# Patient Record
Sex: MALE | Race: WHITE | NOT HISPANIC OR LATINO | URBAN - METROPOLITAN AREA
[De-identification: names, ages, dates, MRNs, and addresses within clinical notes are randomized per-mention and may not be internally consistent; named-entity substitution may affect disease eponyms.]

---

## 2023-04-18 LAB
ALANINE AMINOTRANSFERASE (SGPT) (U/L) IN SER/PLAS: 20 U/L (ref 10–52)
ALBUMIN (G/DL) IN SER/PLAS: 4.4 G/DL (ref 3.4–5)
ALKALINE PHOSPHATASE (U/L) IN SER/PLAS: 72 U/L (ref 33–120)
ANION GAP IN SER/PLAS: 11 MMOL/L (ref 10–20)
APPEARANCE, URINE: CLEAR
ASPARTATE AMINOTRANSFERASE (SGOT) (U/L) IN SER/PLAS: 22 U/L (ref 9–39)
BILIRUBIN TOTAL (MG/DL) IN SER/PLAS: 0.6 MG/DL (ref 0–1.2)
BILIRUBIN, URINE: NEGATIVE
BLOOD, URINE: NEGATIVE
CALCIUM (MG/DL) IN SER/PLAS: 9.4 MG/DL (ref 8.6–10.3)
CARBON DIOXIDE, TOTAL (MMOL/L) IN SER/PLAS: 30 MMOL/L (ref 21–32)
CHLORIDE (MMOL/L) IN SER/PLAS: 100 MMOL/L (ref 98–107)
CHOLESTEROL (MG/DL) IN SER/PLAS: 199 MG/DL (ref 0–199)
CHOLESTEROL IN HDL (MG/DL) IN SER/PLAS: 89.4 MG/DL
CHOLESTEROL/HDL RATIO: 2.2
COLOR, URINE: YELLOW
CREATININE (MG/DL) IN SER/PLAS: 1.05 MG/DL (ref 0.5–1.3)
ERYTHROCYTE DISTRIBUTION WIDTH (RATIO) BY AUTOMATED COUNT: 13.4 % (ref 11.5–14.5)
ERYTHROCYTE MEAN CORPUSCULAR HEMOGLOBIN CONCENTRATION (G/DL) BY AUTOMATED: 32.9 G/DL (ref 32–36)
ERYTHROCYTE MEAN CORPUSCULAR VOLUME (FL) BY AUTOMATED COUNT: 95 FL (ref 80–100)
ERYTHROCYTES (10*6/UL) IN BLOOD BY AUTOMATED COUNT: 4.91 X10E12/L (ref 4.5–5.9)
GFR MALE: >90 ML/MIN/1.73M2
GLUCOSE (MG/DL) IN SER/PLAS: 88 MG/DL (ref 74–99)
GLUCOSE, URINE: NEGATIVE MG/DL
HEMATOCRIT (%) IN BLOOD BY AUTOMATED COUNT: 46.8 % (ref 41–52)
HEMOGLOBIN (G/DL) IN BLOOD: 15.4 G/DL (ref 13.5–17.5)
KETONES, URINE: NEGATIVE MG/DL
LDL: 85 MG/DL (ref 0–99)
LEUKOCYTE ESTERASE, URINE: ABNORMAL
LEUKOCYTES (10*3/UL) IN BLOOD BY AUTOMATED COUNT: 7.3 X10E9/L (ref 4.4–11.3)
MUCUS, URINE: NORMAL /LPF
NITRITE, URINE: NEGATIVE
NRBC (PER 100 WBCS) BY AUTOMATED COUNT: 0 /100 WBC (ref 0–0)
PH, URINE: 5 (ref 5–8)
PLATELETS (10*3/UL) IN BLOOD AUTOMATED COUNT: 295 X10E9/L (ref 150–450)
POTASSIUM (MMOL/L) IN SER/PLAS: 4 MMOL/L (ref 3.5–5.3)
PROTEIN TOTAL: 7 G/DL (ref 6.4–8.2)
PROTEIN, URINE: NEGATIVE MG/DL
RBC, URINE: <1 /HPF (ref 0–5)
SODIUM (MMOL/L) IN SER/PLAS: 137 MMOL/L (ref 136–145)
SPECIFIC GRAVITY, URINE: 1.02 (ref 1–1.03)
THYROTROPIN (MIU/L) IN SER/PLAS BY DETECTION LIMIT <= 0.05 MIU/L: 3.17 MIU/L (ref 0.44–3.98)
TRIGLYCERIDE (MG/DL) IN SER/PLAS: 123 MG/DL (ref 0–149)
UREA NITROGEN (MG/DL) IN SER/PLAS: 19 MG/DL (ref 6–23)
UROBILINOGEN, URINE: <2 MG/DL (ref 0–1.9)
VLDL: 25 MG/DL (ref 0–40)
WBC, URINE: <1 /HPF (ref 0–5)

## 2023-04-20 LAB
6-ACETYLMORPHINE: <25 NG/ML
7-AMINOCLONAZEPAM: <25 NG/ML
ALPHA-HYDROXYALPRAZOLAM: <25 NG/ML
ALPHA-HYDROXYMIDAZOLAM: <25 NG/ML
ALPRAZOLAM: <25 NG/ML
AMPHETAMINE (PRESENCE) IN URINE BY SCREEN METHOD: NORMAL
BARBITURATES PRESENCE IN URINE BY SCREEN METHOD: NORMAL
CANNABINOIDS IN URINE BY SCREEN METHOD: NORMAL
CHLORDIAZEPOXIDE: <25 NG/ML
CLONAZEPAM: <25 NG/ML
COCAINE (PRESENCE) IN URINE BY SCREEN METHOD: NORMAL
CODEINE: <50 NG/ML
CREATINE, URINE FOR DRUG: 169.3 MG/DL
DIAZEPAM: <25 NG/ML
DRUG SCREEN COMMENT URINE: NORMAL
EDDP: <25 NG/ML
FENTANYL CONFIRMATION, URINE: <2.5 NG/ML
HYDROCODONE: <25 NG/ML
HYDROMORPHONE: <25 NG/ML
LORAZEPAM: <25 NG/ML
METHADONE CONFIRMATION,URINE: <25 NG/ML
MIDAZOLAM: <25 NG/ML
MORPHINE URINE: <50 NG/ML
NORDIAZEPAM: <25 NG/ML
NORFENTANYL: <2.5 NG/ML
NORHYDROCODONE: <25 NG/ML
NOROXYCODONE: <25 NG/ML
O-DESMETHYLTRAMADOL: <50 NG/ML
OXAZEPAM: <25 NG/ML
OXYCODONE: <25 NG/ML
OXYMORPHONE: <25 NG/ML
PHENCYCLIDINE (PRESENCE) IN URINE BY SCREEN METHOD: NORMAL
TEMAZEPAM: <25 NG/ML
TRAMADOL: <50 NG/ML
ZOLPIDEM METABOLITE (ZCA): <25 NG/ML
ZOLPIDEM: <25 NG/ML

## 2023-06-16 DIAGNOSIS — F41.9 ANXIETY DISORDER, UNSPECIFIED: ICD-10-CM

## 2023-06-16 DIAGNOSIS — J30.9 ALLERGIC RHINITIS, UNSPECIFIED: ICD-10-CM

## 2023-06-16 DIAGNOSIS — I10 ESSENTIAL (PRIMARY) HYPERTENSION: ICD-10-CM

## 2023-06-16 PROBLEM — R59.0 LAD (LYMPHADENOPATHY), CERVICAL: Status: ACTIVE | Noted: 2023-06-16

## 2023-06-16 PROBLEM — R46.81 OBSESSIVE BEHAVIORS: Status: ACTIVE | Noted: 2023-06-16

## 2023-06-16 PROBLEM — I47.10 PSVT (PAROXYSMAL SUPRAVENTRICULAR TACHYCARDIA) (CMS-HCC): Status: ACTIVE | Noted: 2023-06-16

## 2023-06-16 PROBLEM — K50.90 CROHN'S DISEASE (MULTI): Status: ACTIVE | Noted: 2023-06-16

## 2023-06-16 PROBLEM — R76.8 ANA POSITIVE: Status: ACTIVE | Noted: 2023-06-16

## 2023-06-16 PROBLEM — G43.109 MIGRAINE WITH AURA AND WITHOUT STATUS MIGRAINOSUS, NOT INTRACTABLE: Status: ACTIVE | Noted: 2023-06-16

## 2023-06-16 PROBLEM — R41.840 ATTENTION DEFICIT: Status: ACTIVE | Noted: 2023-06-16

## 2023-06-16 PROBLEM — F41.0 PANIC ATTACKS: Status: ACTIVE | Noted: 2023-06-16

## 2023-06-16 RX ORDER — LISINOPRIL 20 MG/1
20 TABLET ORAL DAILY
Qty: 30 TABLET | Refills: 0 | Status: SHIPPED | OUTPATIENT
Start: 2023-06-16 | End: 2023-07-17

## 2023-06-16 RX ORDER — FLUOXETINE HYDROCHLORIDE 40 MG/1
1 CAPSULE ORAL DAILY
COMMUNITY
Start: 2021-04-16 | End: 2023-10-27 | Stop reason: SDUPTHER

## 2023-06-16 RX ORDER — LISINOPRIL 20 MG/1
1 TABLET ORAL DAILY
COMMUNITY
Start: 2020-05-19 | End: 2024-01-04 | Stop reason: SDUPTHER

## 2023-06-16 RX ORDER — MONTELUKAST SODIUM 10 MG/1
1 TABLET ORAL NIGHTLY
COMMUNITY
Start: 2022-06-23

## 2023-06-16 RX ORDER — FLUOXETINE HYDROCHLORIDE 40 MG/1
40 CAPSULE ORAL DAILY
Qty: 30 CAPSULE | Refills: 0 | Status: SHIPPED | OUTPATIENT
Start: 2023-06-16 | End: 2023-07-17

## 2023-06-16 RX ORDER — MONTELUKAST SODIUM 10 MG/1
10 TABLET ORAL NIGHTLY
Qty: 30 TABLET | Refills: 0 | Status: SHIPPED | OUTPATIENT
Start: 2023-06-16 | End: 2023-07-17

## 2023-07-17 DIAGNOSIS — J30.9 ALLERGIC RHINITIS, UNSPECIFIED: ICD-10-CM

## 2023-07-17 DIAGNOSIS — F41.9 ANXIETY DISORDER, UNSPECIFIED: ICD-10-CM

## 2023-07-17 DIAGNOSIS — I10 ESSENTIAL (PRIMARY) HYPERTENSION: ICD-10-CM

## 2023-07-17 RX ORDER — PREDNISONE 20 MG/1
40 TABLET ORAL DAILY
COMMUNITY
Start: 2022-07-28 | End: 2023-08-22 | Stop reason: WASHOUT

## 2023-07-17 RX ORDER — AMOXICILLIN 500 MG/1
500 TABLET, FILM COATED ORAL 3 TIMES DAILY
COMMUNITY
Start: 2023-04-14 | End: 2023-08-22 | Stop reason: WASHOUT

## 2023-07-17 RX ORDER — ALPRAZOLAM 0.5 MG/1
TABLET ORAL
COMMUNITY
Start: 2019-06-26 | End: 2023-08-22 | Stop reason: SDUPTHER

## 2023-07-17 RX ORDER — METHYLPREDNISOLONE 4 MG/1
TABLET ORAL
COMMUNITY
Start: 2023-04-14 | End: 2023-08-22 | Stop reason: WASHOUT

## 2023-07-17 RX ORDER — LISINOPRIL 20 MG/1
TABLET ORAL
Qty: 90 TABLET | Refills: 0 | Status: SHIPPED | OUTPATIENT
Start: 2023-07-17 | End: 2023-08-22 | Stop reason: SDUPTHER

## 2023-07-17 RX ORDER — FLUOXETINE HYDROCHLORIDE 40 MG/1
CAPSULE ORAL
Qty: 90 CAPSULE | Refills: 1 | Status: SHIPPED | OUTPATIENT
Start: 2023-07-17 | End: 2023-08-22 | Stop reason: SDUPTHER

## 2023-07-17 RX ORDER — IBUPROFEN 800 MG/1
800 TABLET ORAL EVERY 8 HOURS PRN
COMMUNITY
Start: 2023-04-14 | End: 2023-08-22 | Stop reason: WASHOUT

## 2023-07-17 RX ORDER — ALBUTEROL SULFATE 90 UG/1
AEROSOL, METERED RESPIRATORY (INHALATION)
COMMUNITY
End: 2024-01-08 | Stop reason: SDUPTHER

## 2023-07-17 RX ORDER — SUMATRIPTAN 50 MG/1
TABLET, FILM COATED ORAL
COMMUNITY
Start: 2022-06-23 | End: 2023-10-23 | Stop reason: SDUPTHER

## 2023-07-17 RX ORDER — MONTELUKAST SODIUM 10 MG/1
10 TABLET ORAL NIGHTLY
Qty: 90 TABLET | Refills: 1 | Status: SHIPPED | OUTPATIENT
Start: 2023-07-17 | End: 2023-08-22 | Stop reason: SDUPTHER

## 2023-07-17 RX ORDER — MOMETASONE FUROATE 100 UG/1
AEROSOL RESPIRATORY (INHALATION) 2 TIMES DAILY
COMMUNITY
Start: 2018-12-21

## 2023-07-17 RX ORDER — AZITHROMYCIN 250 MG/1
TABLET, FILM COATED ORAL
COMMUNITY
Start: 2022-11-04 | End: 2023-08-22 | Stop reason: WASHOUT

## 2023-08-22 ENCOUNTER — OFFICE VISIT (OUTPATIENT)
Dept: PRIMARY CARE | Facility: CLINIC | Age: 43
End: 2023-08-22
Payer: COMMERCIAL

## 2023-08-22 VITALS
DIASTOLIC BLOOD PRESSURE: 70 MMHG | HEART RATE: 82 BPM | SYSTOLIC BLOOD PRESSURE: 122 MMHG | BODY MASS INDEX: 29.04 KG/M2 | WEIGHT: 191 LBS

## 2023-08-22 DIAGNOSIS — R00.2 HEART PALPITATIONS: Primary | ICD-10-CM

## 2023-08-22 DIAGNOSIS — L81.9 HYPOPIGMENTATION: ICD-10-CM

## 2023-08-22 DIAGNOSIS — I47.10 PSVT (PAROXYSMAL SUPRAVENTRICULAR TACHYCARDIA) (CMS-HCC): ICD-10-CM

## 2023-08-22 DIAGNOSIS — K50.918 CROHN'S DISEASE WITH OTHER COMPLICATION, UNSPECIFIED GASTROINTESTINAL TRACT LOCATION (MULTI): ICD-10-CM

## 2023-08-22 DIAGNOSIS — J30.2 SEASONAL ALLERGIES: ICD-10-CM

## 2023-08-22 DIAGNOSIS — F41.9 ANXIETY: ICD-10-CM

## 2023-08-22 PROCEDURE — 93000 ELECTROCARDIOGRAM COMPLETE: CPT | Performed by: INTERNAL MEDICINE

## 2023-08-22 PROCEDURE — 99214 OFFICE O/P EST MOD 30 MIN: CPT | Performed by: INTERNAL MEDICINE

## 2023-08-22 PROCEDURE — 3074F SYST BP LT 130 MM HG: CPT | Performed by: INTERNAL MEDICINE

## 2023-08-22 PROCEDURE — 3078F DIAST BP <80 MM HG: CPT | Performed by: INTERNAL MEDICINE

## 2023-08-22 RX ORDER — ALPRAZOLAM 0.5 MG/1
0.5 TABLET ORAL DAILY PRN
Qty: 20 TABLET | Refills: 0 | Status: SHIPPED | OUTPATIENT
Start: 2023-08-22 | End: 2024-03-12 | Stop reason: SDUPTHER

## 2023-08-22 ASSESSMENT — ENCOUNTER SYMPTOMS
HEADACHES: 0
DIARRHEA: 0
CHEST TIGHTNESS: 0
SHORTNESS OF BREATH: 0
LIGHT-HEADEDNESS: 0
NERVOUS/ANXIOUS: 1
VOMITING: 0
ABDOMINAL PAIN: 0
NAUSEA: 0
RHINORRHEA: 0
PALPITATIONS: 1
CONSTIPATION: 0
COUGH: 0
SINUS PAIN: 0
SINUS PRESSURE: 0
DIZZINESS: 0

## 2023-08-22 NOTE — ASSESSMENT & PLAN NOTE
On Fluoxetine at 40 mg  Pt is reluctant to increase dose  Using Alprazolam as needed  Hoping to cope with mood/anxiety without medications

## 2023-08-22 NOTE — ASSESSMENT & PLAN NOTE
Worsening congestion  Would like pt to start on daily Flonase with his antihistamine   If symptoms worsen will consider kenalog injection

## 2023-08-22 NOTE — PROGRESS NOTES
Subjective   Patient ID: Thai Cristobal is a 42 y.o. male who presents for Follow-up (6m/heart palpitations thinks related to anxiety/allergy shot/rash on forehead).    HPI    Pt here in 6 month follow up.  He tells me since last visit his work has been very stressful.  He stepped down from the position he was at.  He is unsure if he will continue to work there.  He tells me he will be totally fine on weekends but once at work within 1 hour he will feel heart racing/fluttering.  He has had to take Alprazolam to help lessen symptoms.  This has occurred over the last 3-4 weeks.      His breathing is good.  He is using inhaler.  He feels more congested due to seasonal allergies.  He is on Singulair.  He uses some Flonase as well but not consistently.      Review of Systems   HENT:  Positive for congestion. Negative for postnasal drip, rhinorrhea, sinus pressure and sinus pain.    Respiratory:  Negative for cough, chest tightness and shortness of breath.    Cardiovascular:  Positive for palpitations. Negative for chest pain and leg swelling.   Gastrointestinal:  Negative for abdominal pain, constipation, diarrhea, nausea and vomiting.   Neurological:  Negative for dizziness, light-headedness and headaches.   Psychiatric/Behavioral:  The patient is nervous/anxious.        Objective   /70   Pulse 82   Wt 86.6 kg (191 lb)   BMI 29.04 kg/m²    Physical Exam  Constitutional:       Appearance: Normal appearance.   Cardiovascular:      Rate and Rhythm: Normal rate and regular rhythm.      Pulses: Normal pulses.      Heart sounds: Normal heart sounds.   Pulmonary:      Effort: Pulmonary effort is normal.      Breath sounds: Normal breath sounds.   Abdominal:      General: Bowel sounds are normal.      Palpations: Abdomen is soft.   Musculoskeletal:      Right lower leg: No edema.      Left lower leg: No edema.   Lymphadenopathy:      Cervical: No cervical adenopathy.   Skin:     Comments: Hypopigmented spots noted on  forehead    Neurological:      Mental Status: He is alert and oriented to person, place, and time.   Psychiatric:         Mood and Affect: Mood normal.         Behavior: Behavior normal.         Thought Content: Thought content normal.         Judgment: Judgment normal.         Assessment/Plan   Problem List Items Addressed This Visit       Anxiety     On Fluoxetine at 40 mg  Pt is reluctant to increase dose  Using Alprazolam as needed  Hoping to cope with mood/anxiety without medications          Relevant Medications    ALPRAZolam (Xanax) 0.5 mg tablet    Other Relevant Orders    Follow Up In Primary Care - Health Maintenance    Crohn's disease (CMS/[facility])     On infusions every 6 weeks  He follows with GI through [facility]          Relevant Orders    Follow Up In Primary Care - [facility]    PSVT (paroxysmal supraventricular tachycardia) (CMS/[facility])     Feeling it more of late due to stress          Relevant Orders    ECG 12 lead (Completed)    Follow Up In Primary Care - [facility]    Hypopigmentation     Likely secondary to eczema and lack of ability to tan  Would just moisturize area well  Should go away          Relevant Orders    Follow Up In Primary Care - [facility]    Seasonal allergies     Worsening congestion  Would like pt to start on daily Flonase with his antihistamine   If symptoms worsen will consider kenalog injection             Other Visit Diagnoses       Heart palpitations    -  Primary    Relevant Orders    ECG 12 lead (Completed)    Follow Up In Primary Care - Health Maintenance

## 2023-08-22 NOTE — ASSESSMENT & PLAN NOTE
Likely secondary to eczema and lack of ability to tan  Would just moisturize area well  Should go away

## 2023-08-22 NOTE — PATIENT INSTRUCTIONS
"Consider increase in Fluoxetine dose if stress levels remain similar in upcoming months  Consider seeing/talking with counselor (psychology today) to help manage symptoms/stress from work  Use alprazolam but caution frequency as this medication has potential for tolerance/dependence  We did EKG today to make sure heart fluttering is not more than stress/anxiety induced-it was normal   For allergies would add Flonase or other nasal corticosteroids 2 times a day for 1 week then decrease to 1 time daily and be consistent   If your allergy symptoms persist or worsen call to arrange for steroid injection   Hypopigmented spots of forehead are due to eczema (dry patches of skin) and lack of ability to \"tan\"-should go away; use good moisturizer to face to help prevent dryness   Follow up here in 6 months for full physical   "

## 2023-10-23 DIAGNOSIS — G43.109 MIGRAINE WITH AURA AND WITHOUT STATUS MIGRAINOSUS, NOT INTRACTABLE: ICD-10-CM

## 2023-10-23 RX ORDER — SUMATRIPTAN 50 MG/1
50 TABLET, FILM COATED ORAL ONCE AS NEEDED
Qty: 9 TABLET | Refills: 3 | Status: SHIPPED | OUTPATIENT
Start: 2023-10-23

## 2023-10-27 DIAGNOSIS — F41.0 PANIC ATTACKS: Primary | ICD-10-CM

## 2023-10-27 RX ORDER — FLUOXETINE HYDROCHLORIDE 40 MG/1
40 CAPSULE ORAL DAILY
Qty: 90 CAPSULE | Refills: 1 | Status: SHIPPED | OUTPATIENT
Start: 2023-10-27 | End: 2024-01-24 | Stop reason: SDUPTHER

## 2024-01-03 ENCOUNTER — TELEPHONE (OUTPATIENT)
Dept: PRIMARY CARE | Facility: CLINIC | Age: 44
End: 2024-01-03
Payer: COMMERCIAL

## 2024-01-03 DIAGNOSIS — I10 BENIGN ESSENTIAL HTN: Primary | ICD-10-CM

## 2024-01-03 NOTE — TELEPHONE ENCOUNTER
Yes can increase to either 30 mg/day so 1.5 tablets of the 20 mg or can go up to 40 mg/day so 2 tablets of the 20 mg  It comes in 40 mg dose so if he goes to 40 mg/day can send in new rx   Continue home BP checks with goal of <130/80  Want him to call with update in 2 weeks

## 2024-01-03 NOTE — TELEPHONE ENCOUNTER
Thai called today stating BP is elevated, 150/90 130/90, he is taking Lisinopril noticed this yesterday, wants to know if he should  increase his medication? It has  been as high as 160/100 and lowest was 125/80.      Please advise

## 2024-01-03 NOTE — TELEPHONE ENCOUNTER
I spoke with Thai he would like to increase to 40mg, please send rx into [facility] and he will call in 2 weeks to update how he is doing on new dose and BP readings.

## 2024-01-04 RX ORDER — LISINOPRIL 40 MG/1
40 TABLET ORAL DAILY
Qty: 90 TABLET | Refills: 1 | Status: SHIPPED | OUTPATIENT
Start: 2024-01-04

## 2024-01-08 ENCOUNTER — OFFICE VISIT (OUTPATIENT)
Dept: PRIMARY CARE | Facility: CLINIC | Age: 44
End: 2024-01-08
Payer: COMMERCIAL

## 2024-01-08 VITALS
BODY MASS INDEX: 28.24 KG/M2 | DIASTOLIC BLOOD PRESSURE: 100 MMHG | SYSTOLIC BLOOD PRESSURE: 130 MMHG | WEIGHT: 185.7 LBS | HEART RATE: 100 BPM

## 2024-01-08 DIAGNOSIS — I10 ELEVATED HEART RATE WITH ELEVATED BLOOD PRESSURE AND DIAGNOSIS OF HYPERTENSION: ICD-10-CM

## 2024-01-08 DIAGNOSIS — J30.2 SEASONAL ALLERGIES: ICD-10-CM

## 2024-01-08 DIAGNOSIS — R00.9 ELEVATED HEART RATE WITH ELEVATED BLOOD PRESSURE AND DIAGNOSIS OF HYPERTENSION: ICD-10-CM

## 2024-01-08 DIAGNOSIS — R19.7 DIARRHEA, UNSPECIFIED TYPE: Primary | ICD-10-CM

## 2024-01-08 LAB
BASOPHILS # BLD AUTO: 0.08 X10*3/UL (ref 0–0.1)
BASOPHILS NFR BLD AUTO: 1.6 %
EOSINOPHIL # BLD AUTO: 0.14 X10*3/UL (ref 0–0.7)
EOSINOPHIL NFR BLD AUTO: 2.8 %
ERYTHROCYTE [DISTWIDTH] IN BLOOD BY AUTOMATED COUNT: 13 % (ref 11.5–14.5)
HCT VFR BLD AUTO: 47.9 % (ref 41–52)
HGB BLD-MCNC: 16.2 G/DL (ref 13.5–17.5)
IMM GRANULOCYTES # BLD AUTO: 0.02 X10*3/UL (ref 0–0.7)
IMM GRANULOCYTES NFR BLD AUTO: 0.4 % (ref 0–0.9)
LYMPHOCYTES # BLD AUTO: 1.13 X10*3/UL (ref 1.2–4.8)
LYMPHOCYTES NFR BLD AUTO: 22.2 %
MCH RBC QN AUTO: 31.3 PG (ref 26–34)
MCHC RBC AUTO-ENTMCNC: 33.8 G/DL (ref 32–36)
MCV RBC AUTO: 93 FL (ref 80–100)
MONOCYTES # BLD AUTO: 0.68 X10*3/UL (ref 0.1–1)
MONOCYTES NFR BLD AUTO: 13.4 %
NEUTROPHILS # BLD AUTO: 3.03 X10*3/UL (ref 1.2–7.7)
NEUTROPHILS NFR BLD AUTO: 59.6 %
NRBC BLD-RTO: 0 /100 WBCS (ref 0–0)
PLATELET # BLD AUTO: 291 X10*3/UL (ref 150–450)
RBC # BLD AUTO: 5.18 X10*6/UL (ref 4.5–5.9)
WBC # BLD AUTO: 5.1 X10*3/UL (ref 4.4–11.3)

## 2024-01-08 PROCEDURE — 83690 ASSAY OF LIPASE: CPT

## 2024-01-08 PROCEDURE — 36415 COLL VENOUS BLD VENIPUNCTURE: CPT

## 2024-01-08 PROCEDURE — 1036F TOBACCO NON-USER: CPT | Performed by: INTERNAL MEDICINE

## 2024-01-08 PROCEDURE — 80053 COMPREHEN METABOLIC PANEL: CPT

## 2024-01-08 PROCEDURE — 85025 COMPLETE CBC W/AUTO DIFF WBC: CPT

## 2024-01-08 PROCEDURE — 99213 OFFICE O/P EST LOW 20 MIN: CPT | Performed by: INTERNAL MEDICINE

## 2024-01-08 PROCEDURE — 3075F SYST BP GE 130 - 139MM HG: CPT | Performed by: INTERNAL MEDICINE

## 2024-01-08 PROCEDURE — 3080F DIAST BP >= 90 MM HG: CPT | Performed by: INTERNAL MEDICINE

## 2024-01-08 RX ORDER — ALBUTEROL SULFATE 90 UG/1
1-2 AEROSOL, METERED RESPIRATORY (INHALATION) EVERY 4 HOURS PRN
Qty: 18 G | Refills: 1 | Status: SHIPPED | OUTPATIENT
Start: 2024-01-08

## 2024-01-08 ASSESSMENT — ENCOUNTER SYMPTOMS
NAUSEA: 1
SHORTNESS OF BREATH: 1
ABDOMINAL PAIN: 0
APPETITE CHANGE: 1
STRIDOR: 0
BLOOD IN STOOL: 0
DIFFICULTY URINATING: 0
WHEEZING: 0
VOMITING: 0
ACTIVITY CHANGE: 0
CHOKING: 0
DIZZINESS: 0
COUGH: 0
CHILLS: 0
CONSTIPATION: 0
ABDOMINAL DISTENTION: 0
HYPERTENSION: 1
FLANK PAIN: 0
FACIAL ASYMMETRY: 0
UNEXPECTED WEIGHT CHANGE: 0
DIARRHEA: 1
APNEA: 0
FATIGUE: 1
RECTAL PAIN: 0
LIGHT-HEADEDNESS: 1
HEADACHES: 0
ANAL BLEEDING: 0
CHEST TIGHTNESS: 0
FEVER: 0
FREQUENCY: 0

## 2024-01-08 NOTE — PATIENT INSTRUCTIONS
BP and HR elevations are likely secondary to acute illness that you are having; continue for now the Lisinopril at 40 mg/day   For diarrhea main concern is for staying hydration-gatorade/powerade to support electrolytes; bland/brat diet (toast/banana/rice/apples)  We did labs today and will call with results  If not getting better in next few days and/or any symptoms worsen please let me know   Follow up based on results

## 2024-01-08 NOTE — PROGRESS NOTES
Subjective   Patient ID: Thai Cristobal is a 43 y.o. male who presents for Hypertension, Weight Loss, Shortness of Breath, and Diarrhea.    Hypertension  Associated symptoms include shortness of breath. Pertinent negatives include no chest pain or headaches.   Shortness of Breath  Pertinent negatives include no abdominal pain, chest pain, fever, headaches, vomiting or wheezing.   Diarrhea   Pertinent negatives include no abdominal pain, chills, coughing, fever, headaches or vomiting.       Pt here in acute visit.  He tells me last week he noted his BP going high as well as his HR.  He thought it may be due to eating poorly last week (fast food multiple times in one day).  He then noted he was having diarrhea daily (8-9/day) for last 1 week; his appetite is diminished as well. He has started pepcid 20 mg daily due to some epigastric discomfort.  The BM's are all liquid; no blood noted.  No travel history.  No fevers or chills.  He is tired and has a bad taste in his mouth.  He also feels easily winded.  He felt a bit lightheaded last week and ringing in ears.  He was exposed to people on [place] day that did have GI virus and Covid.  He tested for covid yesterday and it was negative.      Review of Systems   Constitutional:  Positive for appetite change and fatigue. Negative for activity change, chills, fever and unexpected weight change.   Respiratory:  Positive for shortness of breath. Negative for apnea, cough, choking, chest tightness, wheezing and stridor.    Cardiovascular:  Negative for chest pain.   Gastrointestinal:  Positive for diarrhea and nausea. Negative for abdominal distention, abdominal pain, anal bleeding, blood in stool, constipation, rectal pain and vomiting.   Genitourinary:  Negative for difficulty urinating, flank pain and frequency.   Neurological:  Positive for light-headedness. Negative for dizziness, facial asymmetry and headaches.        Brain fog        Objective   BP (!) 130/100 (BP  Location: Left arm, Patient Position: Sitting)   Pulse 100   Wt 84.2 kg (185 lb 11.2 oz)   BMI 28.24 kg/m²    Physical Exam  Constitutional:       Appearance: Normal appearance.   HENT:      Right Ear: Tympanic membrane normal.      Left Ear: Tympanic membrane normal.      Nose: Nose normal.      Mouth/Throat:      Mouth: Mucous membranes are moist.      Pharynx: Oropharynx is clear.   Cardiovascular:      Rate and Rhythm: Normal rate and regular rhythm.      Heart sounds: Normal heart sounds.   Pulmonary:      Effort: Pulmonary effort is normal.      Breath sounds: Normal breath sounds.   Abdominal:      General: Abdomen is flat. Bowel sounds are normal.      Palpations: Abdomen is soft.   Lymphadenopathy:      Cervical: No cervical adenopathy.   Neurological:      Mental Status: He is alert and oriented to person, place, and time.   Psychiatric:         Mood and Affect: Mood normal.         Assessment/Plan   Problem List Items Addressed This Visit       Elevated heart rate with elevated blood pressure and diagnosis of hypertension     This is likely secondary to acute illness he is having; would expect his to return to normal once illness resolves; on Lisinopril   Possible this is covid but home test negative and he is past the 5 day luly so testing is of mute point   We will do blood work today and see if WBC is elevated  Rest, bland diet, push fluids for electrolytes due to diarrhea   If symptoms persist later this week will need to see GI as this could be Crohn's flare          Seasonal allergies    Relevant Medications    albuterol 90 mcg/actuation inhaler     Other Visit Diagnoses       Diarrhea, unspecified type    -  Primary    Relevant Orders    CBC and Auto Differential    Comprehensive Metabolic Panel    Lipase

## 2024-01-08 NOTE — ASSESSMENT & PLAN NOTE
This is likely secondary to acute illness he is having; would expect his to return to normal once illness resolves; on Lisinopril   Possible this is covid but home test negative and he is past the 5 day luly so testing is of mute point   We will do blood work today and see if WBC is elevated  Rest, bland diet, push fluids for electrolytes due to diarrhea   If symptoms persist later this week will need to see GI as this could be Crohn's flare

## 2024-01-09 ENCOUNTER — TELEPHONE (OUTPATIENT)
Dept: PRIMARY CARE | Facility: CLINIC | Age: 44
End: 2024-01-09
Payer: COMMERCIAL

## 2024-01-09 LAB
ALBUMIN SERPL BCP-MCNC: 4.4 G/DL (ref 3.4–5)
ALP SERPL-CCNC: 80 U/L (ref 33–120)
ALT SERPL W P-5'-P-CCNC: 24 U/L (ref 10–52)
ANION GAP SERPL CALC-SCNC: 12 MMOL/L (ref 10–20)
AST SERPL W P-5'-P-CCNC: 25 U/L (ref 9–39)
BILIRUB SERPL-MCNC: 0.6 MG/DL (ref 0–1.2)
BUN SERPL-MCNC: 15 MG/DL (ref 6–23)
CALCIUM SERPL-MCNC: 9.4 MG/DL (ref 8.6–10.6)
CHLORIDE SERPL-SCNC: 103 MMOL/L (ref 98–107)
CO2 SERPL-SCNC: 24 MMOL/L (ref 21–32)
CREAT SERPL-MCNC: 1.16 MG/DL (ref 0.5–1.3)
EGFRCR SERPLBLD CKD-EPI 2021: 80 ML/MIN/1.73M*2
GLUCOSE SERPL-MCNC: 98 MG/DL (ref 74–99)
LIPASE SERPL-CCNC: 13 U/L (ref 9–82)
POTASSIUM SERPL-SCNC: 4.3 MMOL/L (ref 3.5–5.3)
PROT SERPL-MCNC: 6.8 G/DL (ref 6.4–8.2)
SODIUM SERPL-SCNC: 135 MMOL/L (ref 136–145)

## 2024-01-09 NOTE — TELEPHONE ENCOUNTER
Ok would give his symptoms a bit more time-if this is not improving after this week will need to contact GI

## 2024-01-09 NOTE — TELEPHONE ENCOUNTER
He has tried immodium. It seems like it slowed his bowels down, but still having diarrhea. Though some formed stool is happening.

## 2024-01-11 ENCOUNTER — TELEPHONE (OUTPATIENT)
Dept: PRIMARY CARE | Facility: CLINIC | Age: 44
End: 2024-01-11
Payer: COMMERCIAL

## 2024-01-11 NOTE — TELEPHONE ENCOUNTER
You saw Thai Tuesday and wanted him to call with a update, in some ways feeling better, doesn't feel sick and GI issues are slowly improving. He has a scope on 02/16/24, But still having elevated  140 /90 10 HR  resting. Still getting out of breath easily. How do we rule out a heart issue?     Please advise

## 2024-01-11 NOTE — TELEPHONE ENCOUNTER
Since he is not feeling 100% I wouldn't recommend any testing right now-high BP and HR is normal when sick with acute illness  Would give this another 1 week and if not better/improving by mid of next week I need to see him to address

## 2024-01-24 ENCOUNTER — OFFICE VISIT (OUTPATIENT)
Dept: PRIMARY CARE | Facility: CLINIC | Age: 44
End: 2024-01-24
Payer: COMMERCIAL

## 2024-01-24 VITALS — DIASTOLIC BLOOD PRESSURE: 82 MMHG | SYSTOLIC BLOOD PRESSURE: 120 MMHG | HEART RATE: 80 BPM

## 2024-01-24 DIAGNOSIS — R07.89 CHEST PRESSURE: ICD-10-CM

## 2024-01-24 DIAGNOSIS — F41.0 PANIC ATTACKS: ICD-10-CM

## 2024-01-24 DIAGNOSIS — R46.81 OBSESSIVE BEHAVIORS: ICD-10-CM

## 2024-01-24 DIAGNOSIS — R00.2 HEART PALPITATIONS: ICD-10-CM

## 2024-01-24 DIAGNOSIS — F41.9 ANXIETY: ICD-10-CM

## 2024-01-24 DIAGNOSIS — I47.10 PSVT (PAROXYSMAL SUPRAVENTRICULAR TACHYCARDIA) (CMS-HCC): ICD-10-CM

## 2024-01-24 DIAGNOSIS — I10 PRIMARY HYPERTENSION: Primary | ICD-10-CM

## 2024-01-24 PROCEDURE — 1036F TOBACCO NON-USER: CPT | Performed by: INTERNAL MEDICINE

## 2024-01-24 PROCEDURE — 3074F SYST BP LT 130 MM HG: CPT | Performed by: INTERNAL MEDICINE

## 2024-01-24 PROCEDURE — 3079F DIAST BP 80-89 MM HG: CPT | Performed by: INTERNAL MEDICINE

## 2024-01-24 PROCEDURE — 99214 OFFICE O/P EST MOD 30 MIN: CPT | Performed by: INTERNAL MEDICINE

## 2024-01-24 RX ORDER — FLUOXETINE HYDROCHLORIDE 40 MG/1
40 CAPSULE ORAL DAILY
Qty: 90 CAPSULE | Refills: 1 | Status: SHIPPED | OUTPATIENT
Start: 2024-01-24

## 2024-01-24 RX ORDER — FLUOXETINE HYDROCHLORIDE 20 MG/1
20 CAPSULE ORAL DAILY
Qty: 90 CAPSULE | Refills: 1 | Status: SHIPPED | OUTPATIENT
Start: 2024-01-24 | End: 2024-07-22

## 2024-01-24 ASSESSMENT — ENCOUNTER SYMPTOMS
SLEEP DISTURBANCE: 0
NERVOUS/ANXIOUS: 1
DYSPHORIC MOOD: 0
CHEST TIGHTNESS: 0
APNEA: 0
WHEEZING: 0
CHOKING: 0
SHORTNESS OF BREATH: 1
STRIDOR: 0
COUGH: 0
PALPITATIONS: 1

## 2024-01-24 NOTE — ASSESSMENT & PLAN NOTE
Pt having more palpitations  Will have him do holter monitor for 7 days; order is in  Lessen caffeine  May be worse secondary to worsening anxiety

## 2024-01-24 NOTE — ASSESSMENT & PLAN NOTE
We will have patient get stress test due to some chest pressure he is having  Less likely cardiac in nature-normal EKG noted in recent past but he is asking to have this done for peace of mind

## 2024-01-24 NOTE — ASSESSMENT & PLAN NOTE
BP is well controlled here  ? If his cuff is accurate  Will continue current dose   Will have him bring in home cuff to make sure accurate

## 2024-01-24 NOTE — PATIENT INSTRUCTIONS
Call and schedule your stress test cardiac to rule out heart for chest pressure you are having  Call and schedule to have holter monitor put on and wear for 7 full days due to heart racing  Increase Fluoxetine to 60 mg/day by taking 1 40 mg capsule and 1 20 mg capsule daily-should see improvements within 2 weeks with higher dose  Continue all other meds as directed  Follow up here end of 2/2024 and bring BP cuff to make sure accurate

## 2024-01-24 NOTE — PROGRESS NOTES
Subjective   Patient ID: Thai Cristobal is a 43 y.o. male who presents for Follow-up (Blood pressure).    HPI    Pt here in follow up to BP.  He is on Lisinopril 40 mg/day.  He tells me at home he is still getting high readings 130-140/90-95.  He will take his BP in AM when waking up and then before bed.  He is taking Lisinopril around 8 pm.  He tells me his BP is consistent in AM and PM.      He tells me he is waking in middle of night racing.  He also notes when going up one flight of stairs he is short of breath.  He also has some chest pain-left side 1-2/10 in nature.  He describes this as pressure sensation (gas bubble).    No family history of heart disease.  Pts cholesterol is good LDL was 85 last year.      He does admit to increased anxiety of late.  He notes more compulsions/irrational thoughts.      His stomach symptoms did improve after having another entyvio infusion.  He has colonoscopy scheduled for 2/20.        Review of Systems   Respiratory:  Positive for shortness of breath. Negative for apnea, cough, choking, chest tightness, wheezing and stridor.    Cardiovascular:  Positive for chest pain and palpitations. Negative for leg swelling.   Psychiatric/Behavioral:  Negative for dysphoric mood and sleep disturbance. The patient is nervous/anxious.        Objective   /82 (BP Location: Right arm, Patient Position: Sitting)   Pulse 80    Physical Exam  Constitutional:       Appearance: Normal appearance.   Cardiovascular:      Rate and Rhythm: Normal rate and regular rhythm.      Heart sounds: Normal heart sounds.   Pulmonary:      Effort: Pulmonary effort is normal.      Breath sounds: Normal breath sounds.   Lymphadenopathy:      Cervical: No cervical adenopathy.   Neurological:      Mental Status: He is alert and oriented to person, place, and time.   Psychiatric:         Mood and Affect: Mood normal.         Behavior: Behavior normal.         Thought Content: Thought content normal.          Judgment: Judgment normal.         Assessment/Plan   Problem List Items Addressed This Visit       Anxiety     Pt noting worsening symptoms  Will increase to 60 mg of Fluoxetine          Relevant Medications    FLUoxetine (PROzac) 20 mg capsule    FLUoxetine (PROzac) 40 mg capsule    Primary hypertension - Primary     BP is well controlled here  ? If his cuff is accurate  Will continue current dose   Will have him bring in home cuff to make sure accurate          Obsessive behaviors    Panic attacks    Relevant Medications    FLUoxetine (PROzac) 40 mg capsule    PSVT (paroxysmal supraventricular tachycardia)     Pt having more palpitations  Will have him do holter monitor for 7 days; order is in  Lessen caffeine  May be worse secondary to worsening anxiety          Chest pressure     We will have patient get stress test due to some chest pressure he is having  Less likely cardiac in nature-normal EKG noted in recent past but he is asking to have this done for peace of mind         Relevant Orders    Echocardiogram Stress Test     Other Visit Diagnoses       Heart palpitations        Relevant Orders    Echocardiogram Stress Test    Holter or Event Cardiac Monitor

## 2024-01-29 PROBLEM — R11.2 NAUSEA AND VOMITING IN ADULT: Status: ACTIVE | Noted: 2024-01-25

## 2024-01-29 PROBLEM — I47.10 PAROXYSMAL SUPRAVENTRICULAR TACHYCARDIA (CMS-HCC): Status: ACTIVE | Noted: 2023-06-16

## 2024-01-29 PROBLEM — J30.9 ALLERGIC RHINITIS: Status: ACTIVE | Noted: 2024-01-29

## 2024-01-29 PROBLEM — R39.11 URINARY HESITANCY: Status: ACTIVE | Noted: 2024-01-29

## 2024-01-29 PROBLEM — G43.909 MIGRAINE: Status: ACTIVE | Noted: 2024-01-29

## 2024-01-29 PROBLEM — R76.8 POSITIVE ANTINUCLEAR ANTIBODY: Status: ACTIVE | Noted: 2023-06-16

## 2024-01-29 PROBLEM — K56.609 SBO (SMALL BOWEL OBSTRUCTION) (MULTI): Status: ACTIVE | Noted: 2024-01-25

## 2024-01-29 PROBLEM — J45.909 ASTHMA (HHS-HCC): Status: ACTIVE | Noted: 2024-01-29

## 2024-01-29 PROBLEM — G43.009 MIGRAINE WITHOUT AURA AND RESPONSIVE TO TREATMENT: Status: ACTIVE | Noted: 2023-06-16

## 2024-01-29 PROBLEM — I10 BENIGN ESSENTIAL HYPERTENSION: Status: ACTIVE | Noted: 2023-06-16

## 2024-01-29 PROBLEM — R07.89 ATYPICAL CHEST PAIN: Status: ACTIVE | Noted: 2024-01-24

## 2024-01-29 PROBLEM — R59.0 CERVICAL LYMPHADENOPATHY: Status: ACTIVE | Noted: 2023-06-16

## 2024-01-29 PROBLEM — L81.9 DISORDER OF PIGMENTATION: Status: ACTIVE | Noted: 2023-08-22

## 2024-01-29 PROBLEM — R41.840 ATTENTION DISTURBANCE: Status: ACTIVE | Noted: 2023-06-16

## 2024-01-29 PROBLEM — R00.2 PALPITATIONS: Status: ACTIVE | Noted: 2024-01-29

## 2024-01-29 PROBLEM — R19.7 DIARRHEA: Status: ACTIVE | Noted: 2024-01-29

## 2024-01-29 PROBLEM — I95.1 ORTHOSTATIC HYPOTENSION: Status: ACTIVE | Noted: 2024-01-29

## 2024-01-29 PROBLEM — R10.9 ABDOMINAL PAIN, ACUTE: Status: ACTIVE | Noted: 2024-01-25

## 2024-01-29 PROBLEM — R46.81 OBSESSIVE BEHAVIOR: Status: ACTIVE | Noted: 2023-06-16

## 2024-01-29 PROBLEM — K50.90 CROHN DISEASE (MULTI): Status: ACTIVE | Noted: 2023-12-26

## 2024-01-29 PROBLEM — R56.9 SEIZURE (MULTI): Status: ACTIVE | Noted: 2024-01-29

## 2024-01-29 PROBLEM — F32.A DEPRESSION: Status: ACTIVE | Noted: 2024-01-29

## 2024-01-30 ENCOUNTER — PATIENT OUTREACH (OUTPATIENT)
Dept: CARE COORDINATION | Facility: CLINIC | Age: 44
End: 2024-01-30
Payer: COMMERCIAL

## 2024-01-30 RX ORDER — PREDNISONE 10 MG/1
10 TABLET ORAL DAILY
COMMUNITY
End: 2024-03-12 | Stop reason: DRUGHIGH

## 2024-02-13 ENCOUNTER — PATIENT OUTREACH (OUTPATIENT)
Dept: CARE COORDINATION | Facility: CLINIC | Age: 44
End: 2024-02-13
Payer: COMMERCIAL

## 2024-02-13 NOTE — PROGRESS NOTES
Unable to reach patient for call back.   Left voicemail with call back number for patient to call if needed   If no voicemail available call attempts x 2 were made to contact the patient to assist with any questions or concerns patient may have.

## 2024-02-23 ENCOUNTER — APPOINTMENT (OUTPATIENT)
Dept: PRIMARY CARE | Facility: CLINIC | Age: 44
End: 2024-02-23
Payer: COMMERCIAL

## 2024-02-27 ENCOUNTER — APPOINTMENT (OUTPATIENT)
Dept: CARDIOLOGY | Facility: HOSPITAL | Age: 44
End: 2024-02-27
Payer: COMMERCIAL

## 2024-02-27 ENCOUNTER — ANCILLARY PROCEDURE (OUTPATIENT)
Dept: CARDIOLOGY | Facility: CLINIC | Age: 44
End: 2024-02-27
Payer: COMMERCIAL

## 2024-02-27 DIAGNOSIS — R00.2 HEART PALPITATIONS: ICD-10-CM

## 2024-02-27 PROCEDURE — 93246 EXT ECG>7D<15D RECORDING: CPT | Performed by: INTERNAL MEDICINE

## 2024-02-27 PROCEDURE — 93248 EXT ECG>7D<15D REV&INTERPJ: CPT | Performed by: INTERNAL MEDICINE

## 2024-03-12 ENCOUNTER — OFFICE VISIT (OUTPATIENT)
Dept: PRIMARY CARE | Facility: CLINIC | Age: 44
End: 2024-03-12
Payer: COMMERCIAL

## 2024-03-12 ENCOUNTER — APPOINTMENT (OUTPATIENT)
Dept: CARDIOLOGY | Facility: HOSPITAL | Age: 44
End: 2024-03-12
Payer: COMMERCIAL

## 2024-03-12 VITALS
SYSTOLIC BLOOD PRESSURE: 110 MMHG | HEIGHT: 69 IN | WEIGHT: 189.9 LBS | DIASTOLIC BLOOD PRESSURE: 76 MMHG | HEART RATE: 90 BPM | BODY MASS INDEX: 28.13 KG/M2

## 2024-03-12 DIAGNOSIS — I95.1 ORTHOSTATIC HYPOTENSION: ICD-10-CM

## 2024-03-12 DIAGNOSIS — K50.918 CROHN'S DISEASE WITH OTHER COMPLICATION, UNSPECIFIED GASTROINTESTINAL TRACT LOCATION (MULTI): ICD-10-CM

## 2024-03-12 DIAGNOSIS — I10 PRIMARY HYPERTENSION: ICD-10-CM

## 2024-03-12 DIAGNOSIS — F51.04 PSYCHOPHYSIOLOGICAL INSOMNIA: ICD-10-CM

## 2024-03-12 DIAGNOSIS — Z79.899 MEDICATION MANAGEMENT: ICD-10-CM

## 2024-03-12 DIAGNOSIS — I47.10 PSVT (PAROXYSMAL SUPRAVENTRICULAR TACHYCARDIA) (CMS-HCC): ICD-10-CM

## 2024-03-12 DIAGNOSIS — T54.2X1A ACID BURN: ICD-10-CM

## 2024-03-12 DIAGNOSIS — J45.20 MILD INTERMITTENT ASTHMA WITHOUT COMPLICATION (HHS-HCC): ICD-10-CM

## 2024-03-12 DIAGNOSIS — R00.2 HEART PALPITATIONS: ICD-10-CM

## 2024-03-12 DIAGNOSIS — L81.9 HYPOPIGMENTATION: ICD-10-CM

## 2024-03-12 DIAGNOSIS — F41.9 ANXIETY: ICD-10-CM

## 2024-03-12 DIAGNOSIS — T30.4 ACID BURN: ICD-10-CM

## 2024-03-12 DIAGNOSIS — Z00.00 ROUTINE GENERAL MEDICAL EXAMINATION AT A HEALTH CARE FACILITY: Primary | ICD-10-CM

## 2024-03-12 PROBLEM — R10.9 ABDOMINAL PAIN, ACUTE: Status: RESOLVED | Noted: 2024-01-25 | Resolved: 2024-03-12

## 2024-03-12 PROBLEM — K50.90 CROHN DISEASE (MULTI): Status: RESOLVED | Noted: 2023-12-26 | Resolved: 2024-03-12

## 2024-03-12 PROBLEM — R19.7 DIARRHEA: Status: RESOLVED | Noted: 2024-01-29 | Resolved: 2024-03-12

## 2024-03-12 PROBLEM — K56.609 SBO (SMALL BOWEL OBSTRUCTION) (MULTI): Status: RESOLVED | Noted: 2024-01-25 | Resolved: 2024-03-12

## 2024-03-12 PROBLEM — R11.2 NAUSEA AND VOMITING IN ADULT: Status: RESOLVED | Noted: 2024-01-25 | Resolved: 2024-03-12

## 2024-03-12 PROBLEM — R56.9 SEIZURE (MULTI): Status: RESOLVED | Noted: 2024-01-29 | Resolved: 2024-03-12

## 2024-03-12 LAB
AMPHETAMINES UR QL SCN: NORMAL
BARBITURATES UR QL SCN: NORMAL
BZE UR QL SCN: NORMAL
CANNABINOIDS UR QL SCN: NORMAL
CREAT UR-MCNC: 203 MG/DL (ref 20–370)
PCP UR QL SCN: NORMAL

## 2024-03-12 PROCEDURE — 80354 DRUG SCREENING FENTANYL: CPT

## 2024-03-12 PROCEDURE — 80361 OPIATES 1 OR MORE: CPT

## 2024-03-12 PROCEDURE — 80365 DRUG SCREENING OXYCODONE: CPT

## 2024-03-12 PROCEDURE — 3074F SYST BP LT 130 MM HG: CPT | Performed by: INTERNAL MEDICINE

## 2024-03-12 PROCEDURE — 80368 SEDATIVE HYPNOTICS: CPT

## 2024-03-12 PROCEDURE — 80346 BENZODIAZEPINES1-12: CPT

## 2024-03-12 PROCEDURE — 80307 DRUG TEST PRSMV CHEM ANLYZR: CPT

## 2024-03-12 PROCEDURE — 80373 DRUG SCREENING TRAMADOL: CPT

## 2024-03-12 PROCEDURE — 80358 DRUG SCREENING METHADONE: CPT

## 2024-03-12 PROCEDURE — 99396 PREV VISIT EST AGE 40-64: CPT | Performed by: INTERNAL MEDICINE

## 2024-03-12 PROCEDURE — 82570 ASSAY OF URINE CREATININE: CPT

## 2024-03-12 PROCEDURE — 3078F DIAST BP <80 MM HG: CPT | Performed by: INTERNAL MEDICINE

## 2024-03-12 PROCEDURE — 99213 OFFICE O/P EST LOW 20 MIN: CPT | Performed by: INTERNAL MEDICINE

## 2024-03-12 PROCEDURE — 1036F TOBACCO NON-USER: CPT | Performed by: INTERNAL MEDICINE

## 2024-03-12 RX ORDER — PREDNISONE 10 MG/1
20 TABLET ORAL DAILY
Qty: 180 TABLET | Refills: 0 | Status: SHIPPED
Start: 2024-03-12

## 2024-03-12 RX ORDER — TRAZODONE HYDROCHLORIDE 50 MG/1
25-50 TABLET ORAL NIGHTLY PRN
Qty: 90 TABLET | Refills: 1 | Status: SHIPPED | OUTPATIENT
Start: 2024-03-12 | End: 2025-03-12

## 2024-03-12 RX ORDER — FERROUS SULFATE 325(65) MG
325 TABLET ORAL EVERY OTHER DAY
COMMUNITY

## 2024-03-12 RX ORDER — ALPRAZOLAM 0.5 MG/1
0.5 TABLET ORAL DAILY PRN
Qty: 20 TABLET | Refills: 0 | Status: SHIPPED | OUTPATIENT
Start: 2024-03-12

## 2024-03-12 RX ORDER — OMEPRAZOLE 20 MG/1
20 TABLET, DELAYED RELEASE ORAL DAILY
Qty: 30 TABLET | Refills: 0 | COMMUNITY
Start: 2024-03-12 | End: 2025-03-12

## 2024-03-12 ASSESSMENT — ENCOUNTER SYMPTOMS
ADENOPATHY: 0
FEVER: 0
NUMBNESS: 0
CHOKING: 0
DIZZINESS: 0
BACK PAIN: 0
EYE REDNESS: 0
NERVOUS/ANXIOUS: 1
CONFUSION: 0
DYSPHORIC MOOD: 0
DIFFICULTY URINATING: 0
HEADACHES: 0
FATIGUE: 0
EYE PAIN: 0
POLYDIPSIA: 0
ACTIVITY CHANGE: 0
HEMATURIA: 0
WEAKNESS: 0
COLOR CHANGE: 0
LIGHT-HEADEDNESS: 1
CONSTIPATION: 0
HYPERACTIVE: 0
BRUISES/BLEEDS EASILY: 0
SINUS PAIN: 0
SHORTNESS OF BREATH: 0
FACIAL SWELLING: 0
MYALGIAS: 0
WOUND: 0
NECK STIFFNESS: 0
APPETITE CHANGE: 0
ANAL BLEEDING: 0
VOICE CHANGE: 0
JOINT SWELLING: 0
TREMORS: 0
HALLUCINATIONS: 0
COUGH: 0
POLYPHAGIA: 0
SINUS PRESSURE: 0
EYE DISCHARGE: 0
DYSURIA: 0
NECK PAIN: 0
FLANK PAIN: 0
AGITATION: 0
CHEST TIGHTNESS: 0
SPEECH DIFFICULTY: 0
BLOOD IN STOOL: 0
ABDOMINAL PAIN: 1
DECREASED CONCENTRATION: 0
SORE THROAT: 0
STRIDOR: 0
SLEEP DISTURBANCE: 1
EYE ITCHING: 0
WHEEZING: 0
CHILLS: 0
TROUBLE SWALLOWING: 0
FREQUENCY: 0
NAUSEA: 0
ABDOMINAL DISTENTION: 0
PHOTOPHOBIA: 0
UNEXPECTED WEIGHT CHANGE: 0
RHINORRHEA: 0
DIARRHEA: 0
DIAPHORESIS: 0
FACIAL ASYMMETRY: 0
RECTAL PAIN: 0
PALPITATIONS: 1
SEIZURES: 0
ARTHRALGIAS: 0
APNEA: 0
VOMITING: 0

## 2024-03-12 NOTE — PROGRESS NOTES
Subjective   Patient ID: Thai Cristobal is a 43 y.o. male who presents for Annual Exam.    HPI  Pt here for physical.    He was hospitalized in 1/2024 for SBO.  He has since seen GI in follow up.  He is not on Entyvio due to insurance issues.  He had labs recently that showed low iron so he was started on iron every other day.  He is on prednisone 20 mg/day.      He tells me his HR/BP was ok while in hospital and shortly after being discharged.  He tells me when wearing Holter he didn't have many episodes.  In the last week he has had it more-heart racing/beating hard and shortness of breath.  He notes it when going up stairs/changing position (sitting/standing).  He just returned the Holter in last week.  Each episode lasts maybe 1 minute and he recovers within 1-2 minutes.  He can sometimes have lightheadedness.  He had this back in 2014 and discussed with Dr. Escamilla.  He has it when eating-some chest discomfort.      He is not sleeping well.  He has tried benadryl but this causes him to have RLS.  He was using alcohol but has stopped drinking recently.          Review of Systems   Constitutional:  Negative for activity change, appetite change, chills, diaphoresis, fatigue, fever and unexpected weight change.   HENT:  Negative for congestion, dental problem, drooling, ear discharge, ear pain, facial swelling, hearing loss, mouth sores, nosebleeds, postnasal drip, rhinorrhea, sinus pressure, sinus pain, sneezing, sore throat, tinnitus, trouble swallowing and voice change.    Eyes:  Negative for photophobia, pain, discharge, redness, itching and visual disturbance (just had exam-wears contacts).   Respiratory:  Negative for apnea, cough, choking, chest tightness, shortness of breath, wheezing and stridor.    Cardiovascular:  Positive for chest pain and palpitations. Negative for leg swelling.   Gastrointestinal:  Positive for abdominal pain. Negative for abdominal distention, anal bleeding, blood in stool,  "constipation, diarrhea, nausea, rectal pain and vomiting.   Endocrine: Negative for cold intolerance, heat intolerance, polydipsia, polyphagia and polyuria.   Genitourinary:  Negative for decreased urine volume, difficulty urinating, dysuria, enuresis, flank pain, frequency, genital sores, hematuria, penile discharge, penile pain, penile swelling, scrotal swelling, testicular pain and urgency.   Musculoskeletal:  Negative for arthralgias, back pain, gait problem, joint swelling, myalgias, neck pain and neck stiffness.   Skin:  Negative for color change, pallor, rash and wound.   Allergic/Immunologic: Positive for environmental allergies. Negative for food allergies and immunocompromised state.   Neurological:  Positive for light-headedness. Negative for dizziness, tremors, seizures, syncope, facial asymmetry, speech difficulty, weakness, numbness and headaches.   Hematological:  Negative for adenopathy. Does not bruise/bleed easily.   Psychiatric/Behavioral:  Positive for sleep disturbance. Negative for agitation, behavioral problems, confusion, decreased concentration, dysphoric mood, hallucinations, self-injury and suicidal ideas. The patient is nervous/anxious. The patient is not hyperactive.        Objective   /76   Pulse 90   Ht 1.74 m (5' 8.5\")   Wt 86.1 kg (189 lb 14.4 oz)   BMI 28.45 kg/m²    Physical Exam  Constitutional:       Appearance: Normal appearance. He is obese.   HENT:      Head: Normocephalic and atraumatic.      Right Ear: Tympanic membrane, ear canal and external ear normal. There is no impacted cerumen.      Left Ear: Tympanic membrane, ear canal and external ear normal. There is no impacted cerumen.      Nose: Nose normal. No congestion or rhinorrhea.      Mouth/Throat:      Mouth: Mucous membranes are moist.      Pharynx: Oropharynx is clear. No oropharyngeal exudate or posterior oropharyngeal erythema.   Eyes:      Extraocular Movements: Extraocular movements intact.      " Conjunctiva/sclera: Conjunctivae normal.      Pupils: Pupils are equal, round, and reactive to light.   Neck:      Vascular: No carotid bruit.   Cardiovascular:      Rate and Rhythm: Normal rate and regular rhythm.      Pulses: Normal pulses.      Heart sounds: Normal heart sounds. No murmur heard.  Pulmonary:      Effort: Pulmonary effort is normal. No respiratory distress.      Breath sounds: Normal breath sounds. No wheezing, rhonchi or rales.   Abdominal:      General: Abdomen is flat. Bowel sounds are normal. There is no distension.      Palpations: Abdomen is soft.      Tenderness: There is no abdominal tenderness.      Hernia: No hernia is present.   Musculoskeletal:         General: No swelling or tenderness. Normal range of motion.      Cervical back: Normal range of motion and neck supple.      Right lower leg: No edema.      Left lower leg: No edema.   Lymphadenopathy:      Cervical: No cervical adenopathy.   Skin:     General: Skin is warm and dry.      Findings: No lesion or rash.   Neurological:      General: No focal deficit present.      Mental Status: He is alert and oriented to person, place, and time.      Cranial Nerves: No cranial nerve deficit.      Sensory: No sensory deficit.      Motor: No weakness.   Psychiatric:         Mood and Affect: Mood normal.         Behavior: Behavior normal.         Thought Content: Thought content normal.         Judgment: Judgment normal.         Assessment/Plan   Problem List Items Addressed This Visit       Anxiety     On Fluoxetine 60 mg/day          Relevant Medications    ALPRAZolam (Xanax) 0.5 mg tablet    Other Relevant Orders    Follow Up In Primary Care - Established    Primary hypertension    Crohn's disease (CMS/HCC)     Recently in hospital with SBO  On Prednisone right now  Waiting to get Entyvio approved again  Having some acid symptoms noted         Relevant Orders    Follow Up In Primary Care - Established    PSVT (paroxysmal supraventricular  tachycardia)    Hypopigmentation    Asthma     Controlled  No current issues   Allergy induced or when sick with viral          Orthostatic hypotension     Concerned he is having orthostatic episodes  Want him to split his lisinopril dose to 1/2 twice a day  Want him to wear knee high compression socks          Other Visit Diagnoses       Routine general medical examination at a health care facility    -  Primary    Heart palpitations        Relevant Orders    Follow Up In Primary Care - Established    Acid burn        Relevant Medications    omeprazole OTC (PriLOSEC OTC) 20 mg EC tablet    Other Relevant Orders    Follow Up In Primary Care - Established    Psychophysiological insomnia        Relevant Medications    traZODone (Desyrel) 50 mg tablet    Other Relevant Orders    Follow Up In Primary Care - Established    Medication management        Relevant Orders    Opiate/Opioid/Benzo Prescription Compliance

## 2024-03-12 NOTE — ASSESSMENT & PLAN NOTE
Concerned he is having orthostatic episodes  Want him to split his lisinopril dose to 1/2 twice a day  Want him to wear knee high compression socks

## 2024-03-12 NOTE — ASSESSMENT & PLAN NOTE
Recently in hospital with SBO  On Prednisone right now  Waiting to get Entyvio approved again  Having some acid symptoms noted

## 2024-03-12 NOTE — PATIENT INSTRUCTIONS
Buy compression socks (knee highs) and wear them during the day (off at night)  Make sure you are staying adequately hydrated   Continue periodic home BP checks-goal is <130/80 consistently   Get new BP cuff as your current one seems a bit high vs ours-we may need to decrease Lisinopril dose  Trial of taking 1/2 Lisinopril in AM and 1/2 in PM to see if lower dose lessens symptoms but keeps BP adequately   Can use low dose Trazodone 1/2-1 full tablet 30-45 minutes before bed to help with sleep   Trial of Omeprazole 20 mg in AM on empty stomach and/or 30 minutes before largest meal as palpitations may be caused by acid sitting in chest (do for 2 full weeks at the minimum but if better do for full 30 days)   Refill on alprazolam done today  See GI as directed  Follow up here in 3 months

## 2024-03-13 ENCOUNTER — TELEPHONE (OUTPATIENT)
Dept: PRIMARY CARE | Facility: CLINIC | Age: 44
End: 2024-03-13
Payer: COMMERCIAL

## 2024-03-13 DIAGNOSIS — R00.2 PALPITATIONS: Primary | ICD-10-CM

## 2024-03-13 RX ORDER — METOPROLOL SUCCINATE 25 MG/1
25 TABLET, EXTENDED RELEASE ORAL DAILY
Qty: 90 TABLET | Refills: 1 | Status: SHIPPED | OUTPATIENT
Start: 2024-03-13 | End: 2024-09-09

## 2024-03-13 NOTE — TELEPHONE ENCOUNTER
I spoke with Thai and he is agreeable to starting medication, you can send to [facility] on file, also he asked if he needs to make any lifestyle changes, diet changes?  for this and how will it affect his long term health?     Please advise

## 2024-03-13 NOTE — TELEPHONE ENCOUNTER
I sent in metoprolol XL 25 mg to take one a day to manage palpitations and heart racing  Would lessen caffeine as this can increase heart rate but otherwise just healthy diet and regular exercise is allen to healthy living  Will not necessarily bother him for life-we have to see also if related to stomach so would do the trial of the omeprazole daily for 2 weeks first before starting this new med

## 2024-03-13 NOTE — TELEPHONE ENCOUNTER
----- Message from Charlotte VOSS DO sent at 3/12/2024  3:05 PM EDT -----  Holter returned showed sinus rhythm which is our normal rhythm but also periods of sinus tachycardia meaning heart going faster than 100 beats per minute  He had a few extra beats noted and one episode of 16 beats of SVT which is supraventricular tachycardia (above the ventricles-bottom part of heart is going fast)-none of these rhythms are malignant but they can bother him so as discussed if we need to treat with medication we can which would work to slow the HR and keep it from speeding up and going over 100

## 2024-03-14 ENCOUNTER — PATIENT OUTREACH (OUTPATIENT)
Dept: CARE COORDINATION | Facility: CLINIC | Age: 44
End: 2024-03-14
Payer: COMMERCIAL

## 2024-03-14 NOTE — PROGRESS NOTES
Call placed regarding one month post discharge follow up call.  At time of outreach call the patient feels as if their condition has improved since initial visit with PCP or specialist.  Questions or concerns regarding recovery period addressed at this time.   Pt had PCP appt 3/12/2024.  Reviewed any PCP or specialists progress notes/labs/radiology reports if applicable and addressed any questions or concerns.  Pt reports understanding of holter monitor results. He states his wife will be picking up new prescription for metoprolol xl 25mg. Pt denies questions regarding new medication.    Verified next PCP appt 6/18/2024 1200.    Pt denies any questions, needs, or concerns at this time. Pt encouraged to call if questions or needs arise.

## 2024-04-16 ENCOUNTER — PATIENT OUTREACH (OUTPATIENT)
Dept: CARE COORDINATION | Facility: CLINIC | Age: 44
End: 2024-04-16
Payer: COMMERCIAL

## 2024-04-22 ENCOUNTER — PATIENT OUTREACH (OUTPATIENT)
Dept: CARE COORDINATION | Facility: CLINIC | Age: 44
End: 2024-04-22
Payer: COMMERCIAL

## 2024-04-22 NOTE — PROGRESS NOTES
Discharge Facility: [facility]  Discharge Diagnosis: Anemia  Admission Date: 4/16/2024  Discharge Date: 4/19/2024    PCP Appointment Date:  -4/26/2024 0830    Hospital Encounter and Summary: Linked     See discharge assessment below for further details    Engagement  Call Start Time: 0938 (4/22/2024  9:43 AM)    Medications  Does the patient have all medications ordered at discharge?: Not applicable (4/22/2024  9:43 AM)  Care Management Interventions: No intervention needed (4/22/2024  9:43 AM)  Is the patient taking all medications as directed (includes completed medication regime)?: Yes (4/22/2024  9:43 AM)    Appointments  Does the patient have a primary care provider?: Yes (4/22/2024  9:43 AM)  Care Management Interventions: Verified appointment date/time/provider (4/22/2024  9:43 AM)  Has the patient kept scheduled appointments due by today?: Yes (4/22/2024  9:43 AM)    Self Management  Has home health visited the patient within 72 hours of discharge?: Not applicable (4/22/2024  9:43 AM)    Patient Teaching  Does the patient have access to their discharge instructions?: Yes (4/22/2024  9:43 AM)  What is the patient's perception of their health status since discharge?: Same (4/22/2024  9:43 AM)  Is the patient/caregiver able to teach back the hierarchy of who to call/visit for symptoms/problems? PCP, Specialist, Home Health nurse, Urgent Care, ED, 911: Yes (4/22/2024  9:43 AM)    Wrap Up  Wrap Up Additional Comments: Pt was admitted to [facility] 4/16-4/19/2024 for anemia. Pt states prior to going to the hospital he was feeling very tired and noticed blood in his stool. Pt states the doctors think the GI bleed is from a previous chrons surgery and they are thinking about doing surgery in the near future. Pt to call to schedule follow up with surgeon. Pt reports feeling the same since discharge. He states he has not noticed anymore bleeding but that he is tired and sleeps a  lot. Pt reports understanding of discharge instructions. Pt discharged with no new medication. Pt would like to discuss metoprolol prescription with PCP- will send message. Pt scheduled for PCP follow up 4/26/2024 0830. Pt denies any other questions, needs, or concerns at this time. Pt encouraged to call if questions or needs arise. (4/22/2024  9:43 AM)  Call End Time: 0944 (4/22/2024  9:43 AM)

## 2024-04-22 NOTE — PROGRESS NOTES
He can stop metoprolol for now if he wishes and then see me in follow up when scheduled   They did make a note that the SVT may have been due to the anemia so if this is the case would expect it to improve as blood counts improve

## 2024-05-02 ENCOUNTER — PATIENT OUTREACH (OUTPATIENT)
Dept: CARE COORDINATION | Facility: CLINIC | Age: 44
End: 2024-05-02

## 2024-05-02 ENCOUNTER — OFFICE VISIT (OUTPATIENT)
Dept: SURGERY | Facility: CLINIC | Age: 44
End: 2024-05-02
Payer: COMMERCIAL

## 2024-05-02 VITALS
OXYGEN SATURATION: 98 % | SYSTOLIC BLOOD PRESSURE: 128 MMHG | DIASTOLIC BLOOD PRESSURE: 76 MMHG | HEART RATE: 77 BPM | RESPIRATION RATE: 16 BRPM | WEIGHT: 179.5 LBS | HEIGHT: 68 IN | BODY MASS INDEX: 27.2 KG/M2 | TEMPERATURE: 97.1 F

## 2024-05-02 DIAGNOSIS — K50.812 CROHN'S DISEASE OF BOTH SMALL AND LARGE INTESTINE WITH INTESTINAL OBSTRUCTION (MULTI): Primary | ICD-10-CM

## 2024-05-02 PROCEDURE — 3074F SYST BP LT 130 MM HG: CPT | Performed by: SURGERY

## 2024-05-02 PROCEDURE — 1036F TOBACCO NON-USER: CPT | Performed by: SURGERY

## 2024-05-02 PROCEDURE — 3078F DIAST BP <80 MM HG: CPT | Performed by: SURGERY

## 2024-05-02 PROCEDURE — 99215 OFFICE O/P EST HI 40 MIN: CPT | Performed by: SURGERY

## 2024-05-02 RX ORDER — NEOMYCIN SULFATE 500 MG/1
500 TABLET ORAL ONCE
Qty: 1 TABLET | Refills: 0 | Status: SHIPPED | OUTPATIENT
Start: 2024-05-02 | End: 2024-05-02

## 2024-05-02 RX ORDER — CEPHALEXIN 500 MG/1
500 CAPSULE ORAL ONCE
Qty: 1 CAPSULE | Refills: 0 | Status: SHIPPED | OUTPATIENT
Start: 2024-05-02 | End: 2024-05-02

## 2024-05-02 RX ORDER — HYDROCORTISONE ACETATE 25 MG/1
25 SUPPOSITORY RECTAL 2 TIMES DAILY
COMMUNITY
Start: 2024-04-25

## 2024-05-02 NOTE — PROGRESS NOTES
Unable to reach patient for post discharge follow up call.  Left voicemail with call back number for patient to call if needed   If no voicemail available call attempts x 2 were made to contact the patient to assist with any questions or concerns patient may have.

## 2024-05-02 NOTE — PROGRESS NOTES
Subjective   Patient ID: Thai Cristobal is a 43 y.o. male who presents for Follow-up ([facility]- For a GI bleed. ).  The patient complains of abdominal distention, bloating, intolerance of the food.    HPI the patient had a long history of Crohn's disease.  Previous history of ileocolic resection with development of severe anastomotic stricture.  Multiple episodes of dilation.  None effectiveness of medical management.  Also patient had a open cholecystectomy  Review of Systems GI consistent with abdominal discomfort, partial bowel obstruction, multiple Crohn's exacerbations.  Review of other 10 system is negative  Physical Exam pupils equal bilaterally, mucosa moist, bilateral breath sounds, clear to auscultation, regular rhythm and rate, no murmurs.  Abdomen soft, mild tenderness in the right side of the abdomen.  Scar from midline laparotomy.  Scar from the right upper subcostal laparotomy.  Palpable peripheral pulses, no focal neurological motor deficits.  Musculoskeletal exam within normal limits, ENT exam within normal limits.    Objective I reviewed all available data including lab results, radiological studies, previous reports and notes.  Multiple colonoscopies, endoscopies, all available radiological studies were reviewed.  CT enterography consistent with anastomotic stricture, multiple focal cysts of the fatty infiltration of the intestines, consistent with a chronic Crohn's disease.  I spent 65 minutes    No diagnosis found.   Patient Active Problem List   Diagnosis    IDALIA positive    Anxiety    Attention deficit    Primary hypertension    Crohn's disease (Multi)    LAD (lymphadenopathy), cervical    Migraine with aura and without status migrainosus, not intractable    Obsessive behaviors    Panic attacks    PSVT (paroxysmal supraventricular tachycardia) (CMS-HCC)    Hypopigmentation    Seasonal allergies    Chest pressure    Allergic rhinitis    Asthma (HHS-HCC)    Atypical chest pain    Depression     Mallet finger    Migraine without aura and responsive to treatment    Orthostatic hypotension    Palpitations    Urinary hesitancy    Positive antinuclear antibody    Attention disturbance    Disorder of pigmentation    Cervical lymphadenopathy    Migraine    Obsessive behavior    Benign essential hypertension    Paroxysmal supraventricular tachycardia (CMS-HCC)      No Known Allergies   Medication Documentation Review Audit       Reviewed by Marcos Anderson MD (Physician) on 05/02/24 at 0926      Medication Order Taking? Sig Documenting Provider Last Dose Status   albuterol 90 mcg/actuation inhaler [id number] Yes Inhale 1-2 puffs every 4 hours if needed for wheezing or shortness of breath. Charlotte Yan V, DO Taking Active   ALPRAZolam (Xanax) 0.5 mg tablet [id number] Yes Take 1 tablet (0.5 mg) by mouth once daily as needed for anxiety. Charlotte Yan V, DO Taking Active   ferrous sulfate, 325 mg ferrous sulfate, tablet [id number] Yes Take 1 tablet by mouth every other day. Historical Provider, MD Taking Active   FLUoxetine (PROzac) 20 mg capsule [id number] Yes Take 1 capsule (20 mg) by mouth once daily. Charlotte Yan V, DO Taking Active   FLUoxetine (PROzac) 40 mg capsule [id number] Yes Take 1 capsule (40 mg) by mouth once daily. Charlotte Yan V, DO Taking Active   hydrocortisone (Anusol-HC) 25 mg suppository [id number] Yes Insert 1 suppository (25 mg) into the rectum 2 times a day. Historical Provider, MD Taking Active   lisinopril 40 mg tablet [id number] Yes Take 1 tablet (40 mg) by mouth once daily. Chalrotte Yan V, DO Taking Active   metoprolol succinate XL (Toprol-XL) 25 mg 24 hr tablet [id number] No Take 1 tablet (25 mg) by mouth once daily. Do not crush or chew.   Patient not taking: Reported on 5/2/2024    Charlotte Yan V DO Not Taking Active   mometasone (Asmanex HFA) 100 mcg/actuation HFA aerosol inhaler [id number] Yes Inhale twice a day. Historical Provider, MD Taking Active   montelukast (Singulair) 10 mg tablet [id number] Yes  Take 1 tablet (10 mg) by mouth once daily at bedtime. Historical Provider, MD Taking Active   omeprazole OTC (PriLOSEC OTC) 20 mg EC tablet [id number] Yes Take 1 tablet (20 mg) by mouth once daily. Do not crush, chew, or split. Charlotte Yan V, DO Taking Active   predniSONE (Deltasone) 10 mg tablet [id number] No Take 2 tablets (20 mg) by mouth once daily. Start 40 mg a day for 2 weeks then cut down by 10 mg every 10 days   Patient not taking: Reported on 5/2/2024    Charlotte Yan V, DO Not Taking Active   SUMAtriptan (Imitrex) 50 mg tablet [id number] Yes Take 1 tablet (50 mg) by mouth 1 time if needed for migraine for up to 36 doses. Charlotte Yan V, DO Taking Active   traZODone (Desyrel) 50 mg tablet [id number] Yes Take 0.5-1 tablets (25-50 mg) by mouth as needed at bedtime for sleep. Charlotte Yan V, DO Taking Active   vedolizumab (Entyvio) 300 mg injection [id number] Yes Infuse into a venous catheter. Historical Provider, MD Taking Active   vitamin D3-vitamin K2 1,250-200 mcg capsule [id number] Yes Take 1 capsule by mouth 1 (one) time per week. Historical Provider, MD Taking Active                    Past Medical History:   Diagnosis Date    Acute upper respiratory infection, unspecified 02/21/2018    Acute URI    Personal history of other specified conditions 04/23/2018    History of vertigo    SBO (small bowel obstruction) (Multi) 01/25/2024     Social History     Tobacco Use   Smoking Status Never   Smokeless Tobacco Never     Family History   Problem Relation Name Age of Onset    Thyroid disease Mother      Other (bladder cancer) Mother      Hypertension Father      Benign prostatic hyperplasia Father      Anxiety disorder Sister      No Known Problems Brother      No Known Problems Daughter      Heart failure Paternal Grandfather        Past Surgical History:   Procedure Laterality Date    GALLBLADDER SURGERY  02/23/2015    Gallbladder Surgery    OTHER SURGICAL HISTORY  04/16/2021    Small bowel resection       Assessment/Plan      The patient with a longstanding Crohn's disease, on Entyvio, failure of the nonoperative management of the anastomotic stricture.  The patient has indication for open ileocolic resection, possible ileostomy.  Patient had a previous multiple surgical intervention, including laparotomy for ileocolic resection, as well as right upper quadrant open cholecystectomy, therefore minimally invasive procedure will not be feasible and carry high risk for intraoperative complications.  Risks, benefits, alternative treatment were explained to the patient.  All questions were answered.  Informed consent was obtained.    Marcos Anderson MD

## 2024-05-09 ENCOUNTER — OFFICE VISIT (OUTPATIENT)
Dept: SURGERY | Facility: CLINIC | Age: 44
End: 2024-05-09
Payer: COMMERCIAL

## 2024-05-09 VITALS
BODY MASS INDEX: 26.83 KG/M2 | OXYGEN SATURATION: 99 % | HEART RATE: 85 BPM | TEMPERATURE: 97.5 F | WEIGHT: 177 LBS | SYSTOLIC BLOOD PRESSURE: 120 MMHG | RESPIRATION RATE: 16 BRPM | DIASTOLIC BLOOD PRESSURE: 74 MMHG | HEIGHT: 68 IN

## 2024-05-09 DIAGNOSIS — K50.812 CROHN'S DISEASE OF BOTH SMALL AND LARGE INTESTINE WITH INTESTINAL OBSTRUCTION (MULTI): Primary | ICD-10-CM

## 2024-05-09 PROCEDURE — 3074F SYST BP LT 130 MM HG: CPT | Performed by: SURGERY

## 2024-05-09 PROCEDURE — 99213 OFFICE O/P EST LOW 20 MIN: CPT | Performed by: SURGERY

## 2024-05-09 PROCEDURE — 3078F DIAST BP <80 MM HG: CPT | Performed by: SURGERY

## 2024-05-09 PROCEDURE — 1036F TOBACCO NON-USER: CPT | Performed by: SURGERY

## 2024-05-09 NOTE — PROGRESS NOTES
Subjective   Patient ID: Thai Cristobal is a 43 y.o. male who presents for Follow-up ([facility]- Was in the ER about 4/20-4/26 for strictured ileocolonic anastomosis resection to be done 5/25/24).  Patient developed severe abdominal pain last week after a discussion of diet.  Concerned about bowel obstruction.  Schedule appointment for reassessment    HPI Long history of Crohn's disease, previous history of ileocecal resection.  Scheduled for additional partial small bowel and colon resection secondary to anastomotic stricture  Review of Systems GI consistent with abdominal pain.  Review of other 10 system is negative  Physical Exam abdomen soft, no significant tenderness.  No palpable inguinal hernias.  No rebound, no guarding.  Otherwise exam without changes compared to previous exam    Objective I reviewed all available data including lab results, radiological studies, previous reports and notes.    No diagnosis found.   Patient Active Problem List   Diagnosis    IDALIA positive    Anxiety    Attention deficit    Primary hypertension    Crohn's disease (Multi)    LAD (lymphadenopathy), cervical    Migraine with aura and without status migrainosus, not intractable    Obsessive behaviors    Panic attacks    PSVT (paroxysmal supraventricular tachycardia) (CMS-HCC)    Hypopigmentation    Seasonal allergies    Chest pressure    Allergic rhinitis    Asthma (HHS-HCC)    Atypical chest pain    Depression    Mallet finger    Migraine without aura and responsive to treatment    Orthostatic hypotension    Palpitations    Urinary hesitancy    Positive antinuclear antibody    Attention disturbance    Disorder of pigmentation    Cervical lymphadenopathy    Migraine    Obsessive behavior    Benign essential hypertension    Paroxysmal supraventricular tachycardia (CMS-HCC)      No Known Allergies   Medication Documentation Review Audit       Reviewed by Marcos Anderson MD (Physician) on 05/09/24 at 1032      Medication Order Taking?  Sig Documenting Provider Last Dose Status   albuterol 90 mcg/actuation inhaler [id number] Yes Inhale 1-2 puffs every 4 hours if needed for wheezing or shortness of breath. Charlotte Yan SHANIKA, DO Taking Active   ALPRAZolam (Xanax) 0.5 mg tablet [id number] Yes Take 1 tablet (0.5 mg) by mouth once daily as needed for anxiety. Charlotte Yan SHANIKA, DO Taking Active   cephalexin (Keflex) 500 mg capsule [id number]  Take 1 capsule (500 mg) by mouth 1 time for 1 dose. Marcos Anderson MD   24 2359   ferrous sulfate, 325 mg ferrous sulfate, tablet [id number] No Take 1 tablet by mouth every other day. Historical Provider, MD Not Taking Active   FLUoxetine (PROzac) 20 mg capsule [id number] Yes Take 1 capsule (20 mg) by mouth once daily. Charlotte Yan V, DO Taking Active   FLUoxetine (PROzac) 40 mg capsule [id number] Yes Take 1 capsule (40 mg) by mouth once daily. Charlotte Yan SHANIKA, DO Taking Active   hydrocortisone (Anusol-HC) 25 mg suppository [id number] Yes Insert 1 suppository (25 mg) into the rectum 2 times a day. Historical Provider, MD Taking Active   lisinopril 40 mg tablet [id number] Yes Take 1 tablet (40 mg) by mouth once daily.   Patient taking differently: Take 0.5 tablets (20 mg) by mouth once daily.    Charlotte Yan V, DO Taking Differently Active   metoprolol succinate XL (Toprol-XL) 25 mg 24 hr tablet [id number] No Take 1 tablet (25 mg) by mouth once daily. Do not crush or chew.   Patient not taking: Reported on 2024    Charlotte Yan V, DO Not Taking Active   mometasone (Asmanex HFA) 100 mcg/actuation HFA aerosol inhaler [id number]  Inhale twice a day. Historical Provider, MD  Active   montelukast (Singulair) 10 mg tablet [id number] Yes Take 1 tablet (10 mg) by mouth once daily at bedtime. Historical Provider, MD Taking Active   neomycin (Mycifradin) 500 mg tablet [id number]  Take 1 tablet (500 mg) by mouth 1 time for 1 dose. Marcos Anderson MD   24 0117   omeprazole OTC (PriLOSEC OTC) 20 mg EC tablet [id number] Yes Take  1 tablet (20 mg) by mouth once daily. Do not crush, chew, or split. Charlotte Yan V, DO Taking Active   predniSONE (Deltasone) 10 mg tablet [id number] No Take 2 tablets (20 mg) by mouth once daily. Start 40 mg a day for 2 weeks then cut down by 10 mg every 10 days   Patient not taking: Reported on 5/2/2024    Charlotte Yan V, DO Not Taking Active   SUMAtriptan (Imitrex) 50 mg tablet [id number] Yes Take 1 tablet (50 mg) by mouth 1 time if needed for migraine for up to 36 doses. Charlotte Yan V, DO Taking Active   traZODone (Desyrel) 50 mg tablet [id number] Yes Take 0.5-1 tablets (25-50 mg) by mouth as needed at bedtime for sleep. Charlotte Yan V, DO Taking Active   vedolizumab (Entyvio) 300 mg injection [id number] Yes Infuse into a venous catheter. Historical Provider, MD Taking Active   vitamin D3-vitamin K2 1,250-200 mcg capsule [id number] Yes Take 1 capsule by mouth 1 (one) time per week. Historical Provider, MD Taking Active                    Past Medical History:   Diagnosis Date    Acute upper respiratory infection, unspecified 02/21/2018    Acute URI    Personal history of other specified conditions 04/23/2018    History of vertigo    SBO (small bowel obstruction) (Multi) 01/25/2024     Social History     Tobacco Use   Smoking Status Never   Smokeless Tobacco Never     Family History   Problem Relation Name Age of Onset    Thyroid disease Mother      Other (bladder cancer) Mother      Hypertension Father      Benign prostatic hyperplasia Father      Anxiety disorder Sister      No Known Problems Brother      No Known Problems Daughter      Heart failure Paternal Grandfather        Past Surgical History:   Procedure Laterality Date    GALLBLADDER SURGERY  02/23/2015    Gallbladder Surgery    OTHER SURGICAL HISTORY  04/16/2021    Small bowel resection       Assessment/Plan     Patient with longstanding Crohn's disease, history of anastomotic stricture.  The patient scheduled for anastomotic resection on May 28.  Patient's  symptoms improved after bowel rest.  No indication for urgent surgery.  Will keep patient on schedule for anastomotic resection    Marcos Anderson MD

## 2024-06-03 ENCOUNTER — TELEPHONE (OUTPATIENT)
Dept: PRIMARY CARE | Facility: CLINIC | Age: 44
End: 2024-06-03

## 2024-06-03 NOTE — TELEPHONE ENCOUNTER
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 His wife called to update you, he should be discharged today and they are following up with oncology to get some answers on staging.

## 2024-06-17 ENCOUNTER — APPOINTMENT (OUTPATIENT)
Dept: SURGERY | Facility: CLINIC | Age: 44
End: 2024-06-17
Payer: COMMERCIAL

## 2024-06-17 DIAGNOSIS — C7B.8 METASTATIC MALIGNANT NEUROENDOCRINE TUMOR TO LYMPH NODE (MULTI): Primary | ICD-10-CM

## 2024-06-17 DIAGNOSIS — K63.2 COLONIC FISTULA: ICD-10-CM

## 2024-06-17 DIAGNOSIS — K50.819 CROHN'S DISEASE OF SMALL AND LARGE INTESTINES WITH COMPLICATION (MULTI): ICD-10-CM

## 2024-06-17 PROCEDURE — 1036F TOBACCO NON-USER: CPT | Performed by: PHYSICIAN ASSISTANT

## 2024-06-17 PROCEDURE — 99024 POSTOP FOLLOW-UP VISIT: CPT | Performed by: PHYSICIAN ASSISTANT

## 2024-06-17 NOTE — PROGRESS NOTES
Subjective   Patient ID: Thai Cristobal is a 43 y.o. male who presents for Post-op (Partial right colectomy w Small bowel resection, FS and colocolonic fistulla done on 5/28/24).    HPI  This is a 43-year-old male with a known history of Crohn's disease patient is status post partial right colectomy small bowel resection on repair of a colonic fistula on 5/28/2024.  Patient is here to have staples removed.  He is also here to discuss pathology he had a neuroendocrine tumor grade 3 poorly differentiated he did follow-up with oncology here at [facility] they are referring him to oncology at [facility].  Patient's only complaint is change in color of stools at this point he is eating and drinking and he has no abdominal pain.    Review of Systems  Review of systems is negative other than what is mentioned above        Physical Exam  Eyes: Conjunctiva non -icteric and eye lids are without obvious rash or drooping. Pupils are symmetric.   Ears, Nose, Mouth, and Throat: External ears and nose appear to be without deformity or rash. No lesions or masses noted. Hearing is grossly intact.   Neck:. No JVD noted, tracheal position is midline. No thyromegaly, no thyroid nodules  Head and Face: Examination of the head and face revealed no abnormalities.   Respiratory: No gasping or shortness of breath noted, no use of accessory muscles noted.  Lungs are clear to auscultate  Cardiovascular: Examination for edema is normal, regular rate and rhythm S1-S2  GI: Abdomen non tender to palpation, bowel sounds are present, staples removed today incision is clean dry and intact no erythema no swelling no drainage  Skin: No rashes or open lesions/ulcers identified on skin.   Musk: Digits/nails show no clubbing or cyanosis. No asymmetry or masses noted of the musculature. Examination of the muscles/joints/bones show normal range of motion. Gait is grossly normally.   Neurologic: Cranial nerves II- XII intact, motor strength 5/5 muscle  strength of the lower extremities bilaterally and equal.      Objective     No diagnosis found.   Patient Active Problem List   Diagnosis    IDALIA positive    Anxiety    Attention deficit    Primary hypertension    Crohn's disease (Multi)    LAD (lymphadenopathy), cervical    Migraine with aura and without status migrainosus, not intractable    Obsessive behaviors    Panic attacks    PSVT (paroxysmal supraventricular tachycardia) (CMS-[facility])    Hypopigmentation    Seasonal allergies    Chest pressure    Allergic rhinitis    Asthma ([facility])    Atypical chest pain    Depression    Mallet finger    Migraine without aura and responsive to treatment    Orthostatic hypotension    Palpitations    Urinary hesitancy    Positive antinuclear antibody    Attention disturbance    Disorder of pigmentation    Cervical lymphadenopathy    Migraine    Obsessive behavior    Benign essential hypertension    Paroxysmal supraventricular tachycardia (CMS-HCC)      No Known Allergies   Medication Documentation Review Audit       Reviewed by Itzel De Jesus MA (Medical Assistant) on 06/17/24 at 0840      Medication Order Taking? Sig Documenting Provider Last Dose Status   albuterol 90 mcg/actuation inhaler [id number] No Inhale 1-2 puffs every 4 hours if needed for wheezing or shortness of breath. Charlotteiqra Umañae V, DO Taking Active   ALPRAZolam (Xanax) 0.5 mg tablet [id number] No Take 1 tablet (0.5 mg) by mouth once daily as needed for anxiety. Charlotte Umañae V, DO Taking Active   ferrous sulfate, 325 mg ferrous sulfate, tablet [id number] No Take 1 tablet by mouth every other day. Historical Provider, MD Not Taking Active   FLUoxetine (PROzac) 20 mg capsule [id number] No Take 1 capsule (20 mg) by mouth once daily. Charlotte Yan V, DO Taking Active   FLUoxetine (PROzac) 40 mg capsule [id number] No Take 1 capsule (40 mg) by mouth once daily. Charlotte Yan V, DO Taking Active   hydrocortisone (Anusol-HC) 25 mg suppository [id number] No Insert 1 suppository (25 mg)  into the rectum 2 times a day. Historical Provider, MD Taking Active   lisinopril 40 mg tablet [id number] No Take 1 tablet (40 mg) by mouth once daily.   Patient taking differently: Take 0.5 tablets (20 mg) by mouth once daily.    Charlotte Yan V, DO Taking Differently Active   metoprolol succinate XL (Toprol-XL) 25 mg 24 hr tablet [id number] No Take 1 tablet (25 mg) by mouth once daily. Do not crush or chew.   Patient not taking: Reported on 5/2/2024    Charlotte Yan V DO Not Taking Active   mometasone (Asmanex HFA) 100 mcg/actuation HFA aerosol inhaler [id number] No Inhale twice a day. Historical Provider, MD Taking Active   montelukast (Singulair) 10 mg tablet [id number] No Take 1 tablet (10 mg) by mouth once daily at bedtime. Historical Provider, MD Taking Active   omeprazole OTC (PriLOSEC OTC) 20 mg EC tablet [id number] No Take 1 tablet (20 mg) by mouth once daily. Do not crush, chew, or split. Charlotte Yan V, DO Taking Active   predniSONE (Deltasone) 10 mg tablet [id number] No Take 2 tablets (20 mg) by mouth once daily. Start 40 mg a day for 2 weeks then cut down by 10 mg every 10 days   Patient not taking: Reported on 5/2/2024    Charlotte Yan V DO Not Taking Active   SUMAtriptan (Imitrex) 50 mg tablet [id number] No Take 1 tablet (50 mg) by mouth 1 time if needed for migraine for up to 36 doses. Charlotte Yan V, DO Taking Active   traZODone (Desyrel) 50 mg tablet [id number] No Take 0.5-1 tablets (25-50 mg) by mouth as needed at bedtime for sleep. Charlotte Yan V, DO Taking Active   vedolizumab (Entyvio) 300 mg injection [id number] No Infuse into a venous catheter. Historical Provider, MD Taking Active   vitamin D3-vitamin K2 1,250-200 mcg capsule [id number] No Take 1 capsule by mouth 1 (one) time per week. Historical Provider, MD Taking Active                    Past Medical History:   Diagnosis Date    Acute upper respiratory infection, unspecified 02/21/2018    Acute URI    Personal history of other specified conditions 04/23/2018     History of vertigo    SBO (small bowel obstruction) (Multi) 01/25/2024     Social History     Tobacco Use   Smoking Status Never   Smokeless Tobacco Never     Family History   Problem Relation Name Age of Onset    Thyroid disease Mother      Other (bladder cancer) Mother      Hypertension Father      Benign prostatic hyperplasia Father      Anxiety disorder Sister      No Known Problems Brother      No Known Problems Daughter      Heart failure Paternal Grandfather        Past Surgical History:   Procedure Laterality Date    GALLBLADDER SURGERY  02/23/2015    Gallbladder Surgery    OTHER SURGICAL HISTORY  04/16/2021    Small bowel resection       Assessment/Plan   No lifting over 10 to 12 pounds for 3 weeks  You may ride a stationary bike, you may use a treadmill, you may walk outside.  No squats, sit ups or lunges  You may drive a car    Follow-up [facility] oncology as scheduled    Encounter Diagnoses   Name Primary?    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          **Portions of this medical record have been created using voice recognition software and may have minor errors which are inherent in voice recognition systems. It has not been fully edited for typographical or grammatical errors**

## 2024-06-18 ENCOUNTER — APPOINTMENT (OUTPATIENT)
Dept: PRIMARY CARE | Facility: CLINIC | Age: 44
End: 2024-06-18
Payer: COMMERCIAL

## 2024-06-18 VITALS
DIASTOLIC BLOOD PRESSURE: 72 MMHG | SYSTOLIC BLOOD PRESSURE: 118 MMHG | HEART RATE: 76 BPM | BODY MASS INDEX: 24.72 KG/M2 | WEIGHT: 162.6 LBS

## 2024-06-18 DIAGNOSIS — D3A.8 NEUROENDOCRINE TUMOR (CMS-HCC): Primary | ICD-10-CM

## 2024-06-18 DIAGNOSIS — F41.9 ANXIETY: ICD-10-CM

## 2024-06-18 DIAGNOSIS — K50.918 CROHN'S DISEASE WITH OTHER COMPLICATION, UNSPECIFIED GASTROINTESTINAL TRACT LOCATION (MULTI): ICD-10-CM

## 2024-06-18 DIAGNOSIS — R00.2 HEART PALPITATIONS: ICD-10-CM

## 2024-06-18 DIAGNOSIS — I10 PRIMARY HYPERTENSION: ICD-10-CM

## 2024-06-18 PROCEDURE — 3078F DIAST BP <80 MM HG: CPT | Performed by: INTERNAL MEDICINE

## 2024-06-18 PROCEDURE — 99214 OFFICE O/P EST MOD 30 MIN: CPT | Performed by: INTERNAL MEDICINE

## 2024-06-18 PROCEDURE — 3074F SYST BP LT 130 MM HG: CPT | Performed by: INTERNAL MEDICINE

## 2024-06-18 RX ORDER — LISINOPRIL 40 MG/1
20 TABLET ORAL DAILY
Status: SHIPPED
Start: 2024-06-18 | End: 2024-06-18 | Stop reason: DRUGHIGH

## 2024-06-18 RX ORDER — MONTELUKAST SODIUM 10 MG/1
10 TABLET ORAL NIGHTLY PRN
Status: SHIPPED
Start: 2024-06-18

## 2024-06-18 RX ORDER — LISINOPRIL 40 MG/1
40 TABLET ORAL DAILY
Status: SHIPPED
Start: 2024-06-18

## 2024-06-18 ASSESSMENT — ENCOUNTER SYMPTOMS
ACTIVITY CHANGE: 0
WHEEZING: 0
ABDOMINAL PAIN: 1
VOMITING: 0
ABDOMINAL DISTENTION: 0
SHORTNESS OF BREATH: 0
DIZZINESS: 0
FEVER: 0
CONSTIPATION: 0
HEADACHES: 0
PALPITATIONS: 0
CHEST TIGHTNESS: 0
COUGH: 0
NAUSEA: 0
UNEXPECTED WEIGHT CHANGE: 0
LIGHT-HEADEDNESS: 0
CHILLS: 0
DIFFICULTY URINATING: 0
DIARRHEA: 0
APPETITE CHANGE: 1
FATIGUE: 1

## 2024-06-18 ASSESSMENT — PATIENT HEALTH QUESTIONNAIRE - PHQ9
SUM OF ALL RESPONSES TO PHQ9 QUESTIONS 1 AND 2: 0
2. FEELING DOWN, DEPRESSED OR HOPELESS: NOT AT ALL
1. LITTLE INTEREST OR PLEASURE IN DOING THINGS: NOT AT ALL

## 2024-06-18 NOTE — ASSESSMENT & PLAN NOTE
Pt noted to have well differentiated neuroendocrine tumor high grade in a small bowel lesion and in the ileum/right colon resection  With 11/18 lymph nodes involved  He is scheduled to see oncology in early July

## 2024-06-18 NOTE — PATIENT INSTRUCTIONS
See oncology when scheduled they will go over treatment and scanning, etc   Increase iron fortifying foods to help elevate blood counts  Continue meds as driected  Continue to adhere to healthy diet and avoid alcohol   Continue to monitor heart palpitations -if related to low blood counts this should improve as these improve  Follow up in 4 months

## 2024-06-18 NOTE — ASSESSMENT & PLAN NOTE
S/p right sided hemicolectomy due to recurrent bowel obstructions  Saw GS yesterday and had staples removed  Treatment on hold for Crohn's secondary to neuroendocrine tumor noted

## 2024-06-18 NOTE — PROGRESS NOTES
Subjective   Patient ID: Thai Cristobal is a 43 y.o. male who presents for Follow-up (3month).    HPI  Pt here in follow up.  He ended up with blood in his stool in mid April.  His hemoglobin had dropped to 7.  He followed up with Dr. Anderson on May 2nd and they were considering surgery at that time.  He ended up having another obstruction on 5/26 that caused him to be admitted.      Pt was in hospital in May due to continued bowel issues.  He had right partial colon resection with biopied as well of the small bowel resection that were positive for well differentiated neuroendocrine tumor-high grade.  He also had a fistula repaired at that same time on 5/28/2024.   He had lymph nodes biopsied as well and 11/18 were positive for metastatic tumor.  He is down 17 lbs since early May.      He feels ok overall.  He is having less BM's that are soft but not full liquid.  The color is changing-green/brown/beige.  He has not seen blood in bowels but did have some on TP.  His appetite is better.  His hemoglobin is around 10 per patient and that was as of last week.      He has been seen in follow up with  Dr. Clark yesterday.  His staples were removed at that time.      He has been referred to hem/oncology and has an appointment noted on July 9th.      Pt did holter that showed occasional PAC/PVC along with sinus tachycardia.          Review of Systems   Constitutional:  Positive for appetite change and fatigue. Negative for activity change, chills, fever and unexpected weight change.   Eyes:  Negative for visual disturbance.   Respiratory:  Negative for cough, chest tightness, shortness of breath and wheezing.    Cardiovascular:  Negative for chest pain, palpitations and leg swelling.   Gastrointestinal:  Positive for abdominal pain. Negative for abdominal distention, constipation, diarrhea, nausea and vomiting.   Endocrine: Negative for cold intolerance and heat intolerance.   Genitourinary:  Negative for  difficulty urinating.   Neurological:  Negative for dizziness, light-headedness and headaches.       Objective   /72 (BP Location: Left arm, Patient Position: Sitting, BP Cuff Size: Adult)   Pulse 76   Wt 73.8 kg (162 lb 9.6 oz)   BMI 24.72 kg/m²    Physical Exam  Constitutional:       Appearance: Normal appearance.   Cardiovascular:      Rate and Rhythm: Normal rate and regular rhythm.      Heart sounds: Normal heart sounds.   Pulmonary:      Effort: Pulmonary effort is normal.      Breath sounds: Normal breath sounds.   Abdominal:      General: Bowel sounds are normal.      Tenderness: There is abdominal tenderness.   Musculoskeletal:      Right lower leg: No edema.      Left lower leg: No edema.   Lymphadenopathy:      Cervical: No cervical adenopathy.   Neurological:      Mental Status: He is alert.         Assessment/Plan   Problem List Items Addressed This Visit       Anxiety    Primary hypertension    Relevant Medications    lisinopril 40 mg tablet    Crohn's disease (Multi)     S/p right sided hemicolectomy due to recurrent bowel obstructions  Saw GS yesterday and had staples removed  Treatment on hold for Crohn's secondary to neuroendocrine tumor noted          Relevant Orders    Follow Up In Primary Care - Established    Neuroendocrine tumor (CMS-HCC) - Primary     Pt noted to have well differentiated neuroendocrine tumor high grade in a small bowel lesion and in the ileum/right colon resection  With 11/18 lymph nodes involved  He is scheduled to see oncology in early July           Relevant Orders    Follow Up In Primary Care - Established     Other Visit Diagnoses       Heart palpitations        Relevant Orders    Follow Up In Primary Care - Established

## 2024-06-27 DIAGNOSIS — I10 PRIMARY HYPERTENSION: ICD-10-CM

## 2024-06-27 RX ORDER — LISINOPRIL 40 MG/1
20 TABLET ORAL DAILY
Start: 2024-06-27

## 2024-07-03 ENCOUNTER — PATIENT OUTREACH (OUTPATIENT)
Dept: HEMATOLOGY/ONCOLOGY | Facility: CLINIC | Age: 44
End: 2024-07-03
Payer: COMMERCIAL

## 2024-07-03 NOTE — PROGRESS NOTES
Patient stated HX:  2000 Crohnes with resection  2019 Bowel Obstruction started, on and off continuous issues.  2024 finally went in to remove stricture, found the NET  Coming over for second opinion. NET PET on Friday.    Patient called to review Nurse navigation. Patient well understood diagnosis and staging, Patient did have some questions about prognosis and other treatment options, but understood that these will be deferred to the Dr on Tuesday. Patient does not have any barriers to care. Does state that his insurance gives him some initial problems during registration, but is an accepted plan. Patient told to arrive a couple mins early to address the potential insurance holdup.     Patient reviewed time and date of location. Contact number was given and we went over the location of the appointment. Patient had no further needs at the moment.

## 2024-07-05 ENCOUNTER — HOSPITAL ENCOUNTER (OUTPATIENT)
Dept: RADIOLOGY | Facility: HOSPITAL | Age: 44
Discharge: HOME | End: 2024-07-05
Payer: COMMERCIAL

## 2024-07-05 DIAGNOSIS — K50.811 CROHN'S DISEASE OF BOTH SMALL AND LARGE INTESTINE WITH RECTAL BLEEDING (MULTI): ICD-10-CM

## 2024-07-05 DIAGNOSIS — D3A.012: ICD-10-CM

## 2024-07-05 PROCEDURE — A9587 GALLIUM GA-68: HCPCS | Performed by: INTERNAL MEDICINE

## 2024-07-05 PROCEDURE — 78815 PET IMAGE W/CT SKULL-THIGH: CPT | Mod: PI

## 2024-07-05 PROCEDURE — 3430000001 HC RX 343 DIAGNOSTIC RADIOPHARMACEUTICALS: Performed by: INTERNAL MEDICINE

## 2024-07-09 ENCOUNTER — OFFICE VISIT (OUTPATIENT)
Dept: HEMATOLOGY/ONCOLOGY | Facility: CLINIC | Age: 44
End: 2024-07-09
Payer: COMMERCIAL

## 2024-07-09 VITALS
TEMPERATURE: 97.5 F | HEART RATE: 79 BPM | OXYGEN SATURATION: 98 % | BODY MASS INDEX: 24.41 KG/M2 | HEIGHT: 68 IN | RESPIRATION RATE: 16 BRPM | WEIGHT: 161.1 LBS | DIASTOLIC BLOOD PRESSURE: 90 MMHG | SYSTOLIC BLOOD PRESSURE: 128 MMHG

## 2024-07-09 DIAGNOSIS — D3A.8 NEUROENDOCRINE TUMOR (CMS-HCC): Primary | ICD-10-CM

## 2024-07-09 PROCEDURE — 99215 OFFICE O/P EST HI 40 MIN: CPT | Performed by: INTERNAL MEDICINE

## 2024-07-09 PROCEDURE — 3074F SYST BP LT 130 MM HG: CPT | Performed by: INTERNAL MEDICINE

## 2024-07-09 PROCEDURE — 3080F DIAST BP >= 90 MM HG: CPT | Performed by: INTERNAL MEDICINE

## 2024-07-09 ASSESSMENT — PAIN SCALES - GENERAL: PAINLEVEL: 0-NO PAIN

## 2024-07-09 NOTE — PROGRESS NOTES
.Patient ID: Thai Cristobal is a 43 y.o. male.  Referring Physician: No referring provider defined for this encounter.  Primary Care Provider: Charlotte Yan DO      Chief complaint: SB NET    HPI  This is a 43-year-old male with a known history of Crohn's disease patient is status post partial right colectomy small bowel resection on repair of a colonic fistula on 5/28/2024. Pathology he had a neuroendocrine tumor grade 3 well differentiated, with Ki67 30-40%. He did follow-up with oncology at [facility]. He had a PET-Ga68 which showed Postsurgical changes from right hemicolectomy and distal small  bowel with no evidence of focal abnormal gallium 68 dotatate uptake. Patient's only complaint is change in color of stools at this point he is eating and drinking and he has no abdominal pain. No diarrhea and no flushing.    SYSTEMIC TREATMENT RECEIVED: None       Subjective   Please refer to Notes above for complete History of present illness.          Review of Systems:   All 14 systems have been reviewed and were negative except for the HPI.       MEDICAL HISTORY  Past Medical History:   Diagnosis Date    Acute upper respiratory infection, unspecified 02/21/2018    Acute URI    Personal history of other specified conditions 04/23/2018    History of vertigo    SBO (small bowel obstruction) (Multi) 01/25/2024       FAMILY HISTORY  Family History   Problem Relation Name Age of Onset    Thyroid disease Mother      Other (bladder cancer) Mother      Hypertension Father      Benign prostatic hyperplasia Father      Anxiety disorder Sister      No Known Problems Brother      No Known Problems Daughter      Heart failure Paternal Grandfather         TOBACCO HISTORY  Tobacco Use: Low Risk  (6/17/2024)    Patient History     Smoking Tobacco Use: Never     Smokeless Tobacco Use: Never     Passive Exposure: Not on file       SOCIAL HISTORY  Social Connections: Not on file        Outpatient Medication Profile:  Current Outpatient  Medications on File Prior to Visit   Medication Sig Dispense Refill    albuterol 90 mcg/actuation inhaler Inhale 1-2 puffs every 4 hours if needed for wheezing or shortness of breath. 18 g 1    ALPRAZolam (Xanax) 0.5 mg tablet Take 1 tablet (0.5 mg) by mouth once daily as needed for anxiety. 20 tablet 0    ferrous sulfate, 325 mg ferrous sulfate, tablet Take 1 tablet by mouth every other day.      FLUoxetine (PROzac) 20 mg capsule Take 1 capsule (20 mg) by mouth once daily. 90 capsule 1    FLUoxetine (PROzac) 40 mg capsule Take 1 capsule (40 mg) by mouth once daily. 90 capsule 1    hydrocortisone (Anusol-HC) 25 mg suppository Insert 1 suppository (25 mg) into the rectum 2 times a day.      lisinopril 40 mg tablet Take 0.5 tablets (20 mg) by mouth once daily.      mometasone (Asmanex HFA) 100 mcg/actuation HFA aerosol inhaler Inhale 1 puff 2 times a day as needed (asthma/allergies).      montelukast (Singulair) 10 mg tablet Take 1 tablet (10 mg) by mouth as needed at bedtime (allergies/asthma).      SUMAtriptan (Imitrex) 50 mg tablet Take 1 tablet (50 mg) by mouth 1 time if needed for migraine for up to 36 doses. 9 tablet 3    traZODone (Desyrel) 50 mg tablet Take 0.5-1 tablets (25-50 mg) by mouth as needed at bedtime for sleep. 90 tablet 1    vitamin D3-vitamin K2 1,250-200 mcg capsule Take 1 capsule by mouth 1 (one) time per week.       No current facility-administered medications on file prior to visit.         Performance Status:  Symptomatic; fully ambulatory     Vitals and Measurements:   There were no vitals taken for this visit.      Physical Exam:   General: Appears well and in NAD  Eyes: PERRL, EOMI, clear sclera  ENMT: mucous membranes moist, no apparent injury, no lesions seen  Head/Neck: Neck supple, no apparent injury, thyroid without mass or tenderness, No JVD, trachea midline,   Thorax: Patent airways, CTAB, normal breath sounds with good chest expansion, thorax symmetric  Cardiovascular: Regular,  rate and rhythm, no murmurs, 2+ equal pulses of the extremities, normal S 1and S 2  Gastrointestinal: Nondistended, soft, non-tender, no rebound tenderness or guarding, no masses palpable, no organomegaly, +BS  Musculoskeletal: ROM intact, no joint swelling, normal strength  Extremities: normal extremities, no cyanosis edema, contusions or wounds, no clubbing  Neurological: alert and oriented x3, intact senses, motor, response and reflexes, normal strength  Lymphatic: No significant lymphadenopathy  Psychological: Appropriate mood and behavior  Skin: Warm and dry, no lesions, no rashes         Lab Results:  I have reviewed these laboratory results:     Lab Results   Component Value Date    WBC 5.1 01/08/2024    HGB 16.2 01/08/2024    HCT 47.9 01/08/2024    MCV 93 01/08/2024     01/08/2024         Chemistry    Lab Results   Component Value Date/Time     (L) 01/08/2024 1213    K 4.3 01/08/2024 1213     01/08/2024 1213    CO2 24 01/08/2024 1213    BUN 15 01/08/2024 1213    CREATININE 1.16 01/08/2024 1213    Lab Results   Component Value Date/Time    CALCIUM 9.4 01/08/2024 1213    ALKPHOS 80 01/08/2024 1213    AST 25 01/08/2024 1213    ALT 24 01/08/2024 1213    BILITOT 0.6 01/08/2024 1213            Lab Results   Component Value Date    TSH 3.17 04/18/2023        Radiology Result:  I have reviewed the latest Imaging in PACS and the findings are noted in this note. I discussed the results of the latest imaging with the patient. All previous imaging were reviewed at the time it was completed. Full records are available in the EMR for review as well.             NM PET CT net netspot    Result Date: 7/5/2024  Impression: 1. Postsurgical changes from right hemicolectomy and distal small bowel with no evidence of focal abnormal gallium 68 dotatate uptake within the region of documented neuroendocrine tumor to suggest residual disease. 2. Nonspecific, gallium 68 dotatate uptake within the pancreatic  uncinate which can be seen with physiologic uptake.     I personally reviewed the images/study and I agree with the findings as stated by Resident Celso Ashford MD.   MACRO: None   Signed by: Maco Villafana 7/5/2024 3:06 PM Dictation workstation:   [id number]        Pathology Results:  I have reviewed the full pathology report recorded in the EMR. The pertinent portions indicating diagnosis are listed here in the note. for details please refer to the full report recorded in the EMR.    Assessment and Plan:     G3 well differentiated SB NET  This is a 43-year-old male with a known history of Crohn's disease patient is status post partial right colectomy small bowel resection on repair of a colonic fistula on 5/28/2024. Pathology he had a neuroendocrine tumor grade 3 well differentiated, with Ki67 30-40%. He did follow-up with oncology at [facility]. He had a PET-Ga68 which showed Postsurgical changes from right hemicolectomy and distal small  bowel with no evidence of focal abnormal gallium 68 dotatate uptake. Patient's only complaint is change in color of stools at this point he is eating and drinking and he has no abdominal pain. No diarrhea and no flushing.    Plan:  1- CT CAP w/wo contrast on 07/15  2- Review at the [facility]  3- RTC in 2 weeks and if scans are negative will resume with restaging scans in 3 months        DISCLAIMER:   In preparing for this visit and writing this note, I reviewed all the previous electronic medical records (labs, imaging and medical charts) of the patient available in the physician portal. Significant findings which helped in decision making are recorded  in this chart.     The plan was discussed with the patient. We gave him ample opportunities to ask questions. All questions were answered to his satisfaction and he verbalized understanding.       Nba Vargas M.D.   and Director of the Neuroendocrine Tumor Program  Gastrointestinal Medical Oncology  Department  of Hematology and Oncology  [facility], [place] [address]  Phone (Office): [phone]  Fax:  [phone]   Email: [email or web address]  Learn more about [facility] Comprehensive Neuroendocrine Tumor Program: Click Here  Learn more about [place] [employer]: [email or web address]

## 2024-07-16 ENCOUNTER — APPOINTMENT (OUTPATIENT)
Dept: HEMATOLOGY/ONCOLOGY | Facility: HOSPITAL | Age: 44
End: 2024-07-16
Payer: COMMERCIAL

## 2024-07-16 NOTE — TUMOR BOARD NOTE
Tumor Board Documentation    The case was presented by Nba Vargas at our Tumor Board on 07/16/2024, which included representatives from.    This is a 43-year-old male with a known history of Crohn's disease patient is status post partial right colectomy small bowel resection on repair of a colonic fistula on 5/28/2024. Pathology he had a neuroendocrine tumor grade 3 well differentiated, with Ki67 30-40%.   He did follow-up with oncology at [facility].   He had a PET-Ga68 which showed Postsurgical changes from right hemicolectomy and distal small bowel with no evidence of focal abnormal gallium 68 dotatate uptake.   The pathology was reviewed and confirmed the present report.    CT CAP w/wo contrast on 07/15 was not done    Additionally, we reviewed previous medical and familial history, history of present illness, and recent lab results along with all available histopathologic and imaging studies. The tumor board considered available treatment options and made the following recommendations:    Plan: G3 well differentiated SB NET  - CT was not done as scheduled, so RTC in 2 weeks with CT CAP and if scans are negative will resume with restaging scans Q 3 months     National site-specific guidelines were discussed with respect to the case.

## 2024-07-18 ENCOUNTER — HOSPITAL ENCOUNTER (OUTPATIENT)
Dept: RADIOLOGY | Facility: CLINIC | Age: 44
End: 2024-07-18
Payer: COMMERCIAL

## 2024-07-23 ENCOUNTER — APPOINTMENT (OUTPATIENT)
Dept: HEMATOLOGY/ONCOLOGY | Facility: CLINIC | Age: 44
End: 2024-07-23
Payer: COMMERCIAL

## 2024-07-24 ENCOUNTER — OFFICE VISIT (OUTPATIENT)
Dept: SURGERY | Facility: CLINIC | Age: 44
End: 2024-07-24
Payer: COMMERCIAL

## 2024-07-24 ENCOUNTER — HOSPITAL ENCOUNTER (OUTPATIENT)
Dept: RADIOLOGY | Facility: EXTERNAL LOCATION | Age: 44
Discharge: HOME | End: 2024-07-24

## 2024-07-24 DIAGNOSIS — K43.9 VENTRAL HERNIA WITHOUT OBSTRUCTION OR GANGRENE: ICD-10-CM

## 2024-07-24 DIAGNOSIS — R10.31 RLQ ABDOMINAL PAIN: ICD-10-CM

## 2024-07-24 PROCEDURE — 99024 POSTOP FOLLOW-UP VISIT: CPT | Performed by: PHYSICIAN ASSISTANT

## 2024-07-24 PROCEDURE — 1036F TOBACCO NON-USER: CPT | Performed by: PHYSICIAN ASSISTANT

## 2024-07-24 NOTE — PROGRESS NOTES
Subjective   Patient ID: Thai Cristobal is a 43 y.o. male who presents for Follow-up (RLQ pain S/P partial right colectomy done on 5/28/24).    HPI  This is a 43-year-old male who is 2 months status post a partial right colectomy bowel resection colocolonic fistula done history of Crohn's disease.  Patient is here because last Saturday he was working out in the gym he was laying on a bench press machine he was lifting 110 pounds and he felt pain in his right middle to lower quadrant area this been on and off since sometimes it actually reaches the level of the a 7 out of 10 in pain.  He is concerned that he did something wrong.  Does not have any nausea or vomiting.  He is eating he is drinking he is moving his bowels.      Review of Systems    Review of systems is negative other than what is mentioned above        Physical Exam  Eyes: Conjunctiva non -icteric and eye lids are without obvious rash or drooping. Pupils are symmetric.   Ears, Nose, Mouth, and Throat: External ears and nose appear to be without deformity or rash. No lesions or masses noted. Hearing is grossly intact.   Neck:. No JVD noted, tracheal position is midline. No thyromegaly, no thyroid nodules  Head and Face: Examination of the head and face revealed no abnormalities.   Respiratory: No gasping or shortness of breath noted, no use of accessory muscles noted.  Lungs are clear to auscultate  Cardiovascular: Examination for edema is normal, regular rate and rhythm S1-S2  GI: Abdomen non tender to palpation, bowel sounds are present, incision to the right of the navel there is some puffiness in the area possible ventral hernia.  Its only tender on very deep palpation there is no rebound.  Skin: No rashes or open lesions/ulcers identified on skin.   Musk: Digits/nails show no clubbing or cyanosis. No asymmetry or masses noted of the musculature. Examination of the muscles/joints/bones show normal range of motion. Gait is grossly normally.    Neurologic: Cranial nerves II- XII intact, motor strength 5/5 muscle strength of the lower extremities bilaterally and equal.      Objective     No diagnosis found.   Patient Active Problem List   Diagnosis    IDALIA positive    Anxiety    Attention deficit    Primary hypertension    Crohn's disease (Multi)    LAD (lymphadenopathy), cervical    Migraine with aura and without status migrainosus, not intractable    Obsessive behaviors    Panic attacks    PSVT (paroxysmal supraventricular tachycardia) (CMS-HCC)    Hypopigmentation    Seasonal allergies    Chest pressure    Allergic rhinitis    Asthma (HHS-HCC)    Atypical chest pain    Depression    Mallet finger    Migraine without aura and responsive to treatment    Orthostatic hypotension    Palpitations    Urinary hesitancy    Positive antinuclear antibody    Attention disturbance    Disorder of pigmentation    Cervical lymphadenopathy    Migraine    Obsessive behavior    Benign essential hypertension    Paroxysmal supraventricular tachycardia (CMS-HCC)    Neuroendocrine tumor (CMS-HCC)      No Known Allergies         Past Medical History:   Diagnosis Date    Acute upper respiratory infection, unspecified 02/21/2018    Acute URI    Personal history of other specified conditions 04/23/2018    History of vertigo    SBO (small bowel obstruction) (Multi) 01/25/2024     Social History     Tobacco Use   Smoking Status Never   Smokeless Tobacco Never     Family History   Problem Relation Name Age of Onset    Thyroid disease Mother      Other (bladder cancer) Mother      Hypertension Father      Benign prostatic hyperplasia Father      Anxiety disorder Sister      No Known Problems Brother      No Known Problems Daughter      Heart failure Paternal Grandfather        Past Surgical History:   Procedure Laterality Date    COLON SURGERY      GALLBLADDER SURGERY  02/23/2015    Gallbladder Surgery    OTHER SURGICAL HISTORY  04/16/2021    Small bowel resection        Assessment/Plan   This is status post a partial right colectomy possible ventral hernia.  Patient will require CT abdomen pelvis IV oral contrast to assess.  Patient was instructed to stop bench pressing.  He should follow-up once the CT is completed.  If he develops any further acute abdominal pain nausea vomiting and not moving his bowels he needs to report to the emergency department.      Encounter Diagnoses   Name Primary?    Ventral hernia without obstruction or gangrene     RLQ abdominal pain      I have reviewed all data including labs,radiologic and previous reports.      **Portions of this medical record have been created using voice recognition software and may have minor errors which are inherent in voice recognition systems. It has not been fully edited for typographical or grammatical errors**

## 2024-08-04 DIAGNOSIS — F51.04 PSYCHOPHYSIOLOGICAL INSOMNIA: ICD-10-CM

## 2024-08-05 RX ORDER — TRAZODONE HYDROCHLORIDE 50 MG/1
25-50 TABLET ORAL NIGHTLY PRN
Qty: 90 TABLET | Refills: 1 | Status: SHIPPED | OUTPATIENT
Start: 2024-08-05 | End: 2025-08-05

## 2024-08-07 ENCOUNTER — APPOINTMENT (OUTPATIENT)
Dept: RADIOLOGY | Facility: CLINIC | Age: 44
End: 2024-08-07
Payer: COMMERCIAL

## 2024-08-09 ENCOUNTER — NURSE TRIAGE (OUTPATIENT)
Dept: HEMATOLOGY/ONCOLOGY | Facility: HOSPITAL | Age: 44
End: 2024-08-09
Payer: COMMERCIAL

## 2024-08-09 NOTE — TELEPHONE ENCOUNTER
Wife calling for 2 concerns- patient seems more fatigued then before, taking a nap once a day, more tired and brain fog. Appetite fair. Reports their sleep has been disrupted r/t sick child, but wanted to be sure everything was ok, asking if he can have his labs checked to see if he is dehydrated or anemic.  2nd concern-Has new abdominal bulge concerns for possible Hernia. Saw general surgery and is getting CT abd/pelvis on 8/16. His CT scan C/A/P previously ordered by Dr. Vargas was denied, he will be having just abd/pelvis scanned next week, but not chest. IS that ok, will Dr. Vargas still see him on 8/20?  Please advise / address concerns, thank you          Additional Information   Are you sleepy during the day?     Nap once a day   Commented on: If yes to pain, on a scale of 0 to 10 (0 being no pain and 10 being the worst possible) how would you rate your pain?     Intermittent pain at surgery site   Commented on: How many hours a day are you tired?     On and off the entire day   Commented on: Do you have trouble falling alseep or staying asleep?     Sleep has been disrupted r/t child has been sick and sleep disrupted    Protocols used: Fatigue

## 2024-08-09 NOTE — TELEPHONE ENCOUNTER
Dr. Vargas suggesting ED or [facility] visit. [facility] paged, but due to not having any available chairs today and also pt's symptoms, they request the pt to go to the ED to be evaluated. Due to abdominal hernia, concern for bowel strangulation increased and they would be able to provide STAT surgery/imaging consults. Called pt to make them aware of the situation. Patient verbalized understanding and agreement regarding discussed information/plan and will be going to ED today.

## 2024-08-09 NOTE — TELEPHONE ENCOUNTER
Called pt's wife back to discuss recent phone call. Left VM to return call. Provided call back number [phone].

## 2024-08-12 NOTE — TELEPHONE ENCOUNTER
Per [facility], no earlier appointment is available as Dr. Vargas is out of the office until next week.    Jared Santos's call to discuss. Left a voicemail with callback number and fax number [phone].

## 2024-08-12 NOTE — TELEPHONE ENCOUNTER
Patient's spouse Danielle calls back with an update. She states that they did go to [facility] ED on Friday 8/9. Thai had labs drawn, IV fluids and a CT scan. She states that the CT scan showed new liver nodules.    She would like to discuss next steps and if anything additional needs to be scheduled prior to next visit on 8/20.    She has all of the records from [facility] and can send them if needed as they are on a different computer system.    Danielle can be reached at [phone]. If no answer, can call Thai directly at [phone].    Message sent to Dr. Vargas and GI Disease team.

## 2024-08-20 ENCOUNTER — OFFICE VISIT (OUTPATIENT)
Dept: HEMATOLOGY/ONCOLOGY | Facility: CLINIC | Age: 44
End: 2024-08-20
Payer: COMMERCIAL

## 2024-08-20 VITALS
DIASTOLIC BLOOD PRESSURE: 79 MMHG | RESPIRATION RATE: 16 BRPM | WEIGHT: 158.1 LBS | HEART RATE: 85 BPM | TEMPERATURE: 97.3 F | SYSTOLIC BLOOD PRESSURE: 116 MMHG | BODY MASS INDEX: 24.1 KG/M2 | OXYGEN SATURATION: 98 %

## 2024-08-20 DIAGNOSIS — D3A.8 NEUROENDOCRINE TUMOR (CMS-HCC): ICD-10-CM

## 2024-08-20 PROCEDURE — 99215 OFFICE O/P EST HI 40 MIN: CPT | Performed by: INTERNAL MEDICINE

## 2024-08-20 PROCEDURE — 3074F SYST BP LT 130 MM HG: CPT | Performed by: INTERNAL MEDICINE

## 2024-08-20 PROCEDURE — 3078F DIAST BP <80 MM HG: CPT | Performed by: INTERNAL MEDICINE

## 2024-08-20 ASSESSMENT — PAIN SCALES - GENERAL: PAINLEVEL: 2

## 2024-08-20 NOTE — PROGRESS NOTES
.Patient ID: Thai Cristobal is a 43 y.o. male.  Referring Physician: Nba Vargas MD  [id number] [place] [address]  Primary Care Provider: Charlotte Yan DO      Chief complaint: SB NET    HPI  This is a 43-year-old male with a known history of Crohn's disease patient is status post partial right colectomy small bowel resection on repair of a colonic fistula on 5/28/2024. Pathology he had a neuroendocrine tumor grade 3 well differentiated, with Ki67 30-40%. He did follow-up with oncology at [facility]. He had a PET-Ga68 which showed Postsurgical changes from right hemicolectomy and distal small bowel with no evidence of focal abnormal gallium 68 dotatate uptake. Patient's only complaint is change in color of stools at this point he is eating and drinking and he has no abdominal pain. No diarrhea and no flushing.    SYSTEMIC TREATMENT RECEIVED: None       Subjective   Please refer to Notes above for complete History of present illness.          Review of Systems:   All 14 systems have been reviewed and were negative except for the HPI.       MEDICAL HISTORY  Past Medical History:   Diagnosis Date    Acute upper respiratory infection, unspecified 02/21/2018    Acute URI    Personal history of other specified conditions 04/23/2018    History of vertigo    SBO (small bowel obstruction) (Multi) 01/25/2024       FAMILY HISTORY  Family History   Problem Relation Name Age of Onset    Thyroid disease Mother      Other (bladder cancer) Mother      Hypertension Father      Benign prostatic hyperplasia Father      Anxiety disorder Sister      No Known Problems Brother      No Known Problems Daughter      Heart failure Paternal Grandfather         TOBACCO HISTORY  Tobacco Use: Low Risk  (7/24/2024)    Patient History     Smoking Tobacco Use: Never     Smokeless Tobacco Use: Never     Passive Exposure: Not on file       SOCIAL HISTORY  Social Connections: Not on file        Outpatient Medication Profile:  Current  Outpatient Medications on File Prior to Visit   Medication Sig Dispense Refill    albuterol 90 mcg/actuation inhaler Inhale 1-2 puffs every 4 hours if needed for wheezing or shortness of breath. 18 g 1    ALPRAZolam (Xanax) 0.5 mg tablet Take 1 tablet (0.5 mg) by mouth once daily as needed for anxiety. 20 tablet 0    ferrous sulfate, 325 mg ferrous sulfate, tablet Take 1 tablet by mouth every other day.      FLUoxetine (PROzac) 20 mg capsule Take 1 capsule (20 mg) by mouth once daily. 90 capsule 1    FLUoxetine (PROzac) 40 mg capsule Take 1 capsule (40 mg) by mouth once daily. 90 capsule 1    hydrocortisone (Anusol-HC) 25 mg suppository Insert 1 suppository (25 mg) into the rectum 2 times a day.      lisinopril 40 mg tablet Take 0.5 tablets (20 mg) by mouth once daily. (Patient not taking: Reported on 7/9/2024)      mometasone (Asmanex HFA) 100 mcg/actuation HFA aerosol inhaler Inhale 1 puff 2 times a day as needed (asthma/allergies).      montelukast (Singulair) 10 mg tablet Take 1 tablet (10 mg) by mouth as needed at bedtime (allergies/asthma).      SUMAtriptan (Imitrex) 50 mg tablet Take 1 tablet (50 mg) by mouth 1 time if needed for migraine for up to 36 doses. 9 tablet 3    traZODone (Desyrel) 50 mg tablet TAKE 0.5-1 TABLETS (25-50 MG) BY MOUTH AS NEEDED AT BEDTIME FOR SLEEP. 90 tablet 1    vitamin D3-vitamin K2 1,250-200 mcg capsule Take 1 capsule by mouth 1 (one) time per week.       No current facility-administered medications on file prior to visit.         Performance Status:  Symptomatic; fully ambulatory     Vitals and Measurements:   There were no vitals taken for this visit.      Physical Exam:   General: Appears well and in NAD  Eyes: PERRL, EOMI, clear sclera  ENMT: mucous membranes moist, no apparent injury, no lesions seen  Head/Neck: Neck supple, no apparent injury, thyroid without mass or tenderness, No JVD, trachea midline,   Thorax: Patent airways, CTAB, normal breath sounds with good chest  expansion, thorax symmetric  Cardiovascular: Regular, rate and rhythm, no murmurs, 2+ equal pulses of the extremities, normal S 1and S 2  Gastrointestinal: Nondistended, soft, non-tender, no rebound tenderness or guarding, no masses palpable, no organomegaly, +BS  Musculoskeletal: ROM intact, no joint swelling, normal strength  Extremities: normal extremities, no cyanosis edema, contusions or wounds, no clubbing  Neurological: alert and oriented x3, intact senses, motor, response and reflexes, normal strength  Lymphatic: No significant lymphadenopathy  Psychological: Appropriate mood and behavior  Skin: Warm and dry, no lesions, no rashes         Lab Results:  I have reviewed these laboratory results:     Lab Results   Component Value Date    WBC 5.1 01/08/2024    HGB 16.2 01/08/2024    HCT 47.9 01/08/2024    MCV 93 01/08/2024     01/08/2024         Chemistry    Lab Results   Component Value Date/Time     (L) 01/08/2024 1213    K 4.3 01/08/2024 1213     01/08/2024 1213    CO2 24 01/08/2024 1213    BUN 15 01/08/2024 1213    CREATININE 1.16 01/08/2024 1213    Lab Results   Component Value Date/Time    CALCIUM 9.4 01/08/2024 1213    ALKPHOS 80 01/08/2024 1213    AST 25 01/08/2024 1213    ALT 24 01/08/2024 1213    BILITOT 0.6 01/08/2024 1213            Lab Results   Component Value Date    TSH 3.17 04/18/2023        Radiology Result:  I have reviewed the latest Imaging in PACS and the findings are noted in this note. I discussed the results of the latest imaging with the patient. All previous imaging were reviewed at the time it was completed. Full records are available in the EMR for review as well.             NM PET CT net netspot    Result Date: 7/5/2024  Impression: 1. Postsurgical changes from right hemicolectomy and distal small bowel with no evidence of focal abnormal gallium 68 dotatate uptake within the region of documented neuroendocrine tumor to suggest residual disease. 2. Nonspecific,  gallium 68 dotatate uptake within the pancreatic uncinate which can be seen with physiologic uptake.     I personally reviewed the images/study and I agree with the findings as stated by Resident Celso Ashford MD.   MACRO: None   Signed by: Maco Villafana 7/5/2024 3:06 PM Dictation workstation:   [id number]        Pathology Results:  I have reviewed the full pathology report recorded in the EMR. The pertinent portions indicating diagnosis are listed here in the note. for details please refer to the full report recorded in the EMR.    Assessment and Plan:     G3 well differentiated SB NET  This is a 43-year-old male with a known history of Crohn's disease patient is status post partial right colectomy small bowel resection on repair of a colonic fistula on 5/28/2024. Pathology he had a neuroendocrine tumor grade 3 well differentiated, with Ki67 30-40%. He did follow-up with oncology at [facility]. He had a PET-Ga68 which showed Postsurgical changes from right hemicolectomy and distal small bowel with no evidence of focal abnormal gallium 68 dotatate uptake. Patient's only complaint is change in color of stools at this point he is eating and drinking and he has no abdominal pain. No diarrhea and no flushing. She had CT AP on 08/09/2024 at outside institution which showed new small liver nodules.    Plan:  1- MRI liver w eovist   2- RTC in 2 weeks and if scans are negative will resume with restaging scans in 3 months        DISCLAIMER:   In preparing for this visit and writing this note, I reviewed all the previous electronic medical records (labs, imaging and medical charts) of the patient available in the physician portal. Significant findings which helped in decision making are recorded  in this chart.     The plan was discussed with the patient. We gave him ample opportunities to ask questions. All questions were answered to his satisfaction and he verbalized understanding.       Nba Vargas M.D.  Associate  Professor and Director of the Neuroendocrine Tumor Program  Gastrointestinal Medical Oncology  Department of Hematology and Oncology  [facility], [place] [address]  Phone (Office): [phone]  Fax:  [phone]   Email: [email or web address]  Learn more about [facility] Comprehensive Neuroendocrine Tumor Program: Click Here  Learn more about [place] [employer]: [email or web address]

## 2024-08-22 DIAGNOSIS — F41.9 ANXIETY: ICD-10-CM

## 2024-08-22 RX ORDER — FLUOXETINE HYDROCHLORIDE 20 MG/1
20 CAPSULE ORAL DAILY
Qty: 90 CAPSULE | Refills: 1 | Status: SHIPPED | OUTPATIENT
Start: 2024-08-22 | End: 2025-02-18

## 2024-09-04 ENCOUNTER — APPOINTMENT (OUTPATIENT)
Dept: RADIOLOGY | Facility: CLINIC | Age: 44
End: 2024-09-04
Payer: COMMERCIAL

## 2024-09-04 ENCOUNTER — HOSPITAL ENCOUNTER (OUTPATIENT)
Dept: RADIOLOGY | Facility: HOSPITAL | Age: 44
Discharge: HOME | End: 2024-09-04
Payer: COMMERCIAL

## 2024-09-04 DIAGNOSIS — D3A.8 NEUROENDOCRINE TUMOR (CMS-HCC): ICD-10-CM

## 2024-09-04 PROCEDURE — 2500000004 HC RX 250 GENERAL PHARMACY W/ HCPCS (ALT 636 FOR OP/ED): Performed by: INTERNAL MEDICINE

## 2024-09-04 PROCEDURE — 74183 MRI ABD W/O CNTR FLWD CNTR: CPT

## 2024-09-04 PROCEDURE — A9581 GADOXETATE DISODIUM INJ: HCPCS | Performed by: INTERNAL MEDICINE

## 2024-09-05 ENCOUNTER — TELEPHONE (OUTPATIENT)
Dept: HEMATOLOGY/ONCOLOGY | Facility: HOSPITAL | Age: 44
End: 2024-09-05

## 2024-09-06 ENCOUNTER — TELEPHONE (OUTPATIENT)
Dept: HEMATOLOGY/ONCOLOGY | Facility: HOSPITAL | Age: 44
End: 2024-09-06

## 2024-09-10 ENCOUNTER — HOSPITAL ENCOUNTER (OUTPATIENT)
Facility: HOSPITAL | Age: 44
Setting detail: SURGERY ADMIT
End: 2024-09-10
Attending: STUDENT IN AN ORGANIZED HEALTH CARE EDUCATION/TRAINING PROGRAM | Admitting: STUDENT IN AN ORGANIZED HEALTH CARE EDUCATION/TRAINING PROGRAM
Payer: COMMERCIAL

## 2024-09-10 ENCOUNTER — OFFICE VISIT (OUTPATIENT)
Dept: HEMATOLOGY/ONCOLOGY | Facility: CLINIC | Age: 44
End: 2024-09-10
Payer: COMMERCIAL

## 2024-09-10 ENCOUNTER — OFFICE VISIT (OUTPATIENT)
Dept: SURGICAL ONCOLOGY | Facility: CLINIC | Age: 44
End: 2024-09-10
Payer: COMMERCIAL

## 2024-09-10 VITALS
SYSTOLIC BLOOD PRESSURE: 114 MMHG | WEIGHT: 161.4 LBS | TEMPERATURE: 97.5 F | RESPIRATION RATE: 18 BRPM | OXYGEN SATURATION: 98 % | DIASTOLIC BLOOD PRESSURE: 70 MMHG | BODY MASS INDEX: 24.6 KG/M2 | HEART RATE: 76 BPM

## 2024-09-10 DIAGNOSIS — C7B.8: Primary | ICD-10-CM

## 2024-09-10 DIAGNOSIS — D3A.8 NEUROENDOCRINE TUMOR (CMS-HCC): ICD-10-CM

## 2024-09-10 PROCEDURE — 3074F SYST BP LT 130 MM HG: CPT | Performed by: INTERNAL MEDICINE

## 2024-09-10 PROCEDURE — 99215 OFFICE O/P EST HI 40 MIN: CPT | Performed by: INTERNAL MEDICINE

## 2024-09-10 PROCEDURE — 3078F DIAST BP <80 MM HG: CPT | Performed by: INTERNAL MEDICINE

## 2024-09-10 PROCEDURE — 99204 OFFICE O/P NEW MOD 45 MIN: CPT | Performed by: STUDENT IN AN ORGANIZED HEALTH CARE EDUCATION/TRAINING PROGRAM

## 2024-09-10 PROCEDURE — 1036F TOBACCO NON-USER: CPT | Performed by: INTERNAL MEDICINE

## 2024-09-10 PROCEDURE — 1036F TOBACCO NON-USER: CPT | Performed by: STUDENT IN AN ORGANIZED HEALTH CARE EDUCATION/TRAINING PROGRAM

## 2024-09-10 PROCEDURE — 99214 OFFICE O/P EST MOD 30 MIN: CPT | Mod: 57 | Performed by: STUDENT IN AN ORGANIZED HEALTH CARE EDUCATION/TRAINING PROGRAM

## 2024-09-10 RX ORDER — ENOXAPARIN SODIUM 100 MG/ML
40 INJECTION SUBCUTANEOUS ONCE
OUTPATIENT
Start: 2024-09-10 | End: 2024-09-10

## 2024-09-10 RX ORDER — CEFAZOLIN SODIUM 2 G/100ML
2 INJECTION, SOLUTION INTRAVENOUS ONCE
OUTPATIENT
Start: 2024-09-10 | End: 2024-09-10

## 2024-09-10 RX ORDER — SODIUM CHLORIDE, SODIUM LACTATE, POTASSIUM CHLORIDE, CALCIUM CHLORIDE 600; 310; 30; 20 MG/100ML; MG/100ML; MG/100ML; MG/100ML
20 INJECTION, SOLUTION INTRAVENOUS CONTINUOUS
OUTPATIENT
Start: 2024-09-10

## 2024-09-10 ASSESSMENT — ENCOUNTER SYMPTOMS
OCCASIONAL FEELINGS OF UNSTEADINESS: 0
LOSS OF SENSATION IN FEET: 0
DEPRESSION: 0

## 2024-09-10 ASSESSMENT — PAIN SCALES - GENERAL: PAINLEVEL: 0-NO PAIN

## 2024-09-10 NOTE — LETTER
September 10, 2024     Nba Vargas MD  [address]  [facility] [address]    Patient: Thai Cristobal   YOB: 1980   Date of Visit: 9/10/2024       Dear Dr. Nba Vargas MD:    Thank you for referring Thai Cristobal to me for evaluation. Below are my notes for this consultation.  If you have questions, please do not hesitate to call me. I look forward to following your patient along with you.       Sincerely,     Alexis Smith MD      CC: No Recipients  ______________________________________________________________________________________    Assessment and Plan:  Mr Cristobal is a very pleasant 43-year-old man with a significant medical history significant for Crohn's disease, previously on steroids and immune suppressants but not for many months, with a history of multiple small bowel resections and a right colectomy as well as an open cholecystectomy.  His most recent surgery was significant for many dense peritoneal adhesions.  I do not believe he is at risk for short gut.  His recent liver MRI reveals 13 liver tumors by my count, all of which are smaller than 3 cm.  These are presumably neuroendocrine tumor metastases but they did not light up on the dotatate scan.  I believe Dr. Vargas will be ordering a PET scan and biopsy of these liver lesions to confirm our suspicions.  Assuming these results are consistent with neuroendocrine liver mets I have recommended that we proceed with laparotomy, liver ultrasound and multiple wedge/ablation of these liver tumors, and excision of his mesenteric lymph nodes.  I think that we can excise and/or ablate all of his liver tumors and a very confident that we can get more than 70% cytoreduction.  We discussed the risks and benefits of the general rationale behind this approach in the setting of neuroendocrine tumor.  He understands and is enthusiastic to proceed.  We will see him back in 2 weeks with Dr. Vargas to finalize surgical plans and review  his testing.  I will plan for surgery on October 2 and arrange PAT beforehand.    I spent 60 minutes in the professional and overall care of this patient.    Alexis Smith MD  Assistant Professor of Surgery  Division of Surgical Oncology  [phone]  [email or web address]      History Of Present Illness  Thai Cristobal is a 43 y.o. male referred by Nba Vargas for neuroendocrine liver metastases.    The patient has a long history of Crohn's disease diagnosed around the year 2000.  He was treated at that time with what sounds like a bowel resection for abscess in his ileum and then was under no treatment until 2019 when he started developing strictures and fistulae.  He also has a history of an open cholecystectomy.  He was previously on Entyvio and steroids but has been off of these for a while.  Over the past few years he has developed an anastomotic stricture and obstructions that required multiple episodes of dilation.  He was eventually admitted to the hospital in May of this year and underwent laparotomy with Dr. 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  After a tedious and lengthy adhesiolysis he was found to have a small bowel tumor and frozen section revealed poorly differentiated adenocarcinoma and so he underwent a right hemicolectomy.  He has recovered well from the surgery.  Final pathology ultimately returned grade 3 well-differentiated neuroendocrine tumor, Ki-67 30-40%, 11/18 lymph nodes positive, negative margins.  He subsequently had a dotatate scan that showed no focal uptake and a liver MRI which shows multiple liver metastases.  He also has 2 large lymph nodes at his ileocolic pedicle.    All other systems have been reviewed and are negative except as noted in the HPI.    I personally reviewed all necessary laboratory results, pathology reports, and radiologic images for this patient.    Past Medical  History  He has a past medical history of Acute upper respiratory infection, unspecified (02/21/2018), Personal history of other specified conditions (04/23/2018), and SBO (small bowel obstruction) (Multi) (01/25/2024).    Surgical History  He has a past surgical history that includes Gallbladder surgery (02/23/2015); Other surgical history (04/16/2021); and Colon surgery.     Social History  He reports that he has never smoked. He has never used smokeless tobacco. He reports that he does not currently use alcohol. He reports that he does not currently use drugs.    Family History  Family History   Problem Relation Name Age of Onset   • Thyroid disease Mother     • Other (bladder cancer) Mother     • Hypertension Father     • Benign prostatic hyperplasia Father     • Anxiety disorder Sister     • No Known Problems Brother     • No Known Problems Daughter     • Heart failure Paternal Grandfather          Allergies  Patient has no known allergies.     Last Recorded Vitals  There were no vitals taken for this visit.    Physical Exam  General: no acute distress, well-nourished  Eyes: intact EOM, no scleral icterus  ENT: hearing intact, no drainage  Respiratory: symmetric chest rise, no cough  Cardiovascular: intact distal pulses, no pitting edema  Abdominal: soft, nontender, nondistended  Musculoskeletal: no deformities, intact strength  Integumentary: warm, dry, no lymphadenopathy  Neuro: no focal deficits, sensation intact  Psych: normal mood and affect       Relevant Results  Narrative & Impression   Interpreted By:  Jim Reddy,  Eli Espana   STUDY:  MR LIVER WITH EOVIST CONTRAST;  9/4/2024 9:01 pm      INDICATION:  Signs/Symptoms:New liver nodules on CT scan of 08/09/2024.      ,D3A.8 Other benign neuroendocrine tumors (CMS-HCC)      COMPARISON:  PET-CT 07/05/2024, CT abdomen and pelvis 08/09/2024      ACCESSION NUMBER(S):  [id number]      ORDERING CLINICIAN:  CLARI ABREU      TECHNIQUE:  MRI  LIVER; Multiplanar magnetic resonance images of the abdomen were  obtained including the following sequences; T2-weighted SSFSE with  and without fat saturation, T1-weighted GRE in/opposed phase, DWI,  fat saturated 3D-T1w GRE pre and dynamically post contrast.  7 ml of  Eovist was administered intravenously without immediate complication.      FINDINGS:  LIVER:  The liver is at the upper limits of normal measuring 17.1 cm in the  craniocaudal dimension. Smooth contour. Normal enhancement. Hepatic  parenchyma is isointense on T1 in and out of phase imaging. Multiple  T1 hypointense, mildly T2 hyperintense, arterially enhancing lesions  which do not demonstrate uptake of contrast on the hepatocyte phase  throughout the liver, similar in size and appearance as compared to  CT abdomen and pelvis dated 08/09/2024 given the differences in  imaging techniques. The liver lesions were faintly seen and smaller  in size on the PET-CT of 07/05/2024 but did not demonstrate a uptake.  For example, 2.4 x 2.3 cm lesion within the inferior right hepatic  lobe (series 22, image 37), previously measuring 2.3 x 2.1 cm. Liver  lesion within the caudate lobe measuring 2.7 x 2.6 cm (series 22,  image 14), previously measuring 2.8 x 2.6 cm. Liver nodule within the  anterior right hepatic lobe measuring 2.7 x 2.6 cm (series 22, image  17), previously measuring 2.6 x 2.4 cm.      BILE DUCTS:  No intrahepatic or extrahepatic bile duct dilatation is demonstrated.      GALLBLADDER:  The gallbladder is absent.      PANCREAS:  Normal signal intensity. Normal enhancement. No masses. The  pancreatic duct is normal.      SPLEEN:  The spleen is normal in size without evidence of focal lesions.      ADRENAL GLANDS:  The bilateral adrenal glands are unremarkable.      KIDNEYS:  The bilateral kidneys are unremarkable in size and appearance. No  evidence of hydronephrosis.      LYMPH NODES:  Two enlarged mesenteric lymph nodes adjacent to the  ileocolic  anastomosis measuring 2.6 x 2.3 cm (series 22, image 38) and 2.6 x  1.9 cm (series 22, image 40), which are similar in size as compared  to CT abdomen and pelvis dated 08/09/2024 and enlarged as compared to  PET-CT dated 07/05/2024.      ABDOMINAL VESSELS:  Aorta and the major abdominal arterial vessels demonstrate no gross  abnormality.  Superior mesenteric vein, splenic vein, and main, right  and left portal vein are patent. Hepatic veins are patent.  No  significant collaterals or esophageal varices are present.      BOWEL:  Stomach and duodenum are within normal limits. Postoperative changes  of right hemicolectomy. Mild small bowel wall thickening, which is  likely related to known Crohn's disease. The colon is unremarkable.      PERITONEUM/RETROPERITONEUM:  No ascites or loculated fluid collections.      BONES AND LOWER THORAX:  No suspicious osseous lesions. Within normal limits.      IMPRESSION:  1.  Multiple T1 hypointense and T2 hyperintense lesions throughout  the liver, which demonstrate mild arterial enhancement and no uptake  of contrast on the hepatocyte phase, favored to represent metastatic  disease. These liver lesions are similar in size as compared to CT  abdomen and pelvis dated 08/09/2024. Lesions were faintly seen and  smaller in size on the prior PET-CT of 07/05/2024 and did not  demonstrate metabolic activity.  2. Two enlarged mesenteric lymph nodes adjacent to the ileocolic  anastomosis. These lymph nodes are similar in size as compared to CT  abdomen and pelvis dated 08/09/2024 and increased in size as compared  to PET-CT dated 07/05/2024. These were not metabolically active on  the PET-CT.

## 2024-09-10 NOTE — PROGRESS NOTES
.Patient ID: Thai Cristobal is a 43 y.o. male.  Referring Physician: Nba Vargas MD  [address] [place] [address]  Primary Care Provider: Charlotte Yan DO      Chief complaint: SB NET    HPI  This is a 43-year-old male with a known history of Crohn's disease patient is status post partial right colectomy small bowel resection on repair of a colonic fistula on 5/28/2024. Pathology he had a neuroendocrine tumor grade 3 well differentiated, with Ki67 30-40%. He did follow-up with oncology at [facility]. He had a PET-Ga68 which showed Postsurgical changes from right hemicolectomy and distal small bowel with no evidence of focal abnormal gallium 68 dotatate uptake. Patient's only complaint is change in color of stools at this point, he is eating and drinking less and he has no abdominal pain. No diarrhea and no flushing.    SYSTEMIC TREATMENT RECEIVED: None       Subjective   Please refer to Notes above for complete History of present illness.          Review of Systems:   All 14 systems have been reviewed and were negative except for the HPI.       MEDICAL HISTORY  Past Medical History:   Diagnosis Date    Acute upper respiratory infection, unspecified 02/21/2018    Acute URI    Personal history of other specified conditions 04/23/2018    History of vertigo    SBO (small bowel obstruction) (Multi) 01/25/2024       FAMILY HISTORY  Family History   Problem Relation Name Age of Onset    Thyroid disease Mother      Other (bladder cancer) Mother      Hypertension Father      Benign prostatic hyperplasia Father      Anxiety disorder Sister      No Known Problems Brother      No Known Problems Daughter      Heart failure Paternal Grandfather         TOBACCO HISTORY  Tobacco Use: Low Risk  (7/24/2024)    Patient History     Smoking Tobacco Use: Never     Smokeless Tobacco Use: Never     Passive Exposure: Not on file       SOCIAL HISTORY  Social Connections: Not on file        Outpatient Medication  Profile:  Current Outpatient Medications on File Prior to Visit   Medication Sig Dispense Refill    albuterol 90 mcg/actuation inhaler Inhale 1-2 puffs every 4 hours if needed for wheezing or shortness of breath. 18 g 1    ALPRAZolam (Xanax) 0.5 mg tablet Take 1 tablet (0.5 mg) by mouth once daily as needed for anxiety. 20 tablet 0    ferrous sulfate, 325 mg ferrous sulfate, tablet Take 1 tablet by mouth every other day.      FLUoxetine (PROzac) 20 mg capsule Take 1 capsule (20 mg) by mouth once daily. 90 capsule 1    FLUoxetine (PROzac) 40 mg capsule Take 1 capsule (40 mg) by mouth once daily. 90 capsule 1    hydrocortisone (Anusol-HC) 25 mg suppository Insert 1 suppository (25 mg) into the rectum 2 times a day.      lisinopril 40 mg tablet Take 0.5 tablets (20 mg) by mouth once daily. (Patient not taking: Reported on 7/9/2024)      mometasone (Asmanex HFA) 100 mcg/actuation HFA aerosol inhaler Inhale 1 puff 2 times a day as needed (asthma/allergies).      montelukast (Singulair) 10 mg tablet Take 1 tablet (10 mg) by mouth as needed at bedtime (allergies/asthma).      SUMAtriptan (Imitrex) 50 mg tablet Take 1 tablet (50 mg) by mouth 1 time if needed for migraine for up to 36 doses. 9 tablet 3    traZODone (Desyrel) 50 mg tablet TAKE 0.5-1 TABLETS (25-50 MG) BY MOUTH AS NEEDED AT BEDTIME FOR SLEEP. 90 tablet 1    vitamin D3-vitamin K2 1,250-200 mcg capsule Take 1 capsule by mouth 1 (one) time per week.       No current facility-administered medications on file prior to visit.         Performance Status:  Symptomatic; fully ambulatory     Vitals and Measurements:   There were no vitals taken for this visit.      Physical Exam:   General: Appears well and in NAD  Eyes: PERRL, EOMI, clear sclera  ENMT: mucous membranes moist, no apparent injury, no lesions seen  Head/Neck: Neck supple, no apparent injury, thyroid without mass or tenderness, No JVD, trachea midline,   Thorax: Patent airways, CTAB, normal breath sounds  with good chest expansion, thorax symmetric  Cardiovascular: Regular, rate and rhythm, no murmurs, 2+ equal pulses of the extremities, normal S 1and S 2  Gastrointestinal: Nondistended, soft, non-tender, no rebound tenderness or guarding, no masses palpable, no organomegaly, +BS  Musculoskeletal: ROM intact, no joint swelling, normal strength  Extremities: normal extremities, no cyanosis edema, contusions or wounds, no clubbing  Neurological: alert and oriented x3, intact senses, motor, response and reflexes, normal strength  Lymphatic: No significant lymphadenopathy  Psychological: Appropriate mood and behavior  Skin: Warm and dry, no lesions, no rashes         Lab Results:  I have reviewed these laboratory results:     Lab Results   Component Value Date    WBC 5.1 01/08/2024    HGB 16.2 01/08/2024    HCT 47.9 01/08/2024    MCV 93 01/08/2024     01/08/2024         Chemistry    Lab Results   Component Value Date/Time     (L) 01/08/2024 1213    K 4.3 01/08/2024 1213     01/08/2024 1213    CO2 24 01/08/2024 1213    BUN 15 01/08/2024 1213    CREATININE 1.16 01/08/2024 1213    Lab Results   Component Value Date/Time    CALCIUM 9.4 01/08/2024 1213    ALKPHOS 80 01/08/2024 1213    AST 25 01/08/2024 1213    ALT 24 01/08/2024 1213    BILITOT 0.6 01/08/2024 1213            Lab Results   Component Value Date    TSH 3.17 04/18/2023        Radiology Result:  I have reviewed the latest Imaging in PACS and the findings are noted in this note. I discussed the results of the latest imaging with the patient. All previous imaging were reviewed at the time it was completed. Full records are available in the EMR for review as well.             NM PET CT net netspot    Result Date: 7/5/2024  Impression: 1. Postsurgical changes from right hemicolectomy and distal small bowel with no evidence of focal abnormal gallium 68 dotatate uptake within the region of documented neuroendocrine tumor to suggest residual disease. 2.  Nonspecific, gallium 68 dotatate uptake within the pancreatic uncinate which can be seen with physiologic uptake.     I personally reviewed the images/study and I agree with the findings as stated by Resident Celso Ashford MD.   MACRO: None   Signed by: Maco Villafana 7/5/2024 3:06 PM Dictation workstation:   [id number]        Pathology Results:  I have reviewed the full pathology report recorded in the EMR. The pertinent portions indicating diagnosis are listed here in the note. for details please refer to the full report recorded in the EMR.    Assessment and Plan:     G3 well differentiated SB NET  This is a 43-year-old male with a known history of Crohn's disease patient is status post partial right colectomy w small bowel resection on repair of a colonic fistula on 5/28/2024. Pathology he had a neuroendocrine tumor grade 3 well differentiated, with Ki67 30-40%. He did follow-up with oncology at [facility]. He had a PET-Ga68 which showed Postsurgical changes from right hemicolectomy and distal small bowel with no evidence of focal abnormal gallium 68 dotatate uptake. Patient's only complaint is change in color of stools at this point he is eating and drinking and he has no abdominal pain. No diarrhea and no flushing. She had CT AP on 08/09/2024 at outside institution which showed new small liver nodules. 09/04/2024: MRI w Eovist showed Multiple T1 hypointense and T2 hyperintense lesions throughout the liver, and mesenteric LNS.     Plan:  1- PET-FDG and liver biopsy  2-Will send to Dr. Smith for surgical cytoreduction for mesenteric LNS and liver lesions  3- RTC in 2 weeks for follow up       DISCLAIMER:   In preparing for this visit and writing this note, I reviewed all the previous electronic medical records (labs, imaging and medical charts) of the patient available in the physician portal. Significant findings which helped in decision making are recorded  in this chart.     The plan was discussed with the  patient. We gave him ample opportunities to ask questions. All questions were answered to his satisfaction and he verbalized understanding.       Nba Vargas M.D.   and Director of the Neuroendocrine Tumor Program  Gastrointestinal Medical Oncology  Department of Hematology and Oncology  [facility], [place] [address]  Phone (Office): [phone]  Fax:  [phone]   Email: [email or web address]  Learn more about [facility] Comprehensive Neuroendocrine Tumor Program: Click Here  Learn more about [place] [employer]: [email or web address]

## 2024-09-10 NOTE — Clinical Note
September 10, 2024       No Recipients    Patient: Thai Cristobal   YOB: 1980   Date of Visit: 9/10/2024       Dear Dr. Gusman Recipients:    Thank you for referring Thai Cristobal to me for evaluation. Below are my notes for this consultation.  If you have questions, please do not hesitate to call me. I look forward to following your patient along with you.       Sincerely,     Alexis Smith MD      CC:   No Recipients  ______________________________________________________________________________________

## 2024-09-10 NOTE — PROGRESS NOTES
Assessment and Plan:  Mr Cristobal is a very pleasant 43-year-old man with a significant medical history significant for Crohn's disease, previously on steroids and immune suppressants but not for many months, with a history of multiple small bowel resections and a right colectomy as well as an open cholecystectomy.  His most recent surgery was significant for many dense peritoneal adhesions.  I do not believe he is at risk for short gut.  His recent liver MRI reveals 13 liver tumors by my count, all of which are smaller than 3 cm.  These are presumably neuroendocrine tumor metastases but they did not light up on the dotatate scan.  I believe Dr. Vargas will be ordering a PET scan and biopsy of these liver lesions to confirm our suspicions.  Assuming these results are consistent with neuroendocrine liver mets I have recommended that we proceed with laparotomy, liver ultrasound and multiple wedge/ablation of these liver tumors, and excision of his mesenteric lymph nodes.  I think that we can excise and/or ablate all of his liver tumors and a very confident that we can get more than 70% cytoreduction.  We discussed the risks and benefits of the general rationale behind this approach in the setting of neuroendocrine tumor.  He understands and is enthusiastic to proceed.  We will see him back in 2 weeks with Dr. Vargas to finalize surgical plans and review his testing.  I will plan for surgery on October 2 and arrange PAT beforehand.    I spent 60 minutes in the professional and overall care of this patient.    Alexis Smith MD  Assistant Professor of Surgery  Division of Surgical Oncology  [phone]  [email or web address]      History Of Present Illness  Thai Cristobal is a 43 y.o. male referred by Nba Vargas for neuroendocrine liver metastases.    The patient has a long history of Crohn's disease diagnosed around the year 2000.  He was treated at that time with what sounds like a bowel resection for  abscess in his ileum and then was under no treatment until 2019 when he started developing strictures and fistulae.  He also has a history of an open cholecystectomy.  He was previously on Entyvio and steroids but has been off of these for a while.  Over the past few years he has developed an anastomotic stricture and obstructions that required multiple episodes of dilation.  He was eventually admitted to the hospital in May of this year and underwent laparotomy with Dr. 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  After a tedious and lengthy adhesiolysis he was found to have a small bowel tumor and frozen section revealed poorly differentiated adenocarcinoma and so he underwent a right hemicolectomy.  He has recovered well from the surgery.  Final pathology ultimately returned grade 3 well-differentiated neuroendocrine tumor, Ki-67 30-40%, 11/18 lymph nodes positive, negative margins.  He subsequently had a dotatate scan that showed no focal uptake and a liver MRI which shows multiple liver metastases.  He also has 2 large lymph nodes at his ileocolic pedicle.    All other systems have been reviewed and are negative except as noted in the HPI.    I personally reviewed all necessary laboratory results, pathology reports, and radiologic images for this patient.    Past Medical History  He has a past medical history of Acute upper respiratory infection, unspecified (02/21/2018), Personal history of other specified conditions (04/23/2018), and SBO (small bowel obstruction) (Multi) (01/25/2024).    Surgical History  He has a past surgical history that includes Gallbladder surgery (02/23/2015); Other surgical history (04/16/2021); and Colon surgery.     Social History  He reports that he has never smoked. He has never used smokeless tobacco. He reports that he does not currently use alcohol. He reports that he does not currently use  drugs.    Family History  Family History   Problem Relation Name Age of Onset    Thyroid disease Mother      Other (bladder cancer) Mother      Hypertension Father      Benign prostatic hyperplasia Father      Anxiety disorder Sister      No Known Problems Brother      No Known Problems Daughter      Heart failure Paternal Grandfather          Allergies  Patient has no known allergies.     Last Recorded Vitals  There were no vitals taken for this visit.    Physical Exam  General: no acute distress, well-nourished  Eyes: intact EOM, no scleral icterus  ENT: hearing intact, no drainage  Respiratory: symmetric chest rise, no cough  Cardiovascular: intact distal pulses, no pitting edema  Abdominal: soft, nontender, nondistended  Musculoskeletal: no deformities, intact strength  Integumentary: warm, dry, no lymphadenopathy  Neuro: no focal deficits, sensation intact  Psych: normal mood and affect       Relevant Results  Narrative & Impression   Interpreted By:  Jim Reddy,  and Augustus Espana   STUDY:  MR LIVER WITH EOVIST CONTRAST;  9/4/2024 9:01 pm      INDICATION:  Signs/Symptoms:New liver nodules on CT scan of 08/09/2024.      ,D3A.8 Other benign neuroendocrine tumors (CMS-HCC)      COMPARISON:  PET-CT 07/05/2024, CT abdomen and pelvis 08/09/2024      ACCESSION NUMBER(S):  [id number]      ORDERING CLINICIAN:  CLARI ABREU      TECHNIQUE:  MRI LIVER; Multiplanar magnetic resonance images of the abdomen were  obtained including the following sequences; T2-weighted SSFSE with  and without fat saturation, T1-weighted GRE in/opposed phase, DWI,  fat saturated 3D-T1w GRE pre and dynamically post contrast.  7 ml of  Eovist was administered intravenously without immediate complication.      FINDINGS:  LIVER:  The liver is at the upper limits of normal measuring 17.1 cm in the  craniocaudal dimension. Smooth contour. Normal enhancement. Hepatic  parenchyma is isointense on T1 in and out of phase imaging.  Multiple  T1 hypointense, mildly T2 hyperintense, arterially enhancing lesions  which do not demonstrate uptake of contrast on the hepatocyte phase  throughout the liver, similar in size and appearance as compared to  CT abdomen and pelvis dated 08/09/2024 given the differences in  imaging techniques. The liver lesions were faintly seen and smaller  in size on the PET-CT of 07/05/2024 but did not demonstrate a uptake.  For example, 2.4 x 2.3 cm lesion within the inferior right hepatic  lobe (series 22, image 37), previously measuring 2.3 x 2.1 cm. Liver  lesion within the caudate lobe measuring 2.7 x 2.6 cm (series 22,  image 14), previously measuring 2.8 x 2.6 cm. Liver nodule within the  anterior right hepatic lobe measuring 2.7 x 2.6 cm (series 22, image  17), previously measuring 2.6 x 2.4 cm.      BILE DUCTS:  No intrahepatic or extrahepatic bile duct dilatation is demonstrated.      GALLBLADDER:  The gallbladder is absent.      PANCREAS:  Normal signal intensity. Normal enhancement. No masses. The  pancreatic duct is normal.      SPLEEN:  The spleen is normal in size without evidence of focal lesions.      ADRENAL GLANDS:  The bilateral adrenal glands are unremarkable.      KIDNEYS:  The bilateral kidneys are unremarkable in size and appearance. No  evidence of hydronephrosis.      LYMPH NODES:  Two enlarged mesenteric lymph nodes adjacent to the ileocolic  anastomosis measuring 2.6 x 2.3 cm (series 22, image 38) and 2.6 x  1.9 cm (series 22, image 40), which are similar in size as compared  to CT abdomen and pelvis dated 08/09/2024 and enlarged as compared to  PET-CT dated 07/05/2024.      ABDOMINAL VESSELS:  Aorta and the major abdominal arterial vessels demonstrate no gross  abnormality.  Superior mesenteric vein, splenic vein, and main, right  and left portal vein are patent. Hepatic veins are patent.  No  significant collaterals or esophageal varices are present.      BOWEL:  Stomach and duodenum are  within normal limits. Postoperative changes  of right hemicolectomy. Mild small bowel wall thickening, which is  likely related to known Crohn's disease. The colon is unremarkable.      PERITONEUM/RETROPERITONEUM:  No ascites or loculated fluid collections.      BONES AND LOWER THORAX:  No suspicious osseous lesions. Within normal limits.      IMPRESSION:  1.  Multiple T1 hypointense and T2 hyperintense lesions throughout  the liver, which demonstrate mild arterial enhancement and no uptake  of contrast on the hepatocyte phase, favored to represent metastatic  disease. These liver lesions are similar in size as compared to CT  abdomen and pelvis dated 08/09/2024. Lesions were faintly seen and  smaller in size on the prior PET-CT of 07/05/2024 and did not  demonstrate metabolic activity.  2. Two enlarged mesenteric lymph nodes adjacent to the ileocolic  anastomosis. These lymph nodes are similar in size as compared to CT  abdomen and pelvis dated 08/09/2024 and increased in size as compared  to PET-CT dated 07/05/2024. These were not metabolically active on  the PET-CT.

## 2024-09-11 ENCOUNTER — APPOINTMENT (OUTPATIENT)
Facility: HOSPITAL | Age: 44
End: 2024-09-11
Payer: COMMERCIAL

## 2024-09-11 ENCOUNTER — APPOINTMENT (OUTPATIENT)
Dept: RADIOLOGY | Facility: HOSPITAL | Age: 44
End: 2024-09-11
Payer: COMMERCIAL

## 2024-09-13 ENCOUNTER — APPOINTMENT (OUTPATIENT)
Dept: RADIOLOGY | Facility: HOSPITAL | Age: 44
End: 2024-09-13
Payer: COMMERCIAL

## 2024-09-13 ENCOUNTER — HOSPITAL ENCOUNTER (OUTPATIENT)
Dept: RADIOLOGY | Facility: HOSPITAL | Age: 44
Discharge: HOME | End: 2024-09-13
Payer: COMMERCIAL

## 2024-09-13 VITALS
OXYGEN SATURATION: 98 % | BODY MASS INDEX: 24.25 KG/M2 | RESPIRATION RATE: 14 BRPM | HEART RATE: 70 BPM | SYSTOLIC BLOOD PRESSURE: 120 MMHG | DIASTOLIC BLOOD PRESSURE: 75 MMHG | WEIGHT: 160 LBS | HEIGHT: 68 IN | TEMPERATURE: 96.8 F

## 2024-09-13 DIAGNOSIS — D3A.8 NEUROENDOCRINE TUMOR (CMS-HCC): ICD-10-CM

## 2024-09-13 LAB
ALBUMIN SERPL BCP-MCNC: 4.1 G/DL (ref 3.4–5)
ALP SERPL-CCNC: 70 U/L (ref 33–120)
ALT SERPL W P-5'-P-CCNC: 12 U/L (ref 10–52)
ANION GAP SERPL CALC-SCNC: 10 MMOL/L (ref 10–20)
AST SERPL W P-5'-P-CCNC: 18 U/L (ref 9–39)
BILIRUB SERPL-MCNC: 0.5 MG/DL (ref 0–1.2)
BUN SERPL-MCNC: 21 MG/DL (ref 6–23)
CALCIUM SERPL-MCNC: 9.1 MG/DL (ref 8.6–10.3)
CHLORIDE SERPL-SCNC: 103 MMOL/L (ref 98–107)
CO2 SERPL-SCNC: 29 MMOL/L (ref 21–32)
CREAT SERPL-MCNC: 1.04 MG/DL (ref 0.5–1.3)
EGFRCR SERPLBLD CKD-EPI 2021: >90 ML/MIN/1.73M*2
ERYTHROCYTE [DISTWIDTH] IN BLOOD BY AUTOMATED COUNT: 19.8 % (ref 11.5–14.5)
GLUCOSE SERPL-MCNC: 86 MG/DL (ref 74–99)
HCT VFR BLD AUTO: 40.5 % (ref 41–52)
HGB BLD-MCNC: 12.7 G/DL (ref 13.5–17.5)
INR PPP: 1.1 (ref 0.9–1.1)
MCH RBC QN AUTO: 25.8 PG (ref 26–34)
MCHC RBC AUTO-ENTMCNC: 31.4 G/DL (ref 32–36)
MCV RBC AUTO: 82 FL (ref 80–100)
NRBC BLD-RTO: 0 /100 WBCS (ref 0–0)
PLATELET # BLD AUTO: 255 X10*3/UL (ref 150–450)
POTASSIUM SERPL-SCNC: 4.2 MMOL/L (ref 3.5–5.3)
PROT SERPL-MCNC: 6.9 G/DL (ref 6.4–8.2)
PROTHROMBIN TIME: 12 SECONDS (ref 9.8–12.8)
RBC # BLD AUTO: 4.93 X10*6/UL (ref 4.5–5.9)
SODIUM SERPL-SCNC: 138 MMOL/L (ref 136–145)
WBC # BLD AUTO: 4.6 X10*3/UL (ref 4.4–11.3)

## 2024-09-13 PROCEDURE — 85027 COMPLETE CBC AUTOMATED: CPT | Performed by: RADIOLOGY

## 2024-09-13 PROCEDURE — 36415 COLL VENOUS BLD VENIPUNCTURE: CPT | Performed by: RADIOLOGY

## 2024-09-13 PROCEDURE — 99152 MOD SED SAME PHYS/QHP 5/>YRS: CPT | Performed by: RADIOLOGY

## 2024-09-13 PROCEDURE — 2500000005 HC RX 250 GENERAL PHARMACY W/O HCPCS: Performed by: RADIOLOGY

## 2024-09-13 PROCEDURE — 7100000010 HC PHASE TWO TIME - EACH INCREMENTAL 1 MINUTE

## 2024-09-13 PROCEDURE — 2500000004 HC RX 250 GENERAL PHARMACY W/ HCPCS (ALT 636 FOR OP/ED): Performed by: RADIOLOGY

## 2024-09-13 PROCEDURE — 99152 MOD SED SAME PHYS/QHP 5/>YRS: CPT

## 2024-09-13 PROCEDURE — 2720000007 HC OR 272 NO HCPCS

## 2024-09-13 PROCEDURE — 85610 PROTHROMBIN TIME: CPT | Performed by: RADIOLOGY

## 2024-09-13 PROCEDURE — 7100000009 HC PHASE TWO TIME - INITIAL BASE CHARGE

## 2024-09-13 PROCEDURE — 76942 ECHO GUIDE FOR BIOPSY: CPT

## 2024-09-13 PROCEDURE — 80053 COMPREHEN METABOLIC PANEL: CPT | Performed by: RADIOLOGY

## 2024-09-13 RX ORDER — FENTANYL CITRATE 50 UG/ML
INJECTION, SOLUTION INTRAMUSCULAR; INTRAVENOUS
Status: COMPLETED | OUTPATIENT
Start: 2024-09-13 | End: 2024-09-13

## 2024-09-13 RX ORDER — MIDAZOLAM HYDROCHLORIDE 1 MG/ML
INJECTION, SOLUTION INTRAMUSCULAR; INTRAVENOUS
Status: COMPLETED | OUTPATIENT
Start: 2024-09-13 | End: 2024-09-13

## 2024-09-13 RX ORDER — SODIUM CHLORIDE 9 MG/ML
50 INJECTION, SOLUTION INTRAVENOUS CONTINUOUS
Status: DISCONTINUED | OUTPATIENT
Start: 2024-09-13 | End: 2024-09-14 | Stop reason: HOSPADM

## 2024-09-13 ASSESSMENT — PAIN SCALES - GENERAL
PAINLEVEL_OUTOF10: 1
PAINLEVEL_OUTOF10: 0 - NO PAIN
PAINLEVEL_OUTOF10: 1
PAINLEVEL_OUTOF10: 0 - NO PAIN

## 2024-09-13 ASSESSMENT — PAIN - FUNCTIONAL ASSESSMENT: PAIN_FUNCTIONAL_ASSESSMENT: 0-10

## 2024-09-13 NOTE — PRE-PROCEDURE NOTE
Interventional Radiology Preprocedure Note    Indication for procedure: The encounter diagnosis was Neuroendocrine tumor (CMS-HCC). For liver mass biopsy.    Relevant review of systems: NA    Relevant Labs:   Lab Results   Component Value Date    CREATININE 1.04 09/13/2024    EGFR >90 09/13/2024    INR 1.1 09/13/2024    PROTIME 12.0 09/13/2024       Planned Sedation/Anesthesia: Moderate    Airway assessment: normal    Directed physical examination:    Awake and alert, oriented  No acute distress  Regular rate, rhythm  Breathing non-labored    Mallampati: II (hard and soft palate, upper portion of tonsils and uvula visible)    ASA Score: ASA 3 - Patient with moderate systemic disease with functional limitations    Benefits, risks and alternatives of procedure and planned sedation have been discussed with the patient and/or their representative. All questions answered and they agree to proceed.

## 2024-09-13 NOTE — Clinical Note
Right liver mass core biopsy complete. 3 total passes. Specimens collected and taken to lab as ordered. Gel foam inserted into biopsy site. Site dressed with 4x4 guaze and tegaderm. No signs of bleeding or hematoma. Pt to return to [facility] for recovery and discharge.

## 2024-09-13 NOTE — POST-PROCEDURE NOTE
Interventional Radiology Brief Postprocedure Note    Attending: Tru Howell MD      Assistant: none    Diagnosis: Neuroendocrine tumor, liver masses    Description of procedure: Ultrasound guided core biopsy of a lateral right hepatic lobe mass. 18 ga x 3.     Anesthesia:  Moderate sedation    Complications: None    Estimated Blood Loss: minimal    Medications (Filter: Administrations occurring from 0908 to 0938 on 09/13/24) As of 09/13/24 0938      sodium chloride 0.9% infusion (mL/hr) Total volume:  Not documented* Dosing weight:  73.2   *Total volume has not been documented. View each administration to see the amount administered.     Date/Time Rate/Dose/Volume Action       09/13/24  0922 50 mL/hr New Bag               fentaNYL PF (Sublimaze) injection (mcg) Total dose:  50 mcg      Date/Time Rate/Dose/Volume Action       09/13/24 0921 50 mcg Given               midazolam (Versed) injection (mg) Total dose:  2 mg      Date/Time Rate/Dose/Volume Action       09/13/24 0921 2 mg Given               oxygen (O2) therapy (L/min) Total volume:  Not documented*   *Total volume has not been documented. View each administration to see the amount administered.     Date/Time Rate/Dose/Volume Action       09/13/24  0914 3 L/min - 180,000 mL/hr New Bag                   ID Type Source Tests Collected by Time   1 : RIGHT LIVER MASS CORE BIOPSY Tissue LIVER MASS BIOPSY RIGHT SURGICAL PATHOLOGY EXAM Tru Howell MD 9/13/2024 0927         See detailed result report with images in PACS.    The patient tolerated the procedure well without incident or complication and is in stable condition.

## 2024-09-16 ENCOUNTER — HOSPITAL ENCOUNTER (OUTPATIENT)
Dept: RADIOLOGY | Facility: CLINIC | Age: 44
Discharge: HOME | End: 2024-09-16
Payer: COMMERCIAL

## 2024-09-16 DIAGNOSIS — D3A.8 NEUROENDOCRINE TUMOR: ICD-10-CM

## 2024-09-16 PROCEDURE — 38792 RA TRACER ID OF SENTINL NODE: CPT | Mod: RSC

## 2024-09-16 PROCEDURE — 3430000001 HC RX 343 DIAGNOSTIC RADIOPHARMACEUTICALS: Performed by: INTERNAL MEDICINE

## 2024-09-16 PROCEDURE — A9552 F18 FDG: HCPCS | Performed by: INTERNAL MEDICINE

## 2024-09-16 RX ORDER — FLUDEOXYGLUCOSE F 18 200 MCI/ML
11.5 INJECTION, SOLUTION INTRAVENOUS
Status: COMPLETED | OUTPATIENT
Start: 2024-09-16 | End: 2024-09-16

## 2024-09-17 ENCOUNTER — APPOINTMENT (OUTPATIENT)
Dept: HEMATOLOGY/ONCOLOGY | Facility: HOSPITAL | Age: 44
End: 2024-09-17
Payer: COMMERCIAL

## 2024-09-19 ENCOUNTER — HOSPITAL ENCOUNTER (OUTPATIENT)
Dept: RADIOLOGY | Facility: CLINIC | Age: 44
Discharge: HOME | End: 2024-09-19
Payer: COMMERCIAL

## 2024-09-19 DIAGNOSIS — D3A.8 NEUROENDOCRINE TUMOR: ICD-10-CM

## 2024-09-19 LAB
LAB AP ASR DISCLAIMER: NORMAL
LABORATORY COMMENT REPORT: NORMAL
PATH REPORT.COMMENTS IMP SPEC: NORMAL
PATH REPORT.FINAL DX SPEC: NORMAL
PATH REPORT.GROSS SPEC: NORMAL
PATH REPORT.RELEVANT HX SPEC: NORMAL
PATH REPORT.TOTAL CANCER: NORMAL

## 2024-09-19 PROCEDURE — 78815 PET IMAGE W/CT SKULL-THIGH: CPT | Mod: PET TUMOR SUBSQ TX STRATEGY | Performed by: STUDENT IN AN ORGANIZED HEALTH CARE EDUCATION/TRAINING PROGRAM

## 2024-09-19 PROCEDURE — 3430000001 HC RX 343 DIAGNOSTIC RADIOPHARMACEUTICALS: Performed by: INTERNAL MEDICINE

## 2024-09-19 PROCEDURE — 78815 PET IMAGE W/CT SKULL-THIGH: CPT | Mod: PS

## 2024-09-19 PROCEDURE — A9552 F18 FDG: HCPCS | Performed by: INTERNAL MEDICINE

## 2024-09-19 RX ORDER — FLUDEOXYGLUCOSE F 18 200 MCI/ML
13.1 INJECTION, SOLUTION INTRAVENOUS
Status: COMPLETED | OUTPATIENT
Start: 2024-09-19 | End: 2024-09-19

## 2024-09-24 ENCOUNTER — LAB (OUTPATIENT)
Dept: LAB | Facility: LAB | Age: 44
End: 2024-09-24
Payer: COMMERCIAL

## 2024-09-24 ENCOUNTER — OFFICE VISIT (OUTPATIENT)
Dept: SURGICAL ONCOLOGY | Facility: CLINIC | Age: 44
End: 2024-09-24
Payer: COMMERCIAL

## 2024-09-24 ENCOUNTER — OFFICE VISIT (OUTPATIENT)
Dept: HEMATOLOGY/ONCOLOGY | Facility: CLINIC | Age: 44
End: 2024-09-24
Payer: COMMERCIAL

## 2024-09-24 VITALS
BODY MASS INDEX: 24.37 KG/M2 | OXYGEN SATURATION: 98 % | SYSTOLIC BLOOD PRESSURE: 116 MMHG | HEART RATE: 82 BPM | WEIGHT: 160.27 LBS | RESPIRATION RATE: 16 BRPM | TEMPERATURE: 97.9 F | DIASTOLIC BLOOD PRESSURE: 84 MMHG

## 2024-09-24 DIAGNOSIS — D3A.8 NEUROENDOCRINE TUMOR: ICD-10-CM

## 2024-09-24 DIAGNOSIS — C17.9 ADENOCARCINOMA OF SMALL BOWEL (MULTI): Primary | ICD-10-CM

## 2024-09-24 LAB
ALBUMIN SERPL BCP-MCNC: 4.3 G/DL (ref 3.4–5)
ALP SERPL-CCNC: 89 U/L (ref 33–120)
ALT SERPL W P-5'-P-CCNC: 12 U/L (ref 10–52)
ANION GAP SERPL CALC-SCNC: 10 MMOL/L (ref 10–20)
AST SERPL W P-5'-P-CCNC: 18 U/L (ref 9–39)
BASOPHILS # BLD AUTO: 0.12 X10*3/UL (ref 0–0.1)
BASOPHILS NFR BLD AUTO: 2.3 %
BILIRUB SERPL-MCNC: 0.4 MG/DL (ref 0–1.2)
BUN SERPL-MCNC: 19 MG/DL (ref 6–23)
CALCIUM SERPL-MCNC: 9.6 MG/DL (ref 8.6–10.3)
CHLORIDE SERPL-SCNC: 102 MMOL/L (ref 98–107)
CO2 SERPL-SCNC: 30 MMOL/L (ref 21–32)
CREAT SERPL-MCNC: 1.01 MG/DL (ref 0.5–1.3)
EGFRCR SERPLBLD CKD-EPI 2021: >90 ML/MIN/1.73M*2
EOSINOPHIL # BLD AUTO: 0.19 X10*3/UL (ref 0–0.7)
EOSINOPHIL NFR BLD AUTO: 3.7 %
ERYTHROCYTE [DISTWIDTH] IN BLOOD BY AUTOMATED COUNT: 18.6 % (ref 11.5–14.5)
GLUCOSE SERPL-MCNC: 91 MG/DL (ref 74–99)
HCT VFR BLD AUTO: 43.3 % (ref 41–52)
HGB BLD-MCNC: 13.8 G/DL (ref 13.5–17.5)
IMM GRANULOCYTES # BLD AUTO: 0.01 X10*3/UL (ref 0–0.7)
IMM GRANULOCYTES NFR BLD AUTO: 0.2 % (ref 0–0.9)
INR PPP: 1 (ref 0.9–1.1)
LYMPHOCYTES # BLD AUTO: 1.35 X10*3/UL (ref 1.2–4.8)
LYMPHOCYTES NFR BLD AUTO: 26.1 %
MCH RBC QN AUTO: 26.3 PG (ref 26–34)
MCHC RBC AUTO-ENTMCNC: 31.9 G/DL (ref 32–36)
MCV RBC AUTO: 83 FL (ref 80–100)
MONOCYTES # BLD AUTO: 0.46 X10*3/UL (ref 0.1–1)
MONOCYTES NFR BLD AUTO: 8.9 %
NEUTROPHILS # BLD AUTO: 3.04 X10*3/UL (ref 1.2–7.7)
NEUTROPHILS NFR BLD AUTO: 58.8 %
NRBC BLD-RTO: 0 /100 WBCS (ref 0–0)
PLATELET # BLD AUTO: 282 X10*3/UL (ref 150–450)
POTASSIUM SERPL-SCNC: 4.4 MMOL/L (ref 3.5–5.3)
PROT SERPL-MCNC: 7.2 G/DL (ref 6.4–8.2)
PROTHROMBIN TIME: 11.2 SECONDS (ref 9.8–12.8)
RBC # BLD AUTO: 5.25 X10*6/UL (ref 4.5–5.9)
SODIUM SERPL-SCNC: 138 MMOL/L (ref 136–145)
WBC # BLD AUTO: 5.2 X10*3/UL (ref 4.4–11.3)

## 2024-09-24 PROCEDURE — 3079F DIAST BP 80-89 MM HG: CPT | Performed by: INTERNAL MEDICINE

## 2024-09-24 PROCEDURE — 36415 COLL VENOUS BLD VENIPUNCTURE: CPT

## 2024-09-24 PROCEDURE — 85025 COMPLETE CBC W/AUTO DIFF WBC: CPT

## 2024-09-24 PROCEDURE — 3074F SYST BP LT 130 MM HG: CPT | Performed by: INTERNAL MEDICINE

## 2024-09-24 PROCEDURE — 99213 OFFICE O/P EST LOW 20 MIN: CPT | Performed by: STUDENT IN AN ORGANIZED HEALTH CARE EDUCATION/TRAINING PROGRAM

## 2024-09-24 PROCEDURE — 80053 COMPREHEN METABOLIC PANEL: CPT

## 2024-09-24 PROCEDURE — 99215 OFFICE O/P EST HI 40 MIN: CPT | Performed by: INTERNAL MEDICINE

## 2024-09-24 PROCEDURE — 85610 PROTHROMBIN TIME: CPT

## 2024-09-24 RX ORDER — EPINEPHRINE 0.3 MG/.3ML
0.3 INJECTION SUBCUTANEOUS EVERY 5 MIN PRN
OUTPATIENT
Start: 2024-10-15

## 2024-09-24 RX ORDER — EPINEPHRINE 0.3 MG/.3ML
0.3 INJECTION SUBCUTANEOUS EVERY 5 MIN PRN
OUTPATIENT
Start: 2024-10-29

## 2024-09-24 RX ORDER — PROCHLORPERAZINE EDISYLATE 5 MG/ML
10 INJECTION INTRAMUSCULAR; INTRAVENOUS EVERY 6 HOURS PRN
OUTPATIENT
Start: 2024-10-29

## 2024-09-24 RX ORDER — DIPHENHYDRAMINE HYDROCHLORIDE 50 MG/ML
50 INJECTION INTRAMUSCULAR; INTRAVENOUS AS NEEDED
OUTPATIENT
Start: 2024-10-01

## 2024-09-24 RX ORDER — PROCHLORPERAZINE MALEATE 5 MG
10 TABLET ORAL EVERY 6 HOURS PRN
OUTPATIENT
Start: 2024-10-15

## 2024-09-24 RX ORDER — FAMOTIDINE 10 MG/ML
20 INJECTION INTRAVENOUS ONCE AS NEEDED
OUTPATIENT
Start: 2024-10-29

## 2024-09-24 RX ORDER — PROCHLORPERAZINE MALEATE 5 MG
10 TABLET ORAL EVERY 6 HOURS PRN
OUTPATIENT
Start: 2024-10-29

## 2024-09-24 RX ORDER — ALBUTEROL SULFATE 0.83 MG/ML
3 SOLUTION RESPIRATORY (INHALATION) AS NEEDED
OUTPATIENT
Start: 2024-10-15

## 2024-09-24 RX ORDER — LORAZEPAM 2 MG/ML
1 INJECTION INTRAMUSCULAR AS NEEDED
OUTPATIENT
Start: 2024-10-29

## 2024-09-24 RX ORDER — LOPERAMIDE HYDROCHLORIDE 2 MG/1
CAPSULE ORAL
Qty: 100 CAPSULE | Refills: 2 | Status: SHIPPED | OUTPATIENT
Start: 2024-09-24

## 2024-09-24 RX ORDER — ALBUTEROL SULFATE 0.83 MG/ML
3 SOLUTION RESPIRATORY (INHALATION) AS NEEDED
OUTPATIENT
Start: 2024-10-01

## 2024-09-24 RX ORDER — PALONOSETRON 0.05 MG/ML
0.25 INJECTION, SOLUTION INTRAVENOUS ONCE
OUTPATIENT
Start: 2024-10-01

## 2024-09-24 RX ORDER — PALONOSETRON 0.05 MG/ML
0.25 INJECTION, SOLUTION INTRAVENOUS ONCE
OUTPATIENT
Start: 2024-10-15

## 2024-09-24 RX ORDER — LORAZEPAM 2 MG/ML
1 INJECTION INTRAMUSCULAR AS NEEDED
OUTPATIENT
Start: 2024-10-01

## 2024-09-24 RX ORDER — DIPHENHYDRAMINE HYDROCHLORIDE 50 MG/ML
50 INJECTION INTRAMUSCULAR; INTRAVENOUS AS NEEDED
OUTPATIENT
Start: 2024-10-15

## 2024-09-24 RX ORDER — PROCHLORPERAZINE EDISYLATE 5 MG/ML
10 INJECTION INTRAMUSCULAR; INTRAVENOUS EVERY 6 HOURS PRN
OUTPATIENT
Start: 2024-10-01

## 2024-09-24 RX ORDER — EPINEPHRINE 0.3 MG/.3ML
0.3 INJECTION SUBCUTANEOUS EVERY 5 MIN PRN
OUTPATIENT
Start: 2024-10-01

## 2024-09-24 RX ORDER — ALBUTEROL SULFATE 0.83 MG/ML
3 SOLUTION RESPIRATORY (INHALATION) AS NEEDED
OUTPATIENT
Start: 2024-10-29

## 2024-09-24 RX ORDER — PALONOSETRON 0.05 MG/ML
0.25 INJECTION, SOLUTION INTRAVENOUS ONCE
OUTPATIENT
Start: 2024-10-29

## 2024-09-24 RX ORDER — FAMOTIDINE 10 MG/ML
20 INJECTION INTRAVENOUS ONCE AS NEEDED
OUTPATIENT
Start: 2024-10-15

## 2024-09-24 RX ORDER — PROCHLORPERAZINE EDISYLATE 5 MG/ML
10 INJECTION INTRAMUSCULAR; INTRAVENOUS EVERY 6 HOURS PRN
OUTPATIENT
Start: 2024-10-15

## 2024-09-24 RX ORDER — PROCHLORPERAZINE MALEATE 5 MG
10 TABLET ORAL EVERY 6 HOURS PRN
OUTPATIENT
Start: 2024-10-01

## 2024-09-24 RX ORDER — ONDANSETRON HYDROCHLORIDE 8 MG/1
8 TABLET, FILM COATED ORAL EVERY 8 HOURS PRN
Qty: 30 TABLET | Refills: 5 | Status: SHIPPED | OUTPATIENT
Start: 2024-09-24

## 2024-09-24 RX ORDER — FAMOTIDINE 10 MG/ML
20 INJECTION INTRAVENOUS ONCE AS NEEDED
OUTPATIENT
Start: 2024-10-01

## 2024-09-24 RX ORDER — LORAZEPAM 2 MG/ML
1 INJECTION INTRAMUSCULAR AS NEEDED
OUTPATIENT
Start: 2024-10-15

## 2024-09-24 RX ORDER — PROCHLORPERAZINE MALEATE 10 MG
10 TABLET ORAL EVERY 6 HOURS PRN
Qty: 30 TABLET | Refills: 5 | Status: SHIPPED | OUTPATIENT
Start: 2024-09-24

## 2024-09-24 RX ORDER — DIPHENHYDRAMINE HYDROCHLORIDE 50 MG/ML
50 INJECTION INTRAMUSCULAR; INTRAVENOUS AS NEEDED
OUTPATIENT
Start: 2024-10-29

## 2024-09-24 ASSESSMENT — PAIN SCALES - GENERAL: PAINLEVEL: 1

## 2024-09-24 NOTE — PATIENT INSTRUCTIONS
- Port Placement: IR should be reaching out, if not Please call [phone]  - Blood work today.   - Chemotherapy start on 10/1 at 0800am    Please call [phone] with any other questions or concerns.

## 2024-09-24 NOTE — PROGRESS NOTES
.Patient ID: Thai Cristobal is a 43 y.o. male.  Referring Physician: Nba Vargas MD  [id number] [place] [address]  Primary Care Provider: Charlotte Yan DO      Chief complaint: Diagnosis changed after path review to SB Adenocarcinoma     HPI  This is a 43-year-old male with a known history of Crohn's disease patient is status post partial right colectomy small bowel resection on repair of a colonic fistula on 5/28/2024. Pathology he had a neuroendocrine tumor grade 3 well differentiated, with Ki67 30-40%. He did follow-up with oncology at [facility]. He had a PET-Ga68 which showed Postsurgical changes from right hemicolectomy and distal small bowel with no evidence of focal abnormal gallium 68 dotatate uptake.   Patient's only complaint is change in color of stools at this point, he is eating and drinking less and he has no abdominal pain. No diarrhea and no flushing.    SYSTEMIC TREATMENT RECEIVED: None       Subjective   Please refer to Notes above for complete History of present illness.          Review of Systems:   All 14 systems have been reviewed and were negative except for the HPI.       MEDICAL HISTORY  Past Medical History:   Diagnosis Date    Acute upper respiratory infection, unspecified 02/21/2018    Acute URI    Crohn's disease (Multi)     Personal history of other specified conditions 04/23/2018    History of vertigo    SBO (small bowel obstruction) (Multi) 01/25/2024       FAMILY HISTORY  Family History   Problem Relation Name Age of Onset    Thyroid disease Mother      Other (bladder cancer) Mother      Hypertension Father      Benign prostatic hyperplasia Father      Anxiety disorder Sister      No Known Problems Brother      No Known Problems Daughter      Heart failure Paternal Grandfather         TOBACCO HISTORY  Tobacco Use: Low Risk  (9/13/2024)    Patient History     Smoking Tobacco Use: Never     Smokeless Tobacco Use: Never     Passive Exposure: Not on file       SOCIAL  HISTORY  Social Connections: Not on file        Outpatient Medication Profile:  Current Outpatient Medications on File Prior to Visit   Medication Sig Dispense Refill    ALPRAZolam (Xanax) 0.5 mg tablet Take 1 tablet (0.5 mg) by mouth once daily as needed for anxiety. 20 tablet 0    FLUoxetine (PROzac) 20 mg capsule Take 1 capsule (20 mg) by mouth once daily. 90 capsule 1    FLUoxetine (PROzac) 40 mg capsule Take 1 capsule (40 mg) by mouth once daily. 90 capsule 1    NON FORMULARY Take 1 each by mouth once daily at bedtime. THC gummy      SUMAtriptan (Imitrex) 50 mg tablet Take 1 tablet (50 mg) by mouth 1 time if needed for migraine for up to 36 doses. 9 tablet 3     No current facility-administered medications on file prior to visit.         Performance Status:  Symptomatic; fully ambulatory     Vitals and Measurements:   There were no vitals taken for this visit.      Physical Exam:   General: Appears well and in NAD  Eyes: PERRL, EOMI, clear sclera  ENMT: mucous membranes moist, no apparent injury, no lesions seen  Head/Neck: Neck supple, no apparent injury, thyroid without mass or tenderness, No JVD, trachea midline,   Thorax: Patent airways, CTAB, normal breath sounds with good chest expansion, thorax symmetric  Cardiovascular: Regular, rate and rhythm, no murmurs, 2+ equal pulses of the extremities, normal S 1and S 2  Gastrointestinal: Nondistended, soft, non-tender, no rebound tenderness or guarding, no masses palpable, no organomegaly, +BS  Musculoskeletal: ROM intact, no joint swelling, normal strength  Extremities: normal extremities, no cyanosis edema, contusions or wounds, no clubbing  Neurological: alert and oriented x3, intact senses, motor, response and reflexes, normal strength  Lymphatic: No significant lymphadenopathy  Psychological: Appropriate mood and behavior  Skin: Warm and dry, no lesions, no rashes         Lab Results:  I have reviewed these laboratory results:     Lab Results   Component  Value Date    WBC 4.6 09/13/2024    HGB 12.7 (L) 09/13/2024    HCT 40.5 (L) 09/13/2024    MCV 82 09/13/2024     09/13/2024         Chemistry    Lab Results   Component Value Date/Time     09/13/2024 0731    K 4.2 09/13/2024 0731     09/13/2024 0731    CO2 29 09/13/2024 0731    BUN 21 09/13/2024 0731    CREATININE 1.04 09/13/2024 0731    Lab Results   Component Value Date/Time    CALCIUM 9.1 09/13/2024 0731    ALKPHOS 70 09/13/2024 0731    AST 18 09/13/2024 0731    ALT 12 09/13/2024 0731    BILITOT 0.5 09/13/2024 0731            Lab Results   Component Value Date    TSH 3.17 04/18/2023        Radiology Result:  I have reviewed the latest Imaging in PACS and the findings are noted in this note. I discussed the results of the latest imaging with the patient. All previous imaging were reviewed at the time it was completed. Full records are available in the EMR for review as well.             NM PET CT net netspot    Result Date: 7/5/2024  Impression: 1. Postsurgical changes from right hemicolectomy and distal small bowel with no evidence of focal abnormal gallium 68 dotatate uptake within the region of documented neuroendocrine tumor to suggest residual disease. 2. Nonspecific, gallium 68 dotatate uptake within the pancreatic uncinate which can be seen with physiologic uptake.     I personally reviewed the images/study and I agree with the findings as stated by Resident Celso Ashford MD.   MACRO: None   Signed by: Maco Villafana 7/5/2024 3:06 PM Dictation workstation:   [id number]        Pathology Results:  I have reviewed the full pathology report recorded in the EMR. The pertinent portions indicating diagnosis are listed here in the note. for details please refer to the full report recorded in the EMR.    Assessment and Plan:     Metastatic SB Adenocarcinoma with NE features  This is a 43-year-old male with a known history of Crohn's disease patient is status post partial right colectomy w small  bowel resection on repair of a colonic fistula on 5/28/2024. Pathology he had a neuroendocrine tumor grade 3 well differentiated, with Ki67 30-40%. He did follow-up with oncology at [facility]. He had a PET-Ga68 which showed Postsurgical changes from right hemicolectomy and distal small bowel with no evidence of focal abnormal gallium 68 dotatate uptake. Patient's only complaint is change in color of stools at this point he is eating and drinking and he has no abdominal pain. No diarrhea and no flushing. She had CT AP on 08/09/2024 at outside institution which showed new small liver nodules. 09/04/2024: MRI w Eovist showed Multiple T1 hypointense and T2 hyperintense lesions throughout the liver, and mesenteric LNS. PET-FDG showed FDG avid mesenteric kylie and liver metastases.   Repeated liver biopsy showed: high grade malignancy, likely of colonic origin, showing focal adenocarcinoma-like features and focal neuroendocrine differentiation with Ki67 95%    Plan:  1- NGS Tempus   2- FOLFOX plus Bevacizumab   3- RTC in 1 week for follow up       DISCLAIMER:   In preparing for this visit and writing this note, I reviewed all the previous electronic medical records (labs, imaging and medical charts) of the patient available in the physician portal. Significant findings which helped in decision making are recorded  in this chart.     The plan was discussed with the patient. We gave him ample opportunities to ask questions. All questions were answered to his satisfaction and he verbalized understanding.       Nba Vargas M.D.   and Director of the Neuroendocrine Tumor Program  Gastrointestinal Medical Oncology  Department of Hematology and Oncology  [facility], [place] [address]  Phone (Office): [phone]  Fax:  [phone]   Email: [email or web address]  Learn more about [facility]  [facility] Neuroendocrine Tumor Program: Click Here  Learn more about [place] [employer]end[employer]: [email or web address]

## 2024-09-24 NOTE — PROGRESS NOTES
Assessment and Plan:  Mr Cristobal is a very pleasant 43-year-old man with a significant medical history significant for Crohn's disease, previously on steroids and immune suppressants but not for many months, with a history of multiple small bowel resections and a right colectomy as well as an open cholecystectomy.  His most recent surgery was significant for many dense peritoneal adhesions.  I do not believe he is at risk for short gut.  His recent liver MRI reveals 13 liver tumors by my count, all of which are smaller than 3 cm.  These have been confirmed on liver biopsy to be high-grade carcinoma, not neuroendocrine tumor.  He met with Dr. Espitia today and made plans to start systemic chemotherapy.  We discussed that surgery is not the first treatment in this situation for this diagnosis but that I am hopeful he will have a great response to chemotherapy and we can see him in the coming months to discuss surgical consolidation.    I spent 60 minutes in the professional and overall care of this patient.    Alexis Smith MD  Assistant Professor of Surgery  Division of Surgical Oncology  [phone]  [email or web address]      History Of Present Illness  Thai Cristobal is a 43 y.o. male referred by Nba Vargas for neuroendocrine liver metastases.    The patient has a long history of Crohn's disease diagnosed around the year 2000.  He was treated at that time with what sounds like a bowel resection for abscess in his ileum and then was under no treatment until 2019 when he started developing strictures and fistulae.  He also has a history of an open cholecystectomy.  He was previously on Entyvio and steroids but has been off of these for a while.  Over the past few years he has developed an anastomotic stricture and obstructions that required multiple episodes of dilation.  He was eventually admitted to the hospital in May of this year and underwent laparotomy with Dr. 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  After a tedious and lengthy adhesiolysis he was found to have a small bowel tumor and frozen section revealed poorly differentiated adenocarcinoma and so he underwent a right hemicolectomy.  He has recovered well from the surgery.  Final pathology ultimately returned grade 3 well-differentiated neuroendocrine tumor, Ki-67 30-40%, 11/18 lymph nodes positive, negative margins.  He subsequently had a dotatate scan that showed no focal uptake and a liver MRI which shows multiple liver metastases.  He also has 2 large lymph nodes at his ileocolic pedicle.    We met a couple weeks ago and tentatively made plans for surgery thinking this was a neuroendocrine tumor.  Unfortunately his liver biopsy last week came back as a high-grade carcinoma with focal neuroendocrine differentiation.  These have been confirmed    All other systems have been reviewed and are negative except as noted in the HPI.    I personally reviewed all necessary laboratory results, pathology reports, and radiologic images for this patient.    Past Medical History  He has a past medical history of Acute upper respiratory infection, unspecified (02/21/2018), Crohn's disease (Multi), Personal history of other specified conditions (04/23/2018), and SBO (small bowel obstruction) (Multi) (01/25/2024).    Surgical History  He has a past surgical history that includes Gallbladder surgery (02/23/2015); Other surgical history (04/16/2021); Colon surgery; and Colectomy partial / total.     Social History  He reports that he has never smoked. He has never used smokeless tobacco. He reports that he does not currently use alcohol. He reports that he does not currently use drugs.    Family History  Family History   Problem Relation Name Age of Onset    Thyroid disease Mother      Other (bladder cancer) Mother      Hypertension Father      Benign prostatic hyperplasia Father       Anxiety disorder Sister      No Known Problems Brother      No Known Problems Daughter      Heart failure Paternal Grandfather          Allergies  Patient has no known allergies.     Last Recorded Vitals  There were no vitals taken for this visit.    Physical Exam  General: no acute distress, well-nourished  Eyes: intact EOM, no scleral icterus  ENT: hearing intact, no drainage  Respiratory: symmetric chest rise, no cough  Cardiovascular: intact distal pulses, no pitting edema  Abdominal: soft, nontender, nondistended  Musculoskeletal: no deformities, intact strength  Integumentary: warm, dry, no lymphadenopathy  Neuro: no focal deficits, sensation intact  Psych: normal mood and affect       Relevant Results  FINAL DIAGNOSIS      High grade carcinoma with focal neuroendocrine differentiation: Liver (right) biopsy

## 2024-09-24 NOTE — PROGRESS NOTES
Pt here with significant other for follow up visit with Dr. Vargas. Pt to start Folfox+ Beva next Tuesday. Chemo education given along with red chemo bag. Pt awaiting IR to call back for port placement. Pt given port education. Labs to be done today with Tempus collection. Pt verbalizes understanding and agreeable to plan.

## 2024-09-26 ENCOUNTER — TELEPHONE (OUTPATIENT)
Dept: HEMATOLOGY/ONCOLOGY | Facility: HOSPITAL | Age: 44
End: 2024-09-26
Payer: COMMERCIAL

## 2024-09-26 ENCOUNTER — LAB (OUTPATIENT)
Dept: LAB | Facility: CLINIC | Age: 44
End: 2024-09-26
Payer: COMMERCIAL

## 2024-09-26 DIAGNOSIS — C17.9 ADENOCARCINOMA OF SMALL BOWEL (MULTI): ICD-10-CM

## 2024-09-26 DIAGNOSIS — D3A.8 NEUROENDOCRINE TUMOR: ICD-10-CM

## 2024-09-26 LAB
ALBUMIN SERPL BCP-MCNC: 4.5 G/DL (ref 3.4–5)
ALP SERPL-CCNC: 90 U/L (ref 33–120)
ALT SERPL W P-5'-P-CCNC: 14 U/L (ref 10–52)
ANION GAP SERPL CALC-SCNC: 11 MMOL/L (ref 10–20)
APPEARANCE UR: CLEAR
AST SERPL W P-5'-P-CCNC: 18 U/L (ref 9–39)
BASOPHILS # BLD AUTO: 0.07 X10*3/UL (ref 0–0.1)
BASOPHILS NFR BLD AUTO: 1.5 %
BILIRUB SERPL-MCNC: 0.5 MG/DL (ref 0–1.2)
BILIRUB UR STRIP.AUTO-MCNC: NEGATIVE MG/DL
BUN SERPL-MCNC: 20 MG/DL (ref 6–23)
CALCIUM SERPL-MCNC: 9.8 MG/DL (ref 8.6–10.3)
CHLORIDE SERPL-SCNC: 98 MMOL/L (ref 98–107)
CO2 SERPL-SCNC: 32 MMOL/L (ref 21–32)
COLOR UR: ABNORMAL
CREAT SERPL-MCNC: 1.12 MG/DL (ref 0.5–1.3)
EGFRCR SERPLBLD CKD-EPI 2021: 84 ML/MIN/1.73M*2
EOSINOPHIL # BLD AUTO: 0.19 X10*3/UL (ref 0–0.7)
EOSINOPHIL NFR BLD AUTO: 4.1 %
ERYTHROCYTE [DISTWIDTH] IN BLOOD BY AUTOMATED COUNT: 19 % (ref 11.5–14.5)
GLUCOSE SERPL-MCNC: 77 MG/DL (ref 74–99)
GLUCOSE UR STRIP.AUTO-MCNC: NORMAL MG/DL
HCT VFR BLD AUTO: 43.3 % (ref 41–52)
HGB BLD-MCNC: 14.1 G/DL (ref 13.5–17.5)
IMM GRANULOCYTES # BLD AUTO: 0 X10*3/UL (ref 0–0.7)
IMM GRANULOCYTES NFR BLD AUTO: 0 % (ref 0–0.9)
INR PPP: 1.1 (ref 0.9–1.1)
KETONES UR STRIP.AUTO-MCNC: NEGATIVE MG/DL
LEUKOCYTE ESTERASE UR QL STRIP.AUTO: ABNORMAL
LYMPHOCYTES # BLD AUTO: 1.35 X10*3/UL (ref 1.2–4.8)
LYMPHOCYTES NFR BLD AUTO: 28.8 %
MCH RBC QN AUTO: 26.8 PG (ref 26–34)
MCHC RBC AUTO-ENTMCNC: 32.6 G/DL (ref 32–36)
MCV RBC AUTO: 82 FL (ref 80–100)
MONOCYTES # BLD AUTO: 0.45 X10*3/UL (ref 0.1–1)
MONOCYTES NFR BLD AUTO: 9.6 %
MUCOUS THREADS #/AREA URNS AUTO: NORMAL /LPF
NEUTROPHILS # BLD AUTO: 2.63 X10*3/UL (ref 1.2–7.7)
NEUTROPHILS NFR BLD AUTO: 56 %
NITRITE UR QL STRIP.AUTO: NEGATIVE
PH UR STRIP.AUTO: 5.5 [PH]
PLATELET # BLD AUTO: 309 X10*3/UL (ref 150–450)
POTASSIUM SERPL-SCNC: 4 MMOL/L (ref 3.5–5.3)
PROT SERPL-MCNC: 7.7 G/DL (ref 6.4–8.2)
PROT UR STRIP.AUTO-MCNC: ABNORMAL MG/DL
PROTHROMBIN TIME: 12 SECONDS (ref 9.8–12.8)
RBC # BLD AUTO: 5.27 X10*6/UL (ref 4.5–5.9)
RBC # UR STRIP.AUTO: NEGATIVE /UL
RBC #/AREA URNS AUTO: NORMAL /HPF
SODIUM SERPL-SCNC: 137 MMOL/L (ref 136–145)
SP GR UR STRIP.AUTO: 1.02
UROBILINOGEN UR STRIP.AUTO-MCNC: NORMAL MG/DL
WBC # BLD AUTO: 4.7 X10*3/UL (ref 4.4–11.3)
WBC #/AREA URNS AUTO: NORMAL /HPF

## 2024-09-26 PROCEDURE — 85025 COMPLETE CBC W/AUTO DIFF WBC: CPT

## 2024-09-26 PROCEDURE — 85610 PROTHROMBIN TIME: CPT

## 2024-09-26 PROCEDURE — 80053 COMPREHEN METABOLIC PANEL: CPT

## 2024-09-26 PROCEDURE — 87340 HEPATITIS B SURFACE AG IA: CPT

## 2024-09-26 PROCEDURE — G0452 MOLECULAR PATHOLOGY INTERPR: HCPCS | Performed by: STUDENT IN AN ORGANIZED HEALTH CARE EDUCATION/TRAINING PROGRAM

## 2024-09-26 PROCEDURE — 81232 DPYD GENE COMMON VARIANTS: CPT

## 2024-09-26 PROCEDURE — 86704 HEP B CORE ANTIBODY TOTAL: CPT

## 2024-09-26 PROCEDURE — 36415 COLL VENOUS BLD VENIPUNCTURE: CPT

## 2024-09-26 PROCEDURE — 81001 URINALYSIS AUTO W/SCOPE: CPT

## 2024-09-26 PROCEDURE — 86706 HEP B SURFACE ANTIBODY: CPT

## 2024-09-26 NOTE — TELEPHONE ENCOUNTER
RN Coordinator called pt to have blood work and labs done before chemo 10/1. Pt stated he can stop by [facility] after work today to give urine and add on lab work. Pt also was inquiring about flu/COVID vaccination. Per Dr. Vargas, ok to get those injections prior to chemo start. Port placement 9/30. Pt will call with any other questions.

## 2024-09-27 LAB
HBV CORE AB SER QL: NONREACTIVE
HBV SURFACE AB SER-ACNC: <3.1 MIU/ML
HBV SURFACE AG SERPL QL IA: NONREACTIVE

## 2024-09-30 ENCOUNTER — HOSPITAL ENCOUNTER (OUTPATIENT)
Dept: CARDIOLOGY | Facility: HOSPITAL | Age: 44
Discharge: HOME | End: 2024-09-30
Payer: COMMERCIAL

## 2024-09-30 VITALS
HEART RATE: 74 BPM | SYSTOLIC BLOOD PRESSURE: 101 MMHG | TEMPERATURE: 96.8 F | OXYGEN SATURATION: 97 % | DIASTOLIC BLOOD PRESSURE: 73 MMHG | RESPIRATION RATE: 10 BRPM

## 2024-09-30 DIAGNOSIS — R39.11 URINARY HESITANCY: Primary | ICD-10-CM

## 2024-09-30 DIAGNOSIS — D3A.8 NEUROENDOCRINE TUMOR: ICD-10-CM

## 2024-09-30 PROCEDURE — 7100000009 HC PHASE TWO TIME - INITIAL BASE CHARGE

## 2024-09-30 PROCEDURE — 77001 FLUOROGUIDE FOR VEIN DEVICE: CPT | Performed by: RADIOLOGY

## 2024-09-30 PROCEDURE — 76937 US GUIDE VASCULAR ACCESS: CPT | Performed by: RADIOLOGY

## 2024-09-30 PROCEDURE — 99153 MOD SED SAME PHYS/QHP EA: CPT | Performed by: RADIOLOGY

## 2024-09-30 PROCEDURE — 2500000004 HC RX 250 GENERAL PHARMACY W/ HCPCS (ALT 636 FOR OP/ED): Performed by: RADIOLOGY

## 2024-09-30 PROCEDURE — 2500000005 HC RX 250 GENERAL PHARMACY W/O HCPCS: Performed by: RADIOLOGY

## 2024-09-30 PROCEDURE — C1788 PORT, INDWELLING, IMP: HCPCS

## 2024-09-30 PROCEDURE — 99152 MOD SED SAME PHYS/QHP 5/>YRS: CPT | Performed by: RADIOLOGY

## 2024-09-30 PROCEDURE — 99222 1ST HOSP IP/OBS MODERATE 55: CPT | Performed by: NURSE PRACTITIONER

## 2024-09-30 PROCEDURE — 2500000004 HC RX 250 GENERAL PHARMACY W/ HCPCS (ALT 636 FOR OP/ED): Performed by: NURSE PRACTITIONER

## 2024-09-30 PROCEDURE — 2780000003 HC OR 278 NO HCPCS

## 2024-09-30 PROCEDURE — 36561 INSERT TUNNELED CV CATH: CPT | Mod: RT | Performed by: RADIOLOGY

## 2024-09-30 PROCEDURE — 7100000010 HC PHASE TWO TIME - EACH INCREMENTAL 1 MINUTE

## 2024-09-30 PROCEDURE — 36561 INSERT TUNNELED CV CATH: CPT | Performed by: RADIOLOGY

## 2024-09-30 PROCEDURE — 99153 MOD SED SAME PHYS/QHP EA: CPT

## 2024-09-30 PROCEDURE — 99152 MOD SED SAME PHYS/QHP 5/>YRS: CPT

## 2024-09-30 RX ORDER — SODIUM CHLORIDE 9 MG/ML
100 INJECTION, SOLUTION INTRAVENOUS CONTINUOUS
Status: DISCONTINUED | OUTPATIENT
Start: 2024-09-30 | End: 2024-09-30

## 2024-09-30 RX ORDER — HEPARIN 100 UNIT/ML
500 SYRINGE INTRAVENOUS AS NEEDED
Status: CANCELLED | OUTPATIENT
Start: 2024-10-01

## 2024-09-30 RX ORDER — FENTANYL CITRATE 50 UG/ML
INJECTION, SOLUTION INTRAMUSCULAR; INTRAVENOUS AS NEEDED
Status: DISCONTINUED | OUTPATIENT
Start: 2024-09-30 | End: 2024-09-30 | Stop reason: HOSPADM

## 2024-09-30 RX ORDER — MIDAZOLAM HYDROCHLORIDE 1 MG/ML
INJECTION, SOLUTION INTRAMUSCULAR; INTRAVENOUS AS NEEDED
Status: DISCONTINUED | OUTPATIENT
Start: 2024-09-30 | End: 2024-09-30 | Stop reason: HOSPADM

## 2024-09-30 RX ORDER — HEPARIN SODIUM,PORCINE/PF 10 UNIT/ML
50 SYRINGE (ML) INTRAVENOUS AS NEEDED
Status: CANCELLED | OUTPATIENT
Start: 2024-10-01

## 2024-09-30 RX ORDER — LIDOCAINE HYDROCHLORIDE AND EPINEPHRINE 10; 10 MG/ML; UG/ML
INJECTION, SOLUTION INFILTRATION; PERINEURAL AS NEEDED
Status: DISCONTINUED | OUTPATIENT
Start: 2024-09-30 | End: 2024-09-30 | Stop reason: HOSPADM

## 2024-09-30 ASSESSMENT — PAIN - FUNCTIONAL ASSESSMENT
PAIN_FUNCTIONAL_ASSESSMENT: 0-10

## 2024-09-30 ASSESSMENT — ENCOUNTER SYMPTOMS
RESPIRATORY NEGATIVE: 1
NEUROLOGICAL NEGATIVE: 1
GASTROINTESTINAL NEGATIVE: 1
ALLERGIC/IMMUNOLOGIC NEGATIVE: 1
ENDOCRINE NEGATIVE: 1
APPETITE CHANGE: 1
NERVOUS/ANXIOUS: 1
HEMATOLOGIC/LYMPHATIC NEGATIVE: 1
MUSCULOSKELETAL NEGATIVE: 1
CARDIOVASCULAR NEGATIVE: 1
EYES NEGATIVE: 1

## 2024-09-30 ASSESSMENT — PAIN SCALES - GENERAL
PAINLEVEL_OUTOF10: 0 - NO PAIN

## 2024-09-30 NOTE — NURSING NOTE
Pt into PACU with anesthesia at bedside, attached to monitors, family updated via messaging system. Belongings returned to bedside.  1126    Cath lab nurse reporting sedation last given 1112    Pt tolerating apple juice 1130    Family to bedside 1205    Phase 2 1205    IV removed, discharge instructions  completed with pt family/friends, instructed to return to ED if not lizzeth to void in 8-12 hours, SSI, infection prevention, med ed completed and wound education completed. Pt and family express understanding of instructions.      Pt transferred off floor [id number]

## 2024-09-30 NOTE — DISCHARGE INSTRUCTIONS
What is a Central Venous Access Port? Patient and Family Education    What is a Central Venous Access Port?  A central venous access port is a small device made up of 2 parts:  ? The port, which is a hollow metal or plastic disk with a rubber center and  ? The tube (also called a catheter) that connects to the port. The catheter is  threaded through a vein that leads to your heart.  A doctor places the port under the skin in the chest near the collarbone, or in the arm. The port  feels like a small bump. Once your port site heals it should not hurt. A port provides easy  access to a vein. A port is useful if you need frequent intravenous (IV) treatments, medicines,  blood tests or blood products. A port can stay in for months or years if it is cared for correctly  and working as it should. A port may also be called a Mediport, Power Port or Port-a-cath.    Before your Port is Placed:  ? If you take any medicine that thins your blood or helps prevent clots, talk with  your doctor. Before your port is placed, ask your doctor if your dose of these  medicines needs to be changed to help prevent bleeding problems. If your doctor tells  you to stop taking these medicines, ask him or her when you should restart them. If  your port placement is cancelled, call your doctor and ask if you need to restart your  medicine or change your dose. These medicines include, but are not limited to:  Warfarin (Coumadin), Lovenox (enoxaparin), Eliquis (apixaban), Plavix (clopidogrel),  Pradaxa (dabigatran) and Xarelto (rivaroxaban).  ? Do not eat or drink anything 8 hours before your port placement. If you have  been told to take certain medicines, take them with a sip of water.  ? Take your daily medicines, especially any cardiac (heart), high blood pressure, seizure,  and antibiotic medicines. If you take insulin for diabetes, ask your doctor how to adjust  your insulin dose the morning of your port placement. Be sure to tell your  doctor that  you have to fast for 8 hours before the port is placed.  ? Talk with your doctor, nurse or dietitian before taking probiotics. Ask if it’s safe for  you to take them when you have a central line. Some patients should avoid taking  certain probiotics because they may increase their chance of getting an infection.    The Day your Port is Placed:  ? A doctor in Radiology will place your port. The port placement takes about 60 to 90  minutes but plan on being here for 3 to 4 hours. This allows time for your check-in and  recovery.  ? A staff member will talk with you about the procedure, ask you questions and help  answer your questions. For women: Tell your doctor or technologist if there is any  chance you are pregnant.  ? You will be asked to change into a hospital gown for the procedure. You must remove  necklaces and earrings because they can get in the way during your port placement. An  IV will be placed in your arm and you will be asked to lay on a cart. Once we are  ready, we will take you to the procedure room. A family member may stay in our  waiting room while your port is placed.    In the Procedure Room:  You will get medicine through your IV to help you relax. While the port is placed, some  people fall asleep and some are awake but feel very relaxed. We will wash the area where the  port is being placed with a germ killing solution. This solution helps lower your chances of  getting an infection. Next, the doctor injects a numbing medicine into your skin, around the  port site. Once your skin is numb, the doctor makes 2 small incisions (cuts) to place the port  under your skin. The incisions are closed with skin glue or sutures and paper Band-Aids called  steri-strips. A gauze bandage is then placed over the port site.    After the Port is Placed:  ? You will be taken to the recovery area. We will check your pulse and blood pressure  often and check your port site for bleeding and swelling.  Some bruising is normal. If  you see bleeding, apply pressure with your fingertips and call your nurse right away. If  you feel swelling or more pain at the site, call your nurse.  ? You may have some soreness where your port is placed but it should improve each  day as the site heals. The incisions used to place the port are small and should heal in  10 to 14 days.  ? Once healed, you do not need to have a dressing over the site unless the port has a  needle in it.    If You go Home After Your Port is Placed:  ? You must have someone drive you home. We cannot let you drive or travel home alone in  a cab or on a bus. Do not drive for 24 hours after your port is placed.  ? Someone should stay with you through the night.  ? Resume your routine normal diet. You may resume your normal medicines but if you  take blood-thinning medicine, ask your doctor when you should start taking it again.  ? Rest until morning.   ? Lifting your arm on the same side of the mediport should be restricted to 10-15 pounds   or less for 1 week.  ? Avoid pushing, pulling, straining, or any strenuous activities.  ? You may climb stairs.  ? You may return to work when you feel you are ready at any point after surgery.  If you are not able to contact your doctor, go to the nearest Emergency Room.    Caring for Your Incisions:  ? Remove the gauze dressing after 48 hours. You do not need to replace it. Don’t try to peel  off the surgical glue or steri-strips. Let them fall off on their own, which often happens  after 2 weeks.  ? Do not let your incisions get wet until they are fully healed, which often takes 10 to 14  days. When you shower, cover your incisions with a dressing made from plastic wrap  (like Saran wrap or Glad Press n’ Seal) or a plastic bag and tape to keep the area dry.  After you shower, remove the plastic wrap and pat the incisions dry. Don’t swim or soak  in a tub or Jacuzzi until your incisions are fully healed.  ? Do not get  your port site wet if it has a needle in it. Follow the steps above to make  sure your port site is covered with plastic wrap or a plastic bag when you shower.  ? Look at your incisions each day. Call your doctor right away if you have any of the signs  of infection that are listed on the next page.    Caring for Your Port:  -A trained nurse must use a special non-coring needle, called a Schaefer needle, each time they  access your port. The needle is placed through your skin and into the rubber center of the port.  -You will likely feel slight pricking when your nurse inserts the needle. The needle stays in  place for your treatments and can be removed afterwards.  -Your port needs to be flushed every 4 to 6 weeks to help prevent blood clots  from forming in the catheter. If blood clots form and cause a blockage, your  port may need to be removed. Your nurse will flush your port with saline. If  needed, they may also flush it with a blood thinning medicine called heparin.  -Port flushes are done right before the needle is taken out. Some patients are  taught how to do these flushes at home. If you need to flush your port at home,  your nurse will show you how. If your port is not being used at least once  every 4 to 6 weeks, talk with your doctor or nurse about scheduling port  flushes.    Sedation:  If you received medication for sedation, it will be active in your body for approximately 24  hours. So you may feel a little sleepy. Therefore, for the next 24 hours:  ? DO NOT drive a car, operate machinery or power tools. It is recommended that a   responsible adult be with you for the first 24 hours.  ? DO NOT drink any alcoholic beverages or take any non-prescriptive medications that   contain alcohol.  ? DO NOT make any important decisions or sign any legal/important documents.    When to Call Your Doctor:  ? Redness, swelling or drainage near the port or over the port site.  ? Drainage from the port site.  ? Shake  or chills.  ? Temperature of 100.4°F (38°C) or higher.  ? Pain, warm or soreness at the port site.  ? Swelling of your arm, check at the port site.  ? Also call if the port has been moved  ? If you have any questions about your port.     How to Reach your Doctor:    Call [phone] with problems or questions    This info is a general resource. It is not meant to replace your health care provider’s advice.  Ask your doctor or health care team any questions. Always follow their instructions.

## 2024-09-30 NOTE — H&P
History Of Present Illness  Thai Cristobal is a 43 y.o. male presenting with metastatic small bowel adenocarcinoma, here for Mediport placement, planned for chemotherapy. PMHx significant for Crohn's disease s/p partial right colectomy with small bowel resection on repair of colonic fistula 5/28/24, pSVT, migraine, orthostatic hypotension, anxiety, depression, asthma.      Past Medical History:  Past Medical History:   Diagnosis Date    Acute upper respiratory infection, unspecified 02/21/2018    Acute URI    Crohn's disease (Multi)     Personal history of other specified conditions 04/23/2018    History of vertigo    SBO (small bowel obstruction) (Multi) 01/25/2024        Past Surgical History:  Past Surgical History:   Procedure Laterality Date    COLECTOMY PARTIAL / TOTAL      COLON SURGERY      GALLBLADDER SURGERY  02/23/2015    Gallbladder Surgery    OTHER SURGICAL HISTORY  04/16/2021    Small bowel resection          Social History:   reports that he has never smoked. He has never used smokeless tobacco. He reports that he does not currently use alcohol. He reports that he does not currently use drugs.     Family History:  Family History   Problem Relation Name Age of Onset    Thyroid disease Mother      Other (bladder cancer) Mother      Hypertension Father      Benign prostatic hyperplasia Father      Anxiety disorder Sister      No Known Problems Brother      No Known Problems Daughter      Heart failure Paternal Grandfather          Allergies:  No Known Allergies     Home Medications:  Current Outpatient Medications   Medication Instructions    ALPRAZolam (XANAX) 0.5 mg, oral, Daily PRN    FLUoxetine (PROZAC) 40 mg, oral, Daily    FLUoxetine (PROZAC) 20 mg, oral, Daily    loperamide (Imodium) 2 mg capsule Take 2 capsules (4 mg) by mouth with the first episode of diarrhea and 1 capsule (2 mg) by mouth with any additional episodes. Maximum 8 capsules (16 mg) per day.    NON FORMULARY 1 each, oral, Nightly,  THC gummy    ondansetron (ZOFRAN) 8 mg, oral, Every 8 hours PRN    prochlorperazine (COMPAZINE) 10 mg, oral, Every 6 hours PRN    SUMAtriptan (IMITREX) 50 mg, oral, Once as needed       Inpatient Medications:  Scheduled medications   Medication Dose Route Frequency     PRN medications   Medication     Continuous Medications   Medication Dose Last Rate    sodium chloride 0.9%  100 mL/hr           Review of Systems   Constitutional:  Positive for appetite change.   HENT: Negative.     Eyes: Negative.    Respiratory: Negative.     Cardiovascular: Negative.    Gastrointestinal: Negative.    Endocrine: Negative.    Genitourinary: Negative.    Musculoskeletal: Negative.    Skin: Negative.    Allergic/Immunologic: Negative.    Neurological: Negative.    Hematological: Negative.    Psychiatric/Behavioral:  The patient is nervous/anxious.           Physical Exam  General:  Patient is awake, alert, and oriented.  Patient is in no acute distress.  HEENT:  Pupils equal and reactive.  Normocephalic.  Moist mucosa.    Neck:  NoJVD.   Cardiovascular:  Regular rate and rhythm.  Normal S1 and S2. No murmurs/rubs/gallops. Radial pulses 2+.   Pulmonary:  Clear to auscultation bilaterally.  Abdomen:  Soft. Non-tender.   Non-distended.  Positive bowel sounds.  Lower Extremities:  Pedal pulses 2+ No LE edema.  Neurologic:  Cranial nerves II-XII grossly intact.   No focal deficit.   Skin: Skin warm and dry, no lesions. Normal skin turgor.   Psychiatric: Normal affect.     Sedation Plan    ASA 3     Mallampati class: III.    Risks, benefits, and alternatives discussed with patient.         NPO since 000    Last Recorded Vitals  There were no vitals taken for this visit.         Vitals from the Past 24 Hours            Relevant Results    Labs    CBC:   Recent Labs     09/26/24  1646 09/24/24  1543 09/13/24  0731 01/08/24  1213 04/18/23  0835 07/01/21  0822   WBC 4.7 5.2 4.6 5.1 7.3 5.2   HGB 14.1 13.8 12.7* 16.2 15.4 16.2   HCT 43.3 43.3  "40.5* 47.9 46.8 49.2    282 255 291 295 264   MCV 82 83 82 93 95 96     BMP/CMP:   Recent Labs     09/26/24  1646 09/24/24  1543 09/13/24  0731 01/08/24  1213 04/18/23  0835 07/01/21  0822    138 138 135* 137 140   K 4.0 4.4 4.2 4.3 4.0 3.9   CL 98 102 103 103 100 101   BUN 20 19 21 15 19 11   CREATININE 1.12 1.01 1.04 1.16 1.05 1.16   CO2 32 30 29 24 30 30   CALCIUM 9.8 9.6 9.1 9.4 9.4 9.3   PROT 7.7 7.2 6.9 6.8 7.0 6.0*   BILITOT 0.5 0.4 0.5 0.6 0.6 0.8   ALKPHOS 90 89 70 80 72 73   ALT 14 12 12 24 20 22   AST 18 18 18 25 22 27   GLUCOSE 77 91 86 98 88 85      Lipid Panel:   Recent Labs     04/18/23  0835 07/01/21  0822   CHOL 199 160   HDL 89.4 53.5   CHHDL 2.2 3.0   VLDL 25 32   TRIG 123 159*     Cardiac       No lab exists for component: \"CK\", \"CKMBP\"   Hemoglobin A1C: No results for input(s): \"HGBA1C\" in the last 81425 hours.  TSH/ Free T4:   Recent Labs     04/18/23  0835 07/01/21  0822   TSH 3.17 3.12     Iron: No results for input(s): \"FERRITIN\", \"TIBC\", \"IRONSAT\", \"BNP\" in the last 07058 hours.  Coag:   Results from last 7 days   Lab Units 09/26/24  1646 09/24/24  1543   INR  1.1 1.0     ABO: No results found for: \"ABO\"    Past Cardiology Tests (Last 3 Years):    EKG:  No results for input(s): \"ATRRATE\", \"VENTRATE\", \"PRINT\", \"QRSDUR\", \"QTCFRED\", \"QTCCALCB\" in the last 75760 hours.No results found for this or any previous visit (from the past 4464 hour(s)).  Echo:  Echocardiogram: No results found for this or any previous visit from the past 1800 days.    Ejection Fractions:  No results found for: \"EF\"  Cath:  Coronary Angiography: No results found for this or any previous visit from the past 1800 days.    Right Heart Cath: No results found for this or any previous visit from the past 1800 days.    Stress Test:  Nuclear:No results found for this or any previous visit from the past 1800 days.    Metabolic Stress: No results found for this or any previous visit from the past 1800 days.    Cardiac " Imaging:  Cardiac Scoring: No results found for this or any previous visit from the past 1800 days.    Cardiac MRI: No results found for this or any previous visit from the past 1800 days.         Assessment/Plan  Assessment/Plan   Active Problems:  There are no active Hospital Problems.      #Metastatic small bowel adenocarcinoma  -Mediport placement with Dr. Birmingham 9/30/24       NP discussed with Dr. Birmingham regarding plan of care/ discharge plan      I spent 30 minutes in the professional and overall care of this patient.      Sandra Bateman, APRN-CNP

## 2024-09-30 NOTE — POST-PROCEDURE NOTE
Interventional Radiology Brief Postprocedure Note    Attending: Valencia    Assistant: None    Diagnosis: NET    Description of procedure: RIJ mediport, ready for use.     Anesthesia:  Local  2 mg Versed, 100 mcg Fentanyl    Complications: None    Estimated Blood Loss: minimal    Medications (Filter: Administrations occurring from 1144 to 1144 on 09/30/24) As of 09/30/24 1144      None          No specimens collected      See detailed result report with images in PACS.    The patient tolerated the procedure well without incident or complication and is in stable condition.

## 2024-10-01 ENCOUNTER — OFFICE VISIT (OUTPATIENT)
Dept: HEMATOLOGY/ONCOLOGY | Facility: CLINIC | Age: 44
End: 2024-10-01
Payer: COMMERCIAL

## 2024-10-01 ENCOUNTER — NUTRITION (OUTPATIENT)
Dept: HEMATOLOGY/ONCOLOGY | Facility: CLINIC | Age: 44
End: 2024-10-01

## 2024-10-01 ENCOUNTER — INFUSION (OUTPATIENT)
Dept: HEMATOLOGY/ONCOLOGY | Facility: CLINIC | Age: 44
End: 2024-10-01
Payer: COMMERCIAL

## 2024-10-01 VITALS
WEIGHT: 158.07 LBS | BODY MASS INDEX: 23.96 KG/M2 | RESPIRATION RATE: 18 BRPM | DIASTOLIC BLOOD PRESSURE: 81 MMHG | TEMPERATURE: 97.9 F | OXYGEN SATURATION: 96 % | HEART RATE: 76 BPM | HEIGHT: 68 IN | SYSTOLIC BLOOD PRESSURE: 123 MMHG

## 2024-10-01 VITALS — HEIGHT: 68 IN | BODY MASS INDEX: 24.01 KG/M2 | WEIGHT: 158.4 LBS

## 2024-10-01 VITALS — BODY MASS INDEX: 24.01 KG/M2 | WEIGHT: 158.4 LBS | HEIGHT: 68 IN

## 2024-10-01 DIAGNOSIS — R39.11 URINARY HESITANCY: ICD-10-CM

## 2024-10-01 DIAGNOSIS — D3A.8 NEUROENDOCRINE TUMOR: ICD-10-CM

## 2024-10-01 DIAGNOSIS — C17.9 ADENOCARCINOMA OF SMALL BOWEL (MULTI): Primary | ICD-10-CM

## 2024-10-01 DIAGNOSIS — C17.9 ADENOCARCINOMA OF SMALL BOWEL (MULTI): ICD-10-CM

## 2024-10-01 LAB
DPYD GENE MUT ANL BLD/T: NORMAL
ELECTRONICALLY SIGNED BY: NORMAL

## 2024-10-01 PROCEDURE — 99215 OFFICE O/P EST HI 40 MIN: CPT | Mod: 25 | Performed by: INTERNAL MEDICINE

## 2024-10-01 PROCEDURE — 96413 CHEMO IV INFUSION 1 HR: CPT

## 2024-10-01 PROCEDURE — 96375 TX/PRO/DX INJ NEW DRUG ADDON: CPT | Mod: INF

## 2024-10-01 PROCEDURE — 99215 OFFICE O/P EST HI 40 MIN: CPT | Performed by: INTERNAL MEDICINE

## 2024-10-01 PROCEDURE — 96411 CHEMO IV PUSH ADDL DRUG: CPT

## 2024-10-01 PROCEDURE — 3008F BODY MASS INDEX DOCD: CPT | Performed by: INTERNAL MEDICINE

## 2024-10-01 PROCEDURE — 1036F TOBACCO NON-USER: CPT | Performed by: INTERNAL MEDICINE

## 2024-10-01 PROCEDURE — 96416 CHEMO PROLONG INFUSE W/PUMP: CPT

## 2024-10-01 PROCEDURE — 2500000004 HC RX 250 GENERAL PHARMACY W/ HCPCS (ALT 636 FOR OP/ED): Performed by: INTERNAL MEDICINE

## 2024-10-01 PROCEDURE — 96415 CHEMO IV INFUSION ADDL HR: CPT

## 2024-10-01 PROCEDURE — 87086 URINE CULTURE/COLONY COUNT: CPT

## 2024-10-01 RX ORDER — PALONOSETRON 0.05 MG/ML
0.25 INJECTION, SOLUTION INTRAVENOUS ONCE
OUTPATIENT
Start: 2024-11-12

## 2024-10-01 RX ORDER — PROCHLORPERAZINE EDISYLATE 5 MG/ML
10 INJECTION INTRAMUSCULAR; INTRAVENOUS EVERY 6 HOURS PRN
Status: DISCONTINUED | OUTPATIENT
Start: 2024-10-01 | End: 2024-10-01 | Stop reason: HOSPADM

## 2024-10-01 RX ORDER — DIPHENHYDRAMINE HYDROCHLORIDE 50 MG/ML
50 INJECTION INTRAMUSCULAR; INTRAVENOUS AS NEEDED
Status: DISCONTINUED | OUTPATIENT
Start: 2024-10-01 | End: 2024-10-01 | Stop reason: HOSPADM

## 2024-10-01 RX ORDER — PROCHLORPERAZINE MALEATE 10 MG
10 TABLET ORAL EVERY 6 HOURS PRN
Status: DISCONTINUED | OUTPATIENT
Start: 2024-10-01 | End: 2024-10-01 | Stop reason: HOSPADM

## 2024-10-01 RX ORDER — FAMOTIDINE 10 MG/ML
20 INJECTION INTRAVENOUS ONCE AS NEEDED
Status: DISCONTINUED | OUTPATIENT
Start: 2024-10-01 | End: 2024-10-01 | Stop reason: HOSPADM

## 2024-10-01 RX ORDER — HEPARIN 100 UNIT/ML
500 SYRINGE INTRAVENOUS AS NEEDED
Status: CANCELLED | OUTPATIENT
Start: 2024-10-01

## 2024-10-01 RX ORDER — FAMOTIDINE 10 MG/ML
20 INJECTION INTRAVENOUS ONCE AS NEEDED
OUTPATIENT
Start: 2024-11-12

## 2024-10-01 RX ORDER — EPINEPHRINE 0.3 MG/.3ML
0.3 INJECTION SUBCUTANEOUS EVERY 5 MIN PRN
Status: DISCONTINUED | OUTPATIENT
Start: 2024-10-01 | End: 2024-10-01 | Stop reason: HOSPADM

## 2024-10-01 RX ORDER — PROCHLORPERAZINE MALEATE 5 MG
10 TABLET ORAL EVERY 6 HOURS PRN
OUTPATIENT
Start: 2024-11-12

## 2024-10-01 RX ORDER — HEPARIN SODIUM,PORCINE/PF 10 UNIT/ML
50 SYRINGE (ML) INTRAVENOUS AS NEEDED
Status: CANCELLED | OUTPATIENT
Start: 2024-10-01

## 2024-10-01 RX ORDER — PALONOSETRON 0.05 MG/ML
0.25 INJECTION, SOLUTION INTRAVENOUS ONCE
Status: COMPLETED | OUTPATIENT
Start: 2024-10-01 | End: 2024-10-01

## 2024-10-01 RX ORDER — EPINEPHRINE 0.3 MG/.3ML
0.3 INJECTION SUBCUTANEOUS EVERY 5 MIN PRN
OUTPATIENT
Start: 2024-11-12

## 2024-10-01 RX ORDER — ALBUTEROL SULFATE 0.83 MG/ML
3 SOLUTION RESPIRATORY (INHALATION) AS NEEDED
OUTPATIENT
Start: 2024-11-12

## 2024-10-01 RX ORDER — LORAZEPAM 2 MG/ML
1 INJECTION INTRAMUSCULAR AS NEEDED
OUTPATIENT
Start: 2024-11-12

## 2024-10-01 RX ORDER — ALBUTEROL SULFATE 0.83 MG/ML
3 SOLUTION RESPIRATORY (INHALATION) AS NEEDED
Status: DISCONTINUED | OUTPATIENT
Start: 2024-10-01 | End: 2024-10-01 | Stop reason: HOSPADM

## 2024-10-01 RX ORDER — LORAZEPAM 2 MG/ML
1 INJECTION INTRAMUSCULAR AS NEEDED
Status: DISCONTINUED | OUTPATIENT
Start: 2024-10-01 | End: 2024-10-01 | Stop reason: HOSPADM

## 2024-10-01 RX ORDER — PROCHLORPERAZINE EDISYLATE 5 MG/ML
10 INJECTION INTRAMUSCULAR; INTRAVENOUS EVERY 6 HOURS PRN
OUTPATIENT
Start: 2024-11-12

## 2024-10-01 RX ORDER — DIPHENHYDRAMINE HYDROCHLORIDE 50 MG/ML
50 INJECTION INTRAMUSCULAR; INTRAVENOUS AS NEEDED
OUTPATIENT
Start: 2024-11-12

## 2024-10-01 ASSESSMENT — PAIN SCALES - GENERAL: PAINLEVEL: 0-NO PAIN

## 2024-10-01 NOTE — PROGRESS NOTES
"NUTRITION Assessment NOTE    Nutrition Assessment     Reason for Visit:  Thai Cristobal is a 43 y.o. male who presents for initial infusion for diagnosis of adenocarcinoma of small bowel.  Patient receiving bevacizumab, mFOLFOX6 regimen.      Lab Results   Component Value Date/Time    GLUCOSE 77 09/26/2024 1646     09/26/2024 1646    K 4.0 09/26/2024 1646    CL 98 09/26/2024 1646    CO2 32 09/26/2024 1646    ANIONGAP 11 09/26/2024 1646    BUN 20 09/26/2024 1646    CREATININE 1.12 09/26/2024 1646    EGFR 84 09/26/2024 1646    CALCIUM 9.8 09/26/2024 1646    ALBUMIN 4.5 09/26/2024 1646    ALKPHOS 90 09/26/2024 1646    PROT 7.7 09/26/2024 1646    AST 18 09/26/2024 1646    BILITOT 0.5 09/26/2024 1646    ALT 14 09/26/2024 1646     No results found for: \"VITD25\"    Anthropometrics:  Anthropometrics  Height: 172 cm (5' 7.72\")  Weight: 71.9 kg (158 lb 6.4 oz)  BMI (Calculated): 24.29  Percent of IBW: 67.27 %  Adjusted Body Weight (kg): 107 kg  Weight Change  Weight History / % Weight Change: 21 lbs (12%) in the last 6 months  Significant Weight Loss: Yes  Interpretation of Weight Loss: >10% in 6 months        Wt Readings from Last 10 Encounters:   10/01/24 71.9 kg (158 lb 6.4 oz)   10/01/24 71.8 kg (158 lb 6.4 oz)   10/01/24 71.7 kg (158 lb 1.1 oz)   09/24/24 72.7 kg (160 lb 4.4 oz)   09/13/24 72.6 kg (160 lb)   09/10/24 73.2 kg (161 lb 6.4 oz)   08/20/24 71.7 kg (158 lb 1.6 oz)   07/09/24 73.1 kg (161 lb 1.6 oz)   06/18/24 73.8 kg (162 lb 9.6 oz)   05/09/24 80.3 kg (177 lb)        Food And Nutrient Intake:  Food and Nutrient History  Food and Nutrient History: Appetite is currently good.  He reported he has made diet changes over the last few months since diagnosis.  He is eating generally healthier foods, less processed, watching sugar and sugar substitute intake.  Energy Intake: Good > 75 %  Fluid Intake: Good, drinks mostly water     Food Intake  Meal 1: cereal with little milk, scrambled eggs, or spinach " "quiche  Meal 2: turkey sandwich with avocado on it, likes peanut butter and honey sandwiches.  Meal 3: lean meat  Snacks: yogurts, protein bars, smoothies ([employer]),    Food Preparation  Cooking: Spouse/Significant Other                                                   Nutrition Focused Physical Exam Findings:      Subcutaneous Fat Loss  Orbital Fat Pads: Well nourished (slightly bulging fat pads)  Buccal Fat Pads: Well nourished (full, rounded cheeks)  Triceps: Well nourished (ample fat tissue)    Muscle Wasting  Temporalis: Well nourished (well-defined muscle)              Energy Needs  Calculated Energy Needs Using Equations  Height: 172 cm (5' 7.72\")  Estimated Energy Needs  Total Energy Estimated Needs (kCal): 2200 kCal  Total Estimated Energy Need per Day (kCal/kg): 30 kCal/kg  Estimated Fluid Needs  Total Fluid Estimated Needs (mL): 2200 mL  Total Fluid Estimated Needs (mL/kg): 30 mL/kg  Estimated Protein Needs  Total Protein Estimated Needs (g): 86 g  Total Protein Estimated Needs (g/kg): 1.2 g/kg  Method for Estimating Needs: 86-100g protein/day (1.2-1.4g protein/kg)        Nutrition Diagnosis        Nutrition Diagnosis  Patient has Nutrition Diagnosis: Yes  Diagnosis Status (1): New  Nutrition Diagnosis 1: Increased nutrient needs  Related to (1): increased demand for calories, protein and fluids  As Evidenced by (1): chemotherapy for diagnosis of adenocarcinoma of small bowel.    Nutrition Interventions/Recommendations   Nutrition Prescription  Individualized Nutrition Prescription Provided for : Small frequent meals including lean protein foods; increased fluid needs of at least 64 oz per day.    Food and Nutrition Delivery  Food and Nutrition Delivery  Meals & Snacks: General Healthful Diet  Medical Food Supplement: Other, Specify:  Goals: Can add protein poweder to smoothies as tolerated daily. Suggest 1 scoop per day.    Nutrition Education  Nutrition Education  Nutrition Education Content: " Content related nutrition education  Goals: Provided eating hints book and discussed, provided NOURISH bookbook for recipe ideas throughout tx.  Patient and his wife had questions in regards to foods, diet throughout tx, and we discussed foods for bowel management depending on if any GI toxicities. Reviewed eating hints book as good reference for symptoms management related to nutrition.      Coordination of Care  Coordination of Nutrition Care by a Nutrition Professional  Collaboration and referral of nutrition care: Collaboration by nutrition professional with other providers    Patient Instructions   Small frequent meals including lean protein foods.    Fluid intake of at least 64 oz per day.  Provided eating hints book for symptom related nutrition interventions throughout tx.  Provided NOURISH cookbook for recipe ideas.     Nutrition Monitoring and Evaluation   Food/Nutrient Related History Monitoring  Monitoring and Evaluation Plan: Energy intake, Fluid intake, Protein intake  Energy Intake: Estimated energy intake  Criteria: 8382-0179 calories per day  Fluid Intake: Estimated fluid intake  Criteria: 2200-2500ml fluids per day  Estimated protein intake: Estimated protein intake  Criteria: 86-100g protein per day  Body Composition/Growth/Weight History  Monitoring and Evaluation Plan: Weight  Weight: Weight change  Criteria: weight maintenance throughout tx  Biochemical Data, Medical Tests and Procedures  Monitoring and Evaluation Plan: Electrolyte/renal panel  Criteria: labs WNL  Nutrition Focused Physical Findings  Monitoring and Evaluation Plan: Digestive System  Criteria: will monitor for GI toxicity throughout tx      Time Spent  Prep time on day of patient encounter: 10 minutes  Time spent directly with patient, family or caregiver: 20 minutes  Additional Time Spent on Patient Care Activities: 10 minutes  Documentation Time: 15 minutes  Other Time Spent: 0 minutes  Total: 55 minutes    This service will  continue to follow up as needed throughout treatments.

## 2024-10-01 NOTE — PATIENT INSTRUCTIONS
Small frequent meals including lean protein foods.    Fluid intake of at least 64 oz per day.  Provided eating hints book for symptom related nutrition interventions throughout tx.  Provided NOURISH cookbook for recipe ideas.

## 2024-10-01 NOTE — PROGRESS NOTES
.Patient ID: Thai Cristobal is a 43 y.o. male.  Referring Physician: Nba Vargas [place]  [id number] [place] [address]  Primary Care Provider: Charlotte Yan DO      Chief complaint: Diagnosis changed after path review to SB Adenocarcinoma     HPI  This is a 43-year-old male with a known history of Crohn's disease patient is status post partial right colectomy small bowel resection on repair of a colonic fistula on 5/28/2024. Pathology he had a neuroendocrine tumor grade 3 well differentiated, with Ki67 30-40%. He did follow-up with oncology at [facility]. He had a PET-Ga68 which showed Postsurgical changes from right hemicolectomy and distal small bowel with no evidence of focal abnormal gallium 68 dotatate uptake.   Patient's only complaint is change in color of stools at this point, he is eating and drinking less and he has no abdominal pain. No diarrhea and no flushing.    SYSTEMIC TREATMENT RECEIVED: None       Subjective   Please refer to Notes above for complete History of present illness.          Review of Systems:   All 14 systems have been reviewed and were negative except for the HPI.       MEDICAL HISTORY  Past Medical History:   Diagnosis Date    Acute upper respiratory infection, unspecified 02/21/2018    Acute URI    Crohn's disease (Multi)     Personal history of other specified conditions 04/23/2018    History of vertigo    SBO (small bowel obstruction) (Multi) 01/25/2024       FAMILY HISTORY  Family History   Problem Relation Name Age of Onset    Thyroid disease Mother      Other (bladder cancer) Mother      Hypertension Father      Benign prostatic hyperplasia Father      Anxiety disorder Sister      No Known Problems Brother      No Known Problems Daughter      Heart failure Paternal Grandfather         TOBACCO HISTORY  Tobacco Use: Low Risk  (10/1/2024)    Patient History     Smoking Tobacco Use: Never     Smokeless Tobacco Use: Never     Passive Exposure: Not on file       SOCIAL  HISTORY  Social Connections: Not on file        Outpatient Medication Profile:  Current Outpatient Medications on File Prior to Visit   Medication Sig Dispense Refill    ALPRAZolam (Xanax) 0.5 mg tablet Take 1 tablet (0.5 mg) by mouth once daily as needed for anxiety. 20 tablet 0    FLUoxetine (PROzac) 20 mg capsule Take 1 capsule (20 mg) by mouth once daily. 90 capsule 1    FLUoxetine (PROzac) 40 mg capsule Take 1 capsule (40 mg) by mouth once daily. 90 capsule 1    loperamide (Imodium) 2 mg capsule Take 2 capsules (4 mg) by mouth with the first episode of diarrhea and 1 capsule (2 mg) by mouth with any additional episodes. Maximum 8 capsules (16 mg) per day. (Patient not taking: Reported on 9/30/2024) 100 capsule 2    NON FORMULARY Take 1 each by mouth once daily at bedtime. THC gummy      ondansetron (Zofran) 8 mg tablet Take 1 tablet (8 mg) by mouth every 8 hours if needed for nausea or vomiting. 30 tablet 5    prochlorperazine (Compazine) 10 mg tablet Take 1 tablet (10 mg) by mouth every 6 hours if needed for nausea or vomiting. 30 tablet 5    SUMAtriptan (Imitrex) 50 mg tablet Take 1 tablet (50 mg) by mouth 1 time if needed for migraine for up to 36 doses. 9 tablet 3     Current Facility-Administered Medications on File Prior to Visit   Medication Dose Route Frequency Provider Last Rate Last Admin    [DISCONTINUED] fentaNYL PF (Sublimaze) injection    PRN Marco Antonio Birmingham MD   50 mcg at 09/30/24 1111    [DISCONTINUED] lidocaine-epinephrine (Xylocaine W/EPI) 1 %-1:100,000 injection    PRN Marco Antonio Birmingham MD   5 mL at 09/30/24 1109    [DISCONTINUED] midazolam (Versed) injection    PRN Marco Antonio Birmingham MD   1 mg at 09/30/24 1111    [DISCONTINUED] sodium chloride 0.9% infusion  100 mL/hr intravenous Continuous MANUELITO Ramos- mL/hr at 09/30/24 1103 100 mL/hr at 09/30/24 1103         Performance Status:  Symptomatic; fully ambulatory     Vitals and Measurements:   Ht (S) 1.72 m (5'  "7.72\")   Wt 71.8 kg (158 lb 6.4 oz)   BMI 24.29 kg/m²       Physical Exam:   General: Appears well and in NAD  Eyes: PERRL, EOMI, clear sclera  ENMT: mucous membranes moist, no apparent injury, no lesions seen  Head/Neck: Neck supple, no apparent injury, thyroid without mass or tenderness, No JVD, trachea midline,   Thorax: Patent airways, CTAB, normal breath sounds with good chest expansion, thorax symmetric  Cardiovascular: Regular, rate and rhythm, no murmurs, 2+ equal pulses of the extremities, normal S 1and S 2  Gastrointestinal: Nondistended, soft, non-tender, no rebound tenderness or guarding, no masses palpable, no organomegaly, +BS  Musculoskeletal: ROM intact, no joint swelling, normal strength  Extremities: normal extremities, no cyanosis edema, contusions or wounds, no clubbing  Neurological: alert and oriented x3, intact senses, motor, response and reflexes, normal strength  Lymphatic: No significant lymphadenopathy  Psychological: Appropriate mood and behavior  Skin: Warm and dry, no lesions, no rashes         Lab Results:  I have reviewed these laboratory results:     Lab Results   Component Value Date    WBC 4.7 09/26/2024    HGB 14.1 09/26/2024    HCT 43.3 09/26/2024    MCV 82 09/26/2024     09/26/2024         Chemistry    Lab Results   Component Value Date/Time     09/26/2024 1646    K 4.0 09/26/2024 1646    CL 98 09/26/2024 1646    CO2 32 09/26/2024 1646    BUN 20 09/26/2024 1646    CREATININE 1.12 09/26/2024 1646    Lab Results   Component Value Date/Time    CALCIUM 9.8 09/26/2024 1646    ALKPHOS 90 09/26/2024 1646    AST 18 09/26/2024 1646    ALT 14 09/26/2024 1646    BILITOT 0.5 09/26/2024 1646            Lab Results   Component Value Date    TSH 3.17 04/18/2023        Radiology Result:  I have reviewed the latest Imaging in PACS and the findings are noted in this note. I discussed the results of the latest imaging with the patient. All previous imaging were reviewed at the time " it was completed. Full records are available in the EMR for review as well.             NM PET CT net netspot    Result Date: 7/5/2024  Impression: 1. Postsurgical changes from right hemicolectomy and distal small bowel with no evidence of focal abnormal gallium 68 dotatate uptake within the region of documented neuroendocrine tumor to suggest residual disease. 2. Nonspecific, gallium 68 dotatate uptake within the pancreatic uncinate which can be seen with physiologic uptake.     I personally reviewed the images/study and I agree with the findings as stated by Resident Celso Ashford MD.   MACRO: None   Signed by: Maco Villafana 7/5/2024 3:06 PM Dictation workstation:   [id number]        Pathology Results:  I have reviewed the full pathology report recorded in the EMR. The pertinent portions indicating diagnosis are listed here in the note. for details please refer to the full report recorded in the EMR.    Assessment and Plan:     Metastatic SB Adenocarcinoma with NE features  This is a 43-year-old male with a known history of Crohn's disease patient is status post partial right colectomy w small bowel resection on repair of a colonic fistula on 5/28/2024. Pathology he had a neuroendocrine tumor grade 3 well differentiated, with Ki67 30-40%. He did follow-up with oncology at [facility]. He had a PET-Ga68 which showed Postsurgical changes from right hemicolectomy and distal small bowel with no evidence of focal abnormal gallium 68 dotatate uptake. Patient's only complaint is change in color of stools at this point he is eating and drinking and he has no abdominal pain. No diarrhea and no flushing. She had CT AP on 08/09/2024 at outside institution which showed new small liver nodules. 09/04/2024: MRI w [facility] showed Multiple T1 hypointense and T2 hyperintense lesions throughout the liver, and mesenteric LNS. PET-FDG showed FDG avid mesenteric kylie and liver metastases.   Repeated liver biopsy showed: high grade  malignancy, likely of colonic origin, showing focal adenocarcinoma-like features and focal neuroendocrine differentiation with Ki67 95%    Plan:  1- NGS [facility]us: Pending   2- Will start FOLFOX plus Bevacizumab   3- RTC in 2 week for follow up       DISCLAIMER:   In preparing for this visit and writing this note, I reviewed all the previous electronic medical records (labs, imaging and medical charts) of the patient available in the physician portal. Significant findings which helped in decision making are recorded  in this chart.     The plan was discussed with the patient. We gave him ample opportunities to ask questions. All questions were answered to his satisfaction and he verbalized understanding.       Nba Vargas M.D.   and Director of the Neuroendocrine Tumor Program  Gastrointestinal Medical Oncology  Department of Hematology and Oncology  [facility], [place] [address]  Phone (Office): [phone]  Fax:  [phone]   Email: [email or web address]  Learn more about [facility] Comprehensive Neuroendocrine Tumor Program: Click Here  Learn more about [place] [employer]: [email or web address]

## 2024-10-03 ENCOUNTER — INFUSION (OUTPATIENT)
Dept: HEMATOLOGY/ONCOLOGY | Facility: CLINIC | Age: 44
End: 2024-10-03
Payer: COMMERCIAL

## 2024-10-03 VITALS
SYSTOLIC BLOOD PRESSURE: 140 MMHG | RESPIRATION RATE: 18 BRPM | TEMPERATURE: 97.3 F | OXYGEN SATURATION: 97 % | DIASTOLIC BLOOD PRESSURE: 86 MMHG | BODY MASS INDEX: 24.27 KG/M2 | HEART RATE: 66 BPM | WEIGHT: 158.29 LBS

## 2024-10-03 DIAGNOSIS — C17.9 ADENOCARCINOMA OF SMALL BOWEL (MULTI): ICD-10-CM

## 2024-10-03 LAB — BACTERIA UR CULT: NO GROWTH

## 2024-10-03 PROCEDURE — 96523 IRRIG DRUG DELIVERY DEVICE: CPT

## 2024-10-03 PROCEDURE — 2500000004 HC RX 250 GENERAL PHARMACY W/ HCPCS (ALT 636 FOR OP/ED): Performed by: INTERNAL MEDICINE

## 2024-10-03 RX ORDER — HEPARIN SODIUM,PORCINE/PF 10 UNIT/ML
50 SYRINGE (ML) INTRAVENOUS AS NEEDED
Status: DISCONTINUED | OUTPATIENT
Start: 2024-10-03 | End: 2024-10-03 | Stop reason: HOSPADM

## 2024-10-03 RX ORDER — HEPARIN 100 UNIT/ML
500 SYRINGE INTRAVENOUS AS NEEDED
Status: DISCONTINUED | OUTPATIENT
Start: 2024-10-03 | End: 2024-10-03 | Stop reason: HOSPADM

## 2024-10-03 RX ORDER — HEPARIN SODIUM,PORCINE/PF 10 UNIT/ML
50 SYRINGE (ML) INTRAVENOUS AS NEEDED
OUTPATIENT
Start: 2024-10-03

## 2024-10-03 RX ORDER — HEPARIN 100 UNIT/ML
500 SYRINGE INTRAVENOUS AS NEEDED
OUTPATIENT
Start: 2024-10-03

## 2024-10-03 ASSESSMENT — PAIN SCALES - GENERAL: PAINLEVEL: 0-NO PAIN

## 2024-10-03 NOTE — PROGRESS NOTES
Pt here for pump disconnect.  Any changes to pt assessment from 10/1/24 noted in today's 10/3/24 flowsheet.

## 2024-10-06 LAB
DNA RANGE(S) EXAMINED NAR: NORMAL
GENE DIS ANL INTERP-IMP: POSITIVE
GENE DIS ASSESSED: NORMAL
GENE MUT TESTED BLD/T: 3.2 M/MB
MSI CA SPEC-IMP: NORMAL
REASON FOR STUDY: NORMAL
TEMPUS GERMLINE NOTE: NORMAL
TEMPUS PD-L1 (22C3) COMBINED POSITIVE SCORE: 5
TEMPUS PD-L1 (22C3) TUMOR PROPORTION SCORE: 1 %
TEMPUS PERTINENTNEGATIVES: NORMAL
TEMPUS PORTAL: NORMAL
TEMPUS THERAPY1: NORMAL
TEMPUS THERAPYCOUNT: 1
TEMPUS TRIALCOUNT: 3
TEMPUS TRIALMATCHES1: NORMAL
TEMPUS TRIALMATCHES2: NORMAL
TEMPUS TRIALMATCHES3: NORMAL

## 2024-10-08 LAB
DNA RANGE(S) EXAMINED NAR: NORMAL
GENE DIS ANL INTERP-IMP: POSITIVE
GENE DIS ASSESSED: NORMAL
GENE MUT TESTED BLD/T: 3.2 M/MB
MSI CA SPEC-IMP: NORMAL
REASON FOR STUDY: NORMAL
TEMPUS GERMLINE NOTE: NORMAL
TEMPUS PD-L1 (22C3) COMBINED POSITIVE SCORE: 5
TEMPUS PD-L1 (22C3) TUMOR PROPORTION SCORE: 1 %
TEMPUS PERTINENTNEGATIVES: NORMAL
TEMPUS PORTAL: NORMAL
TEMPUS THERAPY1: NORMAL
TEMPUS THERAPYCOUNT: 1
TEMPUS TRIALCOUNT: 3
TEMPUS TRIALMATCHES1: NORMAL
TEMPUS TRIALMATCHES2: NORMAL
TEMPUS TRIALMATCHES3: NORMAL
TEMPUS XR RESULT 1: NORMAL

## 2024-10-15 ENCOUNTER — INFUSION (OUTPATIENT)
Dept: HEMATOLOGY/ONCOLOGY | Facility: CLINIC | Age: 44
End: 2024-10-15
Payer: COMMERCIAL

## 2024-10-15 ENCOUNTER — OFFICE VISIT (OUTPATIENT)
Dept: HEMATOLOGY/ONCOLOGY | Facility: CLINIC | Age: 44
End: 2024-10-15
Payer: COMMERCIAL

## 2024-10-15 ENCOUNTER — LAB (OUTPATIENT)
Dept: HEMATOLOGY/ONCOLOGY | Facility: CLINIC | Age: 44
End: 2024-10-15
Payer: COMMERCIAL

## 2024-10-15 VITALS
SYSTOLIC BLOOD PRESSURE: 150 MMHG | BODY MASS INDEX: 24.03 KG/M2 | WEIGHT: 156.75 LBS | TEMPERATURE: 97.3 F | RESPIRATION RATE: 16 BRPM | OXYGEN SATURATION: 97 % | HEART RATE: 82 BPM | DIASTOLIC BLOOD PRESSURE: 92 MMHG

## 2024-10-15 DIAGNOSIS — C17.9 ADENOCARCINOMA OF SMALL BOWEL (MULTI): ICD-10-CM

## 2024-10-15 LAB
ALBUMIN SERPL BCP-MCNC: 3.9 G/DL (ref 3.4–5)
ALP SERPL-CCNC: 86 U/L (ref 33–120)
ALT SERPL W P-5'-P-CCNC: 12 U/L (ref 10–52)
ANION GAP SERPL CALC-SCNC: 11 MMOL/L (ref 10–20)
AST SERPL W P-5'-P-CCNC: 16 U/L (ref 9–39)
BASOPHILS # BLD AUTO: 0.05 X10*3/UL (ref 0–0.1)
BASOPHILS NFR BLD AUTO: 1.5 %
BILIRUB SERPL-MCNC: 0.4 MG/DL (ref 0–1.2)
BUN SERPL-MCNC: 13 MG/DL (ref 6–23)
CALCIUM SERPL-MCNC: 9.2 MG/DL (ref 8.6–10.3)
CHLORIDE SERPL-SCNC: 103 MMOL/L (ref 98–107)
CO2 SERPL-SCNC: 27 MMOL/L (ref 21–32)
CREAT SERPL-MCNC: 1.02 MG/DL (ref 0.5–1.3)
EGFRCR SERPLBLD CKD-EPI 2021: >90 ML/MIN/1.73M*2
EOSINOPHIL # BLD AUTO: 0.2 X10*3/UL (ref 0–0.7)
EOSINOPHIL NFR BLD AUTO: 5.9 %
ERYTHROCYTE [DISTWIDTH] IN BLOOD BY AUTOMATED COUNT: 16.2 % (ref 11.5–14.5)
GLUCOSE SERPL-MCNC: 78 MG/DL (ref 74–99)
HCT VFR BLD AUTO: 39.5 % (ref 41–52)
HGB BLD-MCNC: 13.1 G/DL (ref 13.5–17.5)
IMM GRANULOCYTES # BLD AUTO: 0 X10*3/UL (ref 0–0.7)
IMM GRANULOCYTES NFR BLD AUTO: 0 % (ref 0–0.9)
LYMPHOCYTES # BLD AUTO: 1.03 X10*3/UL (ref 1.2–4.8)
LYMPHOCYTES NFR BLD AUTO: 30.2 %
MCH RBC QN AUTO: 27.3 PG (ref 26–34)
MCHC RBC AUTO-ENTMCNC: 33.2 G/DL (ref 32–36)
MCV RBC AUTO: 82 FL (ref 80–100)
MONOCYTES # BLD AUTO: 0.43 X10*3/UL (ref 0.1–1)
MONOCYTES NFR BLD AUTO: 12.6 %
NEUTROPHILS # BLD AUTO: 1.7 X10*3/UL (ref 1.2–7.7)
NEUTROPHILS NFR BLD AUTO: 49.8 %
NRBC BLD-RTO: ABNORMAL /100{WBCS}
PLATELET # BLD AUTO: 222 X10*3/UL (ref 150–450)
POTASSIUM SERPL-SCNC: 4 MMOL/L (ref 3.5–5.3)
PROT SERPL-MCNC: 6.9 G/DL (ref 6.4–8.2)
RBC # BLD AUTO: 4.8 X10*6/UL (ref 4.5–5.9)
SODIUM SERPL-SCNC: 137 MMOL/L (ref 136–145)
WBC # BLD AUTO: 3.4 X10*3/UL (ref 4.4–11.3)

## 2024-10-15 PROCEDURE — 99215 OFFICE O/P EST HI 40 MIN: CPT | Performed by: INTERNAL MEDICINE

## 2024-10-15 PROCEDURE — 3080F DIAST BP >= 90 MM HG: CPT | Performed by: INTERNAL MEDICINE

## 2024-10-15 PROCEDURE — 36591 DRAW BLOOD OFF VENOUS DEVICE: CPT

## 2024-10-15 PROCEDURE — 85025 COMPLETE CBC W/AUTO DIFF WBC: CPT

## 2024-10-15 PROCEDURE — 84075 ASSAY ALKALINE PHOSPHATASE: CPT

## 2024-10-15 PROCEDURE — 3077F SYST BP >= 140 MM HG: CPT | Performed by: INTERNAL MEDICINE

## 2024-10-15 PROCEDURE — 2500000004 HC RX 250 GENERAL PHARMACY W/ HCPCS (ALT 636 FOR OP/ED): Performed by: INTERNAL MEDICINE

## 2024-10-15 RX ORDER — HEPARIN 100 UNIT/ML
500 SYRINGE INTRAVENOUS AS NEEDED
OUTPATIENT
Start: 2024-10-15

## 2024-10-15 RX ORDER — HEPARIN 100 UNIT/ML
500 SYRINGE INTRAVENOUS AS NEEDED
Status: DISPENSED | OUTPATIENT
Start: 2024-10-15

## 2024-10-15 RX ORDER — HEPARIN SODIUM,PORCINE/PF 10 UNIT/ML
50 SYRINGE (ML) INTRAVENOUS AS NEEDED
OUTPATIENT
Start: 2024-10-15

## 2024-10-15 ASSESSMENT — PAIN SCALES - GENERAL: PAINLEVEL: 0-NO PAIN

## 2024-10-15 NOTE — PROGRESS NOTES
.Patient ID: Thai Cristobal is a 43 y.o. male.  Referring Physician: Nba Vargas [place]  [address] [place] [address]  Primary Care Provider: Charlotte Yan DO      Chief complaint: Diagnosis changed after path review to SB Adenocarcinoma     HPI  This is a 43-year-old male with a known history of Crohn's disease patient is status post partial right colectomy small bowel resection on repair of a colonic fistula on 5/28/2024. Pathology he had a neuroendocrine tumor grade 3 well differentiated, with Ki67 30-40%. He did follow-up with oncology at [facility]. He had a PET-Ga68 which showed Postsurgical changes from right hemicolectomy and distal small bowel with no evidence of focal abnormal gallium 68 dotatate uptake.   Patient's only complaint is change in color of stools at this point, he is eating and drinking less and he has no abdominal pain. No diarrhea and no flushing.    SYSTEMIC TREATMENT RECEIVED: None       Subjective   Please refer to Notes above for complete History of present illness.          Review of Systems:   All 14 systems have been reviewed and were negative except for the HPI.       MEDICAL HISTORY  Past Medical History:   Diagnosis Date    Acute upper respiratory infection, unspecified 02/21/2018    Acute URI    Crohn's disease (Multi)     Personal history of other specified conditions 04/23/2018    History of vertigo    SBO (small bowel obstruction) (Multi) 01/25/2024       FAMILY HISTORY  Family History   Problem Relation Name Age of Onset    Thyroid disease Mother      Other (bladder cancer) Mother      Hypertension Father      Benign prostatic hyperplasia Father      Anxiety disorder Sister      No Known Problems Brother      No Known Problems Daughter      Heart failure Paternal Grandfather         TOBACCO HISTORY  Tobacco Use: Low Risk  (10/1/2024)    Patient History     Smoking Tobacco Use: Never     Smokeless Tobacco Use: Never     Passive Exposure: Not on file       SOCIAL  HISTORY  Social Connections: Not on file        Outpatient Medication Profile:  Current Outpatient Medications on File Prior to Visit   Medication Sig Dispense Refill    ALPRAZolam (Xanax) 0.5 mg tablet Take 1 tablet (0.5 mg) by mouth once daily as needed for anxiety. 20 tablet 0    FLUoxetine (PROzac) 20 mg capsule Take 1 capsule (20 mg) by mouth once daily. 90 capsule 1    FLUoxetine (PROzac) 40 mg capsule Take 1 capsule (40 mg) by mouth once daily. 90 capsule 1    loperamide (Imodium) 2 mg capsule Take 2 capsules (4 mg) by mouth with the first episode of diarrhea and 1 capsule (2 mg) by mouth with any additional episodes. Maximum 8 capsules (16 mg) per day. 100 capsule 2    ondansetron (Zofran) 8 mg tablet Take 1 tablet (8 mg) by mouth every 8 hours if needed for nausea or vomiting. 30 tablet 5    prochlorperazine (Compazine) 10 mg tablet Take 1 tablet (10 mg) by mouth every 6 hours if needed for nausea or vomiting. 30 tablet 5    SUMAtriptan (Imitrex) 50 mg tablet Take 1 tablet (50 mg) by mouth 1 time if needed for migraine for up to 36 doses. 9 tablet 3    NON FORMULARY Take 1 each by mouth once daily at bedtime. THC gummy       No current facility-administered medications on file prior to visit.         Performance Status:  Symptomatic; fully ambulatory     Vitals and Measurements:   BP (!) 150/92   Pulse 82   Temp 36.3 °C (97.3 °F)   Resp 16   Wt 71.1 kg (156 lb 12 oz)   SpO2 97%   BMI 24.03 kg/m²       Physical Exam:   General: Appears well and in NAD  Eyes: PERRL, EOMI, clear sclera  ENMT: mucous membranes moist, no apparent injury, no lesions seen  Head/Neck: Neck supple, no apparent injury, thyroid without mass or tenderness, No JVD, trachea midline,   Thorax: Patent airways, CTAB, normal breath sounds with good chest expansion, thorax symmetric  Cardiovascular: Regular, rate and rhythm, no murmurs, 2+ equal pulses of the extremities, normal S 1and S 2  Gastrointestinal: Nondistended, soft,  non-tender, no rebound tenderness or guarding, no masses palpable, no organomegaly, +BS  Musculoskeletal: ROM intact, no joint swelling, normal strength  Extremities: normal extremities, no cyanosis edema, contusions or wounds, no clubbing  Neurological: alert and oriented x3, intact senses, motor, response and reflexes, normal strength  Lymphatic: No significant lymphadenopathy  Psychological: Appropriate mood and behavior  Skin: Warm and dry, no lesions, no rashes         Lab Results:  I have reviewed these laboratory results:     Lab Results   Component Value Date    WBC 3.4 (L) 10/15/2024    HGB 13.1 (L) 10/15/2024    HCT 39.5 (L) 10/15/2024    MCV 82 10/15/2024     10/15/2024         Chemistry    Lab Results   Component Value Date/Time     09/26/2024 1646    K 4.0 09/26/2024 1646    CL 98 09/26/2024 1646    CO2 32 09/26/2024 1646    BUN 20 09/26/2024 1646    CREATININE 1.12 09/26/2024 1646    Lab Results   Component Value Date/Time    CALCIUM 9.8 09/26/2024 1646    ALKPHOS 90 09/26/2024 1646    AST 18 09/26/2024 1646    ALT 14 09/26/2024 1646    BILITOT 0.5 09/26/2024 1646            Lab Results   Component Value Date    TSH 3.17 04/18/2023        Radiology Result:  I have reviewed the latest Imaging in PACS and the findings are noted in this note. I discussed the results of the latest imaging with the patient. All previous imaging were reviewed at the time it was completed. Full records are available in the EMR for review as well.         NM PET CT net netspot    Result Date: 7/5/2024  Impression: 1. Postsurgical changes from right hemicolectomy and distal small bowel with no evidence of focal abnormal gallium 68 dotatate uptake within the region of documented neuroendocrine tumor to suggest residual disease. 2. Nonspecific, gallium 68 dotatate uptake within the pancreatic uncinate which can be seen with physiologic uptake.     I personally reviewed the images/study and I agree with the findings  as stated by Resident Celso Ashford MD.   MACRO: None   Signed by: Maco Villafana 7/5/2024 3:06 PM Dictation workstation:   [id number]        Pathology Results:  I have reviewed the full pathology report recorded in the EMR. The pertinent portions indicating diagnosis are listed here in the note. for details please refer to the full report recorded in the EMR.    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 Pathology he had a neuroendocrine tumor grade 3 well differentiated, with Ki67 30-40%. He did follow-up with oncology at [facility] [place]. He had a PET-Ga68 which showed Postsurgical changes from right hemicolectomy and distal small bowel with no evidence of focal abnormal gallium 68 dotatate uptake. Patient's only complaint is change in color of stools at this point he is eating and drinking and he has no abdominal pain. No diarrhea and no flushing. She had CT AP on 08/09/2024 at outside institution which showed new small liver nodules. 09/04/2024: MRI w Eovist showed Multiple T1 hypointense and T2 hyperintense lesions throughout the liver, and mesenteric LNS. PET-FDG showed FDG avid mesenteric kylie and liver metastases.   Repeated liver biopsy showed: high grade malignancy, likely of colonic origin, showing focal adenocarcinoma-like features and focal neuroendocrine differentiation with Ki67 95%  He started on FOLFOX and avastin and finished 2 cycles and tolerated well. No abdominal pain, no nausea or vomiting. No new symptoms.   Labs today 10/15/2024 showed leucopenia and we will delay for a week.    Plan:  1- Will delay FOLFOX plus Bevacizumab for one week.  2- RTC in 3 week for follow up and C4       DISCLAIMER:   In preparing for this  visit and writing this note, I reviewed all the previous electronic medical records (labs, imaging and medical charts) of the patient available in the physician portal. Significant findings which helped in decision making are recorded  in this chart.     The plan was discussed with the patient. We gave him ample opportunities to ask questions. All questions were answered to his satisfaction and he verbalized understanding.       Nba Vargas M.D.   and Director of the Neuroendocrine Tumor Program  Gastrointestinal Medical Oncology  Department of Hematology and Oncology  [facility], [place] [address]  Phone (Office): [phone]  Fax:  [phone]   Email: [email or web address]  Learn more about [facility] Comprehensive Neuroendocrine Tumor Program: Click Here  Learn more about [place] Neuroend[employer] Society: [email or web address]

## 2024-10-17 ENCOUNTER — APPOINTMENT (OUTPATIENT)
Dept: PRIMARY CARE | Facility: CLINIC | Age: 44
End: 2024-10-17
Payer: COMMERCIAL

## 2024-10-17 ENCOUNTER — APPOINTMENT (OUTPATIENT)
Dept: HEMATOLOGY/ONCOLOGY | Facility: CLINIC | Age: 44
End: 2024-10-17
Payer: COMMERCIAL

## 2024-10-17 RX ORDER — DEXAMETHASONE 6 MG/1
12 TABLET ORAL ONCE
OUTPATIENT
Start: 2024-11-12

## 2024-10-17 RX ORDER — DEXAMETHASONE 6 MG/1
12 TABLET ORAL ONCE
OUTPATIENT
Start: 2024-10-29

## 2024-10-17 RX ORDER — DEXAMETHASONE 6 MG/1
12 TABLET ORAL ONCE
OUTPATIENT
Start: 2024-11-26

## 2024-10-18 ENCOUNTER — NURSE TRIAGE (OUTPATIENT)
Dept: ADMISSION | Facility: HOSPITAL | Age: 44
End: 2024-10-18
Payer: COMMERCIAL

## 2024-10-18 NOTE — TELEPHONE ENCOUNTER
Patient called with concerns regarding  recent port placement.  Patient wants to confirm if okay to take a bath after having port placement 3 weeks ago.  No open sores, 1 steri strip remaining.     Also complains of, Rectal pain/pressure after having bowel movements. No recent blood in stool.  Patient is having soft formed stools. States he has not had solid/soft stool in few years.  Pressure feeling about 2-3/10.  Light tan stools.  Started 4-5 days ago after stools have been forming.  Eating/drinking normally. No fever, no nausea.  Urinating well.  Energy level decreased.     Patient also sent [employer] message regarding rectal pressure.       Additional Information   Commented on: Where is the pain? Is there more than one place where you're having pain?     Rectal pain/pressure   Commented on: What does the pain feel like? What words would describe your pain (sharp, burning, stabbing, etc)?     Rectal pressure    Protocols used: Pain

## 2024-10-21 DIAGNOSIS — C17.9 ADENOCARCINOMA OF SMALL BOWEL (MULTI): Primary | ICD-10-CM

## 2024-10-21 DIAGNOSIS — F41.9 ANXIETY: ICD-10-CM

## 2024-10-21 RX ORDER — ALPRAZOLAM 0.5 MG/1
0.5 TABLET ORAL DAILY PRN
Qty: 30 TABLET | Refills: 0 | Status: SHIPPED | OUTPATIENT
Start: 2024-10-21

## 2024-10-22 ENCOUNTER — INFUSION (OUTPATIENT)
Dept: HEMATOLOGY/ONCOLOGY | Facility: CLINIC | Age: 44
End: 2024-10-22
Payer: COMMERCIAL

## 2024-10-22 VITALS
SYSTOLIC BLOOD PRESSURE: 143 MMHG | OXYGEN SATURATION: 97 % | WEIGHT: 158.73 LBS | BODY MASS INDEX: 24.34 KG/M2 | TEMPERATURE: 97.7 F | RESPIRATION RATE: 18 BRPM | DIASTOLIC BLOOD PRESSURE: 94 MMHG | HEART RATE: 85 BPM

## 2024-10-22 DIAGNOSIS — C17.9 ADENOCARCINOMA OF SMALL BOWEL (MULTI): ICD-10-CM

## 2024-10-22 LAB
ALBUMIN SERPL BCP-MCNC: 3.8 G/DL (ref 3.4–5)
ALP SERPL-CCNC: 80 U/L (ref 33–120)
ALT SERPL W P-5'-P-CCNC: 13 U/L (ref 10–52)
ANION GAP SERPL CALC-SCNC: 11 MMOL/L (ref 10–20)
AST SERPL W P-5'-P-CCNC: 18 U/L (ref 9–39)
BASOPHILS # BLD AUTO: 0.06 X10*3/UL (ref 0–0.1)
BASOPHILS NFR BLD AUTO: 1.8 %
BILIRUB SERPL-MCNC: 0.4 MG/DL (ref 0–1.2)
BUN SERPL-MCNC: 14 MG/DL (ref 6–23)
CALCIUM SERPL-MCNC: 9.2 MG/DL (ref 8.6–10.3)
CHLORIDE SERPL-SCNC: 102 MMOL/L (ref 98–107)
CO2 SERPL-SCNC: 28 MMOL/L (ref 21–32)
CREAT SERPL-MCNC: 0.98 MG/DL (ref 0.5–1.3)
EGFRCR SERPLBLD CKD-EPI 2021: >90 ML/MIN/1.73M*2
EOSINOPHIL # BLD AUTO: 0.17 X10*3/UL (ref 0–0.7)
EOSINOPHIL NFR BLD AUTO: 5.2 %
ERYTHROCYTE [DISTWIDTH] IN BLOOD BY AUTOMATED COUNT: 16.4 % (ref 11.5–14.5)
GLUCOSE SERPL-MCNC: 80 MG/DL (ref 74–99)
HCT VFR BLD AUTO: 41.9 % (ref 41–52)
HGB BLD-MCNC: 13.7 G/DL (ref 13.5–17.5)
IMM GRANULOCYTES # BLD AUTO: 0.01 X10*3/UL (ref 0–0.7)
IMM GRANULOCYTES NFR BLD AUTO: 0.3 % (ref 0–0.9)
LYMPHOCYTES # BLD AUTO: 1.48 X10*3/UL (ref 1.2–4.8)
LYMPHOCYTES NFR BLD AUTO: 45 %
MCH RBC QN AUTO: 27.2 PG (ref 26–34)
MCHC RBC AUTO-ENTMCNC: 32.7 G/DL (ref 32–36)
MCV RBC AUTO: 83 FL (ref 80–100)
MONOCYTES # BLD AUTO: 0.63 X10*3/UL (ref 0.1–1)
MONOCYTES NFR BLD AUTO: 19.1 %
NEUTROPHILS # BLD AUTO: 0.94 X10*3/UL (ref 1.2–7.7)
NEUTROPHILS NFR BLD AUTO: 28.6 %
NRBC BLD-RTO: ABNORMAL /100{WBCS}
PLATELET # BLD AUTO: 315 X10*3/UL (ref 150–450)
POTASSIUM SERPL-SCNC: 4.2 MMOL/L (ref 3.5–5.3)
PROT SERPL-MCNC: 7.1 G/DL (ref 6.4–8.2)
RBC # BLD AUTO: 5.04 X10*6/UL (ref 4.5–5.9)
SODIUM SERPL-SCNC: 137 MMOL/L (ref 136–145)
WBC # BLD AUTO: 3.3 X10*3/UL (ref 4.4–11.3)

## 2024-10-22 PROCEDURE — 80053 COMPREHEN METABOLIC PANEL: CPT

## 2024-10-22 PROCEDURE — 85025 COMPLETE CBC W/AUTO DIFF WBC: CPT

## 2024-10-22 PROCEDURE — 36591 DRAW BLOOD OFF VENOUS DEVICE: CPT

## 2024-10-22 PROCEDURE — 2500000004 HC RX 250 GENERAL PHARMACY W/ HCPCS (ALT 636 FOR OP/ED): Performed by: INTERNAL MEDICINE

## 2024-10-22 RX ORDER — HEPARIN 100 UNIT/ML
500 SYRINGE INTRAVENOUS AS NEEDED
Status: DISCONTINUED | OUTPATIENT
Start: 2024-10-22 | End: 2024-10-22 | Stop reason: HOSPADM

## 2024-10-22 RX ORDER — HEPARIN SODIUM,PORCINE/PF 10 UNIT/ML
50 SYRINGE (ML) INTRAVENOUS AS NEEDED
OUTPATIENT
Start: 2024-10-22

## 2024-10-22 RX ORDER — HEPARIN 100 UNIT/ML
500 SYRINGE INTRAVENOUS AS NEEDED
OUTPATIENT
Start: 2024-10-22

## 2024-10-22 ASSESSMENT — PAIN SCALES - GENERAL: PAINLEVEL_OUTOF10: 0-NO PAIN

## 2024-10-22 NOTE — PROGRESS NOTES
Delaying pt treatment one more week due to [facility] outside parameters. Dr. Vargas aware. Rescheduling appointment for next week for both Dr. Vargas and treatment

## 2024-10-23 ENCOUNTER — TELEPHONE (OUTPATIENT)
Dept: ADMISSION | Facility: HOSPITAL | Age: 44
End: 2024-10-23
Payer: COMMERCIAL

## 2024-10-24 ENCOUNTER — APPOINTMENT (OUTPATIENT)
Dept: HEMATOLOGY/ONCOLOGY | Facility: CLINIC | Age: 44
End: 2024-10-24
Payer: COMMERCIAL

## 2024-10-24 DIAGNOSIS — G43.109 MIGRAINE WITH AURA AND WITHOUT STATUS MIGRAINOSUS, NOT INTRACTABLE: ICD-10-CM

## 2024-10-24 RX ORDER — SUMATRIPTAN 50 MG/1
50 TABLET, FILM COATED ORAL ONCE AS NEEDED
Qty: 9 TABLET | Refills: 3 | Status: SHIPPED | OUTPATIENT
Start: 2024-10-24

## 2024-10-28 ENCOUNTER — APPOINTMENT (OUTPATIENT)
Dept: PRIMARY CARE | Facility: CLINIC | Age: 44
End: 2024-10-28
Payer: COMMERCIAL

## 2024-10-28 VITALS
SYSTOLIC BLOOD PRESSURE: 117 MMHG | OXYGEN SATURATION: 96 % | WEIGHT: 156.2 LBS | BODY MASS INDEX: 23.95 KG/M2 | RESPIRATION RATE: 16 BRPM | HEART RATE: 88 BPM | DIASTOLIC BLOOD PRESSURE: 85 MMHG

## 2024-10-28 DIAGNOSIS — F41.9 ANXIETY: ICD-10-CM

## 2024-10-28 DIAGNOSIS — C17.9 ADENOCARCINOMA OF SMALL BOWEL (MULTI): ICD-10-CM

## 2024-10-28 DIAGNOSIS — K50.918 CROHN'S DISEASE WITH OTHER COMPLICATION, UNSPECIFIED GASTROINTESTINAL TRACT LOCATION: ICD-10-CM

## 2024-10-28 DIAGNOSIS — C7B.8 SECONDARY NEUROENDOCRINE TUMOR OF LIVER: ICD-10-CM

## 2024-10-28 DIAGNOSIS — G43.109 MIGRAINE WITH AURA AND WITHOUT STATUS MIGRAINOSUS, NOT INTRACTABLE: ICD-10-CM

## 2024-10-28 DIAGNOSIS — I10 PRIMARY HYPERTENSION: Primary | ICD-10-CM

## 2024-10-28 PROBLEM — G43.909 MIGRAINE: Status: RESOLVED | Noted: 2024-01-29 | Resolved: 2024-10-28

## 2024-10-28 PROCEDURE — 99214 OFFICE O/P EST MOD 30 MIN: CPT | Performed by: INTERNAL MEDICINE

## 2024-10-28 PROCEDURE — 3074F SYST BP LT 130 MM HG: CPT | Performed by: INTERNAL MEDICINE

## 2024-10-28 PROCEDURE — 3079F DIAST BP 80-89 MM HG: CPT | Performed by: INTERNAL MEDICINE

## 2024-10-28 ASSESSMENT — ENCOUNTER SYMPTOMS
BLOOD IN STOOL: 0
CHEST TIGHTNESS: 0
DIARRHEA: 0
DIZZINESS: 0
SHORTNESS OF BREATH: 0
FEVER: 0
PALPITATIONS: 0
UNEXPECTED WEIGHT CHANGE: 0
APPETITE CHANGE: 0
LIGHT-HEADEDNESS: 0
CHILLS: 0
COUGH: 0
FATIGUE: 0
VOMITING: 0
HEADACHES: 1
ABDOMINAL PAIN: 0
ACTIVITY CHANGE: 0
DIAPHORESIS: 0
WHEEZING: 0
CONSTIPATION: 0
NAUSEA: 1

## 2024-10-28 ASSESSMENT — PATIENT HEALTH QUESTIONNAIRE - PHQ9
1. LITTLE INTEREST OR PLEASURE IN DOING THINGS: NOT AT ALL
2. FEELING DOWN, DEPRESSED OR HOPELESS: NOT AT ALL
SUM OF ALL RESPONSES TO PHQ9 QUESTIONS 1 AND 2: 0

## 2024-10-29 ENCOUNTER — TELEPHONE (OUTPATIENT)
Dept: HEMATOLOGY/ONCOLOGY | Facility: HOSPITAL | Age: 44
End: 2024-10-29
Payer: COMMERCIAL

## 2024-10-29 ENCOUNTER — APPOINTMENT (OUTPATIENT)
Dept: HEMATOLOGY/ONCOLOGY | Facility: CLINIC | Age: 44
End: 2024-10-29
Payer: COMMERCIAL

## 2024-10-29 ENCOUNTER — INFUSION (OUTPATIENT)
Dept: HEMATOLOGY/ONCOLOGY | Facility: CLINIC | Age: 44
End: 2024-10-29
Payer: COMMERCIAL

## 2024-10-29 ENCOUNTER — TELEMEDICINE (OUTPATIENT)
Dept: HEMATOLOGY/ONCOLOGY | Facility: CLINIC | Age: 44
End: 2024-10-29
Payer: COMMERCIAL

## 2024-10-29 VITALS
OXYGEN SATURATION: 98 % | TEMPERATURE: 97.2 F | SYSTOLIC BLOOD PRESSURE: 122 MMHG | WEIGHT: 158.84 LBS | HEART RATE: 79 BPM | DIASTOLIC BLOOD PRESSURE: 82 MMHG | RESPIRATION RATE: 18 BRPM | BODY MASS INDEX: 24.07 KG/M2 | HEIGHT: 68 IN

## 2024-10-29 DIAGNOSIS — C17.9 ADENOCARCINOMA OF SMALL BOWEL (MULTI): Primary | ICD-10-CM

## 2024-10-29 DIAGNOSIS — C17.9 ADENOCARCINOMA OF SMALL BOWEL (MULTI): ICD-10-CM

## 2024-10-29 LAB
ALBUMIN SERPL BCP-MCNC: 4 G/DL (ref 3.4–5)
ALP SERPL-CCNC: 91 U/L (ref 33–120)
ALT SERPL W P-5'-P-CCNC: 19 U/L (ref 10–52)
ANION GAP SERPL CALC-SCNC: 12 MMOL/L (ref 10–20)
AST SERPL W P-5'-P-CCNC: 30 U/L (ref 9–39)
BASOPHILS # BLD AUTO: 0.09 X10*3/UL (ref 0–0.1)
BASOPHILS NFR BLD AUTO: 1.7 %
BILIRUB SERPL-MCNC: 0.4 MG/DL (ref 0–1.2)
BUN SERPL-MCNC: 13 MG/DL (ref 6–23)
CALCIUM SERPL-MCNC: 9.3 MG/DL (ref 8.6–10.3)
CHLORIDE SERPL-SCNC: 102 MMOL/L (ref 98–107)
CO2 SERPL-SCNC: 27 MMOL/L (ref 21–32)
CREAT SERPL-MCNC: 0.96 MG/DL (ref 0.5–1.3)
EGFRCR SERPLBLD CKD-EPI 2021: >90 ML/MIN/1.73M*2
EOSINOPHIL # BLD AUTO: 0.12 X10*3/UL (ref 0–0.7)
EOSINOPHIL NFR BLD AUTO: 2.3 %
ERYTHROCYTE [DISTWIDTH] IN BLOOD BY AUTOMATED COUNT: 16.3 % (ref 11.5–14.5)
GLUCOSE SERPL-MCNC: 69 MG/DL (ref 74–99)
HCT VFR BLD AUTO: 43.4 % (ref 41–52)
HGB BLD-MCNC: 14.2 G/DL (ref 13.5–17.5)
IMM GRANULOCYTES # BLD AUTO: 0.01 X10*3/UL (ref 0–0.7)
IMM GRANULOCYTES NFR BLD AUTO: 0.2 % (ref 0–0.9)
LYMPHOCYTES # BLD AUTO: 1.73 X10*3/UL (ref 1.2–4.8)
LYMPHOCYTES NFR BLD AUTO: 33.4 %
MCH RBC QN AUTO: 27.4 PG (ref 26–34)
MCHC RBC AUTO-ENTMCNC: 32.7 G/DL (ref 32–36)
MCV RBC AUTO: 84 FL (ref 80–100)
MONOCYTES # BLD AUTO: 0.73 X10*3/UL (ref 0.1–1)
MONOCYTES NFR BLD AUTO: 14.1 %
NEUTROPHILS # BLD AUTO: 2.5 X10*3/UL (ref 1.2–7.7)
NEUTROPHILS NFR BLD AUTO: 48.3 %
NRBC BLD-RTO: ABNORMAL /100{WBCS}
PLATELET # BLD AUTO: 240 X10*3/UL (ref 150–450)
POTASSIUM SERPL-SCNC: 4.3 MMOL/L (ref 3.5–5.3)
PROT SERPL-MCNC: 7.3 G/DL (ref 6.4–8.2)
RBC # BLD AUTO: 5.19 X10*6/UL (ref 4.5–5.9)
SODIUM SERPL-SCNC: 137 MMOL/L (ref 136–145)
WBC # BLD AUTO: 5.2 X10*3/UL (ref 4.4–11.3)

## 2024-10-29 PROCEDURE — 96415 CHEMO IV INFUSION ADDL HR: CPT

## 2024-10-29 PROCEDURE — 99214 OFFICE O/P EST MOD 30 MIN: CPT | Performed by: INTERNAL MEDICINE

## 2024-10-29 PROCEDURE — 2500000004 HC RX 250 GENERAL PHARMACY W/ HCPCS (ALT 636 FOR OP/ED): Performed by: INTERNAL MEDICINE

## 2024-10-29 PROCEDURE — 96416 CHEMO PROLONG INFUSE W/PUMP: CPT

## 2024-10-29 PROCEDURE — 80053 COMPREHEN METABOLIC PANEL: CPT

## 2024-10-29 PROCEDURE — 85025 COMPLETE CBC W/AUTO DIFF WBC: CPT

## 2024-10-29 PROCEDURE — 96411 CHEMO IV PUSH ADDL DRUG: CPT

## 2024-10-29 PROCEDURE — 96375 TX/PRO/DX INJ NEW DRUG ADDON: CPT | Mod: INF

## 2024-10-29 PROCEDURE — 96413 CHEMO IV INFUSION 1 HR: CPT

## 2024-10-29 RX ORDER — ALBUTEROL SULFATE 0.83 MG/ML
3 SOLUTION RESPIRATORY (INHALATION) AS NEEDED
Status: DISCONTINUED | OUTPATIENT
Start: 2024-10-29 | End: 2024-10-29 | Stop reason: HOSPADM

## 2024-10-29 RX ORDER — PROCHLORPERAZINE EDISYLATE 5 MG/ML
10 INJECTION INTRAMUSCULAR; INTRAVENOUS EVERY 6 HOURS PRN
Status: DISCONTINUED | OUTPATIENT
Start: 2024-10-29 | End: 2024-10-29 | Stop reason: HOSPADM

## 2024-10-29 RX ORDER — DEXAMETHASONE 6 MG/1
12 TABLET ORAL ONCE
Status: COMPLETED | OUTPATIENT
Start: 2024-10-29 | End: 2024-10-29

## 2024-10-29 RX ORDER — PALONOSETRON 0.05 MG/ML
0.25 INJECTION, SOLUTION INTRAVENOUS ONCE
Status: COMPLETED | OUTPATIENT
Start: 2024-10-29 | End: 2024-10-29

## 2024-10-29 RX ORDER — FAMOTIDINE 10 MG/ML
20 INJECTION INTRAVENOUS ONCE AS NEEDED
Status: DISCONTINUED | OUTPATIENT
Start: 2024-10-29 | End: 2024-10-29 | Stop reason: HOSPADM

## 2024-10-29 RX ORDER — LORAZEPAM 2 MG/ML
1 INJECTION INTRAMUSCULAR AS NEEDED
Status: DISCONTINUED | OUTPATIENT
Start: 2024-10-29 | End: 2024-10-29 | Stop reason: HOSPADM

## 2024-10-29 RX ORDER — EPINEPHRINE 0.3 MG/.3ML
0.3 INJECTION SUBCUTANEOUS EVERY 5 MIN PRN
Status: DISCONTINUED | OUTPATIENT
Start: 2024-10-29 | End: 2024-10-29 | Stop reason: HOSPADM

## 2024-10-29 RX ORDER — DEXAMETHASONE 6 MG/1
12 TABLET ORAL ONCE
Status: DISCONTINUED | OUTPATIENT
Start: 2024-10-29 | End: 2024-10-29

## 2024-10-29 RX ORDER — PROCHLORPERAZINE MALEATE 10 MG
10 TABLET ORAL EVERY 6 HOURS PRN
Status: DISCONTINUED | OUTPATIENT
Start: 2024-10-29 | End: 2024-10-29 | Stop reason: HOSPADM

## 2024-10-29 RX ORDER — DIPHENHYDRAMINE HYDROCHLORIDE 50 MG/ML
50 INJECTION INTRAMUSCULAR; INTRAVENOUS AS NEEDED
Status: DISCONTINUED | OUTPATIENT
Start: 2024-10-29 | End: 2024-10-29 | Stop reason: HOSPADM

## 2024-10-31 ENCOUNTER — APPOINTMENT (OUTPATIENT)
Dept: HEMATOLOGY/ONCOLOGY | Facility: CLINIC | Age: 44
End: 2024-10-31
Payer: COMMERCIAL

## 2024-10-31 ENCOUNTER — INFUSION (OUTPATIENT)
Dept: HEMATOLOGY/ONCOLOGY | Facility: CLINIC | Age: 44
End: 2024-10-31
Payer: COMMERCIAL

## 2024-10-31 VITALS
TEMPERATURE: 97.3 F | HEART RATE: 73 BPM | BODY MASS INDEX: 24.08 KG/M2 | SYSTOLIC BLOOD PRESSURE: 133 MMHG | WEIGHT: 157.08 LBS | OXYGEN SATURATION: 98 % | DIASTOLIC BLOOD PRESSURE: 87 MMHG | RESPIRATION RATE: 18 BRPM

## 2024-10-31 DIAGNOSIS — C17.9 ADENOCARCINOMA OF SMALL BOWEL (MULTI): ICD-10-CM

## 2024-10-31 DIAGNOSIS — F41.9 ANXIETY: ICD-10-CM

## 2024-10-31 DIAGNOSIS — F41.0 PANIC ATTACKS: ICD-10-CM

## 2024-10-31 PROCEDURE — 2500000004 HC RX 250 GENERAL PHARMACY W/ HCPCS (ALT 636 FOR OP/ED): Performed by: INTERNAL MEDICINE

## 2024-10-31 PROCEDURE — 2500000004 HC RX 250 GENERAL PHARMACY W/ HCPCS (ALT 636 FOR OP/ED): Mod: JZ | Performed by: INTERNAL MEDICINE

## 2024-10-31 PROCEDURE — 96377 APPLICATON ON-BODY INJECTOR: CPT

## 2024-10-31 RX ORDER — HEPARIN 100 UNIT/ML
500 SYRINGE INTRAVENOUS AS NEEDED
OUTPATIENT
Start: 2024-10-31

## 2024-10-31 RX ORDER — HEPARIN 100 UNIT/ML
500 SYRINGE INTRAVENOUS AS NEEDED
Status: DISCONTINUED | OUTPATIENT
Start: 2024-10-31 | End: 2024-10-31 | Stop reason: HOSPADM

## 2024-10-31 RX ORDER — HEPARIN SODIUM,PORCINE/PF 10 UNIT/ML
50 SYRINGE (ML) INTRAVENOUS AS NEEDED
OUTPATIENT
Start: 2024-10-31

## 2024-10-31 RX ORDER — FLUOXETINE HYDROCHLORIDE 40 MG/1
40 CAPSULE ORAL DAILY
Qty: 90 CAPSULE | Refills: 1 | Status: SHIPPED | OUTPATIENT
Start: 2024-10-31

## 2024-10-31 ASSESSMENT — PAIN SCALES - GENERAL: PAINLEVEL_OUTOF10: 0-NO PAIN

## 2024-11-05 ENCOUNTER — APPOINTMENT (OUTPATIENT)
Dept: HEMATOLOGY/ONCOLOGY | Facility: CLINIC | Age: 44
End: 2024-11-05
Payer: COMMERCIAL

## 2024-11-06 ENCOUNTER — APPOINTMENT (OUTPATIENT)
Dept: HEMATOLOGY/ONCOLOGY | Facility: CLINIC | Age: 44
End: 2024-11-06
Payer: COMMERCIAL

## 2024-11-07 ENCOUNTER — APPOINTMENT (OUTPATIENT)
Dept: HEMATOLOGY/ONCOLOGY | Facility: CLINIC | Age: 44
End: 2024-11-07
Payer: COMMERCIAL

## 2024-11-08 ENCOUNTER — APPOINTMENT (OUTPATIENT)
Dept: HEMATOLOGY/ONCOLOGY | Facility: CLINIC | Age: 44
End: 2024-11-08
Payer: COMMERCIAL

## 2024-11-12 ENCOUNTER — APPOINTMENT (OUTPATIENT)
Dept: HEMATOLOGY/ONCOLOGY | Facility: CLINIC | Age: 44
End: 2024-11-12
Payer: COMMERCIAL

## 2024-11-12 ENCOUNTER — OFFICE VISIT (OUTPATIENT)
Dept: HEMATOLOGY/ONCOLOGY | Facility: CLINIC | Age: 44
End: 2024-11-12
Payer: COMMERCIAL

## 2024-11-12 ENCOUNTER — INFUSION (OUTPATIENT)
Dept: HEMATOLOGY/ONCOLOGY | Facility: CLINIC | Age: 44
End: 2024-11-12
Payer: COMMERCIAL

## 2024-11-12 ENCOUNTER — LAB (OUTPATIENT)
Dept: HEMATOLOGY/ONCOLOGY | Facility: CLINIC | Age: 44
End: 2024-11-12
Payer: COMMERCIAL

## 2024-11-12 VITALS
DIASTOLIC BLOOD PRESSURE: 89 MMHG | SYSTOLIC BLOOD PRESSURE: 121 MMHG | OXYGEN SATURATION: 97 % | HEART RATE: 91 BPM | WEIGHT: 155.87 LBS | TEMPERATURE: 97.2 F | BODY MASS INDEX: 23.9 KG/M2 | RESPIRATION RATE: 18 BRPM

## 2024-11-12 DIAGNOSIS — C17.9 ADENOCARCINOMA OF SMALL BOWEL (MULTI): Primary | ICD-10-CM

## 2024-11-12 DIAGNOSIS — C17.9 ADENOCARCINOMA OF SMALL BOWEL (MULTI): ICD-10-CM

## 2024-11-12 LAB
ALBUMIN SERPL BCP-MCNC: 4.1 G/DL (ref 3.4–5)
ALP SERPL-CCNC: 171 U/L (ref 33–120)
ALT SERPL W P-5'-P-CCNC: 16 U/L (ref 10–52)
ANION GAP SERPL CALC-SCNC: 12 MMOL/L (ref 10–20)
APPEARANCE UR: CLEAR
AST SERPL W P-5'-P-CCNC: 20 U/L (ref 9–39)
BASOPHILS # BLD AUTO: 0.06 X10*3/UL (ref 0–0.1)
BASOPHILS NFR BLD AUTO: 0.5 %
BILIRUB SERPL-MCNC: 0.3 MG/DL (ref 0–1.2)
BILIRUB UR STRIP.AUTO-MCNC: NEGATIVE MG/DL
BUN SERPL-MCNC: 15 MG/DL (ref 6–23)
CALCIUM SERPL-MCNC: 9.3 MG/DL (ref 8.6–10.3)
CHLORIDE SERPL-SCNC: 103 MMOL/L (ref 98–107)
CO2 SERPL-SCNC: 26 MMOL/L (ref 21–32)
COLOR UR: NORMAL
CREAT SERPL-MCNC: 0.98 MG/DL (ref 0.5–1.3)
EGFRCR SERPLBLD CKD-EPI 2021: >90 ML/MIN/1.73M*2
EOSINOPHIL # BLD AUTO: 0.3 X10*3/UL (ref 0–0.7)
EOSINOPHIL NFR BLD AUTO: 2.7 %
ERYTHROCYTE [DISTWIDTH] IN BLOOD BY AUTOMATED COUNT: 16.6 % (ref 11.5–14.5)
GLUCOSE SERPL-MCNC: 112 MG/DL (ref 74–99)
GLUCOSE UR STRIP.AUTO-MCNC: NORMAL MG/DL
HCT VFR BLD AUTO: 43.4 % (ref 41–52)
HGB BLD-MCNC: 14.5 G/DL (ref 13.5–17.5)
IMM GRANULOCYTES # BLD AUTO: 0.06 X10*3/UL (ref 0–0.7)
IMM GRANULOCYTES NFR BLD AUTO: 0.5 % (ref 0–0.9)
KETONES UR STRIP.AUTO-MCNC: NEGATIVE MG/DL
LEUKOCYTE ESTERASE UR QL STRIP.AUTO: NEGATIVE
LYMPHOCYTES # BLD AUTO: 1.58 X10*3/UL (ref 1.2–4.8)
LYMPHOCYTES NFR BLD AUTO: 14.1 %
MCH RBC QN AUTO: 28 PG (ref 26–34)
MCHC RBC AUTO-ENTMCNC: 33.4 G/DL (ref 32–36)
MCV RBC AUTO: 84 FL (ref 80–100)
MONOCYTES # BLD AUTO: 0.72 X10*3/UL (ref 0.1–1)
MONOCYTES NFR BLD AUTO: 6.4 %
NEUTROPHILS # BLD AUTO: 8.52 X10*3/UL (ref 1.2–7.7)
NEUTROPHILS NFR BLD AUTO: 75.8 %
NITRITE UR QL STRIP.AUTO: NEGATIVE
NRBC BLD-RTO: ABNORMAL /100{WBCS}
PH UR STRIP.AUTO: 5 [PH]
PLATELET # BLD AUTO: 179 X10*3/UL (ref 150–450)
POTASSIUM SERPL-SCNC: 3.9 MMOL/L (ref 3.5–5.3)
PROT SERPL-MCNC: 7.2 G/DL (ref 6.4–8.2)
PROT UR STRIP.AUTO-MCNC: NEGATIVE MG/DL
RBC # BLD AUTO: 5.18 X10*6/UL (ref 4.5–5.9)
RBC # UR STRIP.AUTO: NEGATIVE /UL
SODIUM SERPL-SCNC: 137 MMOL/L (ref 136–145)
SP GR UR STRIP.AUTO: 1.02
UROBILINOGEN UR STRIP.AUTO-MCNC: NORMAL MG/DL
WBC # BLD AUTO: 11.2 X10*3/UL (ref 4.4–11.3)

## 2024-11-12 PROCEDURE — 36591 DRAW BLOOD OFF VENOUS DEVICE: CPT

## 2024-11-12 PROCEDURE — 3079F DIAST BP 80-89 MM HG: CPT | Performed by: INTERNAL MEDICINE

## 2024-11-12 PROCEDURE — 2500000001 HC RX 250 WO HCPCS SELF ADMINISTERED DRUGS (ALT 637 FOR MEDICARE OP): Performed by: INTERNAL MEDICINE

## 2024-11-12 PROCEDURE — 85025 COMPLETE CBC W/AUTO DIFF WBC: CPT

## 2024-11-12 PROCEDURE — 99215 OFFICE O/P EST HI 40 MIN: CPT | Performed by: INTERNAL MEDICINE

## 2024-11-12 PROCEDURE — 96413 CHEMO IV INFUSION 1 HR: CPT

## 2024-11-12 PROCEDURE — 2500000004 HC RX 250 GENERAL PHARMACY W/ HCPCS (ALT 636 FOR OP/ED): Performed by: INTERNAL MEDICINE

## 2024-11-12 PROCEDURE — 99215 OFFICE O/P EST HI 40 MIN: CPT | Mod: 25 | Performed by: INTERNAL MEDICINE

## 2024-11-12 PROCEDURE — 96416 CHEMO PROLONG INFUSE W/PUMP: CPT

## 2024-11-12 PROCEDURE — 81003 URINALYSIS AUTO W/O SCOPE: CPT

## 2024-11-12 PROCEDURE — 96415 CHEMO IV INFUSION ADDL HR: CPT

## 2024-11-12 PROCEDURE — 96375 TX/PRO/DX INJ NEW DRUG ADDON: CPT | Mod: INF

## 2024-11-12 PROCEDURE — 96411 CHEMO IV PUSH ADDL DRUG: CPT

## 2024-11-12 PROCEDURE — 3074F SYST BP LT 130 MM HG: CPT | Performed by: INTERNAL MEDICINE

## 2024-11-12 PROCEDURE — 80053 COMPREHEN METABOLIC PANEL: CPT

## 2024-11-12 RX ORDER — ALBUTEROL SULFATE 0.83 MG/ML
3 SOLUTION RESPIRATORY (INHALATION) AS NEEDED
OUTPATIENT
Start: 2024-12-10

## 2024-11-12 RX ORDER — DEXAMETHASONE 6 MG/1
12 TABLET ORAL ONCE
OUTPATIENT
Start: 2024-12-10

## 2024-11-12 RX ORDER — FAMOTIDINE 10 MG/ML
20 INJECTION INTRAVENOUS ONCE AS NEEDED
Status: DISCONTINUED | OUTPATIENT
Start: 2024-11-12 | End: 2024-11-12 | Stop reason: HOSPADM

## 2024-11-12 RX ORDER — PALONOSETRON 0.05 MG/ML
0.25 INJECTION, SOLUTION INTRAVENOUS ONCE
Status: COMPLETED | OUTPATIENT
Start: 2024-11-12 | End: 2024-11-12

## 2024-11-12 RX ORDER — EPINEPHRINE 0.3 MG/.3ML
0.3 INJECTION SUBCUTANEOUS EVERY 5 MIN PRN
Status: DISCONTINUED | OUTPATIENT
Start: 2024-11-12 | End: 2024-11-12 | Stop reason: HOSPADM

## 2024-11-12 RX ORDER — PROCHLORPERAZINE EDISYLATE 5 MG/ML
10 INJECTION INTRAMUSCULAR; INTRAVENOUS EVERY 6 HOURS PRN
Status: DISCONTINUED | OUTPATIENT
Start: 2024-11-12 | End: 2024-11-12 | Stop reason: HOSPADM

## 2024-11-12 RX ORDER — DEXAMETHASONE 6 MG/1
12 TABLET ORAL ONCE
Status: COMPLETED | OUTPATIENT
Start: 2024-11-12 | End: 2024-11-12

## 2024-11-12 RX ORDER — HEPARIN SODIUM,PORCINE/PF 10 UNIT/ML
50 SYRINGE (ML) INTRAVENOUS AS NEEDED
Status: CANCELLED | OUTPATIENT
Start: 2024-11-12

## 2024-11-12 RX ORDER — PROCHLORPERAZINE MALEATE 5 MG
10 TABLET ORAL EVERY 6 HOURS PRN
OUTPATIENT
Start: 2024-12-10

## 2024-11-12 RX ORDER — FAMOTIDINE 10 MG/ML
20 INJECTION INTRAVENOUS ONCE AS NEEDED
OUTPATIENT
Start: 2024-12-10

## 2024-11-12 RX ORDER — ACETAMINOPHEN 325 MG/1
650 TABLET ORAL EVERY 4 HOURS PRN
Status: CANCELLED | OUTPATIENT
Start: 2024-11-12

## 2024-11-12 RX ORDER — PROCHLORPERAZINE MALEATE 10 MG
10 TABLET ORAL EVERY 6 HOURS PRN
Status: DISCONTINUED | OUTPATIENT
Start: 2024-11-12 | End: 2024-11-12 | Stop reason: HOSPADM

## 2024-11-12 RX ORDER — DIPHENHYDRAMINE HYDROCHLORIDE 50 MG/ML
50 INJECTION INTRAMUSCULAR; INTRAVENOUS AS NEEDED
Status: DISCONTINUED | OUTPATIENT
Start: 2024-11-12 | End: 2024-11-12 | Stop reason: HOSPADM

## 2024-11-12 RX ORDER — ACETAMINOPHEN 325 MG/1
650 TABLET ORAL EVERY 4 HOURS PRN
Status: DISCONTINUED | OUTPATIENT
Start: 2024-11-12 | End: 2024-11-12 | Stop reason: HOSPADM

## 2024-11-12 RX ORDER — LORAZEPAM 2 MG/ML
1 INJECTION INTRAMUSCULAR AS NEEDED
Status: DISCONTINUED | OUTPATIENT
Start: 2024-11-12 | End: 2024-11-12 | Stop reason: HOSPADM

## 2024-11-12 RX ORDER — PROCHLORPERAZINE EDISYLATE 5 MG/ML
10 INJECTION INTRAMUSCULAR; INTRAVENOUS EVERY 6 HOURS PRN
OUTPATIENT
Start: 2024-12-10

## 2024-11-12 RX ORDER — HEPARIN 100 UNIT/ML
500 SYRINGE INTRAVENOUS AS NEEDED
Status: CANCELLED | OUTPATIENT
Start: 2024-11-12

## 2024-11-12 RX ORDER — PALONOSETRON 0.05 MG/ML
0.25 INJECTION, SOLUTION INTRAVENOUS ONCE
OUTPATIENT
Start: 2024-12-10

## 2024-11-12 RX ORDER — ALBUTEROL SULFATE 0.83 MG/ML
3 SOLUTION RESPIRATORY (INHALATION) AS NEEDED
Status: DISCONTINUED | OUTPATIENT
Start: 2024-11-12 | End: 2024-11-12 | Stop reason: HOSPADM

## 2024-11-12 RX ORDER — DIPHENHYDRAMINE HYDROCHLORIDE 50 MG/ML
50 INJECTION INTRAMUSCULAR; INTRAVENOUS AS NEEDED
OUTPATIENT
Start: 2024-12-10

## 2024-11-12 RX ORDER — EPINEPHRINE 0.3 MG/.3ML
0.3 INJECTION SUBCUTANEOUS EVERY 5 MIN PRN
OUTPATIENT
Start: 2024-12-10

## 2024-11-12 RX ORDER — LORAZEPAM 2 MG/ML
1 INJECTION INTRAMUSCULAR AS NEEDED
OUTPATIENT
Start: 2024-12-10

## 2024-11-12 ASSESSMENT — PAIN SCALES - GENERAL
PAINLEVEL_OUTOF10: 6
PAINLEVEL_OUTOF10: 0-NO PAIN

## 2024-11-12 ASSESSMENT — PAIN DESCRIPTION - DESCRIPTORS: DESCRIPTORS: OTHER (COMMENT)

## 2024-11-12 NOTE — PROGRESS NOTES
.Patient ID: Thai Cristobal is a 44 y.o. male.  Referring Physician: Nba Vargas MD  [id number] [place] [address]  Primary Care Provider: Charlotte Yan DO      Chief complaint: SB Adenocarcinoma     HPI  This is a 44-year-old male with a known history of Crohn's disease patient is status post partial right colectomy small bowel resection on repair of a colonic fistula on 5/28/2024. Pathology he had a neuroendocrine tumor grade 3 well differentiated, with Ki67 30-40%. He did follow-up with oncology at [facility]. He had a PET-Ga68 which showed Postsurgical changes from right hemicolectomy and distal small bowel with no evidence of focal abnormal gallium 68 dotatate uptake.   Patient's only complaint is change in color of stools at this point, he is eating and drinking less and he has no abdominal pain. No diarrhea and no flushing.  Repeated liver biopsy showed: high grade malignancy, likely of colonic origin, showing focal adenocarcinoma-like features and focal neuroendocrine differentiation with Ki67 95%  He started on FOLFOX and avastin and finished 1 cycles and tolerated well. No abdominal pain, no nausea or vomiting. No new symptoms. His labs showed leucopenia and we delayed C2.  He had C2 and repeated labs showed normal WBCs    SYSTEMIC TREATMENT RECEIVED: None       Subjective   Please refer to Notes above for complete History of present illness.          Review of Systems:   All 14 systems have been reviewed and were negative except for the HPI.       MEDICAL HISTORY  Past Medical History:   Diagnosis Date    Acute upper respiratory infection, unspecified 02/21/2018    Acute URI    Crohn's disease (Multi)     Personal history of other specified conditions 04/23/2018    History of vertigo    SBO (small bowel obstruction) (Multi) 01/25/2024       FAMILY HISTORY  Family History   Problem Relation Name Age of Onset    Thyroid disease Mother      Other (bladder cancer) Mother      Hypertension Father       Benign prostatic hyperplasia Father      Anxiety disorder Sister      No Known Problems Brother      No Known Problems Daughter      Heart failure Paternal Grandfather         TOBACCO HISTORY  Tobacco Use: Low Risk  (10/1/2024)    Patient History     Smoking Tobacco Use: Never     Smokeless Tobacco Use: Never     Passive Exposure: Not on file       SOCIAL HISTORY  Social Connections: Not on file        Outpatient Medication Profile:  Current Outpatient Medications on File Prior to Visit   Medication Sig Dispense Refill    ALPRAZolam (Xanax) 0.5 mg tablet Take 1 tablet (0.5 mg) by mouth once daily as needed for anxiety. 30 tablet 0    FLUoxetine (PROzac) 20 mg capsule Take 1 capsule (20 mg) by mouth once daily. 90 capsule 1    FLUoxetine (PROzac) 40 mg capsule TAKE 1 CAPSULE BY MOUTH ONCE DAILY 90 capsule 1    loperamide (Imodium) 2 mg capsule Take 2 capsules (4 mg) by mouth with the first episode of diarrhea and 1 capsule (2 mg) by mouth with any additional episodes. Maximum 8 capsules (16 mg) per day. 100 capsule 2    NON FORMULARY Take 1 each by mouth once daily at bedtime. THC gummy (Patient not taking: Reported on 10/28/2024)      ondansetron (Zofran) 8 mg tablet Take 1 tablet (8 mg) by mouth every 8 hours if needed for nausea or vomiting. 30 tablet 5    prochlorperazine (Compazine) 10 mg tablet Take 1 tablet (10 mg) by mouth every 6 hours if needed for nausea or vomiting. 30 tablet 5    SUMAtriptan (Imitrex) 50 mg tablet TAKE 1 TABLET (50 MG) BY MOUTH 1 TIME IF NEEDED FOR MIGRAINE FOR UP TO 36 DOSES. 9 tablet 3     Current Facility-Administered Medications on File Prior to Visit   Medication Dose Route Frequency Provider Last Rate Last Admin    heparin flush 100 unit/mL syringe 500 Units  500 Units intra-catheter PRN Nba Vargas MD   500 Units at 10/15/24 1019         Performance Status:  Symptomatic; fully ambulatory     Vitals and Measurements:   There were no vitals taken for this visit.      Physical  Exam:   General: Appears well and in NAD  Eyes: PERRL, EOMI, clear sclera  ENMT: mucous membranes moist, no apparent injury, no lesions seen  Head/Neck: Neck supple, no apparent injury, thyroid without mass or tenderness, No JVD, trachea midline,   Thorax: Patent airways, CTAB, normal breath sounds with good chest expansion, thorax symmetric  Cardiovascular: Regular, rate and rhythm, no murmurs, 2+ equal pulses of the extremities, normal S 1and S 2  Gastrointestinal: Nondistended, soft, non-tender, no rebound tenderness or guarding, no masses palpable, no organomegaly, +BS  Musculoskeletal: ROM intact, no joint swelling, normal strength  Extremities: normal extremities, no cyanosis edema, contusions or wounds, no clubbing  Neurological: alert and oriented x3, intact senses, motor, response and reflexes, normal strength  Lymphatic: No significant lymphadenopathy  Psychological: Appropriate mood and behavior  Skin: Warm and dry, no lesions, no rashes          Lab Results:  I have reviewed these laboratory results:     Lab Results   Component Value Date    WBC 11.2 11/12/2024    HGB 14.5 11/12/2024    HCT 43.4 11/12/2024    MCV 84 11/12/2024     11/12/2024         Chemistry    Lab Results   Component Value Date/Time     10/29/2024 1004    K 4.3 10/29/2024 1004     10/29/2024 1004    CO2 27 10/29/2024 1004    BUN 13 10/29/2024 1004    CREATININE 0.96 10/29/2024 1004    Lab Results   Component Value Date/Time    CALCIUM 9.3 10/29/2024 1004    ALKPHOS 91 10/29/2024 1004    AST 30 10/29/2024 1004    ALT 19 10/29/2024 1004    BILITOT 0.4 10/29/2024 1004            Lab Results   Component Value Date    TSH 3.17 04/18/2023        Radiology Result:  I have reviewed the latest Imaging in PACS and the findings are noted in this note. I discussed the results of the latest imaging with the patient. All previous imaging were reviewed at the time it was completed. Full records are available in the EMR for review  as well.         NM PET CT net netspot    Result Date: 7/5/2024  Impression: 1. Postsurgical changes from right hemicolectomy and distal small bowel with no evidence of focal abnormal gallium 68 dotatate uptake within the region of documented neuroendocrine tumor to suggest residual disease. 2. Nonspecific, gallium 68 dotatate uptake within the pancreatic uncinate which can be seen with physiologic uptake.     I personally reviewed the images/study and I agree with the findings as stated by Resident Celso Ashford MD.   MACRO: None   Signed by: Maco Villafana 7/5/2024 3:06 PM Dictation workstation:   [id number]        Pathology Results:  I have reviewed the full pathology report recorded in the EMR. The pertinent portions indicating diagnosis are listed here in the note. for details please refer to the full report recorded in the EMR.    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 Pathology he had a neuroendocrine tumor grade 3 well differentiated, with Ki67 30-40%. He did follow-up with oncology at [facility]. He had a PET-Ga68 which showed Postsurgical changes from right hemicolectomy and distal small bowel with no evidence of focal abnormal gallium 68 dotatate uptake. Patient's only complaint is change in color of stools at this point he is eating and drinking and he has no abdominal pain. No diarrhea and no flushing. She had CT AP on 08/09/2024 at outside institution which showed new small liver nodules. 09/04/2024: MRI w Eovist showed Multiple T1 hypointense and T2 hyperintense lesions throughout the liver, and mesenteric LNS. PET-FDG showed FDG avid mesenteric kylie and liver metastases.   Repeated liver biopsy showed: high grade  malignancy, likely of colonic origin, showing focal adenocarcinoma-like features and focal neuroendocrine differentiation with Ki67 95%  He started on FOLFOX and avastin and finished 1 cycles and tolerated well. No abdominal pain, no nausea or vomiting. No new symptoms. His labs showed leucopenia and keep delaying C2.  He finished C2  Labs today was normal     Plan:  1- Will resume C3 FOLFOX plus Bevacizumab with 20% dose reduction for 5FU  2- RTC in 2 week for  C3  3- Restaging imaging after C4 on 12/06 and RTC for follow up before C5 on 12/10/2024       DISCLAIMER:   In preparing for this visit and writing this note, I reviewed all the previous electronic medical records (labs, imaging and medical charts) of the patient available in the physician portal. Significant findings which helped in decision making are recorded  in this chart.     The plan was discussed with the patient. We gave him ample opportunities to ask questions. All questions were answered to his satisfaction and he verbalized understanding.       Nba Vargas M.D.   and Director of the Neuroendocrine Tumor Program  Gastrointestinal Medical Oncology  Department of Hematology and Oncology  [facility], [place] [address]  Phone (Office): [phone]  Fax:  [phone]   Email: [email or web address]  Learn more about [facility] Comprehensive Neuroendocrine Tumor Program: Click Here  Learn more about [place] Neuroend[employer]: [email or web address]

## 2024-11-14 ENCOUNTER — INFUSION (OUTPATIENT)
Dept: HEMATOLOGY/ONCOLOGY | Facility: CLINIC | Age: 44
End: 2024-11-14
Payer: COMMERCIAL

## 2024-11-14 ENCOUNTER — APPOINTMENT (OUTPATIENT)
Dept: HEMATOLOGY/ONCOLOGY | Facility: CLINIC | Age: 44
End: 2024-11-14
Payer: COMMERCIAL

## 2024-11-14 VITALS
OXYGEN SATURATION: 97 % | RESPIRATION RATE: 18 BRPM | BODY MASS INDEX: 23.93 KG/M2 | DIASTOLIC BLOOD PRESSURE: 86 MMHG | HEART RATE: 84 BPM | SYSTOLIC BLOOD PRESSURE: 126 MMHG | TEMPERATURE: 97.2 F | WEIGHT: 156.09 LBS

## 2024-11-14 DIAGNOSIS — C17.9 ADENOCARCINOMA OF SMALL BOWEL (MULTI): ICD-10-CM

## 2024-11-14 PROCEDURE — 2500000004 HC RX 250 GENERAL PHARMACY W/ HCPCS (ALT 636 FOR OP/ED): Mod: JZ | Performed by: INTERNAL MEDICINE

## 2024-11-14 PROCEDURE — 96377 APPLICATON ON-BODY INJECTOR: CPT

## 2024-11-14 PROCEDURE — 2500000004 HC RX 250 GENERAL PHARMACY W/ HCPCS (ALT 636 FOR OP/ED): Performed by: INTERNAL MEDICINE

## 2024-11-14 RX ORDER — HEPARIN 100 UNIT/ML
500 SYRINGE INTRAVENOUS AS NEEDED
Status: DISCONTINUED | OUTPATIENT
Start: 2024-11-14 | End: 2024-11-14 | Stop reason: HOSPADM

## 2024-11-14 RX ORDER — HEPARIN SODIUM,PORCINE/PF 10 UNIT/ML
50 SYRINGE (ML) INTRAVENOUS AS NEEDED
OUTPATIENT
Start: 2024-11-14

## 2024-11-14 RX ORDER — HEPARIN 100 UNIT/ML
500 SYRINGE INTRAVENOUS AS NEEDED
OUTPATIENT
Start: 2024-11-14

## 2024-11-14 ASSESSMENT — PAIN SCALES - GENERAL: PAINLEVEL_OUTOF10: 0-NO PAIN

## 2024-11-19 ENCOUNTER — APPOINTMENT (OUTPATIENT)
Dept: HEMATOLOGY/ONCOLOGY | Facility: CLINIC | Age: 44
End: 2024-11-19
Payer: COMMERCIAL

## 2024-11-20 ENCOUNTER — APPOINTMENT (OUTPATIENT)
Dept: HEMATOLOGY/ONCOLOGY | Facility: CLINIC | Age: 44
End: 2024-11-20
Payer: COMMERCIAL

## 2024-11-22 ENCOUNTER — APPOINTMENT (OUTPATIENT)
Dept: HEMATOLOGY/ONCOLOGY | Facility: CLINIC | Age: 44
End: 2024-11-22
Payer: COMMERCIAL

## 2024-11-22 ENCOUNTER — INFUSION (OUTPATIENT)
Dept: HEMATOLOGY/ONCOLOGY | Facility: CLINIC | Age: 44
End: 2024-11-22
Payer: COMMERCIAL

## 2024-11-22 VITALS
BODY MASS INDEX: 22.29 KG/M2 | TEMPERATURE: 97.3 F | SYSTOLIC BLOOD PRESSURE: 150 MMHG | HEART RATE: 80 BPM | RESPIRATION RATE: 18 BRPM | DIASTOLIC BLOOD PRESSURE: 62 MMHG | OXYGEN SATURATION: 97 % | WEIGHT: 145.39 LBS

## 2024-11-22 DIAGNOSIS — C17.9 ADENOCARCINOMA OF SMALL BOWEL (MULTI): ICD-10-CM

## 2024-11-22 LAB
ALBUMIN SERPL BCP-MCNC: 4.1 G/DL (ref 3.4–5)
ALP SERPL-CCNC: 334 U/L (ref 33–120)
ALT SERPL W P-5'-P-CCNC: 31 U/L (ref 10–52)
ANION GAP SERPL CALC-SCNC: 10 MMOL/L (ref 10–20)
AST SERPL W P-5'-P-CCNC: 30 U/L (ref 9–39)
BASOPHILS # BLD MANUAL: 0 X10*3/UL (ref 0–0.1)
BASOPHILS NFR BLD MANUAL: 0 %
BILIRUB SERPL-MCNC: 0.3 MG/DL (ref 0–1.2)
BUN SERPL-MCNC: 16 MG/DL (ref 6–23)
CALCIUM SERPL-MCNC: 9.4 MG/DL (ref 8.6–10.3)
CHLORIDE SERPL-SCNC: 99 MMOL/L (ref 98–107)
CO2 SERPL-SCNC: 30 MMOL/L (ref 21–32)
CREAT SERPL-MCNC: 0.95 MG/DL (ref 0.5–1.3)
EGFRCR SERPLBLD CKD-EPI 2021: >90 ML/MIN/1.73M*2
EOSINOPHIL # BLD MANUAL: 0 X10*3/UL (ref 0–0.7)
EOSINOPHIL NFR BLD MANUAL: 0 %
ERYTHROCYTE [DISTWIDTH] IN BLOOD BY AUTOMATED COUNT: 17 % (ref 11.5–14.5)
GLUCOSE SERPL-MCNC: 88 MG/DL (ref 74–99)
HCT VFR BLD AUTO: 42.5 % (ref 41–52)
HGB BLD-MCNC: 14.1 G/DL (ref 13.5–17.5)
IMM GRANULOCYTES # BLD AUTO: 0.21 X10*3/UL (ref 0–0.7)
IMM GRANULOCYTES NFR BLD AUTO: 0.8 % (ref 0–0.9)
LYMPHOCYTES # BLD MANUAL: 2.36 X10*3/UL (ref 1.2–4.8)
LYMPHOCYTES NFR BLD MANUAL: 9 %
MCH RBC QN AUTO: 28.3 PG (ref 26–34)
MCHC RBC AUTO-ENTMCNC: 33.2 G/DL (ref 32–36)
MCV RBC AUTO: 85 FL (ref 80–100)
MONOCYTES # BLD MANUAL: 2.36 X10*3/UL (ref 0.1–1)
MONOCYTES NFR BLD MANUAL: 9 %
MYELOCYTES # BLD MANUAL: 0.26 X10*3/UL
MYELOCYTES NFR BLD MANUAL: 1 %
NEUTROPHILS # BLD MANUAL: 21.22 X10*3/UL (ref 1.2–7.7)
NEUTS BAND # BLD MANUAL: 4.98 X10*3/UL (ref 0–0.7)
NEUTS BAND NFR BLD MANUAL: 19 %
NEUTS SEG # BLD MANUAL: 16.24 X10*3/UL (ref 1.2–7)
NEUTS SEG NFR BLD MANUAL: 62 %
NEUTS VAC BLD QL SMEAR: PRESENT
NRBC BLD-RTO: ABNORMAL /100{WBCS}
PLATELET # BLD AUTO: 304 X10*3/UL (ref 150–450)
POTASSIUM SERPL-SCNC: 4.4 MMOL/L (ref 3.5–5.3)
PROT SERPL-MCNC: 7.1 G/DL (ref 6.4–8.2)
RBC # BLD AUTO: 4.98 X10*6/UL (ref 4.5–5.9)
RBC MORPH BLD: ABNORMAL
SODIUM SERPL-SCNC: 135 MMOL/L (ref 136–145)
TOTAL CELLS COUNTED BLD: 100
TOXIC GRANULES BLD QL SMEAR: PRESENT
WBC # BLD AUTO: 26.2 X10*3/UL (ref 4.4–11.3)

## 2024-11-22 PROCEDURE — 85027 COMPLETE CBC AUTOMATED: CPT

## 2024-11-22 PROCEDURE — 2500000004 HC RX 250 GENERAL PHARMACY W/ HCPCS (ALT 636 FOR OP/ED): Performed by: INTERNAL MEDICINE

## 2024-11-22 PROCEDURE — 36591 DRAW BLOOD OFF VENOUS DEVICE: CPT

## 2024-11-22 PROCEDURE — 85007 BL SMEAR W/DIFF WBC COUNT: CPT

## 2024-11-22 PROCEDURE — 84075 ASSAY ALKALINE PHOSPHATASE: CPT

## 2024-11-22 RX ORDER — HEPARIN 100 UNIT/ML
500 SYRINGE INTRAVENOUS AS NEEDED
OUTPATIENT
Start: 2024-11-22

## 2024-11-22 RX ORDER — HEPARIN SODIUM,PORCINE/PF 10 UNIT/ML
50 SYRINGE (ML) INTRAVENOUS AS NEEDED
OUTPATIENT
Start: 2024-11-22

## 2024-11-22 RX ORDER — HEPARIN 100 UNIT/ML
500 SYRINGE INTRAVENOUS AS NEEDED
Status: DISCONTINUED | OUTPATIENT
Start: 2024-11-22 | End: 2024-11-22 | Stop reason: HOSPADM

## 2024-11-22 ASSESSMENT — PAIN SCALES - GENERAL: PAINLEVEL_OUTOF10: 0-NO PAIN

## 2024-11-23 ENCOUNTER — APPOINTMENT (OUTPATIENT)
Dept: RADIOLOGY | Facility: HOSPITAL | Age: 44
End: 2024-11-23
Payer: COMMERCIAL

## 2024-11-23 ENCOUNTER — HOSPITAL ENCOUNTER (INPATIENT)
Facility: HOSPITAL | Age: 44
End: 2024-11-23
Attending: STUDENT IN AN ORGANIZED HEALTH CARE EDUCATION/TRAINING PROGRAM | Admitting: STUDENT IN AN ORGANIZED HEALTH CARE EDUCATION/TRAINING PROGRAM
Payer: COMMERCIAL

## 2024-11-23 DIAGNOSIS — K56.609 SBO (SMALL BOWEL OBSTRUCTION) (MULTI): Primary | ICD-10-CM

## 2024-11-23 LAB
ALBUMIN SERPL BCP-MCNC: 3.9 G/DL (ref 3.4–5)
ALBUMIN SERPL BCP-MCNC: 4.1 G/DL (ref 3.4–5)
ALP SERPL-CCNC: 318 U/L (ref 33–120)
ALP SERPL-CCNC: 363 U/L (ref 33–120)
ALT SERPL W P-5'-P-CCNC: 30 U/L (ref 10–52)
ALT SERPL W P-5'-P-CCNC: 34 U/L (ref 10–52)
ANION GAP SERPL CALC-SCNC: 12 MMOL/L (ref 10–20)
ANION GAP SERPL CALC-SCNC: 12 MMOL/L (ref 10–20)
APPEARANCE UR: CLEAR
AST SERPL W P-5'-P-CCNC: 27 U/L (ref 9–39)
AST SERPL W P-5'-P-CCNC: 30 U/L (ref 9–39)
BASOPHILS # BLD AUTO: 0.17 X10*3/UL (ref 0–0.1)
BASOPHILS # BLD AUTO: 0.17 X10*3/UL (ref 0–0.1)
BASOPHILS NFR BLD AUTO: 0.6 %
BASOPHILS NFR BLD AUTO: 0.7 %
BILIRUB SERPL-MCNC: 0.3 MG/DL (ref 0–1.2)
BILIRUB SERPL-MCNC: 0.3 MG/DL (ref 0–1.2)
BILIRUB UR STRIP.AUTO-MCNC: NEGATIVE MG/DL
BUN SERPL-MCNC: 13 MG/DL (ref 6–23)
BUN SERPL-MCNC: 13 MG/DL (ref 6–23)
CALCIUM SERPL-MCNC: 9.2 MG/DL (ref 8.6–10.3)
CALCIUM SERPL-MCNC: 9.4 MG/DL (ref 8.6–10.3)
CHLORIDE SERPL-SCNC: 98 MMOL/L (ref 98–107)
CHLORIDE SERPL-SCNC: 99 MMOL/L (ref 98–107)
CO2 SERPL-SCNC: 30 MMOL/L (ref 21–32)
CO2 SERPL-SCNC: 30 MMOL/L (ref 21–32)
COLOR UR: YELLOW
CREAT SERPL-MCNC: 0.97 MG/DL (ref 0.5–1.3)
CREAT SERPL-MCNC: 1.03 MG/DL (ref 0.5–1.3)
EGFRCR SERPLBLD CKD-EPI 2021: >90 ML/MIN/1.73M*2
EGFRCR SERPLBLD CKD-EPI 2021: >90 ML/MIN/1.73M*2
EOSINOPHIL # BLD AUTO: 0.06 X10*3/UL (ref 0–0.7)
EOSINOPHIL # BLD AUTO: 0.26 X10*3/UL (ref 0–0.7)
EOSINOPHIL NFR BLD AUTO: 0.2 %
EOSINOPHIL NFR BLD AUTO: 1.1 %
ERYTHROCYTE [DISTWIDTH] IN BLOOD BY AUTOMATED COUNT: 16.9 % (ref 11.5–14.5)
ERYTHROCYTE [DISTWIDTH] IN BLOOD BY AUTOMATED COUNT: 17.1 % (ref 11.5–14.5)
GLUCOSE SERPL-MCNC: 116 MG/DL (ref 74–99)
GLUCOSE SERPL-MCNC: 120 MG/DL (ref 74–99)
GLUCOSE UR STRIP.AUTO-MCNC: NORMAL MG/DL
HCT VFR BLD AUTO: 43 % (ref 41–52)
HCT VFR BLD AUTO: 44.6 % (ref 41–52)
HGB BLD-MCNC: 14 G/DL (ref 13.5–17.5)
HGB BLD-MCNC: 14.6 G/DL (ref 13.5–17.5)
HOLD SPECIMEN: NORMAL
HYALINE CASTS #/AREA URNS AUTO: ABNORMAL /LPF
IMM GRANULOCYTES # BLD AUTO: 0.27 X10*3/UL (ref 0–0.7)
IMM GRANULOCYTES # BLD AUTO: 0.69 X10*3/UL (ref 0–0.7)
IMM GRANULOCYTES NFR BLD AUTO: 1.1 % (ref 0–0.9)
IMM GRANULOCYTES NFR BLD AUTO: 2.4 % (ref 0–0.9)
KETONES UR STRIP.AUTO-MCNC: NEGATIVE MG/DL
LEUKOCYTE ESTERASE UR QL STRIP.AUTO: NEGATIVE
LIPASE SERPL-CCNC: 101 U/L (ref 9–82)
LYMPHOCYTES # BLD AUTO: 1.5 X10*3/UL (ref 1.2–4.8)
LYMPHOCYTES # BLD AUTO: 2.7 X10*3/UL (ref 1.2–4.8)
LYMPHOCYTES NFR BLD AUTO: 11.1 %
LYMPHOCYTES NFR BLD AUTO: 5.2 %
MAGNESIUM SERPL-MCNC: 2.27 MG/DL (ref 1.6–2.4)
MCH RBC QN AUTO: 27.7 PG (ref 26–34)
MCH RBC QN AUTO: 28.1 PG (ref 26–34)
MCHC RBC AUTO-ENTMCNC: 32.6 G/DL (ref 32–36)
MCHC RBC AUTO-ENTMCNC: 32.7 G/DL (ref 32–36)
MCV RBC AUTO: 85 FL (ref 80–100)
MCV RBC AUTO: 86 FL (ref 80–100)
MONOCYTES # BLD AUTO: 1.68 X10*3/UL (ref 0.1–1)
MONOCYTES # BLD AUTO: 2.47 X10*3/UL (ref 0.1–1)
MONOCYTES NFR BLD AUTO: 10.1 %
MONOCYTES NFR BLD AUTO: 5.8 %
MUCOUS THREADS #/AREA URNS AUTO: ABNORMAL /LPF
NEUTROPHILS # BLD AUTO: 18.54 X10*3/UL (ref 1.2–7.7)
NEUTROPHILS # BLD AUTO: 24.62 X10*3/UL (ref 1.2–7.7)
NEUTROPHILS NFR BLD AUTO: 75.9 %
NEUTROPHILS NFR BLD AUTO: 85.8 %
NITRITE UR QL STRIP.AUTO: NEGATIVE
NRBC BLD-RTO: 0 /100 WBCS (ref 0–0)
NRBC BLD-RTO: 0 /100 WBCS (ref 0–0)
PH UR STRIP.AUTO: 6 [PH]
PLATELET # BLD AUTO: 304 X10*3/UL (ref 150–450)
PLATELET # BLD AUTO: 307 X10*3/UL (ref 150–450)
POTASSIUM SERPL-SCNC: 4.2 MMOL/L (ref 3.5–5.3)
POTASSIUM SERPL-SCNC: 4.6 MMOL/L (ref 3.5–5.3)
PROT SERPL-MCNC: 6.8 G/DL (ref 6.4–8.2)
PROT SERPL-MCNC: 7.2 G/DL (ref 6.4–8.2)
PROT UR STRIP.AUTO-MCNC: ABNORMAL MG/DL
RBC # BLD AUTO: 5.06 X10*6/UL (ref 4.5–5.9)
RBC # BLD AUTO: 5.2 X10*6/UL (ref 4.5–5.9)
RBC # UR STRIP.AUTO: NEGATIVE /UL
RBC #/AREA URNS AUTO: ABNORMAL /HPF
SODIUM SERPL-SCNC: 136 MMOL/L (ref 136–145)
SODIUM SERPL-SCNC: 136 MMOL/L (ref 136–145)
SP GR UR STRIP.AUTO: >1.05
UROBILINOGEN UR STRIP.AUTO-MCNC: NORMAL MG/DL
WBC # BLD AUTO: 24.4 X10*3/UL (ref 4.4–11.3)
WBC # BLD AUTO: 28.7 X10*3/UL (ref 4.4–11.3)
WBC #/AREA URNS AUTO: ABNORMAL /HPF

## 2024-11-23 PROCEDURE — 2500000001 HC RX 250 WO HCPCS SELF ADMINISTERED DRUGS (ALT 637 FOR MEDICARE OP)

## 2024-11-23 PROCEDURE — 2500000004 HC RX 250 GENERAL PHARMACY W/ HCPCS (ALT 636 FOR OP/ED): Performed by: STUDENT IN AN ORGANIZED HEALTH CARE EDUCATION/TRAINING PROGRAM

## 2024-11-23 PROCEDURE — 99222 1ST HOSP IP/OBS MODERATE 55: CPT | Performed by: SURGERY

## 2024-11-23 PROCEDURE — 80053 COMPREHEN METABOLIC PANEL: CPT

## 2024-11-23 PROCEDURE — 96375 TX/PRO/DX INJ NEW DRUG ADDON: CPT

## 2024-11-23 PROCEDURE — 96376 TX/PRO/DX INJ SAME DRUG ADON: CPT

## 2024-11-23 PROCEDURE — 2550000001 HC RX 255 CONTRASTS: Performed by: STUDENT IN AN ORGANIZED HEALTH CARE EDUCATION/TRAINING PROGRAM

## 2024-11-23 PROCEDURE — 99285 EMERGENCY DEPT VISIT HI MDM: CPT | Performed by: STUDENT IN AN ORGANIZED HEALTH CARE EDUCATION/TRAINING PROGRAM

## 2024-11-23 PROCEDURE — 74177 CT ABD & PELVIS W/CONTRAST: CPT | Mod: FOREIGN READ | Performed by: RADIOLOGY

## 2024-11-23 PROCEDURE — 2500000004 HC RX 250 GENERAL PHARMACY W/ HCPCS (ALT 636 FOR OP/ED)

## 2024-11-23 PROCEDURE — 74177 CT ABD & PELVIS W/CONTRAST: CPT

## 2024-11-23 PROCEDURE — 99223 1ST HOSP IP/OBS HIGH 75: CPT

## 2024-11-23 PROCEDURE — 1100000001 HC PRIVATE ROOM DAILY

## 2024-11-23 PROCEDURE — 83690 ASSAY OF LIPASE: CPT

## 2024-11-23 PROCEDURE — 96374 THER/PROPH/DIAG INJ IV PUSH: CPT | Mod: 59

## 2024-11-23 PROCEDURE — 99285 EMERGENCY DEPT VISIT HI MDM: CPT | Mod: 25

## 2024-11-23 PROCEDURE — 83735 ASSAY OF MAGNESIUM: CPT

## 2024-11-23 PROCEDURE — 85025 COMPLETE CBC W/AUTO DIFF WBC: CPT

## 2024-11-23 PROCEDURE — 96372 THER/PROPH/DIAG INJ SC/IM: CPT

## 2024-11-23 PROCEDURE — 81003 URINALYSIS AUTO W/O SCOPE: CPT

## 2024-11-23 PROCEDURE — 87040 BLOOD CULTURE FOR BACTERIA: CPT | Mod: STJLAB

## 2024-11-23 PROCEDURE — 0D9670Z DRAINAGE OF STOMACH WITH DRAINAGE DEVICE, VIA NATURAL OR ARTIFICIAL OPENING: ICD-10-PCS | Performed by: STUDENT IN AN ORGANIZED HEALTH CARE EDUCATION/TRAINING PROGRAM

## 2024-11-23 PROCEDURE — 36415 COLL VENOUS BLD VENIPUNCTURE: CPT

## 2024-11-23 RX ORDER — LISINOPRIL 40 MG/1
40 TABLET ORAL NIGHTLY
Status: DISCONTINUED | OUTPATIENT
Start: 2024-11-23 | End: 2024-11-28 | Stop reason: HOSPADM

## 2024-11-23 RX ORDER — FLUOXETINE HYDROCHLORIDE 20 MG/1
20 CAPSULE ORAL NIGHTLY
Status: DISCONTINUED | OUTPATIENT
Start: 2024-11-23 | End: 2024-11-28 | Stop reason: HOSPADM

## 2024-11-23 RX ORDER — MILK THISTLE 150 MG
1 CAPSULE ORAL DAILY
COMMUNITY

## 2024-11-23 RX ORDER — PANTOPRAZOLE SODIUM 40 MG/10ML
40 INJECTION, POWDER, LYOPHILIZED, FOR SOLUTION INTRAVENOUS
Status: DISCONTINUED | OUTPATIENT
Start: 2024-11-23 | End: 2024-11-28 | Stop reason: HOSPADM

## 2024-11-23 RX ORDER — ONDANSETRON 4 MG/1
4 TABLET, FILM COATED ORAL EVERY 8 HOURS PRN
Status: DISCONTINUED | OUTPATIENT
Start: 2024-11-23 | End: 2024-11-28 | Stop reason: HOSPADM

## 2024-11-23 RX ORDER — ONDANSETRON HYDROCHLORIDE 2 MG/ML
4 INJECTION, SOLUTION INTRAVENOUS ONCE
Status: COMPLETED | OUTPATIENT
Start: 2024-11-23 | End: 2024-11-23

## 2024-11-23 RX ORDER — KETOROLAC TROMETHAMINE 30 MG/ML
30 INJECTION, SOLUTION INTRAMUSCULAR; INTRAVENOUS EVERY 6 HOURS PRN
Status: DISPENSED | OUTPATIENT
Start: 2024-11-23 | End: 2024-11-28

## 2024-11-23 RX ORDER — LISINOPRIL 40 MG/1
40 TABLET ORAL NIGHTLY
COMMUNITY

## 2024-11-23 RX ORDER — ACETAMINOPHEN 325 MG/1
975 TABLET ORAL EVERY 8 HOURS
Status: DISCONTINUED | OUTPATIENT
Start: 2024-11-23 | End: 2024-11-24

## 2024-11-23 RX ORDER — CHOLECALCIFEROL (VITAMIN D3) 25 MCG
1000 TABLET ORAL DAILY
Status: DISCONTINUED | OUTPATIENT
Start: 2024-11-23 | End: 2024-11-28 | Stop reason: HOSPADM

## 2024-11-23 RX ORDER — METOCLOPRAMIDE 10 MG/1
10 TABLET ORAL EVERY 6 HOURS PRN
Status: DISCONTINUED | OUTPATIENT
Start: 2024-11-23 | End: 2024-11-28 | Stop reason: HOSPADM

## 2024-11-23 RX ORDER — MORPHINE SULFATE 4 MG/ML
4 INJECTION, SOLUTION INTRAMUSCULAR; INTRAVENOUS ONCE
Status: COMPLETED | OUTPATIENT
Start: 2024-11-23 | End: 2024-11-23

## 2024-11-23 RX ORDER — METOCLOPRAMIDE HYDROCHLORIDE 5 MG/ML
10 INJECTION INTRAMUSCULAR; INTRAVENOUS EVERY 6 HOURS PRN
Status: DISCONTINUED | OUTPATIENT
Start: 2024-11-23 | End: 2024-11-28 | Stop reason: HOSPADM

## 2024-11-23 RX ORDER — VIT C/ASCORB SOD/MULTIVIT-MIN 500 MG
1 TABLET,CHEWABLE ORAL DAILY
COMMUNITY

## 2024-11-23 RX ORDER — ENOXAPARIN SODIUM 100 MG/ML
40 INJECTION SUBCUTANEOUS
Status: DISCONTINUED | OUTPATIENT
Start: 2024-11-23 | End: 2024-11-28 | Stop reason: HOSPADM

## 2024-11-23 RX ORDER — VIT C/E/ZN/COPPR/LUTEIN/ZEAXAN 250MG-90MG
25 CAPSULE ORAL DAILY
COMMUNITY

## 2024-11-23 RX ORDER — FLAXSEED OIL 1000 MG
1000 CAPSULE ORAL DAILY
COMMUNITY

## 2024-11-23 RX ORDER — ALPRAZOLAM 0.5 MG/1
0.5 TABLET ORAL DAILY PRN
Status: DISCONTINUED | OUTPATIENT
Start: 2024-11-23 | End: 2024-11-28 | Stop reason: HOSPADM

## 2024-11-23 RX ORDER — POLYETHYLENE GLYCOL 3350 17 G/17G
17 POWDER, FOR SOLUTION ORAL DAILY PRN
Status: DISCONTINUED | OUTPATIENT
Start: 2024-11-23 | End: 2024-11-28 | Stop reason: HOSPADM

## 2024-11-23 RX ORDER — FLAXSEED OIL 1000 MG
1 CAPSULE ORAL DAILY
Status: DISCONTINUED | OUTPATIENT
Start: 2024-11-23 | End: 2024-11-23

## 2024-11-23 RX ORDER — LIDOCAINE HYDROCHLORIDE 20 MG/ML
1 JELLY TOPICAL ONCE
Status: DISCONTINUED | OUTPATIENT
Start: 2024-11-23 | End: 2024-11-28 | Stop reason: HOSPADM

## 2024-11-23 RX ORDER — FLUOXETINE HYDROCHLORIDE 20 MG/1
40 CAPSULE ORAL NIGHTLY
Status: DISCONTINUED | OUTPATIENT
Start: 2024-11-23 | End: 2024-11-28 | Stop reason: HOSPADM

## 2024-11-23 RX ORDER — ONDANSETRON HYDROCHLORIDE 2 MG/ML
4 INJECTION, SOLUTION INTRAVENOUS EVERY 8 HOURS PRN
Status: DISCONTINUED | OUTPATIENT
Start: 2024-11-23 | End: 2024-11-28 | Stop reason: HOSPADM

## 2024-11-23 RX ORDER — SODIUM CHLORIDE, SODIUM LACTATE, POTASSIUM CHLORIDE, CALCIUM CHLORIDE 600; 310; 30; 20 MG/100ML; MG/100ML; MG/100ML; MG/100ML
150 INJECTION, SOLUTION INTRAVENOUS CONTINUOUS
Status: DISCONTINUED | OUTPATIENT
Start: 2024-11-23 | End: 2024-11-28 | Stop reason: HOSPADM

## 2024-11-23 RX ADMIN — FLUOXETINE HYDROCHLORIDE 20 MG: 20 CAPSULE ORAL at 21:40

## 2024-11-23 RX ADMIN — ONDANSETRON 4 MG: 2 INJECTION INTRAMUSCULAR; INTRAVENOUS at 18:53

## 2024-11-23 RX ADMIN — KETOROLAC TROMETHAMINE 30 MG: 30 INJECTION, SOLUTION INTRAMUSCULAR at 18:52

## 2024-11-23 RX ADMIN — CHOLECALCIFEROL TAB 25 MCG (1000 UNIT) 1000 UNITS: 25 TAB at 21:40

## 2024-11-23 RX ADMIN — HYDROMORPHONE HYDROCHLORIDE 0.4 MG: 1 INJECTION, SOLUTION INTRAMUSCULAR; INTRAVENOUS; SUBCUTANEOUS at 15:45

## 2024-11-23 RX ADMIN — LISINOPRIL 40 MG: 40 TABLET ORAL at 20:29

## 2024-11-23 RX ADMIN — KETOROLAC TROMETHAMINE 30 MG: 30 INJECTION, SOLUTION INTRAMUSCULAR at 12:15

## 2024-11-23 RX ADMIN — HYDROMORPHONE HYDROCHLORIDE 0.5 MG: 1 INJECTION, SOLUTION INTRAMUSCULAR; INTRAVENOUS; SUBCUTANEOUS at 02:43

## 2024-11-23 RX ADMIN — ONDANSETRON 4 MG: 2 INJECTION INTRAMUSCULAR; INTRAVENOUS at 01:45

## 2024-11-23 RX ADMIN — KETOROLAC TROMETHAMINE 30 MG: 30 INJECTION, SOLUTION INTRAMUSCULAR at 05:48

## 2024-11-23 RX ADMIN — FLUOXETINE HYDROCHLORIDE 40 MG: 20 CAPSULE ORAL at 21:39

## 2024-11-23 RX ADMIN — PANTOPRAZOLE SODIUM 40 MG: 40 INJECTION, POWDER, FOR SOLUTION INTRAVENOUS at 06:54

## 2024-11-23 RX ADMIN — SODIUM CHLORIDE, POTASSIUM CHLORIDE, SODIUM LACTATE AND CALCIUM CHLORIDE 125 ML/HR: 600; 310; 30; 20 INJECTION, SOLUTION INTRAVENOUS at 05:54

## 2024-11-23 RX ADMIN — ENOXAPARIN SODIUM 40 MG: 40 INJECTION SUBCUTANEOUS at 08:42

## 2024-11-23 RX ADMIN — ACETAMINOPHEN 325MG 975 MG: 325 TABLET ORAL at 05:47

## 2024-11-23 RX ADMIN — ACETAMINOPHEN 325MG 975 MG: 325 TABLET ORAL at 21:40

## 2024-11-23 RX ADMIN — MORPHINE SULFATE 4 MG: 4 INJECTION, SOLUTION INTRAMUSCULAR; INTRAVENOUS at 01:47

## 2024-11-23 RX ADMIN — SODIUM CHLORIDE, POTASSIUM CHLORIDE, SODIUM LACTATE AND CALCIUM CHLORIDE 125 ML/HR: 600; 310; 30; 20 INJECTION, SOLUTION INTRAVENOUS at 14:14

## 2024-11-23 RX ADMIN — IOHEXOL 75 ML: 350 INJECTION, SOLUTION INTRAVENOUS at 02:35

## 2024-11-23 RX ADMIN — ACETAMINOPHEN 325MG 975 MG: 325 TABLET ORAL at 14:14

## 2024-11-23 SDOH — ECONOMIC STABILITY: FOOD INSECURITY: WITHIN THE PAST 12 MONTHS, YOU WORRIED THAT YOUR FOOD WOULD RUN OUT BEFORE YOU GOT MONEY TO BUY MORE.: NEVER TRUE

## 2024-11-23 SDOH — ECONOMIC STABILITY: FOOD INSECURITY

## 2024-11-23 SDOH — SOCIAL STABILITY: SOCIAL INSECURITY: DOES ANYONE TRY TO KEEP YOU FROM HAVING/CONTACTING OTHER FRIENDS OR DOING THINGS OUTSIDE YOUR HOME?: NO

## 2024-11-23 SDOH — SOCIAL STABILITY: SOCIAL INSECURITY: HAVE YOU HAD ANY THOUGHTS OF HARMING ANYONE ELSE?: NO

## 2024-11-23 SDOH — SOCIAL STABILITY: SOCIAL INSECURITY: DO YOU FEEL UNSAFE GOING BACK TO THE PLACE WHERE YOU ARE LIVING?: NO

## 2024-11-23 SDOH — ECONOMIC STABILITY: FOOD INSECURITY: WITHIN THE PAST 12 MONTHS, YOU WORRIED THAT YOUR FOOD WOULD RUN OUT BEFORE YOU GOT THE MONEY TO BUY MORE.: NEVER TRUE

## 2024-11-23 SDOH — SOCIAL STABILITY: SOCIAL INSECURITY: WITHIN THE LAST YEAR, HAVE YOU BEEN AFRAID OF YOUR PARTNER OR EX-PARTNER?: NO

## 2024-11-23 SDOH — SOCIAL STABILITY: SOCIAL INSECURITY: WITHIN THE LAST YEAR, HAVE YOU BEEN HUMILIATED OR EMOTIONALLY ABUSED IN OTHER WAYS BY YOUR PARTNER OR EX-PARTNER?: NO

## 2024-11-23 SDOH — SOCIAL STABILITY: SOCIAL INSECURITY
WITHIN THE LAST YEAR, HAVE YOU BEEN RAPED OR FORCED TO HAVE ANY KIND OF SEXUAL ACTIVITY BY YOUR PARTNER OR EX-PARTNER?: NO

## 2024-11-23 SDOH — ECONOMIC STABILITY: HOUSING INSECURITY: IN THE LAST 12 MONTHS, WAS THERE A TIME WHEN YOU WERE NOT ABLE TO PAY THE MORTGAGE OR RENT ON TIME?: NO

## 2024-11-23 SDOH — ECONOMIC STABILITY: HOUSING INSECURITY

## 2024-11-23 SDOH — SOCIAL STABILITY: SOCIAL INSECURITY: ARE THERE ANY APPARENT SIGNS OF INJURIES/BEHAVIORS THAT COULD BE RELATED TO ABUSE/NEGLECT?: NO

## 2024-11-23 SDOH — ECONOMIC STABILITY: HOUSING INSECURITY: IN THE PAST 12 MONTHS, HOW MANY TIMES HAVE YOU MOVED WHERE YOU WERE LIVING?: 1

## 2024-11-23 SDOH — ECONOMIC STABILITY: TRANSPORTATION INSECURITY: IN THE PAST 12 MONTHS, HAS LACK OF TRANSPORTATION KEPT YOU FROM MEDICAL APPOINTMENTS OR FROM GETTING MEDICATIONS?: NO

## 2024-11-23 SDOH — ECONOMIC STABILITY: INCOME INSECURITY: IN THE LAST 12 MONTHS, WAS THERE A TIME WHEN YOU WERE NOT ABLE TO PAY THE MORTGAGE OR RENT ON TIME?: NO

## 2024-11-23 SDOH — ECONOMIC STABILITY: HOUSING INSECURITY: AT ANY TIME IN THE PAST 12 MONTHS, WERE YOU HOMELESS OR LIVING IN A SHELTER (INCLUDING NOW)?: NO

## 2024-11-23 SDOH — SOCIAL STABILITY: SOCIAL INSECURITY: HAS ANYONE EVER THREATENED TO HURT YOUR FAMILY OR YOUR PETS?: NO

## 2024-11-23 SDOH — SOCIAL STABILITY: SOCIAL INSECURITY
WITHIN THE LAST YEAR, HAVE YOU BEEN KICKED, HIT, SLAPPED, OR OTHERWISE PHYSICALLY HURT BY YOUR PARTNER OR EX-PARTNER?: NO

## 2024-11-23 SDOH — ECONOMIC STABILITY: FOOD INSECURITY: WITHIN THE PAST 12 MONTHS, THE FOOD YOU BOUGHT JUST DIDN'T LAST AND YOU DIDN'T HAVE MONEY TO GET MORE.: NEVER TRUE

## 2024-11-23 SDOH — ECONOMIC STABILITY: TRANSPORTATION INSECURITY
IN THE PAST 12 MONTHS, HAS LACK OF TRANSPORTATION KEPT YOU FROM MEETINGS, WORK, OR FROM GETTING THINGS NEEDED FOR DAILY LIVING?: NO

## 2024-11-23 SDOH — ECONOMIC STABILITY: HOUSING INSECURITY
IN THE LAST 12 MONTHS, WAS THERE A TIME WHEN YOU DID NOT HAVE A STEADY PLACE TO SLEEP OR SLEPT IN A SHELTER (INCLUDING NOW)?: NO

## 2024-11-23 SDOH — ECONOMIC STABILITY: TRANSPORTATION INSECURITY

## 2024-11-23 SDOH — SOCIAL STABILITY: SOCIAL INSECURITY: ABUSE: ADULT

## 2024-11-23 SDOH — ECONOMIC STABILITY: GENERAL

## 2024-11-23 SDOH — ECONOMIC STABILITY: FOOD INSECURITY: HOW HARD IS IT FOR YOU TO PAY FOR THE VERY BASICS LIKE FOOD, HOUSING, MEDICAL CARE, AND HEATING?: NOT HARD AT ALL

## 2024-11-23 SDOH — ECONOMIC STABILITY: INCOME INSECURITY: IN THE PAST 12 MONTHS HAS THE ELECTRIC, GAS, OIL, OR WATER COMPANY THREATENED TO SHUT OFF SERVICES IN YOUR HOME?: NO

## 2024-11-23 SDOH — ECONOMIC STABILITY: TRANSPORTATION INSECURITY
IN THE PAST 12 MONTHS, HAS THE LACK OF TRANSPORTATION KEPT YOU FROM MEDICAL APPOINTMENTS OR FROM GETTING MEDICATIONS?: NO

## 2024-11-23 SDOH — ECONOMIC STABILITY: HOUSING INSECURITY: IN THE LAST 12 MONTHS, HOW MANY PLACES HAVE YOU LIVED?: 1

## 2024-11-23 SDOH — SOCIAL STABILITY: SOCIAL INSECURITY: HAVE YOU HAD THOUGHTS OF HARMING ANYONE ELSE?: NO

## 2024-11-23 SDOH — SOCIAL STABILITY: SOCIAL INSECURITY: DO YOU FEEL ANYONE HAS EXPLOITED OR TAKEN ADVANTAGE OF YOU FINANCIALLY OR OF YOUR PERSONAL PROPERTY?: NO

## 2024-11-23 SDOH — SOCIAL STABILITY: SOCIAL INSECURITY: ARE YOU OR HAVE YOU BEEN THREATENED OR ABUSED PHYSICALLY, EMOTIONALLY, OR SEXUALLY BY ANYONE?: NO

## 2024-11-23 SDOH — ECONOMIC STABILITY: HOUSING INSECURITY: IN THE PAST 12 MONTHS HAS THE ELECTRIC, GAS, OIL, OR WATER COMPANY THREATENED TO SHUT OFF SERVICES IN YOUR HOME?: NO

## 2024-11-23 SDOH — SOCIAL STABILITY: SOCIAL INSECURITY: WERE YOU ABLE TO COMPLETE ALL THE BEHAVIORAL HEALTH SCREENINGS?: YES

## 2024-11-23 ASSESSMENT — ENCOUNTER SYMPTOMS
NUMBNESS: 0
WHEEZING: 0
SINUS PRESSURE: 0
HEADACHES: 0
ABDOMINAL PAIN: 1
DIARRHEA: 1
SINUS PAIN: 0
SORE THROAT: 0
COUGH: 0
VOMITING: 0
PALPITATIONS: 0
WEAKNESS: 0
DIZZINESS: 0
DYSURIA: 0
SHORTNESS OF BREATH: 0
APPETITE CHANGE: 0
LIGHT-HEADEDNESS: 0
DIFFICULTY URINATING: 0
CONSTIPATION: 0
FEVER: 0
NAUSEA: 1
CHILLS: 0

## 2024-11-23 ASSESSMENT — COGNITIVE AND FUNCTIONAL STATUS - GENERAL
PATIENT BASELINE BEDBOUND: NO
MOBILITY SCORE: 24
DAILY ACTIVITIY SCORE: 24

## 2024-11-23 ASSESSMENT — LIFESTYLE VARIABLES
HOW OFTEN DO YOU HAVE A DRINK CONTAINING ALCOHOL: NEVER
HAVE PEOPLE ANNOYED YOU BY CRITICIZING YOUR DRINKING: NO
HOW OFTEN DO YOU HAVE 6 OR MORE DRINKS ON ONE OCCASION: NEVER
HAVE YOU EVER FELT YOU SHOULD CUT DOWN ON YOUR DRINKING: NO
HOW MANY STANDARD DRINKS CONTAINING ALCOHOL DO YOU HAVE ON A TYPICAL DAY: PATIENT DOES NOT DRINK
EVER HAD A DRINK FIRST THING IN THE MORNING TO STEADY YOUR NERVES TO GET RID OF A HANGOVER: NO
AUDIT-C TOTAL SCORE: 0
TOTAL SCORE: 0
EVER FELT BAD OR GUILTY ABOUT YOUR DRINKING: NO
SKIP TO QUESTIONS 9-10: 1
AUDIT-C TOTAL SCORE: 0

## 2024-11-23 ASSESSMENT — ACTIVITIES OF DAILY LIVING (ADL)
LACK_OF_TRANSPORTATION: NO
LACK_OF_TRANSPORTATION: NO
PATIENT'S MEMORY ADEQUATE TO SAFELY COMPLETE DAILY ACTIVITIES?: YES
TOILETING: INDEPENDENT
WALKS IN HOME: INDEPENDENT
FEEDING YOURSELF: INDEPENDENT
ADEQUATE_TO_COMPLETE_ADL: YES
BATHING: INDEPENDENT
JUDGMENT_ADEQUATE_SAFELY_COMPLETE_DAILY_ACTIVITIES: YES
DRESSING YOURSELF: INDEPENDENT
GROOMING: INDEPENDENT
HEARING - RIGHT EAR: FUNCTIONAL
HEARING - LEFT EAR: FUNCTIONAL

## 2024-11-23 ASSESSMENT — PAIN SCALES - GENERAL
PAINLEVEL_OUTOF10: 5 - MODERATE PAIN
PAINLEVEL_OUTOF10: 3
PAINLEVEL_OUTOF10: 7
PAINLEVEL_OUTOF10: 2
PAINLEVEL_OUTOF10: 4
PAINLEVEL_OUTOF10: 4
PAINLEVEL_OUTOF10: 9
PAINLEVEL_OUTOF10: 4
PAINLEVEL_OUTOF10: 6
PAINLEVEL_OUTOF10: 7
PAINLEVEL_OUTOF10: 4
PAINLEVEL_OUTOF10: 0 - NO PAIN
PAINLEVEL_OUTOF10: 4
PAINLEVEL_OUTOF10: 4
PAINLEVEL_OUTOF10: 7

## 2024-11-23 ASSESSMENT — PAIN DESCRIPTION - ORIENTATION: ORIENTATION: ANTERIOR

## 2024-11-23 ASSESSMENT — PAIN - FUNCTIONAL ASSESSMENT
PAIN_FUNCTIONAL_ASSESSMENT: 0-10

## 2024-11-23 ASSESSMENT — COLUMBIA-SUICIDE SEVERITY RATING SCALE - C-SSRS
1. IN THE PAST MONTH, HAVE YOU WISHED YOU WERE DEAD OR WISHED YOU COULD GO TO SLEEP AND NOT WAKE UP?: NO
2. HAVE YOU ACTUALLY HAD ANY THOUGHTS OF KILLING YOURSELF?: NO
6. HAVE YOU EVER DONE ANYTHING, STARTED TO DO ANYTHING, OR PREPARED TO DO ANYTHING TO END YOUR LIFE?: NO

## 2024-11-23 ASSESSMENT — PAIN DESCRIPTION - PAIN TYPE
TYPE: ACUTE PAIN
TYPE: ACUTE PAIN

## 2024-11-23 ASSESSMENT — PAIN DESCRIPTION - LOCATION
LOCATION: ABDOMEN

## 2024-11-23 ASSESSMENT — SOCIAL DETERMINANTS OF HEALTH (SDOH): IN THE PAST 12 MONTHS, HAS THE ELECTRIC, GAS, OIL, OR WATER COMPANY THREATENED TO SHUT OFF SERVICE IN YOUR HOME?: NO

## 2024-11-23 ASSESSMENT — PATIENT HEALTH QUESTIONNAIRE - PHQ9
SUM OF ALL RESPONSES TO PHQ9 QUESTIONS 1 & 2: 0
1. LITTLE INTEREST OR PLEASURE IN DOING THINGS: NOT AT ALL
2. FEELING DOWN, DEPRESSED OR HOPELESS: NOT AT ALL

## 2024-11-23 NOTE — NURSING NOTE
Patient vomitting clear yelllow emesis and complaining of severe abdominal pain. Zofran and Toradol adminsitered as ordered. General surgeon and medical residents notified.

## 2024-11-23 NOTE — ED TRIAGE NOTES
Pt has stage 4 cancer, here for abd pain that started 1 hour ago. Took an old percocet for pain, no relief, took prescribed xanax for anxiety.

## 2024-11-23 NOTE — H&P
Internal Medicine    Admission H&P      Patient Thai Cristobal PCP Charlotte UmañaDO jakob    MRN [id number]  Admission Date 11/23/2024      Chief Complaint/Reason for Admission:  Thai is a 44 y.o. male who presented today with abdominal pain    Subjective    Subjective   History of Presenting Illness  Mr. Thai Cristobal is a 44 y.o. male with a primary complaint of abdominal pain. 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  Patient reports that his abdominal pain began on the evening of 11/22 at approximately 10:30.  He describes his pain as diffuse cramping in the periumbilical region.  He says that this pain feels similar to prior bowel obstructions.  He has mild nausea, but has not vomited well.  He ate a bowl of cereal about 30 minutes before his pain started, which was his last meal.  Last bowel movement was earlier this afternoon.  He has still been passing gas.  He has had some diarrhea over the past few days, but thinks he might of just overdone it on his Imodium.  He denies any fevers, chills, headache, blurry vision, lightheadedness, upper respiratory symptoms, chest pain, shortness of breath, urinary symptoms.    Patient has a longstanding history of Crohn's disease, which ultimately resulted in anastomotic stricture. He underwent a partial right colectomy with additional small bowel resection and repair of colocolonic fistula on 5/28/2024 due to an anastomotic stricture related to his Crohn's.  Biopsies taken during that surgery were concerning for neuroendocrine tumor grade 3, thus prompting oncology follow-up.  He now follows with Dr. Nba Vargas.  Metastasis to the liver and mesenteric lymph nodes diagnosed in early September.  Liver biopsy performed in September which showed high-grade malignancy with focal adenocarcinoma-like features and  neuroendocrine differentiation.  Patient has been started on FOLFOX plus Bevacizumab, per chart review it appears that start date was 10/1.  The patient reports that his last chemotherapy treatment was about 10 days ago, on 11/12.  He did also get a dose of Neulasta on 11/14.    ED course:  V/S: On presentation to the ED, temp 36.6, HR 80, RR 16, /100, SpO2 99% on room air  Labs: CMP largely unremarkable, alkaline phosphatase 363, ALT 34, AST 30.  CBC showed leukocytosis of 24.4, absolute neutrophils 18.54.  Lipase elevated at 101.  Imaging: CT abdomen pelvis showed no evidence of acute inflammatory process.  There are a few loops of mildly distended and fluid-filled jejunum in the left abdomen as well as a possible zone of transition in the mid abdomen where there is decompression of the small bowel distal to the loop of fluid-filled small bowel with trace mesenteric congestion.  Intervention: Patient given doses of Zofran, morphine, Dilaudid for pain control.  Case discussed with general surgery who recommended bowel rest and decompression with NG tube, however patient declined NG tube.  He said he would reconsider if he became more nauseous and had episodes of vomiting.  At this time, he will be admitted to teaching service.    Past Medical History  Active Ambulatory Problems     Diagnosis Date Noted   • IDALIA positive 06/16/2023   • Anxiety 06/16/2023   • Attention deficit 06/16/2023   • Primary hypertension 06/16/2023   • Crohn's disease (Multi) 06/16/2023   • LAD (lymphadenopathy), cervical 06/16/2023   • Migraine with aura and without status migrainosus, not intractable 06/16/2023   • Obsessive behaviors 06/16/2023   • Panic attacks 06/16/2023   • PSVT (paroxysmal supraventricular tachycardia) (CMS-[facility]) 06/16/2023   • Hypopigmentation 08/22/2023   • Seasonal allergies 08/22/2023   • Chest pressure 01/24/2024   • Allergic rhinitis 01/29/2024   • Asthma 01/29/2024   • Atypical chest pain 01/24/2024   •  Depression 01/29/2024   • Mallet finger 02/23/2015   • Migraine without aura and responsive to treatment 06/16/2023   • Orthostatic hypotension 01/29/2024   • Palpitations 01/29/2024   • Urinary hesitancy 01/29/2024   • Positive antinuclear antibody 06/16/2023   • Attention disturbance 06/16/2023   • Disorder of pigmentation 08/22/2023   • Cervical lymphadenopathy 06/16/2023   • Obsessive behavior 06/16/2023   • Benign essential hypertension 06/16/2023   • Paroxysmal supraventricular tachycardia (CMS-[facility]) 06/16/2023   • Neuroendocrine tumor 06/18/2024   • Secondary neuroendocrine tumor of liver 09/10/2024   • Adenocarcinoma of small bowel (Multi) 09/24/2024     Resolved Ambulatory Problems     Diagnosis Date Noted   • Abdominal pain, acute 01/25/2024   • Diarrhea 01/29/2024   • Nausea and vomiting in adult 01/25/2024   • SBO (small bowel obstruction) (Multi) 01/25/2024   • Seizure (Multi) 01/29/2024   • Crohn disease (Multi) 12/26/2023   • Migraine 01/29/2024     Past Medical History:   Diagnosis Date   • Acute upper respiratory infection, unspecified 02/21/2018   • Personal history of other specified conditions 04/23/2018       Past Surgical History     Social History    Home Medication:  Prior to Admission medications    Medication Sig Start Date End Date Taking? Authorizing Provider   ALPRAZolam (Xanax) 0.5 mg tablet Take 1 tablet (0.5 mg) by mouth once daily as needed for anxiety. 10/21/24   Charlotteiqra Yan V, DO   FLUoxetine (PROzac) 20 mg capsule Take 1 capsule (20 mg) by mouth once daily. 8/22/24 2/18/25  Charlotte Yan V, DO   FLUoxetine (PROzac) 40 mg capsule TAKE 1 CAPSULE BY MOUTH ONCE DAILY 10/31/24   Charlotte Yan V, DO   loperamide (Imodium) 2 mg capsule Take 2 capsules (4 mg) by mouth with the first episode of diarrhea and 1 capsule (2 mg) by mouth with any additional episodes. Maximum 8 capsules (16 mg) per day. 9/24/24   Nba Vargas MD   NON FORMULARY Take 1 each by mouth once daily at bedtime. THC  "gummy  Patient not taking: Reported on 10/28/2024    Historical Provider, MD   ondansetron (Zofran) 8 mg tablet Take 1 tablet (8 mg) by mouth every 8 hours if needed for nausea or vomiting. 9/24/24   Nba Vargas MD   prochlorperazine (Compazine) 10 mg tablet Take 1 tablet (10 mg) by mouth every 6 hours if needed for nausea or vomiting. 9/24/24   Nba Vargas MD   SUMAtriptan (Imitrex) 50 mg tablet TAKE 1 TABLET (50 MG) BY MOUTH 1 TIME IF NEEDED FOR MIGRAINE FOR UP TO 36 DOSES. 10/24/24   Charlotte Yan V, DO        Family History  Noncontributory to current complaint.    Allergies:  No Known Allergies     Review of System:  Review of Systems   Constitutional:  Negative for appetite change, chills and fever.   HENT:  Negative for congestion, sinus pressure, sinus pain and sore throat.    Eyes:  Negative for visual disturbance.   Respiratory:  Negative for cough, shortness of breath and wheezing.    Cardiovascular:  Negative for chest pain, palpitations and leg swelling.   Gastrointestinal:  Positive for abdominal pain, diarrhea and nausea. Negative for constipation and vomiting.        Blood in stool (known active hemorrhoid)   Genitourinary:  Negative for difficulty urinating and dysuria.   Neurological:  Negative for dizziness, weakness, light-headedness, numbness and headaches.       Objective    Objective   Vital Signs:  Visit Vitals  /80 (BP Location: Left arm, Patient Position: Lying)   Pulse 96   Temp 36.6 °C (97.8 °F) (Temporal)   Resp 17   Ht 1.702 m (5' 7\")   Wt 66.7 kg (147 lb)   SpO2 95%   BMI 23.02 kg/m²   Smoking Status Never   BSA 1.78 m²        Physical Examination:    General: Not in acute distress though does appear uncomfortable during exam, A&O x3, alert, coopertive, well-developed  HEENT: Normocephalic, atraumatic, EOMI, moist mucous membranes  Neck: Neck supple, trachea midline, no evidence of trauma  Cardiovascular: RRR, S1 and S2 appreciated, no murmurs rubs gallops appreciated; " MediPort in place without surrounding erythema   Respiratory: Clear to auscultation bilaterally without wheezes, rales, rhonchi  GI: Abdomen soft, nondistended, some tenderness noted the epigastric and periumbilical region, no guarding or rebound, bowel sounds noted  Extremities: No edema appreciated in lower extremities bilaterally, no cyanosis  Neuro: A&O x3, no focal deficits, strength and sensation intact bilaterally  Skin: Warm and dry, without lesions or rashes  Psychiatric: Judgment intact.  Appropriate mood, affect and behavior.      Laboratory Data:  Results for orders placed or performed during the hospital encounter of 11/23/24 (from the past 24 hours)   CBC and Auto Differential   Result Value Ref Range    WBC 24.4 (H) 4.4 - 11.3 x10*3/uL    nRBC 0.0 0.0 - 0.0 /100 WBCs    RBC 5.20 4.50 - 5.90 x10*6/uL    Hemoglobin 14.6 13.5 - 17.5 g/dL    Hematocrit 44.6 41.0 - 52.0 %    MCV 86 80 - 100 fL    MCH 28.1 26.0 - 34.0 pg    MCHC 32.7 32.0 - 36.0 g/dL    RDW 16.9 (H) 11.5 - 14.5 %    Platelets 304 150 - 450 x10*3/uL    Neutrophils % 75.9 40.0 - 80.0 %    Immature Granulocytes %, Automated 1.1 (H) 0.0 - 0.9 %    Lymphocytes % 11.1 13.0 - 44.0 %    Monocytes % 10.1 2.0 - 10.0 %    Eosinophils % 1.1 0.0 - 6.0 %    Basophils % 0.7 0.0 - 2.0 %    Neutrophils Absolute 18.54 (H) 1.20 - 7.70 x10*3/uL    Immature Granulocytes Absolute, Automated 0.27 0.00 - 0.70 x10*3/uL    Lymphocytes Absolute 2.70 1.20 - 4.80 x10*3/uL    Monocytes Absolute 2.47 (H) 0.10 - 1.00 x10*3/uL    Eosinophils Absolute 0.26 0.00 - 0.70 x10*3/uL    Basophils Absolute 0.17 (H) 0.00 - 0.10 x10*3/uL   Comprehensive metabolic panel   Result Value Ref Range    Glucose 120 (H) 74 - 99 mg/dL    Sodium 136 136 - 145 mmol/L    Potassium 4.6 3.5 - 5.3 mmol/L    Chloride 99 98 - 107 mmol/L    Bicarbonate 30 21 - 32 mmol/L    Anion Gap 12 10 - 20 mmol/L    Urea Nitrogen 13 6 - 23 mg/dL    Creatinine 1.03 0.50 - 1.30 mg/dL    eGFR >90 >60 mL/min/1.73m*2     Calcium 9.4 8.6 - 10.3 mg/dL    Albumin 4.1 3.4 - 5.0 g/dL    Alkaline Phosphatase 363 (H) 33 - 120 U/L    Total Protein 7.2 6.4 - 8.2 g/dL    AST 30 9 - 39 U/L    Bilirubin, Total 0.3 0.0 - 1.2 mg/dL    ALT 34 10 - 52 U/L   Lipase   Result Value Ref Range    Lipase 101 (H) 9 - 82 U/L       Imaging:  CT abdomen pelvis w IV contrast    Addendum Date: 11/23/2024    Addendum: Per ordering physician, clinical concern is for small bowel obstruction.  In reviewing the exam, there are a few loops of mildly distended and fluid-filled jejunum in the left abdomen, at least some of which are likely related to peristalsis given the decompression of the small bowel both proximally and distally.  However, there does appear to be a possible zone of transition in the mid abdomen where there is decompression of the majority of the small bowel distal to a loop of mildly distended and fluid-filled small bowel with trace mesenteric congestion, raising the possibility of partial small bowel obstruction.  There is no pneumatosis, portal venous gas, or free intraperitoneal air.  Case and addendum discussed with Dr. Bell at 3:28 AM 11/23/2024. Signed by Cayden Everett MD    Result Date: 11/23/2024  STUDY: CT Abdomen and Pelvis with IV Contrast; 11/23/2024 02:41 AM INDICATION: Abdominal pain epigastric region with cramping.  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 COMPARISON: None. ACCESSION NUMBER(S): [id number] ORDERING CLINICIAN: AKIN FARNSWORTH TECHNIQUE: CT of the abdomen and pelvis was performed.  Contiguous axial images were obtained at 3 mm slice thickness through the abdomen and pelvis. Coronal and sagittal reconstructions at 3 mm slice thickness were performed.  75 mL Omnipaque-350 was administered intravenously. FINDINGS: LOWER CHEST: No cardiomegaly.  No pericardial effusion.  Lung bases are clear.  ABDOMEN:  LIVER: There are multiple ill-defined  low-attenuation lesions in the liver, measuring up to 1.9 x 1.6 cm.  No hepatomegaly.  Smooth surface contour.  BILE DUCTS: No intrahepatic or extrahepatic biliary ductal dilatation.  GALLBLADDER: Status post cholecystectomy with likely related lobular ductal dilatation.  STOMACH: No abnormalities identified.  PANCREAS: No masses or ductal dilatation.  SPLEEN: No splenomegaly or focal splenic lesion.  ADRENAL GLANDS: No thickening or nodules.  KIDNEYS AND URETERS: Kidneys are normal in size and location.  No renal or ureteral calculi.  PELVIS:  BLADDER: No abnormalities identified.  REPRODUCTIVE ORGANS: No abnormalities identified.  BOWEL: Moderate volume stool burden.  Appendix surgically absent.  VESSELS: No abnormalities identified.  Abdominal aorta is normal in caliber.  PERITONEUM/RETROPERITONEUM/LYMPH NODES: No free fluid.  No pneumoperitoneum. No lymphadenopathy.  ABDOMINAL WALL: No abnormalities identified. SOFT TISSUES: No abnormalities identified.  BONES: No acute fracture or aggressive osseous lesion.    No evidence of acute inflammatory process in the abdomen or pelvis. Moderate volume stool burden. Multiple ill-defined low-attenuation lesions in the liver, presumably metastatic disease. Signed by Cayden Everett MD      Medications:  Scheduled medications  lidocaine, 1 Application, urethral, Once      Continuous medications     PRN medications      Assessment    Assessment & Plan   Mr. Thai Cristobal is a 44 y.o. male who presents with abdominal pain.  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  Patient reports that his symptoms started the evening of 11/22 around 10:30 PM, and felt similar in nature to prior episodes of SBO.  Patient presented to [facility] ED, and imaging was concerning for possible small bowel obstruction.   Decision was made for admission for further management.    Principal Problem:    SBO (small bowel obstruction) (Multi)       # Partial small bowel obstruction  # Stage IV small bowel adenocarcinoma + neuroendocrine tumor with metastasis to the liver and lymph nodes  # S/p partial right colectomy and small bowel resection May 2024  # History of Crohn's disease  # Leukocytosis  Patient presents with symptoms indicative of small bowel obstruction, confirmed on imaging.  Patient does have a history of multiple small bowel obstructions the past year, as well as active malignancy of the small bowel with metastasis.  Patient does present with leukocytosis but otherwise does not have any signs or symptoms of an infectious process.  Low suspicion for bowel ischemia as the patient does not have any significant cardiovascular risk factors.  Leukocytosis could be secondary to recent dose of Neulasta.  Low suspicion for infectious etiology as he denies fevers or chills, and imaging was not suggestive of acute cholecystitis or pancreatitis or diverticulitis.  -CT abdomen and pelvis showed a few loops of mildly distended and fluid-filled bowel with a possible zone of transition in the mid abdomen with some mesenteric congestion noted, negative of early partial small bowel obstruction  -S/p Zofran, Dilaudid, morphine in the ED  -General Surgery was consulted in the ED, recommended conservative management    Plan:  -Recommend NG tube placement with low intermittent suction, however patient declined.  He agrees to reconsider if his nausea worsens.  -Bowel rest, patient to be kept n.p.o.  -Will initiate IVF, LR at 125 mL/h  -General surgery has been consulted, appreciate recommendations  -Serial abdominal exams  -Optimize electrolytes, K>4, Mg>2  -Pain regimen: Scheduled Tylenol.  Toradol every 6 hours for pain.  Recommend avoidance of opiate medication due to risk of constipation.  -Zofran first-line for nausea as needed, Reglan  second line  -Low suspicion for infection at this time, however will collect blood cultures      CHRONIC PROBLEMS:  # HTN  # Anxiety  # Migraines  -Continue home medications as indicated once med rec has been completed    Global Plan of Care  IVF: LR to 125 mL/h  Diet: N.p.o.  DVT prophylaxis: Lovenox  Dispo: Anticipate 2-3 midnight stays pending improvement small bowel obstruction  Consults: General Surgery  Code Status: Full code      Patient seen and to be staffed with attending.  Signature: Florence Barfield DO  Internal Medicine PGY-1 Resident  Date: November 23, 2024  ------------------------------  Senior resident addendum       I saw and examined the patient independently of the intern.    Plan was discussed with the intern.    Above reflects my input.      Job Tanner MD   Internal Medicine, PGY III

## 2024-11-23 NOTE — HOSPITAL COURSE
44 y.o. male with past medical history significant for stage IV small bowel carcinoma plus neuroendocrine tumor with metastasis to liver and lymph nodes, history of Crohn's disease s/p partial right colectomy and small bowel resection May 2024 who presented the evening of 11/22 around 10:30 PM with abdominal pain that felt similar in nature to prior episodes of SBO.  Imaging was concerning for possible small bowel obstruction.  Decision was made for admission for further management.  Patient continued to have nausea and vomiting overnight and he was agreeable to have nasogastric tube placed after initial decline.  Nasogastric tube provided relief of abdominal pain as reported by the patient (resulted in about 1550 mL).  Based on surgical team evaluation with the patient continue to improve, NG tube was clamped and monitored for worsening of symptoms.  On 11/25/2024, nurse reported that overnight the patient NG tube got kinked and drainage was interrupted.  During this time the patient reported feeling nauseous.  After adjusting the NG tube patient reported relief of his nausea and abdominal pain.  NG-tube resulted in 1800 mL of fluid drainage (total over the day 2800 mL).  Patient reported significant improvement in his abdominal pain.  Surgical team evaluated the patient and decided to continue conservative management with NG tube while giving the patient n.p.o. On 11/26/2024 CT abdomen that was done showed dilated small bowel measuring up to 4.5 cm in diameter, which  increased from prior study. Transition point in the left of the midline near the mid-distal jejunum with abrupt caliber change and swirling of the mesentery in this location.  However, abdominal x-ray  (11/27/2024) showed Interval resolution of previously noted dilated small bowel loops in the left upper quadrant with nonobstructive bowel gas pattern.  The patient was able to have a bowel movement and reported significant relief compared to  previously with reduction in the NG output.  Overnight from 11/27-11/28, patient had significant decreased in NG output and tolerated soft food diet after NG tube was removed. Patient was discharged in stable condition home.

## 2024-11-23 NOTE — CONSULTS
"Reason For Consult  SBO    History Of Present Illness  Thai Cristobal is a 44 y.o. male presenting with abdominal pain and nausea.  No vomiting.  Is passing flatus.  On my evaluation his pain had resolved.  He had refused NG tube.  I reviewed his presenting CT and previous and there is not much difference.  The previous was done for follow up of his CA.  He has had SBO in the past and said this pain was different.  He reports prior to this situation he had taken a bit more immodium to control his diarrhea and maybe he took too much.       Past Medical History  He has a past medical history of Acute upper respiratory infection, unspecified (02/21/2018), Crohn's disease (Multi), Personal history of other specified conditions (04/23/2018), and SBO (small bowel obstruction) (Multi) (01/25/2024). Cancer of neuroendocrine type and metastatic.    Surgical History  He has a past surgical history that includes Gallbladder surgery (02/23/2015); Other surgical history (04/16/2021); Colon surgery; and Colectomy partial / total.     Social History  He reports that he has never smoked. He has never used smokeless tobacco. He reports that he does not currently use alcohol. He reports that he does not currently use drugs.    Family History  Family History   Problem Relation Name Age of Onset    Thyroid disease Mother      Other (bladder cancer) Mother      Hypertension Father      Benign prostatic hyperplasia Father      Anxiety disorder Sister      No Known Problems Brother      No Known Problems Daughter      Heart failure Paternal Grandfather          Allergies  Patient has no known allergies.     Physical Exam  Abd: soft, nontender, nondistended     Last Recorded Vitals  Blood pressure 122/83, pulse 88, temperature 36.7 °C (98.1 °F), temperature source Temporal, resp. rate 17, height 1.702 m (5' 7\"), weight 66.7 kg (147 lb), SpO2 98%.    Relevant Results  Results for orders placed or performed during the hospital encounter of " 11/23/24 (from the past 24 hours)   CBC and Auto Differential   Result Value Ref Range    WBC 24.4 (H) 4.4 - 11.3 x10*3/uL    nRBC 0.0 0.0 - 0.0 /100 WBCs    RBC 5.20 4.50 - 5.90 x10*6/uL    Hemoglobin 14.6 13.5 - 17.5 g/dL    Hematocrit 44.6 41.0 - 52.0 %    MCV 86 80 - 100 fL    MCH 28.1 26.0 - 34.0 pg    MCHC 32.7 32.0 - 36.0 g/dL    RDW 16.9 (H) 11.5 - 14.5 %    Platelets 304 150 - 450 x10*3/uL    Neutrophils % 75.9 40.0 - 80.0 %    Immature Granulocytes %, Automated 1.1 (H) 0.0 - 0.9 %    Lymphocytes % 11.1 13.0 - 44.0 %    Monocytes % 10.1 2.0 - 10.0 %    Eosinophils % 1.1 0.0 - 6.0 %    Basophils % 0.7 0.0 - 2.0 %    Neutrophils Absolute 18.54 (H) 1.20 - 7.70 x10*3/uL    Immature Granulocytes Absolute, Automated 0.27 0.00 - 0.70 x10*3/uL    Lymphocytes Absolute 2.70 1.20 - 4.80 x10*3/uL    Monocytes Absolute 2.47 (H) 0.10 - 1.00 x10*3/uL    Eosinophils Absolute 0.26 0.00 - 0.70 x10*3/uL    Basophils Absolute 0.17 (H) 0.00 - 0.10 x10*3/uL   Comprehensive metabolic panel   Result Value Ref Range    Glucose 120 (H) 74 - 99 mg/dL    Sodium 136 136 - 145 mmol/L    Potassium 4.6 3.5 - 5.3 mmol/L    Chloride 99 98 - 107 mmol/L    Bicarbonate 30 21 - 32 mmol/L    Anion Gap 12 10 - 20 mmol/L    Urea Nitrogen 13 6 - 23 mg/dL    Creatinine 1.03 0.50 - 1.30 mg/dL    eGFR >90 >60 mL/min/1.73m*2    Calcium 9.4 8.6 - 10.3 mg/dL    Albumin 4.1 3.4 - 5.0 g/dL    Alkaline Phosphatase 363 (H) 33 - 120 U/L    Total Protein 7.2 6.4 - 8.2 g/dL    AST 30 9 - 39 U/L    Bilirubin, Total 0.3 0.0 - 1.2 mg/dL    ALT 34 10 - 52 U/L   Lipase   Result Value Ref Range    Lipase 101 (H) 9 - 82 U/L   Magnesium   Result Value Ref Range    Magnesium 2.27 1.60 - 2.40 mg/dL   CBC and Auto Differential   Result Value Ref Range    WBC 28.7 (H) 4.4 - 11.3 x10*3/uL    nRBC 0.0 0.0 - 0.0 /100 WBCs    RBC 5.06 4.50 - 5.90 x10*6/uL    Hemoglobin 14.0 13.5 - 17.5 g/dL    Hematocrit 43.0 41.0 - 52.0 %    MCV 85 80 - 100 fL    MCH 27.7 26.0 - 34.0 pg     MCHC 32.6 32.0 - 36.0 g/dL    RDW 17.1 (H) 11.5 - 14.5 %    Platelets 307 150 - 450 x10*3/uL    Neutrophils % 85.8 40.0 - 80.0 %    Immature Granulocytes %, Automated 2.4 (H) 0.0 - 0.9 %    Lymphocytes % 5.2 13.0 - 44.0 %    Monocytes % 5.8 2.0 - 10.0 %    Eosinophils % 0.2 0.0 - 6.0 %    Basophils % 0.6 0.0 - 2.0 %    Neutrophils Absolute 24.62 (H) 1.20 - 7.70 x10*3/uL    Immature Granulocytes Absolute, Automated 0.69 0.00 - 0.70 x10*3/uL    Lymphocytes Absolute 1.50 1.20 - 4.80 x10*3/uL    Monocytes Absolute 1.68 (H) 0.10 - 1.00 x10*3/uL    Eosinophils Absolute 0.06 0.00 - 0.70 x10*3/uL    Basophils Absolute 0.17 (H) 0.00 - 0.10 x10*3/uL   Comprehensive metabolic panel   Result Value Ref Range    Glucose 116 (H) 74 - 99 mg/dL    Sodium 136 136 - 145 mmol/L    Potassium 4.2 3.5 - 5.3 mmol/L    Chloride 98 98 - 107 mmol/L    Bicarbonate 30 21 - 32 mmol/L    Anion Gap 12 10 - 20 mmol/L    Urea Nitrogen 13 6 - 23 mg/dL    Creatinine 0.97 0.50 - 1.30 mg/dL    eGFR >90 >60 mL/min/1.73m*2    Calcium 9.2 8.6 - 10.3 mg/dL    Albumin 3.9 3.4 - 5.0 g/dL    Alkaline Phosphatase 318 (H) 33 - 120 U/L    Total Protein 6.8 6.4 - 8.2 g/dL    AST 27 9 - 39 U/L    Bilirubin, Total 0.3 0.0 - 1.2 mg/dL    ALT 30 10 - 52 U/L   Blood Culture    Specimen: Peripheral Venipuncture; Blood culture   Result Value Ref Range    Blood Culture Loaded on Instrument - Culture in progress    Blood Culture    Specimen: Peripheral Venipuncture; Blood culture   Result Value Ref Range    Blood Culture Loaded on Instrument - Culture in progress    Urinalysis with Reflex Culture and Microscopic   Result Value Ref Range    Color, Urine Yellow Light-Yellow, Yellow, Dark-Yellow    Appearance, Urine Clear Clear    Specific Gravity, Urine >1.050 (N) 1.005 - 1.035    pH, Urine 6.0 5.0, 5.5, 6.0, 6.5, 7.0, 7.5, 8.0    Protein, Urine 20 (TRACE) NEGATIVE, 10 (TRACE), 20 (TRACE) mg/dL    Glucose, Urine Normal Normal mg/dL    Blood, Urine NEGATIVE NEGATIVE     Ketones, Urine NEGATIVE NEGATIVE mg/dL    Bilirubin, Urine NEGATIVE NEGATIVE    Urobilinogen, Urine Normal Normal mg/dL    Nitrite, Urine NEGATIVE NEGATIVE    Leukocyte Esterase, Urine NEGATIVE NEGATIVE   Urinalysis Microscopic   Result Value Ref Range    WBC, Urine NONE 1-5, NONE /HPF    RBC, Urine 1-2 NONE, 1-2, 3-5 /HPF    Mucus, Urine FEW Reference range not established. /LPF    Hyaline Casts, Urine OCCASIONAL (A) NONE /LPF     CT abdomen pelvis w IV contrast    Addendum Date: 11/23/2024    Addendum: Per ordering physician, clinical concern is for small bowel obstruction.  In reviewing the exam, there are a few loops of mildly distended and fluid-filled jejunum in the left abdomen, at least some of which are likely related to peristalsis given the decompression of the small bowel both proximally and distally.  However, there does appear to be a possible zone of transition in the mid abdomen where there is decompression of the majority of the small bowel distal to a loop of mildly distended and fluid-filled small bowel with trace mesenteric congestion, raising the possibility of partial small bowel obstruction.  There is no pneumatosis, portal venous gas, or free intraperitoneal air.  Case and addendum discussed with Dr. Bell at 3:28 AM 11/23/2024. Signed by Cayden Everett MD    Result Date: 11/23/2024  STUDY: CT Abdomen and Pelvis with IV Contrast; 11/23/2024 02:41 AM INDICATION: Abdominal pain epigastric region with cramping.  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 COMPARISON: None. ACCESSION NUMBER(S): [id number] ORDERING CLINICIAN: AKIN FARNSWORTH TECHNIQUE: CT of the abdomen and pelvis was performed.  Contiguous axial images were obtained at 3 mm slice thickness through the abdomen and pelvis. Coronal and sagittal reconstructions at 3 mm slice thickness were performed.  75 mL Omnipaque-350 was administered intravenously. FINDINGS:  LOWER CHEST: No cardiomegaly.  No pericardial effusion.  Lung bases are clear.  ABDOMEN:  LIVER: There are multiple ill-defined low-attenuation lesions in the liver, measuring up to 1.9 x 1.6 cm.  No hepatomegaly.  Smooth surface contour.  BILE DUCTS: No intrahepatic or extrahepatic biliary ductal dilatation.  GALLBLADDER: Status post cholecystectomy with likely related lobular ductal dilatation.  STOMACH: No abnormalities identified.  PANCREAS: No masses or ductal dilatation.  SPLEEN: No splenomegaly or focal splenic lesion.  ADRENAL GLANDS: No thickening or nodules.  KIDNEYS AND URETERS: Kidneys are normal in size and location.  No renal or ureteral calculi.  PELVIS:  BLADDER: No abnormalities identified.  REPRODUCTIVE ORGANS: No abnormalities identified.  BOWEL: Moderate volume stool burden.  Appendix surgically absent.  VESSELS: No abnormalities identified.  Abdominal aorta is normal in caliber.  PERITONEUM/RETROPERITONEUM/LYMPH NODES: No free fluid.  No pneumoperitoneum. No lymphadenopathy.  ABDOMINAL WALL: No abnormalities identified. SOFT TISSUES: No abnormalities identified.  BONES: No acute fracture or aggressive osseous lesion.    No evidence of acute inflammatory process in the abdomen or pelvis. Moderate volume stool burden. Multiple ill-defined low-attenuation lesions in the liver, presumably metastatic disease. Signed by Cayden Everett MD   I have also reviewed the CT personally. And though I see mild bowel dilation, it is not severe and only slightly more dialted than previous CT.     Assessment/Plan     SBO - either too much immodium or is already resolving.  Ok for no NG tube.  Ok to trial clears later today if no vomting and is passing flatus.    I spent 30 minutes in the professional and overall care of this patient.      Tomy Santamaria MD

## 2024-11-23 NOTE — ED PROVIDER NOTES
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  He has been passing gas, he says it does feel similar to prior bowel obstruction, does have a history of a colectomy.  No nausea, vomiting.  No chest pain or shortness of breath, no fever, no URI symptoms, no other symptoms today but he is on chemotherapy, does get nutrition injections as well.  No black or bloody stools, no other symptoms.  Took oxycodone at home without relief of pain.          Nursing Notes were reviewed.    PAST MEDICAL HISTORY     Past Medical History:   Diagnosis Date    Acute upper respiratory infection, unspecified 02/21/2018    Acute URI    Crohn's disease (Multi)     Personal history of other specified conditions 04/23/2018    History of vertigo    SBO (small bowel obstruction) (Multi) 01/25/2024         SURGICAL HISTORY       Past Surgical History:   Procedure Laterality Date    COLECTOMY PARTIAL / TOTAL      COLON SURGERY      GALLBLADDER SURGERY  02/23/2015    Gallbladder Surgery    OTHER SURGICAL HISTORY  04/16/2021    Small bowel resection         CURRENT MEDICATIONS       Previous Medications    ALPRAZOLAM (XANAX) 0.5 MG TABLET    Take 1 tablet (0.5 mg) by mouth once daily as needed for anxiety.    FLUOXETINE (PROZAC) 20 MG CAPSULE    Take 1 capsule (20 mg) by mouth once daily.    FLUOXETINE (PROZAC) 40 MG CAPSULE    TAKE 1 CAPSULE BY MOUTH ONCE DAILY    LOPERAMIDE (IMODIUM) 2 MG CAPSULE    Take 2 capsules (4 mg) by mouth with the  first episode of diarrhea and 1 capsule (2 mg) by mouth with any additional episodes. Maximum 8 capsules (16 mg) per day.    NON FORMULARY    Take 1 each by mouth once daily at bedtime. THC gummy    ONDANSETRON (ZOFRAN) 8 MG TABLET    Take 1 tablet (8 mg) by mouth every 8 hours if needed for nausea or vomiting.    PROCHLORPERAZINE (COMPAZINE) 10 MG TABLET    Take 1 tablet (10 mg) by mouth every 6 hours if needed for nausea or vomiting.    SUMATRIPTAN (IMITREX) 50 MG TABLET    TAKE 1 TABLET (50 MG) BY MOUTH 1 TIME IF NEEDED FOR MIGRAINE FOR UP TO 36 DOSES.       ALLERGIES     Patient has no known allergies.    FAMILY HISTORY       Family History   Problem Relation Name Age of Onset    Thyroid disease Mother      Other (bladder cancer) Mother      Hypertension Father      Benign prostatic hyperplasia Father      Anxiety disorder Sister      No Known Problems Brother      No Known Problems Daughter      Heart failure Paternal Grandfather            SOCIAL HISTORY       Social History     Socioeconomic History    Marital status:     Number of children: 1   Tobacco Use    Smoking status: Never    Smokeless tobacco: Never   Vaping Use    Vaping status: Never Used   Substance and Sexual Activity    Alcohol use: Not Currently     Comment: was drinking 1-3 drinks/daily    Drug use: Not Currently    Sexual activity: Yes     Partners: Female       SCREENINGS                        PHYSICAL EXAM    (up to 7 for level 4, 8 or more for level 5)     ED Triage Vitals [11/23/24 0123]   Temperature Heart Rate Respirations BP   36.6 °C (97.8 °F) 80 16 (!) 172/100      Pulse Ox Temp Source Heart Rate Source Patient Position   99 % Temporal Monitor Sitting      BP Location FiO2 (%)     Right arm --       Physical Exam  Vitals and nursing note reviewed.   Constitutional:       General: He is not in acute distress.  HENT:      Head: Normocephalic and atraumatic.   Eyes:      General: No scleral icterus.        Right eye: No  discharge.         Left eye: No discharge.      Conjunctiva/sclera: Conjunctivae normal.   Cardiovascular:      Rate and Rhythm: Normal rate and regular rhythm.      Pulses: Normal pulses.   Pulmonary:      Effort: Pulmonary effort is normal.   Abdominal:      General: Abdomen is flat.      Palpations: Abdomen is soft.      Tenderness: There is abdominal tenderness in the epigastric area. There is no guarding or rebound.   Musculoskeletal:         General: No deformity.      Right lower leg: No edema.      Left lower leg: No edema.   Skin:     General: Skin is warm and dry.   Neurological:      Mental Status: He is alert and oriented to person, place, and time. Mental status is at baseline.   Psychiatric:         Mood and Affect: Mood normal.         Behavior: Behavior normal.          DIAGNOSTIC RESULTS     LABS:  Labs Reviewed   CBC WITH AUTO DIFFERENTIAL - Abnormal       Result Value    WBC 24.4 (*)     nRBC 0.0      RBC 5.20      Hemoglobin 14.6      Hematocrit 44.6      MCV 86      MCH 28.1      MCHC 32.7      RDW 16.9 (*)     Platelets 304      Neutrophils % 75.9      Immature Granulocytes %, Automated 1.1 (*)     Lymphocytes % 11.1      Monocytes % 10.1      Eosinophils % 1.1      Basophils % 0.7      Neutrophils Absolute 18.54 (*)     Immature Granulocytes Absolute, Automated 0.27      Lymphocytes Absolute 2.70      Monocytes Absolute 2.47 (*)     Eosinophils Absolute 0.26      Basophils Absolute 0.17 (*)    COMPREHENSIVE METABOLIC PANEL - Abnormal    Glucose 120 (*)     Sodium 136      Potassium 4.6      Chloride 99      Bicarbonate 30      Anion Gap 12      Urea Nitrogen 13      Creatinine 1.03      eGFR >90      Calcium 9.4      Albumin 4.1      Alkaline Phosphatase 363 (*)     Total Protein 7.2      AST 30      Bilirubin, Total 0.3      ALT 34     LIPASE - Abnormal    Lipase 101 (*)     Narrative:     Venipuncture immediately after or during the administration of Metamizole may lead to falsely low  results. Testing should be performed immediately prior to Metamizole dosing.   URINALYSIS WITH REFLEX CULTURE AND MICROSCOPIC    Narrative:     The following orders were created for panel order Urinalysis with Reflex Culture and Microscopic.  Procedure                               Abnormality         Status                     ---------                               -----------         ------                     Urinalysis with Reflex C...[[id number]]                                                 Extra Urine Gray Tube[[id number]]                                                         Please view results for these tests on the individual orders.   URINALYSIS WITH REFLEX CULTURE AND MICROSCOPIC   EXTRA URINE GRAY TUBE       All other labs were within normal range or not returned as of this dictation.    Imaging  CT abdomen pelvis w IV contrast   Final Result   Addendum (preliminary) 1 of 1   Addendum:   Per ordering physician, clinical concern is for small bowel   obstruction.  In reviewing the exam, there are a few loops of mildly   distended and fluid-filled jejunum in the left abdomen, at least some   of which are likely related to peristalsis given the decompression of   the small bowel both proximally and distally.  However, there does   appear to be a possible zone of transition in the mid abdomen where   there is decompression of the majority of the small bowel distal to a   loop of mildly distended and fluid-filled small bowel with trace   mesenteric congestion, raising the possibility of partial small bowel   obstruction.  There is no pneumatosis, portal venous gas, or free   intraperitoneal air.  Case and addendum discussed with Dr. Bell at   3:28 AM 11/23/2024.   Signed by Cayden Everett MD      Final   No evidence of acute inflammatory process in the abdomen or pelvis.   Moderate volume stool burden.   Multiple ill-defined low-attenuation lesions in the liver, presumably   metastatic disease.   Signed  by Cayden Everett MD           Procedures  Procedures     EMERGENCY DEPARTMENT COURSE/MDM:     ED Course as of 11/23/24 0412   Sat Nov 23, 2024   0350 Surgery Dr. Santamaria, said he will see the patient on consult. [RD]   0350 Patient did ask to forego NG tube for now, give a trial of n.p.o., bowel rest, however said if he vomits he will be amenable to an NG-tube.  Said he had a bad experience with them in the past [RD]      ED Course User Index  [RD] Benjie Torres, DO         Diagnoses as of 11/23/24 0412   SBO (small bowel obstruction) (Multi)        Medical Decision Making  44-year-old male, history of adenocarcinoma of the small bowel with mets to the liver, mesenteric lymph nodes, comes to emergency with abdominal pain getting progressively worse over the last 12 hours and even more pain in the last hour or so, has a history of multiple small bowel obstructions due to multiple abdominal surgeries.  Did get CBC, chemistry, lipase, UA, CT abdomen and pelvis on the patient.    On my independent review of labs, CBC does show leukocytosis 24.4, I do not believe this is secondary to infectious etiology he is afebrile here, I do believe this is secondary to Neutrogen injections that he gets as a part of his chemotherapy regimen.  Otherwise hemoglobin normal, platelets are normal.  Chemistry shows normal electrolytes, renal function, hepatic function as well and his alkaline phosphatase is elevated likely side effect of his chemotherapy.  Lipase slightly elevated, no CT evidence for pancreatitis.  CT does show a developing small bowel obstruction, surgery consulted, will see the patient on consult.  Patient did forego an NG tube at this time said he has had bad experiences with them in the past, said he would like to try n.p.o., bowel rest and will consent to an NG tube if he started vomiting.    Patient also admitted to the hospital for further management after being given a dose of 4 mg IV morphine for pain control, 4  mg IV Zofran for nausea control, and half a milligram of Dilaudid IV for additional pain control.        Patient and or family in agreement and understanding of treatment plan.  All questions answered.      I reviewed the case with the attending ED physician. The attending ED physician agrees with the plan. Patient and/or patient´s representative was counseled regarding labs, imaging, likely diagnosis, and plan. All questions were answered.    ED Medications administered this visit:    Medications   lidocaine 2 % mucosal jelly (Uro-Jet) 1 Application (has no administration in time range)   ondansetron (Zofran) injection 4 mg (4 mg intravenous Given 11/23/24 0145)   morphine injection 4 mg (4 mg intravenous Given 11/23/24 0147)   iohexol (OMNIPaque) 350 mg iodine/mL solution 75 mL (75 mL intravenous Given 11/23/24 0235)   HYDROmorphone (Dilaudid) injection 0.5 mg (0.5 mg intravenous Given 11/23/24 0243)     Final Impression:   1. SBO (small bowel obstruction) (Multi)          (Please note that portions of this note were completed with a voice recognition program.  Efforts were made to edit the dictations but occasionally words are mis-transcribed.)     Benjie Torres DO  Resident  11/23/24 8589    I performed a history and physical examination of the patient and discussed his management with the resident physician.  I agree with the history, physical, assessment, and plan of care, with the following exceptions:   None    I was present for key and critical portions of the following procedures: None  Time Spent in Critical Care of the patient: None  Time spent in discussions with the patient and family: 30    DO Jon Ching DO  11/23/24 1531

## 2024-11-24 ENCOUNTER — APPOINTMENT (OUTPATIENT)
Dept: RADIOLOGY | Facility: HOSPITAL | Age: 44
End: 2024-11-24
Payer: COMMERCIAL

## 2024-11-24 VITALS
OXYGEN SATURATION: 96 % | TEMPERATURE: 97.5 F | BODY MASS INDEX: 23.07 KG/M2 | WEIGHT: 147 LBS | HEART RATE: 93 BPM | DIASTOLIC BLOOD PRESSURE: 84 MMHG | HEIGHT: 67 IN | RESPIRATION RATE: 16 BRPM | SYSTOLIC BLOOD PRESSURE: 142 MMHG

## 2024-11-24 LAB
ANION GAP SERPL CALC-SCNC: 13 MMOL/L (ref 10–20)
BACTERIA BLD CULT: NORMAL
BACTERIA BLD CULT: NORMAL
BUN SERPL-MCNC: 21 MG/DL (ref 6–23)
CALCIUM SERPL-MCNC: 9.4 MG/DL (ref 8.6–10.3)
CHLORIDE SERPL-SCNC: 97 MMOL/L (ref 98–107)
CO2 SERPL-SCNC: 30 MMOL/L (ref 21–32)
CREAT SERPL-MCNC: 1.18 MG/DL (ref 0.5–1.3)
EGFRCR SERPLBLD CKD-EPI 2021: 78 ML/MIN/1.73M*2
ERYTHROCYTE [DISTWIDTH] IN BLOOD BY AUTOMATED COUNT: 17.1 % (ref 11.5–14.5)
GLUCOSE SERPL-MCNC: 92 MG/DL (ref 74–99)
HCT VFR BLD AUTO: 44.3 % (ref 41–52)
HGB BLD-MCNC: 14.6 G/DL (ref 13.5–17.5)
MAGNESIUM SERPL-MCNC: 1.92 MG/DL (ref 1.6–2.4)
MCH RBC QN AUTO: 28.1 PG (ref 26–34)
MCHC RBC AUTO-ENTMCNC: 33 G/DL (ref 32–36)
MCV RBC AUTO: 85 FL (ref 80–100)
NRBC BLD-RTO: 0 /100 WBCS (ref 0–0)
PLATELET # BLD AUTO: 269 X10*3/UL (ref 150–450)
POTASSIUM SERPL-SCNC: 4.5 MMOL/L (ref 3.5–5.3)
RBC # BLD AUTO: 5.19 X10*6/UL (ref 4.5–5.9)
SODIUM SERPL-SCNC: 135 MMOL/L (ref 136–145)
WBC # BLD AUTO: 38.7 X10*3/UL (ref 4.4–11.3)

## 2024-11-24 PROCEDURE — 2500000004 HC RX 250 GENERAL PHARMACY W/ HCPCS (ALT 636 FOR OP/ED)

## 2024-11-24 PROCEDURE — 83735 ASSAY OF MAGNESIUM: CPT | Performed by: STUDENT IN AN ORGANIZED HEALTH CARE EDUCATION/TRAINING PROGRAM

## 2024-11-24 PROCEDURE — 99232 SBSQ HOSP IP/OBS MODERATE 35: CPT | Performed by: SURGERY

## 2024-11-24 PROCEDURE — 85027 COMPLETE CBC AUTOMATED: CPT | Performed by: STUDENT IN AN ORGANIZED HEALTH CARE EDUCATION/TRAINING PROGRAM

## 2024-11-24 PROCEDURE — 74018 RADEX ABDOMEN 1 VIEW: CPT | Performed by: STUDENT IN AN ORGANIZED HEALTH CARE EDUCATION/TRAINING PROGRAM

## 2024-11-24 PROCEDURE — 2500000001 HC RX 250 WO HCPCS SELF ADMINISTERED DRUGS (ALT 637 FOR MEDICARE OP)

## 2024-11-24 PROCEDURE — 74018 RADEX ABDOMEN 1 VIEW: CPT

## 2024-11-24 PROCEDURE — 99233 SBSQ HOSP IP/OBS HIGH 50: CPT | Performed by: STUDENT IN AN ORGANIZED HEALTH CARE EDUCATION/TRAINING PROGRAM

## 2024-11-24 PROCEDURE — 1100000001 HC PRIVATE ROOM DAILY

## 2024-11-24 PROCEDURE — 80048 BASIC METABOLIC PNL TOTAL CA: CPT | Performed by: STUDENT IN AN ORGANIZED HEALTH CARE EDUCATION/TRAINING PROGRAM

## 2024-11-24 RX ORDER — ACETAMINOPHEN 160 MG/5ML
975 SOLUTION ORAL EVERY 8 HOURS
Status: DISCONTINUED | OUTPATIENT
Start: 2024-11-24 | End: 2024-11-28 | Stop reason: HOSPADM

## 2024-11-24 RX ADMIN — METOCLOPRAMIDE 10 MG: 5 INJECTION, SOLUTION INTRAMUSCULAR; INTRAVENOUS at 01:12

## 2024-11-24 RX ADMIN — ENOXAPARIN SODIUM 40 MG: 40 INJECTION SUBCUTANEOUS at 11:07

## 2024-11-24 RX ADMIN — FLUOXETINE HYDROCHLORIDE 20 MG: 20 CAPSULE ORAL at 21:03

## 2024-11-24 RX ADMIN — PANTOPRAZOLE SODIUM 40 MG: 40 INJECTION, POWDER, FOR SOLUTION INTRAVENOUS at 06:27

## 2024-11-24 RX ADMIN — KETOROLAC TROMETHAMINE 30 MG: 30 INJECTION, SOLUTION INTRAMUSCULAR at 06:50

## 2024-11-24 RX ADMIN — ALPRAZOLAM 0.5 MG: 0.5 TABLET ORAL at 01:09

## 2024-11-24 RX ADMIN — FLUOXETINE HYDROCHLORIDE 40 MG: 20 CAPSULE ORAL at 21:03

## 2024-11-24 RX ADMIN — LISINOPRIL 40 MG: 40 TABLET ORAL at 21:03

## 2024-11-24 RX ADMIN — ONDANSETRON 4 MG: 2 INJECTION INTRAMUSCULAR; INTRAVENOUS at 09:21

## 2024-11-24 RX ADMIN — SODIUM CHLORIDE, POTASSIUM CHLORIDE, SODIUM LACTATE AND CALCIUM CHLORIDE 125 ML/HR: 600; 310; 30; 20 INJECTION, SOLUTION INTRAVENOUS at 06:50

## 2024-11-24 RX ADMIN — SODIUM CHLORIDE, POTASSIUM CHLORIDE, SODIUM LACTATE AND CALCIUM CHLORIDE 125 ML/HR: 600; 310; 30; 20 INJECTION, SOLUTION INTRAVENOUS at 23:55

## 2024-11-24 RX ADMIN — KETOROLAC TROMETHAMINE 30 MG: 30 INJECTION, SOLUTION INTRAMUSCULAR at 00:51

## 2024-11-24 RX ADMIN — ACETAMINOPHEN 1000 MG: 650 SUSPENSION ORAL at 21:03

## 2024-11-24 ASSESSMENT — PAIN SCALES - GENERAL
PAINLEVEL_OUTOF10: 2
PAINLEVEL_OUTOF10: 3
PAINLEVEL_OUTOF10: 5 - MODERATE PAIN

## 2024-11-24 ASSESSMENT — PAIN DESCRIPTION - LOCATION
LOCATION: ABDOMEN
LOCATION: ABDOMEN
LOCATION: HEAD

## 2024-11-24 ASSESSMENT — PAIN SCALES - PAIN ASSESSMENT IN ADVANCED DEMENTIA (PAINAD): TOTALSCORE: MEDICATION (SEE MAR)

## 2024-11-24 ASSESSMENT — COGNITIVE AND FUNCTIONAL STATUS - GENERAL: MOBILITY SCORE: 24

## 2024-11-24 ASSESSMENT — PAIN - FUNCTIONAL ASSESSMENT
PAIN_FUNCTIONAL_ASSESSMENT: 0-10

## 2024-11-24 NOTE — PROGRESS NOTES
"ID:  Thai Cristobal is a 44 y.o. male on hospital day 1 of admission presenting with SBO (small bowel obstruction) (Multi).    Subjective   The patient seen and examined this morning at bedside.  Overnight patient continued to have nausea and vomiting.  He agreed to have nasogastric tube inserted.  This morning he reported abdominal relief post NG tube insertion.  Additionally p.o. Tylenol was switched to liquid Tylenol due to difficulty swallowing pills.  Furthermore, he reported having some abdominal cramps that causes pain pain rating 6-7/10, however during evaluation this morning he only had pain rating 2 out of 10.  He reported that he passed gas 1 time yesterday and another time this morning.  He reported concern regarding missing his chemotherapy appointment tomorrow which he would probably will not be able to attend based on surgical team evaluation.  Surgery team evaluated the patient this morning and discussed trial of clamping the NG tube if the patient condition continues to improve.  However, if his condition starts to worsen they recommended small bowel follow-through to assess for need any surgical intervention.  Communication with pharmacy was done regarding use of black seed oil and the patient will be okay to take it.      Objective     Visit Vitals  /86   Pulse 80   Temp 36.8 °C (98.2 °F) (Temporal)   Resp 16   Ht 1.702 m (5' 7\")   Wt 66.7 kg (147 lb)   SpO2 97%   BMI 23.02 kg/m²   Smoking Status Never   BSA 1.78 m²        Physical Examination:  General: Appears stated age, well nourished, A&Ox4, conversational, lying comfortably in bed, not in pain or distress. On RA.  Nasogastric tube in place.  HEENT: Mucous membranes moist.  Head/Neck: Atraumatic, Normocephalic. Neck supple.   Respiratory: equal air entry bilaterally no crackles or wheezing were appreciated.  Cardiovascular: regular rhythm, normal S1 and S2. No added sounds or murmurs,  Gastrointestinal: soft, non-tender abdomen, bowel " sounds present, no guarding or rebound.   Extremities: No edema in lower extremities.  Neurological:  no focal neurologic deficits.  Psychological: Appropriate mood, normal affect.      Laboratory Data:    Results from last 7 days   Lab Units 11/24/24  0537 11/23/24  0618 11/23/24  0143   WBC AUTO x10*3/uL 38.7* 28.7* 24.4*   RBC AUTO x10*6/uL 5.19 5.06 5.20   HEMOGLOBIN g/dL 14.6 14.0 14.6     Results from last 7 days   Lab Units 11/24/24  0537 11/23/24  0618 11/23/24  0143 11/22/24  1150   SODIUM mmol/L 135* 136 136 135*   POTASSIUM mmol/L 4.5 4.2 4.6 4.4   CHLORIDE mmol/L 97* 98 99 99   CO2 mmol/L 30 30 30 30   BUN mg/dL 21 13 13 16   CREATININE mg/dL 1.18 0.97 1.03 0.95   CALCIUM mg/dL 9.4 9.2 9.4 9.4   MAGNESIUM mg/dL 1.92  --  2.27  --    BILIRUBIN TOTAL mg/dL  --  0.3 0.3 0.3   ALT U/L  --  30 34 31   AST U/L  --  27 30 30       Imaging:  XR abdomen 1 view    Result Date: 11/24/2024  Interpreted By:  Morgan Maier, STUDY: XR ABDOMEN 1 VIEW;  11/24/2024 4:06 am   INDICATION: Signs/Symptoms:Verify NG tube placement.     COMPARISON: None.   ACCESSION NUMBER(S): [id number]   ORDERING CLINICIAN: MELI JORGE   FINDINGS: - NG/OG tube termination: Gastric fundus   Dilated small bowel loops measuring up to 5 cm are noted.   There is a mild colonic stool burden.   There is no evidence of free intraperitoneal air.   No pneumatosis or portal venous gas identified. Cholecystectomy clips are noted. Right lower quadrant abdominal staples are noted from prior bowel resections.   No acute osseous abnormalities.   The lung bases are clear.       Nasogastric tube terminates over the gastric fundus.   Dilated left upper quadrant small bowel loops measuring up to 5 cm concerning for bowel obstruction.   MACRO: None.   Signed by: Morgan Maier 11/24/2024 4:18 AM Dictation workstation:   [id number]    CT abdomen pelvis w IV contrast    Addendum Date: 11/23/2024    Addendum: Per ordering physician, clinical concern is for  small bowel obstruction.  In reviewing the exam, there are a few loops of mildly distended and fluid-filled jejunum in the left abdomen, at least some of which are likely related to peristalsis given the decompression of the small bowel both proximally and distally.  However, there does appear to be a possible zone of transition in the mid abdomen where there is decompression of the majority of the small bowel distal to a loop of mildly distended and fluid-filled small bowel with trace mesenteric congestion, raising the possibility of partial small bowel obstruction.  There is no pneumatosis, portal venous gas, or free intraperitoneal air.  Case and addendum discussed with Dr. Bell at 3:28 AM 11/23/2024. Signed by Cayden Everett MD    Result Date: 11/23/2024  STUDY: CT Abdomen and Pelvis with IV Contrast; 11/23/2024 02:41 AM INDICATION: Abdominal pain epigastric region with cramping.  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 COMPARISON: None. ACCESSION NUMBER(S): [id number] ORDERING CLINICIAN: AKIN FARNSWORTH TECHNIQUE: CT of the abdomen and pelvis was performed.  Contiguous axial images were obtained at 3 mm slice thickness through the abdomen and pelvis. Coronal and sagittal reconstructions at 3 mm slice thickness were performed.  75 mL Omnipaque-350 was administered intravenously. FINDINGS: LOWER CHEST: No cardiomegaly.  No pericardial effusion.  Lung bases are clear.  ABDOMEN:  LIVER: There are multiple ill-defined low-attenuation lesions in the liver, measuring up to 1.9 x 1.6 cm.  No hepatomegaly.  Smooth surface contour.  BILE DUCTS: No intrahepatic or extrahepatic biliary ductal dilatation.  GALLBLADDER: Status post cholecystectomy with likely related lobular ductal dilatation.  STOMACH: No abnormalities identified.  PANCREAS: No masses or ductal dilatation.  SPLEEN: No splenomegaly or focal splenic lesion.  ADRENAL GLANDS: No thickening  or nodules.  KIDNEYS AND URETERS: Kidneys are normal in size and location.  No renal or ureteral calculi.  PELVIS:  BLADDER: No abnormalities identified.  REPRODUCTIVE ORGANS: No abnormalities identified.  BOWEL: Moderate volume stool burden.  Appendix surgically absent.  VESSELS: No abnormalities identified.  Abdominal aorta is normal in caliber.  PERITONEUM/RETROPERITONEUM/LYMPH NODES: No free fluid.  No pneumoperitoneum. No lymphadenopathy.  ABDOMINAL WALL: No abnormalities identified. SOFT TISSUES: No abnormalities identified.  BONES: No acute fracture or aggressive osseous lesion.    No evidence of acute inflammatory process in the abdomen or pelvis. Moderate volume stool burden. Multiple ill-defined low-attenuation lesions in the liver, presumably metastatic disease. Signed by Cayden Everett MD      Medications:  Scheduled medications  acetaminophen, 1,000 mg, oral, q8h  cholecalciferol, 1,000 Units, oral, Daily  enoxaparin, 40 mg, subcutaneous, q24h KAMILLA  FLUoxetine, 20 mg, oral, Nightly  FLUoxetine, 40 mg, oral, Nightly  lidocaine, 1 Application, urethral, Once  lisinopril, 40 mg, oral, Nightly  pantoprazole, 40 mg, intravenous, Daily before breakfast      Continuous medications  lactated Ringer's, 125 mL/hr, Last Rate: 125 mL/hr (11/24/24 0650)      PRN medications  PRN medications: ALPRAZolam, ketorolac, metoclopramide **OR** metoclopramide, ondansetron **OR** ondansetron, phenoL, polyethylene glycol    Assessment    Assessment & Plan   Mr. Thai Cristobal is a 44 y.o. male who presents with abdominal pain.  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  Patient reports that his symptoms started the evening of 11/22 around 10:30 PM, and felt similar in nature to prior episodes of SBO.  Patient presented to [facility] ED, and imaging  was concerning for possible small bowel obstruction.  Decision was made for admission for further management.  Patient continued to have nausea and vomiting overnight and he was agreeable to have nasogastric tube placed after initial decline.  Nasogastric tube provided relief of abdominal pain as reported by the patient (resulted in about 1550 mL).  Based on surgical team evaluation with the patient continue to improve we will try clamping NG tube and monitor for worsening of symptoms.  If symptoms starts to worsen may consider small intestinal follow-through for evaluation for further surgical intervention.    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  Patient does have a history of multiple small bowel obstructions the past year, as well as active malignancy of the small bowel with metastasis.  Patient does present with leukocytosis but otherwise does not have any signs or symptoms of an infectious process.  Low suspicion for bowel ischemia as the patient does not have any significant cardiovascular risk factors.  Leukocytosis could be secondary to recent dose of Neulasta.  Low suspicion for infectious etiology as he denies fevers or chills, and imaging was not suggestive of acute cholecystitis or pancreatitis or diverticulitis.  -CT abdomen and pelvis showed a few loops of mildly distended and fluid-filled bowel with a possible zone of transition in the mid abdomen with some mesenteric congestion noted, negative of early partial small bowel obstruction  -S/p Zofran, Dilaudid, morphine in the ED  -General Surgery was consulted in the ED, recommended  conservative management     Plan:  -Due to persistent nausea and vomiting patient was offered again nasogastric tube for which she was agreeable to.  NG tube resulted in 1550 mL which resulted in relief of abdominal pain as reported by the patient.  -General surgery has been consulted, appreciate recommendations  -Surgical team evaluated the patient and recommended if symptoms continue to improve clamp NG tube and monitor for improvement.  In case of worsening symptoms may consider small bowel follow-through for further evaluation for any surgical intervention.  -Surgical team also recommended patient transfer to the place where he were cared for previously given his cancer condition if any further surgical intervention is needed.  -Bowel rest, patient to be kept n.p.o.  -Will initiate IVF, LR at 125 mL/h  -Serial abdominal exams  -Optimize electrolytes, K>4, Mg>2  -Pain regimen: Scheduled Tylenol.  Toradol every 6 hours for pain.  Recommend avoidance of opiate medication due to risk of constipation.  -Zofran first-line for nausea as needed, Reglan second line  -Low suspicion for infection at this time, however will collect blood cultures.  -Patient WBCs continue to trend up 28.7--> 38.7.  Will continue monitoring at this time as there is no signs of infection  -Dietitian consulted.  Appreciate recommendations  -Patient blood pressure was fluctuating between normal and maximum high of 174/108.  This is likely reactive to nausea, vomiting, and abdominal pain.  Patient was started on his home med lisinopril 40 mg daily.  Will continue monitoring at this time with no further intervention.        CHRONIC PROBLEMS:  # HTN  # Anxiety  # Migraines  -Continue home medications as indicated    Global Plan of Care  IVF: LR to 125 mL/h  Diet: N.p.o.  DVT prophylaxis: Lovenox  Dispo: Anticipate 2-3 midnight stays pending improvement small bowel obstruction  Bowel regimen: MiraLAX 17 g as needed  Consults: General Surgery  Code  Status: Full Code     Emergency Contact: Extended Emergency Contact Information  Primary Emergency Contact: Danielle Cristobal  Mobile Phone: [phone]  Relation: Spouse     Patient seen and discussed with the attending.  Signature: SHAYLA MCGUIRE MD, PGY I Internal medicine  Date: November 24, 2024   This note has been transcribed using Dragon voice recognition system and there is a possibility of unintentional typing misprints.  Any information found to be copied from previous providers is done in the best interest of the patient to provide accurate, quality, and continuity of care.

## 2024-11-24 NOTE — CARE PLAN
"The patient's goals for the shift include      The clinical goals for the shift include pain to be managed    Patient asking for something stronger for abdominal discomfort, patient describes it as \"intermittent cramping.\" Teaching service notified and did not want to order narcotics d/t SBO. Encouraged patient to allow NG to be placed which he declined earlier in the shift.    At about 0050 patient ambulated the hallway with wife, stating he was really uncomfortable. He had received Toradol prior to walk and after walk. he violently vomited a large amt of tan fluids. Shortly after, agreeable to having NG tube placed.    Tolerated insertion well but complains that tube hurts his nose and throat.     KUB done, placement verified, placed to LIS.    Patient agrees that his stomach discomfort has  improved.    Wife at bedside.  "

## 2024-11-24 NOTE — PROGRESS NOTES
"Thai Cristobal is a 44 y.o. male on day 1 of admission presenting with SBO (small bowel obstruction) (Multi).    Subjective   Patient initially did ok and was thinking this could be from the immodium as patient reports this feels different than his other SBOs.  But he progressed to vomiting and worsening pain.  He got the NG tube which has drained over 1 L and he had pain.  Since the NG tube he has not required any narcotics for pain and denies pain.  He is passing small amount of flatus, but not at normal amount.  No stool since admission.  He also vomited but reported that was from gagging on the tube.  He vomited about 200ml and had 750ml in the cannister for 8 hour time period.         Objective     Physical Exam  Abd: soft, minimal tenderness, not localized, and no peritoneal signs    Last Recorded Vitals  Blood pressure (!) 148/92, pulse 82, temperature 36.8 °C (98.2 °F), temperature source Temporal, resp. rate 16, height 1.702 m (5' 7\"), weight 66.7 kg (147 lb), SpO2 99%.  Intake/Output last 3 Shifts:  I/O last 3 completed shifts:  In: 1631.3 (24.5 mL/kg) [I.V.:1631.3 (24.5 mL/kg)]  Out: 800 (12 mL/kg) [Emesis/NG output:800]  Weight: 66.7 kg     Relevant Results  XR abdomen 1 view    Result Date: 11/24/2024  Interpreted By:  Morgan Maier, STUDY: XR ABDOMEN 1 VIEW;  11/24/2024 4:06 am   INDICATION: Signs/Symptoms:Verify NG tube placement.     COMPARISON: None.   ACCESSION NUMBER(S): [id number]   ORDERING CLINICIAN: MELI JORGE   FINDINGS: - NG/OG tube termination: Gastric fundus   Dilated small bowel loops measuring up to 5 cm are noted.   There is a mild colonic stool burden.   There is no evidence of free intraperitoneal air.   No pneumatosis or portal venous gas identified. Cholecystectomy clips are noted. Right lower quadrant abdominal staples are noted from prior bowel resections.   No acute osseous abnormalities.   The lung bases are clear.       Nasogastric tube terminates over the gastric fundus.  "  Dilated left upper quadrant small bowel loops measuring up to 5 cm concerning for bowel obstruction.   MACRO: None.   Signed by: Morgan Maier 11/24/2024 4:18 AM Dictation workstation:   [id number]    CT abdomen pelvis w IV contrast    Addendum Date: 11/23/2024    Addendum: Per ordering physician, clinical concern is for small bowel obstruction.  In reviewing the exam, there are a few loops of mildly distended and fluid-filled jejunum in the left abdomen, at least some of which are likely related to peristalsis given the decompression of the small bowel both proximally and distally.  However, there does appear to be a possible zone of transition in the mid abdomen where there is decompression of the majority of the small bowel distal to a loop of mildly distended and fluid-filled small bowel with trace mesenteric congestion, raising the possibility of partial small bowel obstruction.  There is no pneumatosis, portal venous gas, or free intraperitoneal air.  Case and addendum discussed with Dr. Bell at 3:28 AM 11/23/2024. Signed by Cayden Everett MD    Result Date: 11/23/2024  STUDY: CT Abdomen and Pelvis with IV Contrast; 11/23/2024 02:41 AM INDICATION: Abdominal pain epigastric region with cramping.  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 COMPARISON: None. ACCESSION NUMBER(S): [id number] ORDERING CLINICIAN: AKIN FARNSWORTH TECHNIQUE: CT of the abdomen and pelvis was performed.  Contiguous axial images were obtained at 3 mm slice thickness through the abdomen and pelvis. Coronal and sagittal reconstructions at 3 mm slice thickness were performed.  75 mL Omnipaque-350 was administered intravenously. FINDINGS: LOWER CHEST: No cardiomegaly.  No pericardial effusion.  Lung bases are clear.  ABDOMEN:  LIVER: There are multiple ill-defined low-attenuation lesions in the liver, measuring up to 1.9 x 1.6 cm.  No hepatomegaly.  Smooth surface  contour.  BILE DUCTS: No intrahepatic or extrahepatic biliary ductal dilatation.  GALLBLADDER: Status post cholecystectomy with likely related lobular ductal dilatation.  STOMACH: No abnormalities identified.  PANCREAS: No masses or ductal dilatation.  SPLEEN: No splenomegaly or focal splenic lesion.  ADRENAL GLANDS: No thickening or nodules.  KIDNEYS AND URETERS: Kidneys are normal in size and location.  No renal or ureteral calculi.  PELVIS:  BLADDER: No abnormalities identified.  REPRODUCTIVE ORGANS: No abnormalities identified.  BOWEL: Moderate volume stool burden.  Appendix surgically absent.  VESSELS: No abnormalities identified.  Abdominal aorta is normal in caliber.  PERITONEUM/RETROPERITONEUM/LYMPH NODES: No free fluid.  No pneumoperitoneum. No lymphadenopathy.  ABDOMINAL WALL: No abnormalities identified. SOFT TISSUES: No abnormalities identified.  BONES: No acute fracture or aggressive osseous lesion.    No evidence of acute inflammatory process in the abdomen or pelvis. Moderate volume stool burden. Multiple ill-defined low-attenuation lesions in the liver, presumably metastatic disease. Signed by Cayden Everett MD   I have reivewed the recent KUB.  There appears to be some dilated SB loops, but gas within the colon and some stool in the colon.    Results for orders placed or performed during the hospital encounter of 11/23/24 (from the past 24 hours)   CBC   Result Value Ref Range    WBC 38.7 (H) 4.4 - 11.3 x10*3/uL    nRBC 0.0 0.0 - 0.0 /100 WBCs    RBC 5.19 4.50 - 5.90 x10*6/uL    Hemoglobin 14.6 13.5 - 17.5 g/dL    Hematocrit 44.3 41.0 - 52.0 %    MCV 85 80 - 100 fL    MCH 28.1 26.0 - 34.0 pg    MCHC 33.0 32.0 - 36.0 g/dL    RDW 17.1 (H) 11.5 - 14.5 %    Platelets 269 150 - 450 x10*3/uL   Basic Metabolic Panel   Result Value Ref Range    Glucose 92 74 - 99 mg/dL    Sodium 135 (L) 136 - 145 mmol/L    Potassium 4.5 3.5 - 5.3 mmol/L    Chloride 97 (L) 98 - 107 mmol/L    Bicarbonate 30 21 - 32 mmol/L     Anion Gap 13 10 - 20 mmol/L    Urea Nitrogen 21 6 - 23 mg/dL    Creatinine 1.18 0.50 - 1.30 mg/dL    eGFR 78 >60 mL/min/1.73m*2    Calcium 9.4 8.6 - 10.3 mg/dL   Magnesium   Result Value Ref Range    Magnesium 1.92 1.60 - 2.40 mg/dL                 Assessment/Plan   Assessment & Plan  SBO (small bowel obstruction) (Multi)    Complex history with Crohn's disease and stage 4 neuroendocrine tumor, undergoing chemotherapy.  He is due for chemotherapy tomorrow.  We discussed this but seems not possible.    SBO - we discussed if he improves, then possibly just clamp NG tube later today, but if he is not passing flatus, then a SBFT might be reasonable to assess the small bowel and any possibility of need for surgical intervention.  Given his previous surgeries and current stage 4 cancer, operative intervention would be best carried out where he has previously been cared for.    Surgery will continue to follow.       I spent 20 minutes in the professional and overall care of this patient.      Tomy Santamaria MD

## 2024-11-24 NOTE — NURSING NOTE
The pt up ambulating the hallways with his wife and daughter. The pt walking very well and states he is starting the pass some gas. Denies any pain at this time.

## 2024-11-24 NOTE — CARE PLAN
The patient's goals for the shift include  decrease the vomiting    The clinical goals for the shift include pain to be managed    Over the shift, the patient did not make progress toward the following goals. The pt still feeling nauseated and vomiting and then the NG is having large amounts out.

## 2024-11-25 ENCOUNTER — TELEPHONE (OUTPATIENT)
Dept: HEMATOLOGY/ONCOLOGY | Facility: CLINIC | Age: 44
End: 2024-11-25
Payer: COMMERCIAL

## 2024-11-25 ENCOUNTER — APPOINTMENT (OUTPATIENT)
Dept: HEMATOLOGY/ONCOLOGY | Facility: CLINIC | Age: 44
End: 2024-11-25
Payer: COMMERCIAL

## 2024-11-25 LAB
ANION GAP SERPL CALC-SCNC: 15 MMOL/L (ref 10–20)
BUN SERPL-MCNC: 22 MG/DL (ref 6–23)
CALCIUM SERPL-MCNC: 9.1 MG/DL (ref 8.6–10.3)
CHLORIDE SERPL-SCNC: 97 MMOL/L (ref 98–107)
CO2 SERPL-SCNC: 28 MMOL/L (ref 21–32)
CREAT SERPL-MCNC: 1.19 MG/DL (ref 0.5–1.3)
EGFRCR SERPLBLD CKD-EPI 2021: 77 ML/MIN/1.73M*2
ERYTHROCYTE [DISTWIDTH] IN BLOOD BY AUTOMATED COUNT: 17.2 % (ref 11.5–14.5)
GLUCOSE SERPL-MCNC: 81 MG/DL (ref 74–99)
HCT VFR BLD AUTO: 43 % (ref 41–52)
HGB BLD-MCNC: 13.8 G/DL (ref 13.5–17.5)
MAGNESIUM SERPL-MCNC: 1.92 MG/DL (ref 1.6–2.4)
MCH RBC QN AUTO: 27.5 PG (ref 26–34)
MCHC RBC AUTO-ENTMCNC: 32.1 G/DL (ref 32–36)
MCV RBC AUTO: 86 FL (ref 80–100)
NRBC BLD-RTO: 0 /100 WBCS (ref 0–0)
PLATELET # BLD AUTO: 236 X10*3/UL (ref 150–450)
POTASSIUM SERPL-SCNC: 4.4 MMOL/L (ref 3.5–5.3)
RBC # BLD AUTO: 5.01 X10*6/UL (ref 4.5–5.9)
SODIUM SERPL-SCNC: 136 MMOL/L (ref 136–145)
WBC # BLD AUTO: 21.1 X10*3/UL (ref 4.4–11.3)

## 2024-11-25 PROCEDURE — 2500000001 HC RX 250 WO HCPCS SELF ADMINISTERED DRUGS (ALT 637 FOR MEDICARE OP)

## 2024-11-25 PROCEDURE — 1100000001 HC PRIVATE ROOM DAILY

## 2024-11-25 PROCEDURE — 85027 COMPLETE CBC AUTOMATED: CPT | Performed by: STUDENT IN AN ORGANIZED HEALTH CARE EDUCATION/TRAINING PROGRAM

## 2024-11-25 PROCEDURE — 2500000004 HC RX 250 GENERAL PHARMACY W/ HCPCS (ALT 636 FOR OP/ED)

## 2024-11-25 PROCEDURE — 84520 ASSAY OF UREA NITROGEN: CPT | Performed by: STUDENT IN AN ORGANIZED HEALTH CARE EDUCATION/TRAINING PROGRAM

## 2024-11-25 PROCEDURE — 37799 UNLISTED PX VASCULAR SURGERY: CPT | Performed by: STUDENT IN AN ORGANIZED HEALTH CARE EDUCATION/TRAINING PROGRAM

## 2024-11-25 PROCEDURE — 99231 SBSQ HOSP IP/OBS SF/LOW 25: CPT | Performed by: NURSE PRACTITIONER

## 2024-11-25 PROCEDURE — 83735 ASSAY OF MAGNESIUM: CPT | Performed by: STUDENT IN AN ORGANIZED HEALTH CARE EDUCATION/TRAINING PROGRAM

## 2024-11-25 PROCEDURE — 99232 SBSQ HOSP IP/OBS MODERATE 35: CPT | Performed by: STUDENT IN AN ORGANIZED HEALTH CARE EDUCATION/TRAINING PROGRAM

## 2024-11-25 RX ADMIN — LISINOPRIL 40 MG: 40 TABLET ORAL at 20:45

## 2024-11-25 RX ADMIN — SODIUM CHLORIDE, POTASSIUM CHLORIDE, SODIUM LACTATE AND CALCIUM CHLORIDE 150 ML/HR: 600; 310; 30; 20 INJECTION, SOLUTION INTRAVENOUS at 23:35

## 2024-11-25 RX ADMIN — PANTOPRAZOLE SODIUM 40 MG: 40 INJECTION, POWDER, FOR SOLUTION INTRAVENOUS at 05:16

## 2024-11-25 RX ADMIN — ACETAMINOPHEN 1000 MG: 650 SUSPENSION ORAL at 20:44

## 2024-11-25 RX ADMIN — ENOXAPARIN SODIUM 40 MG: 40 INJECTION SUBCUTANEOUS at 08:42

## 2024-11-25 RX ADMIN — FLUOXETINE HYDROCHLORIDE 40 MG: 20 CAPSULE ORAL at 20:45

## 2024-11-25 RX ADMIN — ALPRAZOLAM 0.5 MG: 0.5 TABLET ORAL at 01:18

## 2024-11-25 RX ADMIN — FLUOXETINE HYDROCHLORIDE 20 MG: 20 CAPSULE ORAL at 20:44

## 2024-11-25 ASSESSMENT — PAIN SCALES - GENERAL: PAINLEVEL_OUTOF10: 0 - NO PAIN

## 2024-11-25 NOTE — CARE PLAN
Problem: Pain - Adult  Goal: Verbalizes/displays adequate comfort level or baseline comfort level  Outcome: Progressing     Problem: Pain  Goal: Turns in bed with improved pain control throughout the shift  Outcome: Progressing

## 2024-11-25 NOTE — PROGRESS NOTES
Nutrition Initial Assessment:   Nutrition Assessment    Reason for Assessment: Admission nursing screening (MST of 3)    Patient History: Thai Cristobal is a 44 y.o. male presenting to ED with abdominal pain and admitted for SBO.  Pt states he was diagnosed with Crohn's disease in 2000 but only started needing medication for it for the past 5 years. He is following with oncology and recently had chemo since in May he had neuroendocrine tumor biopsied showing high grade malignancy.  This admission NG placed for decompression with improvement in stomach discomfort.  Pt continues to have significant output from NG with NPO and possible SBFT tomorrow.    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  Prior to symptoms of bowel obstruction he had been eating throughout the day just typically smaller portions.  He had not specific food allergies/intolerances.  At recommendations of their oncologist they have been avoiding all added sugar and sugar substitiutes including stevia.  He notes they have a soy protein powder that he uses at times but feels confused about soy and his diagnosis.  He and spouse voice confusion over fiber recommendations.  He has cut out most dairy from his diet whereas before May he was consuming more dairy products without issue.  Vitamin/Herbal Supplement Use: vitamin D, MVI, Magnesium, flaxseed oil   Food Allergies/Intolerances:  None  GI Symptoms: Nausea, Vomiting, and Abdominal pain (Improved since admit but not resolved  Oral Problems: None       Current Diet: NPO Diet;  "Effective now    Anthropometrics:  Height: 170.2 cm (5' 7\")   Weight: 66.7 kg (147 lb)   BMI (Calculated): 23.02             Weight Change %:  Weight History / % Weight Change: Weight loss of 7.4% (-5.3kg) since 10/29/24  Significant Weight Loss: Yes  Interpretation of Weight Loss: >5% in 1 month  Wt Readings from Last 10 Encounters:   11/23/24 66.7 kg (147 lb)   11/22/24 65.9 kg (145 lb 6.3 oz)   11/14/24 70.8 kg (156 lb 1.4 oz)   11/12/24 70.7 kg (155 lb 13.8 oz)   10/31/24 71.2 kg (157 lb 1.2 oz)   10/29/24 72 kg (158 lb 13.5 oz)   10/28/24 70.9 kg (156 lb 3.2 oz)   10/22/24 72 kg (158 lb 11.7 oz)   10/15/24 71.1 kg (156 lb 12 oz)   10/03/24 71.8 kg (158 lb 4.6 oz)    Pt states that he was 195lbs in the past but had intentionally lost down into the 150-160# range prior to cancer dx    Pain Assessment: 0-10  0-10 (Numeric) Pain Score: 3  Pain Type: Acute pain  Pain Location: Head      Nutrition Significant Labs:  BMP Trend:   Results from last 7 days   Lab Units 11/25/24  0702 11/24/24  0537 11/23/24  0618 11/23/24  0143   GLUCOSE mg/dL 81 92 116* 120*   CALCIUM mg/dL 9.1 9.4 9.2 9.4   SODIUM mmol/L 136 135* 136 136   POTASSIUM mmol/L 4.4 4.5 4.2 4.6   CO2 mmol/L 28 30 30 30   CHLORIDE mmol/L 97* 97* 98 99   BUN mg/dL 22 21 13 13   CREATININE mg/dL 1.19 1.18 0.97 1.03        Nutrition Specific Medications:  Scheduled medications  acetaminophen, 1,000 mg, oral, q8h  cholecalciferol, 1,000 Units, oral, Daily  enoxaparin, 40 mg, subcutaneous, q24h KAMILLA  FLUoxetine, 20 mg, oral, Nightly  FLUoxetine, 40 mg, oral, Nightly  lidocaine, 1 Application, urethral, Once  lisinopril, 40 mg, oral, Nightly  pantoprazole, 40 mg, intravenous, Daily before breakfast      Continuous medications  lactated Ringer's, 150 mL/hr, Last Rate: 150 mL/hr (11/25/24 0545)      Nutrition Focused Physical Exam Findings:  defer: pt declines at this time    I/O:   Last BM Date: 11/22/24; Stool Appearance: Unable to assess (11/23/24 1500)   "   Estimated Needs:   Total Energy Estimated Needs (kCal):  (1870-2200kcal or 28-33kcal/kg)  Total Protein Estimated Needs (g):  (80-100g or 1.2-1.5g/kg)  Total Fluid Estimated Needs (mL):  (1ml/kcal or per MD recs)          Nutrition Diagnosis   Malnutrition Diagnosis  Patient has Malnutrition Diagnosis: Yes  Diagnosis Status: New  Malnutrition Diagnosis: Moderate malnutrition related to acute disease or injury  As Evidenced by: acute on chronic nutritional stress in the setting of neuroendocrine cancer with current bowel obstruction, indicators including weight loss >5% within the past month and intakes meeting <75% of needs for >7days.            Nutrition Interventions/Recommendations         Nutrition Prescription:  Individualized Nutrition Prescription Provided for : Once appropriate to intiate PO diet suggest starting with clear liquids and advancing to Fiber Restricted diet with tolerance        Nutrition Interventions:   Food and/or Nutrient Delivery Interventions  Interventions: Medical food supplement  Medical Food Supplement: Commercial beverage  Goal: Once up to at least full liquids pt would like to trial [employer] 1.0 formula.  Additional Interventions: Discussed rationale of needing to focus on prevention of further weight loss.  Encouraged to maintain recommendations from oncologist while being mindul of small frequent meals/snacks throughout the day with adequate fluid provision.    Coordination of Nutrition Care by a Nutrition Professional  Collaboration and Referral of Nutrition Care: Collaboration by nutrition professional with other providers  Goal: Maintain communication with IDT as appropriate    Nutrition Education: RD to follow up with handouts regarding topics discussed including fiber content of foods and goals for nutrition during cancer treatment.         Nutrition Monitoring and Evaluation   Food/Nutrient Related History Monitoring  Monitoring and Evaluation Plan: Fluid intake, Amount  of food  Fluid Intake: Estimated fluid intake  Criteria: Goal to consume >75% of fluids provided once able to take PO diet  Amount of Food: Estimated amout of food  Criteria: Goal to consume >50% of meals provided once able to take PO diet    Body Composition/Growth/Weight History  Monitoring and Evaluation Plan: Weight  Weight: Measured weight  Criteria: maintain stable weight    Biochemical Data, Medical Tests and Procedures  Monitoring and Evaluation Plan: Electrolyte/renal panel  Electrolyte and Renal Panel: Sodium, Chloride  Criteria: maintain WNR            Time Spent/Follow-up Reminder:   Time Spent (min): 60 minutes  Last Date of Nutrition Visit: 11/25/24  Nutrition Follow-Up Needed?: 3-5 days  Follow up Comment: KEON - mod PCM - watch for diet to advance

## 2024-11-25 NOTE — PROGRESS NOTES
"ID:  Thai Cristobal is a 44 y.o. male on hospital day 2 of admission presenting with SBO (small bowel obstruction) (Multi).    Subjective   The patient seen and examined this morning at bedside.  Nasogastric tube was kinked as reported by the nurse and there was decrease in the drainage during intervention which the patient felt nauseous.  After adjusting the NG tube it resulted in 1800 mL (total throughout the day was 2800 mL.).  This morning the patient denied having any lightheadedness, dizziness, fever, chills, chest pain, shortness of breath, palpitation, abdominal pain, or urinary symptoms.  He reported that he passed gas several times over yesterday.  Additionally he reported that compared to initial presentation he can barely feel his abdominal pain.  Surgical team evaluated the patient today and decided to continue conservative management with NG tube while giving the patient n.p.o. with possible SBFT tomorrow after reassessing the patient however they did not recommend any surgical intervention at this time.      Objective     Visit Vitals  /81 (BP Location: Right arm, Patient Position: Lying)   Pulse 106   Temp 36.6 °C (97.9 °F) (Temporal)   Resp 16   Ht 1.702 m (5' 7\")   Wt 66.7 kg (147 lb)   SpO2 97%   BMI 23.02 kg/m²   Smoking Status Never   BSA 1.78 m²        Physical Examination:  General: Appears stated age, well nourished, A&Ox4, conversational, lying comfortably in bed, not in pain or distress. On RA.  Nasogastric tube in place.  HEENT: Mucous membranes moist.  Head/Neck: Atraumatic, Normocephalic. Neck supple.   Respiratory: equal air entry bilaterally no crackles or wheezing were appreciated.  Cardiovascular: regular rhythm, normal S1 and S2. No added sounds or murmurs,  Gastrointestinal: soft, non-tender abdomen, bowel sounds present, no guarding or rebound.   Extremities: No edema in lower extremities.  Neurological:  no focal neurologic deficits.  Skin: Surgical scar can be observed " on the abdomen.  Psychological: Appropriate mood, normal affect.      Laboratory Data:    Results from last 7 days   Lab Units 11/25/24  0702 11/24/24  0537 11/23/24  0618   WBC AUTO x10*3/uL 21.1* 38.7* 28.7*   RBC AUTO x10*6/uL 5.01 5.19 5.06   HEMOGLOBIN g/dL 13.8 14.6 14.0     Results from last 7 days   Lab Units 11/25/24  0702 11/24/24  0537 11/23/24  0618 11/23/24  0143 11/22/24  1150   SODIUM mmol/L 136 135* 136 136 135*   POTASSIUM mmol/L 4.4 4.5 4.2 4.6 4.4   CHLORIDE mmol/L 97* 97* 98 99 99   CO2 mmol/L 28 30 30 30 30   BUN mg/dL 22 21 13 13 16   CREATININE mg/dL 1.19 1.18 0.97 1.03 0.95   CALCIUM mg/dL 9.1 9.4 9.2 9.4 9.4   MAGNESIUM mg/dL 1.92 1.92  --  2.27  --    BILIRUBIN TOTAL mg/dL  --   --  0.3 0.3 0.3   ALT U/L  --   --  30 34 31   AST U/L  --   --  27 30 30       Imaging:  XR abdomen 1 view    Result Date: 11/24/2024  Interpreted By:  Morgan Maier, STUDY: XR ABDOMEN 1 VIEW;  11/24/2024 4:06 am   INDICATION: Signs/Symptoms:Verify NG tube placement.     COMPARISON: None.   ACCESSION NUMBER(S): [id number]   ORDERING CLINICIAN: MELI JORGE   FINDINGS: - NG/OG tube termination: Gastric fundus   Dilated small bowel loops measuring up to 5 cm are noted.   There is a mild colonic stool burden.   There is no evidence of free intraperitoneal air.   No pneumatosis or portal venous gas identified. Cholecystectomy clips are noted. Right lower quadrant abdominal staples are noted from prior bowel resections.   No acute osseous abnormalities.   The lung bases are clear.       Nasogastric tube terminates over the gastric fundus.   Dilated left upper quadrant small bowel loops measuring up to 5 cm concerning for bowel obstruction.   MACRO: None.   Signed by: Morgan Maier 11/24/2024 4:18 AM Dictation workstation:   [id number]      Medications:  Scheduled medications  acetaminophen, 1,000 mg, oral, q8h  cholecalciferol, 1,000 Units, oral, Daily  enoxaparin, 40 mg, subcutaneous, q24h KAMILLA  FLUoxetine, 20 mg,  oral, Nightly  FLUoxetine, 40 mg, oral, Nightly  lidocaine, 1 Application, urethral, Once  lisinopril, 40 mg, oral, Nightly  pantoprazole, 40 mg, intravenous, Daily before breakfast      Continuous medications  lactated Ringer's, 150 mL/hr, Last Rate: 150 mL/hr (11/25/24 0545)      PRN medications  PRN medications: ALPRAZolam, ketorolac, metoclopramide **OR** metoclopramide, ondansetron **OR** ondansetron, phenoL, polyethylene glycol    Assessment    Assessment & Plan   Mr. Thai Cristobal is a 44 y.o. male who presents with abdominal pain.  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  Patient reports that his symptoms started the evening of 11/22 around 10:30 PM, and felt similar in nature to prior episodes of SBO.  Patient presented to [facility] ED, and imaging was concerning for possible small bowel obstruction.  Decision was made for admission for further management.  Patient continued to have nausea and vomiting overnight and he was agreeable to have nasogastric tube placed after initial decline.  Nasogastric tube provided relief of abdominal pain as reported by the patient (resulted in about 1550 mL).  Based on surgical team evaluation with the patient continue to improve we will try clamping NG tube and monitor for worsening of symptoms.  If symptoms starts to worsen may consider small intestinal follow-through for evaluation for further surgical intervention.  On 11/25/2024, nurse reported that overnight the patient NG tube got kinked and drainage was interrupted.  During this time the patient reported feeling nauseous.  After adjusting the NG tube patient reported relief of his nausea and abdominal pain.  G-tube resulted in 1800 mL of fluid drainage (total over the day 2800 mL).  Patient reports significant improvement in his  abdominal pain.  Surgical team evaluated the patient and decided to continue conservative management with NG tube while giving the patient n.p.o. with no surgical intervention at this time and may consider SBFT tomorrow after assessing the patient    Principal Problem:    SBO (small bowel obstruction) (Multi)     # Partial small bowel obstruction  # Stage IV small bowel adenocarcinoma + neuroendocrine tumor with metastasis to the liver and lymph nodes  # S/p partial right colectomy and small bowel resection May 2024  # History of Crohn's disease  # Leukocytosis  Patient presents with symptoms indicative of small bowel obstruction, confirmed on imaging.  Patient does have a history of multiple small bowel obstructions the past year, as well as active malignancy of the small bowel with metastasis.  Patient does present with leukocytosis but otherwise does not have any signs or symptoms of an infectious process.  Low suspicion for bowel ischemia as the patient does not have any significant cardiovascular risk factors.  Leukocytosis could be secondary to recent dose of Neulasta.  Low suspicion for infectious etiology as he denies fevers or chills, and imaging was not suggestive of acute cholecystitis or pancreatitis or diverticulitis.  -CT abdomen and pelvis showed a few loops of mildly distended and fluid-filled bowel with a possible zone of transition in the mid abdomen with some mesenteric congestion noted, negative of early partial small bowel obstruction  -S/p Zofran, Dilaudid, morphine in the ED  -General Surgery was consulted in the ED, recommended conservative management     Plan:  -Patient nausea and vomiting has significantly improved compared to yesterday (there was an episode where the NG tube was kinked interrupting the drainage during which the patient felt nauseous however it was relieved after adjusting the tube) additionally, he reported improvement in his abdominal pain.  -General surgery has been  consulted, appreciate recommendations  -Surgical team evaluated the patient and recommended continue conservative management while keeping the patient n.p.o. with no surgical intervention at this time and may consider SBFT tomorrow after assessing the patient.  -Surgical team also recommended patient transfer to the place where he were cared for previously given his cancer condition if any further surgical intervention is needed.  -Bowel rest, patient to be kept n.p.o.  -Will initiate IVF, LR at 125 mL/h  -Serial abdominal exams  -Optimize electrolytes, K>4, Mg>2  -Pain regimen: Scheduled Tylenol.  Toradol every 6 hours for pain.  Recommend avoidance of opiate medication due to risk of constipation.  -Zofran first-line for nausea as needed, Reglan second line  -Low suspicion for infection at this time, however will collect blood cultures.  -Patient WBCs trended down 38.7--> 21.1.  Will continue monitoring at this time as there is no signs of infection  -Dietitian consulted.  Appreciate recommendations  -Patient blood pressure has been better controlled today.  Will continue monitoring at this time.     CHRONIC PROBLEMS:  # HTN  # Anxiety  # Migraines  -Continue home medications as indicated    Global Plan of Care  IVF: LR to 125 mL/h  Diet: N.p.o.  DVT prophylaxis: Lovenox  Dispo: Anticipate 2-3 midnight stays pending improvement small bowel obstruction  Bowel regimen: MiraLAX 17 g as needed  Consults: General Surgery  Code Status: Full Code     Emergency Contact: Extended Emergency Contact Information  Primary Emergency Contact: Danielle Cristobal  Mobile Phone: [phone]  Relation: Spouse     Patient seen and discussed with the attending.  Signature: SHAYLA MCGUIRE MD, PGY I Internal medicine  Date: November 25, 2024   This note has been transcribed using Dragon voice recognition system and there is a possibility of unintentional typing misprints.  Any information found to be copied from  previous providers is done in the best interest of the patient to provide accurate, quality, and continuity of care.

## 2024-11-25 NOTE — PROGRESS NOTES
11/25/24 1138   Discharge Planning   Living Arrangements Spouse/significant other   Support Systems Spouse/significant other   Type of Residence Private residence   Home or Post Acute Services None   Expected Discharge Disposition Home     Met with patient and spouse at bedside. Admitted for SBO. Pt lives with spouse and was independent PTA with no HHC or DME. PCP is [facility]. Pt feels he is able to manage her health and understands his medications. Was able to drive and obtain meds. Pt plans to return home with no new discharge needs. Family will provide transport.

## 2024-11-25 NOTE — TELEPHONE ENCOUNTER
Pt admitted to hospital at this time. Will not be showing up to infusion appointment. Discussed with wife to call Dr. Vargas office to get appointments in once patient is discharged.

## 2024-11-25 NOTE — CARE PLAN
The patient's goals for the shift include      The clinical goals for the shift include No episodes of N/V. Patient will ambulate at least 3 times this shift.    ~1800 out from NGT, most output was at beginning of shift likely from suction tube being kinked at wall to cannister causing fluid to accumulate. Low urine output, notified residents. Fluid rate increased. VSS. Patient ambulated 3 times this shift. Monitor labs in AM.    Problem: Pain - Adult  Goal: Verbalizes/displays adequate comfort level or baseline comfort level  Outcome: Progressing     Problem: Pain  Goal: Turns in bed with improved pain control throughout the shift  Outcome: Progressing  Goal: Performs ADL's with improved pain control throughout shift  Outcome: Progressing  Goal: Free from opioid side effects throughout the shift  Outcome: Progressing

## 2024-11-25 NOTE — PROGRESS NOTES
Thai Cortezsandeepon 44 y.o. male    Subjective  Patient seen and examined this morning. Denies nausea and vomiting.  No fever or chills.  Reports no flatus this morning and no BM as of yet.  Up ambulating. Abdominal pain improving, only taking Tylenol for pain. NGT with bilious output.  Urinating well. With no other complaints.    NGT 2800cc    Objective  PHYSICAL EXAM:  Physical Exam  Vitals reviewed.   Constitutional:       General: He is awake.      Appearance: Normal appearance.   Cardiovascular:      Rate and Rhythm: Normal rate and regular rhythm.      Pulses: Normal pulses.      Heart sounds: Normal heart sounds.   Pulmonary:      Effort: Pulmonary effort is normal.      Breath sounds: Normal breath sounds and air entry.   Abdominal:      General: Abdomen is flat. There is no distension.      Palpations: Abdomen is soft.      Tenderness: There is no abdominal tenderness. There is no guarding or rebound.      Comments: Soft, non-distended.  Non-TTP. No guarding, rebound or peritoneal signs.  NGT with bilious output.    Musculoskeletal:         General: Normal range of motion.      Cervical back: Normal range of motion.   Skin:     General: Skin is warm and dry.   Neurological:      General: No focal deficit present.      Mental Status: He is alert and oriented to person, place, and time.   Psychiatric:         Behavior: Behavior is cooperative.         Vital signs in last 24 hours:  Vitals:    11/25/24 0800   BP: 131/86   Pulse: 87   Resp: 16   Temp: 36.4 °C (97.5 °F)   SpO2: 95%         Intake/Output this shift:    Intake/Output Summary (Last 24 hours) at 11/25/2024 1107  Last data filed at 11/25/2024 0600  Gross per 24 hour   Intake 1729.17 ml   Output 2600 ml   Net -870.83 ml        Allergies:  No Known Allergies     Medications:  Scheduled medications  acetaminophen, 1,000 mg, oral, q8h  cholecalciferol, 1,000 Units, oral, Daily  enoxaparin, 40 mg, subcutaneous, q24h KAMILLA  FLUoxetine, 20 mg, oral,  Nightly  FLUoxetine, 40 mg, oral, Nightly  lidocaine, 1 Application, urethral, Once  lisinopril, 40 mg, oral, Nightly  pantoprazole, 40 mg, intravenous, Daily before breakfast      Continuous medications  lactated Ringer's, 150 mL/hr, Last Rate: 150 mL/hr (11/25/24 0545)      PRN medications  PRN medications: ALPRAZolam, ketorolac, metoclopramide **OR** metoclopramide, ondansetron **OR** ondansetron, phenoL, polyethylene glycol       Labs:  Results for orders placed or performed during the hospital encounter of 11/23/24 (from the past 24 hours)   CBC   Result Value Ref Range    WBC 21.1 (H) 4.4 - 11.3 x10*3/uL    nRBC 0.0 0.0 - 0.0 /100 WBCs    RBC 5.01 4.50 - 5.90 x10*6/uL    Hemoglobin 13.8 13.5 - 17.5 g/dL    Hematocrit 43.0 41.0 - 52.0 %    MCV 86 80 - 100 fL    MCH 27.5 26.0 - 34.0 pg    MCHC 32.1 32.0 - 36.0 g/dL    RDW 17.2 (H) 11.5 - 14.5 %    Platelets 236 150 - 450 x10*3/uL   Basic Metabolic Panel   Result Value Ref Range    Glucose 81 74 - 99 mg/dL    Sodium 136 136 - 145 mmol/L    Potassium 4.4 3.5 - 5.3 mmol/L    Chloride 97 (L) 98 - 107 mmol/L    Bicarbonate 28 21 - 32 mmol/L    Anion Gap 15 10 - 20 mmol/L    Urea Nitrogen 22 6 - 23 mg/dL    Creatinine 1.19 0.50 - 1.30 mg/dL    eGFR 77 >60 mL/min/1.73m*2    Calcium 9.1 8.6 - 10.3 mg/dL   Magnesium   Result Value Ref Range    Magnesium 1.92 1.60 - 2.40 mg/dL        Imaging:  XR abdomen 1 view    Result Date: 11/24/2024  Interpreted By:  Morgan Maier, STUDY: XR ABDOMEN 1 VIEW;  11/24/2024 4:06 am   INDICATION: Signs/Symptoms:Verify NG tube placement.     COMPARISON: None.   ACCESSION NUMBER(S): [id number]   ORDERING CLINICIAN: MELI JORGE   FINDINGS: - NG/OG tube termination: Gastric fundus   Dilated small bowel loops measuring up to 5 cm are noted.   There is a mild colonic stool burden.   There is no evidence of free intraperitoneal air.   No pneumatosis or portal venous gas identified. Cholecystectomy clips are noted. Right lower quadrant abdominal  staples are noted from prior bowel resections.   No acute osseous abnormalities.   The lung bases are clear.       Nasogastric tube terminates over the gastric fundus.   Dilated left upper quadrant small bowel loops measuring up to 5 cm concerning for bowel obstruction.   MACRO: None.   Signed by: Morgan Maier 11/24/2024 4:18 AM Dictation workstation:   [id number]    CT abdomen pelvis w IV contrast    Addendum Date: 11/23/2024    Addendum: Per ordering physician, clinical concern is for small bowel obstruction.  In reviewing the exam, there are a few loops of mildly distended and fluid-filled jejunum in the left abdomen, at least some of which are likely related to peristalsis given the decompression of the small bowel both proximally and distally.  However, there does appear to be a possible zone of transition in the mid abdomen where there is decompression of the majority of the small bowel distal to a loop of mildly distended and fluid-filled small bowel with trace mesenteric congestion, raising the possibility of partial small bowel obstruction.  There is no pneumatosis, portal venous gas, or free intraperitoneal air.  Case and addendum discussed with Dr. Bell at 3:28 AM 11/23/2024. Signed by Cayden Everett MD    Result Date: 11/23/2024  STUDY: CT Abdomen and Pelvis with IV Contrast; 11/23/2024 02:41 AM INDICATION: Abdominal pain epigastric region with cramping.  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 COMPARISON: None. ACCESSION NUMBER(S): [id number] ORDERING CLINICIAN: AKIN FARNSWORTH TECHNIQUE: CT of the abdomen and pelvis was performed.  Contiguous axial images were obtained at 3 mm slice thickness through the abdomen and pelvis. Coronal and sagittal reconstructions at 3 mm slice thickness were performed.  75 mL Omnipaque-350 was administered intravenously. FINDINGS: LOWER CHEST: No cardiomegaly.  No pericardial effusion.  Lung  bases are clear.  ABDOMEN:  LIVER: There are multiple ill-defined low-attenuation lesions in the liver, measuring up to 1.9 x 1.6 cm.  No hepatomegaly.  Smooth surface contour.  BILE DUCTS: No intrahepatic or extrahepatic biliary ductal dilatation.  GALLBLADDER: Status post cholecystectomy with likely related lobular ductal dilatation.  STOMACH: No abnormalities identified.  PANCREAS: No masses or ductal dilatation.  SPLEEN: No splenomegaly or focal splenic lesion.  ADRENAL GLANDS: No thickening or nodules.  KIDNEYS AND URETERS: Kidneys are normal in size and location.  No renal or ureteral calculi.  PELVIS:  BLADDER: No abnormalities identified.  REPRODUCTIVE ORGANS: No abnormalities identified.  BOWEL: Moderate volume stool burden.  Appendix surgically absent.  VESSELS: No abnormalities identified.  Abdominal aorta is normal in caliber.  PERITONEUM/RETROPERITONEUM/LYMPH NODES: No free fluid.  No pneumoperitoneum. No lymphadenopathy.  ABDOMINAL WALL: No abnormalities identified. SOFT TISSUES: No abnormalities identified.  BONES: No acute fracture or aggressive osseous lesion.    No evidence of acute inflammatory process in the abdomen or pelvis. Moderate volume stool burden. Multiple ill-defined low-attenuation lesions in the liver, presumably metastatic disease. Signed by Cayden Everett MD           Plan  SBO (small bowel obstruction) (Multi)     - Continue conservative management for now  - NPO  - Pain medication - try to limit narcotics  - Nausea: antiemetics PRN  - Continue NGT to LIWS - 2800cc output in last 24 hrs.   - Waiting for return of bowel function  - Encourage OOB   - Possible SBFT tomorrow - will reassess tomorrow   - No acute surgical intervention at this time    Plan of care discussed and further recommendations as per Dr. Irvin.     MANUELITO English-CNP    I spent 20 minutes in the professional and overall care of this patient.

## 2024-11-26 ENCOUNTER — APPOINTMENT (OUTPATIENT)
Dept: HEMATOLOGY/ONCOLOGY | Facility: CLINIC | Age: 44
End: 2024-11-26
Payer: COMMERCIAL

## 2024-11-26 ENCOUNTER — TELEPHONE (OUTPATIENT)
Dept: HEMATOLOGY/ONCOLOGY | Facility: HOSPITAL | Age: 44
End: 2024-11-26
Payer: COMMERCIAL

## 2024-11-26 ENCOUNTER — APPOINTMENT (OUTPATIENT)
Dept: RADIOLOGY | Facility: HOSPITAL | Age: 44
End: 2024-11-26
Payer: COMMERCIAL

## 2024-11-26 LAB
ANION GAP SERPL CALC-SCNC: 17 MMOL/L (ref 10–20)
BUN SERPL-MCNC: 20 MG/DL (ref 6–23)
CALCIUM SERPL-MCNC: 9.1 MG/DL (ref 8.6–10.3)
CHLORIDE SERPL-SCNC: 96 MMOL/L (ref 98–107)
CO2 SERPL-SCNC: 27 MMOL/L (ref 21–32)
CREAT SERPL-MCNC: 1.05 MG/DL (ref 0.5–1.3)
EGFRCR SERPLBLD CKD-EPI 2021: 90 ML/MIN/1.73M*2
ERYTHROCYTE [DISTWIDTH] IN BLOOD BY AUTOMATED COUNT: 16.6 % (ref 11.5–14.5)
GLUCOSE SERPL-MCNC: 71 MG/DL (ref 74–99)
HCT VFR BLD AUTO: 42.5 % (ref 41–52)
HGB BLD-MCNC: 13.5 G/DL (ref 13.5–17.5)
MAGNESIUM SERPL-MCNC: 1.95 MG/DL (ref 1.6–2.4)
MCH RBC QN AUTO: 27.7 PG (ref 26–34)
MCHC RBC AUTO-ENTMCNC: 31.8 G/DL (ref 32–36)
MCV RBC AUTO: 87 FL (ref 80–100)
NRBC BLD-RTO: 0 /100 WBCS (ref 0–0)
PLATELET # BLD AUTO: 226 X10*3/UL (ref 150–450)
POTASSIUM SERPL-SCNC: 4.2 MMOL/L (ref 3.5–5.3)
RBC # BLD AUTO: 4.88 X10*6/UL (ref 4.5–5.9)
SODIUM SERPL-SCNC: 136 MMOL/L (ref 136–145)
WBC # BLD AUTO: 18.1 X10*3/UL (ref 4.4–11.3)

## 2024-11-26 PROCEDURE — 2550000001 HC RX 255 CONTRASTS: Performed by: STUDENT IN AN ORGANIZED HEALTH CARE EDUCATION/TRAINING PROGRAM

## 2024-11-26 PROCEDURE — 99232 SBSQ HOSP IP/OBS MODERATE 35: CPT | Performed by: STUDENT IN AN ORGANIZED HEALTH CARE EDUCATION/TRAINING PROGRAM

## 2024-11-26 PROCEDURE — 99231 SBSQ HOSP IP/OBS SF/LOW 25: CPT | Performed by: NURSE PRACTITIONER

## 2024-11-26 PROCEDURE — A9698 NON-RAD CONTRAST MATERIALNOC: HCPCS | Performed by: STUDENT IN AN ORGANIZED HEALTH CARE EDUCATION/TRAINING PROGRAM

## 2024-11-26 PROCEDURE — 2500000004 HC RX 250 GENERAL PHARMACY W/ HCPCS (ALT 636 FOR OP/ED)

## 2024-11-26 PROCEDURE — 1100000001 HC PRIVATE ROOM DAILY

## 2024-11-26 PROCEDURE — 85027 COMPLETE CBC AUTOMATED: CPT

## 2024-11-26 PROCEDURE — 2500000001 HC RX 250 WO HCPCS SELF ADMINISTERED DRUGS (ALT 637 FOR MEDICARE OP)

## 2024-11-26 PROCEDURE — 82374 ASSAY BLOOD CARBON DIOXIDE: CPT

## 2024-11-26 PROCEDURE — 74177 CT ABD & PELVIS W/CONTRAST: CPT | Performed by: RADIOLOGY

## 2024-11-26 PROCEDURE — 74177 CT ABD & PELVIS W/CONTRAST: CPT

## 2024-11-26 PROCEDURE — 83735 ASSAY OF MAGNESIUM: CPT

## 2024-11-26 RX ADMIN — FLUOXETINE HYDROCHLORIDE 20 MG: 20 CAPSULE ORAL at 21:04

## 2024-11-26 RX ADMIN — SODIUM CHLORIDE, POTASSIUM CHLORIDE, SODIUM LACTATE AND CALCIUM CHLORIDE 150 ML/HR: 600; 310; 30; 20 INJECTION, SOLUTION INTRAVENOUS at 05:58

## 2024-11-26 RX ADMIN — FLUOXETINE HYDROCHLORIDE 40 MG: 20 CAPSULE ORAL at 21:05

## 2024-11-26 RX ADMIN — IOHEXOL 500 ML: 12 SOLUTION ORAL at 14:46

## 2024-11-26 RX ADMIN — ENOXAPARIN SODIUM 40 MG: 40 INJECTION SUBCUTANEOUS at 10:22

## 2024-11-26 RX ADMIN — PANTOPRAZOLE SODIUM 40 MG: 40 INJECTION, POWDER, FOR SOLUTION INTRAVENOUS at 06:02

## 2024-11-26 RX ADMIN — SODIUM CHLORIDE, POTASSIUM CHLORIDE, SODIUM LACTATE AND CALCIUM CHLORIDE 150 ML/HR: 600; 310; 30; 20 INJECTION, SOLUTION INTRAVENOUS at 13:21

## 2024-11-26 RX ADMIN — IOHEXOL 75 ML: 350 INJECTION, SOLUTION INTRAVENOUS at 16:07

## 2024-11-26 RX ADMIN — ACETAMINOPHEN 1000 MG: 650 SUSPENSION ORAL at 21:05

## 2024-11-26 RX ADMIN — LISINOPRIL 40 MG: 40 TABLET ORAL at 21:04

## 2024-11-26 RX ADMIN — SODIUM CHLORIDE, POTASSIUM CHLORIDE, SODIUM LACTATE AND CALCIUM CHLORIDE 150 ML/HR: 600; 310; 30; 20 INJECTION, SOLUTION INTRAVENOUS at 21:06

## 2024-11-26 ASSESSMENT — COGNITIVE AND FUNCTIONAL STATUS - GENERAL
MOBILITY SCORE: 24
DAILY ACTIVITIY SCORE: 24

## 2024-11-26 ASSESSMENT — PAIN SCALES - GENERAL
PAINLEVEL_OUTOF10: 0 - NO PAIN

## 2024-11-26 ASSESSMENT — PAIN - FUNCTIONAL ASSESSMENT: PAIN_FUNCTIONAL_ASSESSMENT: 0-10

## 2024-11-26 NOTE — TELEPHONE ENCOUNTER
I spoke with Thai to check in on him. He is doing well and is hoping he will be home for the holiday. Currently admitted at [facility]. I confirmed with him that they are keeping in touch with us regarding his care and we can see all of his charts from them. He was relieved for the call and appreciated our help. MD made aware.    [de-identified] : Right ear odor [FreeTextEntry6] : Patient is a 5-month-old female brought to office by dad for an odor of her right ear starting this morning.  Parents both felt patient's right ear has an unusual odor to it starting this morning.  Patient has been otherwise well no fever no vomiting no diarrhea eating and drinking well.  Patient has been playful happy and active this morning

## 2024-11-26 NOTE — PROGRESS NOTES
Nutrition Diet Education  Reason for education:  Dietary instructions for hospital and homegoing    Nutrition Note:  Patient History: Thai Cristobal is a 44 y.o. male presenting to ED with abdominal pain and admitted for SBO.  Pt states he was diagnosed with Crohn's disease in 2000 but only started needing medication for it for the past 5 years. He is following with oncology and recently had chemo since in May he had neuroendocrine tumor biopsied showing high grade malignancy.  This admission NG placed for decompression with improvement in stomach discomfort.  Pt continues to have significant output from NG with NPO and possible SBFT tomorrow.     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, history of Crohn's disease, s/p right hemicolectomy May 2024, HTN, anxiety, migraines     Nutrition Significant Labs:  BMP Trend:   Results from last 7 days   Lab Units 11/26/24  0508 11/25/24  0702 11/24/24  0537 11/23/24  0618   GLUCOSE mg/dL 71* 81 92 116*   CALCIUM mg/dL 9.1 9.1 9.4 9.2   SODIUM mmol/L 136 136 135* 136   POTASSIUM mmol/L 4.2 4.4 4.5 4.2   CO2 mmol/L 27 28 30 30   CHLORIDE mmol/L 96* 97* 97* 98   BUN mg/dL 20 22 21 13   CREATININE mg/dL 1.05 1.19 1.18 0.97        Current Diet: NPO Diet; Effective now    Encounter summary: Met with pt at bedside for follow up education from encounter yesterday.  Pt seen up walking hallways.    Education Documentation  Nutrition Care Manual, taught by Wale Thompson RD at 11/26/2024 10:28 AM.  Learner: Significant Other, Patient  Readiness: Acceptance  Method: Explanation, Handout  Response: Verbalizes Understanding  Comment: Provided and discussed Fiber and Bowel Management handout, Low Fiber Nutrition Therapy, and Maximizing Nutrition During Cancer Treatment handout from [facility]. Provided contact information for further questions.            Nutrition Prescription/Recommended  Diet:  Individualized Nutrition Prescription Provided for : Once appropriate to intiate PO diet suggest starting with clear liquids and advancing to Fiber Restricted diet with tolerance  Add [employer] Standard 1.0 (325kal, 16g pro) once daily with lunch and as requested once at least full liquids.    Time Spent/Follow-up Reminder:   Time Spent (min): 45 minutes  Last Date of Nutrition Visit: 11/26/24  Nutrition Follow-Up Needed?: 3-5 days  Follow up Comment: KEON - mod PCM - watch for diet to advance

## 2024-11-26 NOTE — PROGRESS NOTES
"ID:  Thai Cristobal is a 44 y.o. male on hospital day 3 of admission presenting with SBO (small bowel obstruction) (Multi).    Subjective   The patient seen and examined this morning at bedside.  Patient reports significant improvement in his abdominal pain.  Nasogastric tube still in place with production of 2550 mL over yesterday.  Patient denied having any lightheadedness, dizziness, fever, chills, shortness of breath, chest pain, palpitation, abdominal pain, or urinary symptoms.  He does endorse passing gas however still did not have any bowel movement.      Objective     Visit Vitals  /90 (BP Location: Right arm, Patient Position: Lying)   Pulse 82   Temp 36.8 °C (98.2 °F) (Temporal)   Resp 16   Ht 1.702 m (5' 7\")   Wt 66.7 kg (147 lb)   SpO2 98%   BMI 23.02 kg/m²   Smoking Status Never   BSA 1.78 m²        Physical Examination:  General: Appears stated age, well nourished, A&Ox4, conversational, lying comfortably in bed, not in pain or distress. On RA.  Nasogastric tube in place.  HEENT: Mucous membranes moist.  Head/Neck: Atraumatic, Normocephalic. Neck supple.   Respiratory: equal air entry bilaterally no crackles or wheezing were appreciated.  Cardiovascular: regular rhythm, normal S1 and S2. No added sounds or murmurs,  Gastrointestinal: soft, non-tender abdomen, bowel sounds present, no guarding or rebound.   Extremities: No edema in lower extremities.  Neurological:  no focal neurologic deficits.  Skin: Surgical scar can be observed on the abdomen.  Psychological: Appropriate mood, normal affect.      Laboratory Data:    Results from last 7 days   Lab Units 11/26/24  0508 11/25/24  0702 11/24/24  0537   WBC AUTO x10*3/uL 18.1* 21.1* 38.7*   RBC AUTO x10*6/uL 4.88 5.01 5.19   HEMOGLOBIN g/dL 13.5 13.8 14.6     Results from last 7 days   Lab Units 11/26/24  0508 11/25/24  0702 11/24/24  0537 11/23/24  0618 11/23/24  0143 11/22/24  1150 11/22/24  1150   SODIUM mmol/L 136 136 135* 136 136  --  135* "   POTASSIUM mmol/L 4.2 4.4 4.5 4.2 4.6  --  4.4   CHLORIDE mmol/L 96* 97* 97* 98 99  --  99   CO2 mmol/L 27 28 30 30 30  --  30   BUN mg/dL 20 22 21 13 13  --  16   CREATININE mg/dL 1.05 1.19 1.18 0.97 1.03  --  0.95   CALCIUM mg/dL 9.1 9.1 9.4 9.2 9.4  --  9.4   MAGNESIUM mg/dL 1.95 1.92 1.92  --  2.27   < >  --    BILIRUBIN TOTAL mg/dL  --   --   --  0.3 0.3  --  0.3   ALT U/L  --   --   --  30 34  --  31   AST U/L  --   --   --  27 30  --  30    < > = values in this interval not displayed.       Imaging:  No results found.     Medications:  Scheduled medications  acetaminophen, 1,000 mg, oral, q8h  cholecalciferol, 1,000 Units, oral, Daily  enoxaparin, 40 mg, subcutaneous, q24h KAMILLA  FLUoxetine, 20 mg, oral, Nightly  FLUoxetine, 40 mg, oral, Nightly  iohexol, 500 mL, oral, Once in imaging  iohexol, 75 mL, intravenous, Once in imaging  lidocaine, 1 Application, urethral, Once  lisinopril, 40 mg, oral, Nightly  pantoprazole, 40 mg, intravenous, Daily before breakfast      Continuous medications  lactated Ringer's, 150 mL/hr, Last Rate: 150 mL/hr (11/26/24 1321)      PRN medications  PRN medications: ALPRAZolam, ketorolac, metoclopramide **OR** metoclopramide, ondansetron **OR** ondansetron, phenoL, polyethylene glycol    Assessment    Assessment & Plan   Mr. Thai Cristobal is a 44 y.o. male who presents with abdominal pain.  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  Patient reports that his symptoms started the evening of 11/22 around 10:30 PM, and felt similar in nature to prior episodes of SBO.  Patient presented to [facility] ED, and imaging was concerning for possible small bowel obstruction.  Decision was made for admission for further management.  Patient continued to have nausea and vomiting overnight and he was agreeable to have  nasogastric tube placed after initial decline.  Nasogastric tube provided relief of abdominal pain as reported by the patient (resulted in about 1550 mL).  Based on surgical team evaluation with the patient continue to improve we will try clamping NG tube and monitor for worsening of symptoms.  If symptoms starts to worsen may consider small intestinal follow-through for evaluation for further surgical intervention.  On 11/25/2024, nurse reported that overnight the patient NG tube got kinked and drainage was interrupted.  During this time the patient reported feeling nauseous.  After adjusting the NG tube patient reported relief of his nausea and abdominal pain.  G-tube resulted in 1800 mL of fluid drainage (total over the day 2800 mL).  Patient reports significant improvement in his abdominal pain.  Surgical team evaluated the patient and decided to continue conservative management with NG tube while giving the patient n.p.o. with no surgical intervention at this time and may consider SBFT tomorrow after assessing the patient.  On 11/26/2024, patient was evaluated by surgical team which decided repeat CTAP today.    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  Patient does have a history of multiple small bowel obstructions the past year, as well as active malignancy of the small bowel with metastasis.  Patient does present with leukocytosis but otherwise does not have any signs or symptoms of an infectious process.  Low suspicion for bowel ischemia as the patient does not have any significant cardiovascular risk factors.  Leukocytosis could be secondary  to recent dose of Neulasta.  Low suspicion for infectious etiology as he denies fevers or chills, and imaging was not suggestive of acute cholecystitis or pancreatitis or diverticulitis.  -CT abdomen and pelvis showed a few loops of mildly distended and fluid-filled bowel with a possible zone of transition in the mid abdomen with some mesenteric congestion noted, negative of early partial small bowel obstruction  -S/p Zofran, Dilaudid, morphine in the ED  -General Surgery was consulted in the ED, recommended conservative management     Plan:  -Patient nausea and vomiting has significantly improved compared to yesterday (there was an episode where the NG tube was kinked interrupting the drainage during which the patient felt nauseous however it was relieved after adjusting the tube) additionally, he reported improvement in his abdominal pain.  -General surgery has been consulted, appreciate recommendations  -Surgical team evaluated the patient and recommended continue conservative management while keeping the patient n.p.o. with no surgical intervention at this time and repeat CTAP today with no plan for surgical intervention at this time.  -Bowel rest, patient to be kept n.p.o.  -Will initiate IVF, LR at 125 mL/h  -Serial abdominal exams  -Optimize electrolytes, K>4, Mg>2  -Pain regimen: Scheduled Tylenol.  Toradol every 6 hours for pain.  Recommend avoidance of opiate medication due to risk of constipation.  -Zofran first-line for nausea as needed, Reglan second line  -Low suspicion for infection at this time, however will collect blood cultures.  -Patient WBCs continue to trend down (21.1--> 18.1).  Will continue monitoring at this time as there is no signs of infection  -Dietitian consulted.  Appreciate recommendations  -Patient blood pressure has been better controlled today.  Will continue monitoring at this time.     CHRONIC PROBLEMS:  # HTN  # Anxiety  # Migraines  -Continue home medications as  indicated    Global Plan of Care  IVF: LR to 125 mL/h  Diet: N.p.o.  DVT prophylaxis: Lovenox  Dispo: Anticipate 2-3 midnight stays pending improvement small bowel obstruction  Bowel regimen: MiraLAX 17 g as needed  Consults: General Surgery  Code Status: Full Code     Emergency Contact: Extended Emergency Contact Information  Primary Emergency Contact: Danielle Cristobal  Mobile Phone: [phone]  Relation: Spouse     Patient seen and discussed with the attending.  Signature: SHAYLA MCGUIRE MD, PGY I Internal medicine  Date: November 26, 2024   This note has been transcribed using Dragon voice recognition system and there is a possibility of unintentional typing misprints.  Any information found to be copied from previous providers is done in the best interest of the patient to provide accurate, quality, and continuity of care.

## 2024-11-26 NOTE — DOCUMENTATION CLARIFICATION NOTE
PATIENT:               ABUNDIO LECHUGA  ACCT #:                  [id number]  MRN:                       [id number]  :                       1980  ADMIT DATE:       2024 1:14 AM  DISCH DATE:  RESPONDING PROVIDER #:        [id number]          PROVIDER RESPONSE TEXT:    I agree with dietician diagnosis of moderate malnutrition on 24    CDI QUERY TEXT:    Clarification    Instruction:    Based on your assessment of the patient and the clinical information, please provide the requested documentation by clicking on the appropriate radio button and enter any additional information if prompted.    Question: Please further clarify this patient nutritional status as    When answering this query, please exercise your independent professional judgment. The fact that a question is being asked, does not imply that any particular answer is desired or expected.    The patient's clinical indicators include:  Clinical Information:  44M presents for abd pain, nausea; found to have SBO    Clinical Indicators:  - BMI 23.02  - RD, : Moderate malnutrition related to acute disease or injury As Evidenced by: acute on chronic nutritional stress in the setting of neuroendocrine cancer with current bowel obstruction, indicators including weight loss >5% within the past month and intakes meeting <75% of needs for >7days.    Treatment:  [employer] 1.0 nutritional supplement, Fiber restricted diet, RD consult    Risk Factors:  Stage IV adenocarcinoma and neuroendocrine of the small bowel cancer with liver and mesenteric lymph node metastasis on chemotherapy, history of Crohn's disease  Options provided:  -- I agree with dietician diagnosis of moderate malnutrition on 24  -- Other - I will add my own diagnosis  -- Refer to Clinical Documentation Reviewer    Query created by: Royn Huddleston on 2024 12:06 PM      Electronically signed by:  MELI JORGE MD 2024 12:14 PM

## 2024-11-26 NOTE — CARE PLAN
The patient's goals for the shift include      The clinical goals for the shift include No episodes of N/V. Patient will ambulate at least 3 times this shift.    Total of 1300 ml out at by 0400 this shift. Patient did at least 5 laps around the unit. Bowel sounds still present but states he has not been passing gas as he was yesterday. Intermittent discomfort at times but pain minimal, only taking tylenol. Patient states he has an appetite now and want's to eat. VSS. Urine output still on low side and garrick in color. Yesterday IVF increased to 150ml/hr. Monitoring labs.     Problem: Pain - Adult  Goal: Verbalizes/displays adequate comfort level or baseline comfort level  Outcome: Progressing     Problem: Pain  Goal: Turns in bed with improved pain control throughout the shift  Outcome: Met  Goal: Performs ADL's with improved pain control throughout shift  Outcome: Met  Goal: Free from opioid side effects throughout the shift  Outcome: Met

## 2024-11-26 NOTE — PROGRESS NOTES
Thai Cristobal 44 y.o. male    Subjective  Patient seen and examined this morning. Denies nausea and vomiting.  No fever or chills.  Reports flatus today and no BM as of yet.  Up ambulating. Reports abdominal pain comes in waves. NGT with bilious output.  Urinating well. With no other complaints.    NGT 2550cc    Objective  PHYSICAL EXAM:  Physical Exam  Vitals reviewed.   Constitutional:       General: He is awake.      Appearance: Normal appearance.   Cardiovascular:      Rate and Rhythm: Normal rate and regular rhythm.      Pulses: Normal pulses.      Heart sounds: Normal heart sounds.   Pulmonary:      Effort: Pulmonary effort is normal.      Breath sounds: Normal breath sounds and air entry.   Abdominal:      General: Abdomen is flat. There is no distension.      Palpations: Abdomen is soft.      Tenderness: There is no abdominal tenderness. There is no guarding or rebound.      Comments: Soft, non-distended.  Non-TTP. No guarding, rebound or peritoneal signs.  NGT with bilious output.    Musculoskeletal:         General: Normal range of motion.      Cervical back: Normal range of motion.   Skin:     General: Skin is warm and dry.   Neurological:      General: No focal deficit present.      Mental Status: He is alert and oriented to person, place, and time.   Psychiatric:         Behavior: Behavior is cooperative.         Vital signs in last 24 hours:  Vitals:    11/26/24 1200   BP: 123/90   Pulse: 82   Resp: 16   Temp: 36.8 °C (98.2 °F)   SpO2: 98%         Intake/Output this shift:    Intake/Output Summary (Last 24 hours) at 11/26/2024 1225  Last data filed at 11/26/2024 1000  Gross per 24 hour   Intake 1959.58 ml   Output 3025 ml   Net -1065.42 ml        Allergies:  No Known Allergies     Medications:  Scheduled medications  acetaminophen, 1,000 mg, oral, q8h  cholecalciferol, 1,000 Units, oral, Daily  enoxaparin, 40 mg, subcutaneous, q24h KAMILLA  FLUoxetine, 20 mg, oral, Nightly  FLUoxetine, 40 mg, oral,  Nightly  lidocaine, 1 Application, urethral, Once  lisinopril, 40 mg, oral, Nightly  pantoprazole, 40 mg, intravenous, Daily before breakfast      Continuous medications  lactated Ringer's, 150 mL/hr, Last Rate: 150 mL/hr (11/26/24 0558)      PRN medications  PRN medications: ALPRAZolam, ketorolac, metoclopramide **OR** metoclopramide, ondansetron **OR** ondansetron, phenoL, polyethylene glycol       Labs:  Results for orders placed or performed during the hospital encounter of 11/23/24 (from the past 24 hours)   Basic Metabolic Panel   Result Value Ref Range    Glucose 71 (L) 74 - 99 mg/dL    Sodium 136 136 - 145 mmol/L    Potassium 4.2 3.5 - 5.3 mmol/L    Chloride 96 (L) 98 - 107 mmol/L    Bicarbonate 27 21 - 32 mmol/L    Anion Gap 17 10 - 20 mmol/L    Urea Nitrogen 20 6 - 23 mg/dL    Creatinine 1.05 0.50 - 1.30 mg/dL    eGFR 90 >60 mL/min/1.73m*2    Calcium 9.1 8.6 - 10.3 mg/dL   CBC   Result Value Ref Range    WBC 18.1 (H) 4.4 - 11.3 x10*3/uL    nRBC 0.0 0.0 - 0.0 /100 WBCs    RBC 4.88 4.50 - 5.90 x10*6/uL    Hemoglobin 13.5 13.5 - 17.5 g/dL    Hematocrit 42.5 41.0 - 52.0 %    MCV 87 80 - 100 fL    MCH 27.7 26.0 - 34.0 pg    MCHC 31.8 (L) 32.0 - 36.0 g/dL    RDW 16.6 (H) 11.5 - 14.5 %    Platelets 226 150 - 450 x10*3/uL   Magnesium   Result Value Ref Range    Magnesium 1.95 1.60 - 2.40 mg/dL        Imaging:  XR abdomen 1 view    Result Date: 11/24/2024  Interpreted By:  Morgan Maier, STUDY: XR ABDOMEN 1 VIEW;  11/24/2024 4:06 am   INDICATION: Signs/Symptoms:Verify NG tube placement.     COMPARISON: None.   ACCESSION NUMBER(S): [id number]   ORDERING CLINICIAN: MELI JORGE   FINDINGS: - NG/OG tube termination: Gastric fundus   Dilated small bowel loops measuring up to 5 cm are noted.   There is a mild colonic stool burden.   There is no evidence of free intraperitoneal air.   No pneumatosis or portal venous gas identified. Cholecystectomy clips are noted. Right lower quadrant abdominal staples are noted from  prior bowel resections.   No acute osseous abnormalities.   The lung bases are clear.       Nasogastric tube terminates over the gastric fundus.   Dilated left upper quadrant small bowel loops measuring up to 5 cm concerning for bowel obstruction.   MACRO: None.   Signed by: Morgan Maier 11/24/2024 4:18 AM Dictation workstation:   [id number]    CT abdomen pelvis w IV contrast    Addendum Date: 11/23/2024    Addendum: Per ordering physician, clinical concern is for small bowel obstruction.  In reviewing the exam, there are a few loops of mildly distended and fluid-filled jejunum in the left abdomen, at least some of which are likely related to peristalsis given the decompression of the small bowel both proximally and distally.  However, there does appear to be a possible zone of transition in the mid abdomen where there is decompression of the majority of the small bowel distal to a loop of mildly distended and fluid-filled small bowel with trace mesenteric congestion, raising the possibility of partial small bowel obstruction.  There is no pneumatosis, portal venous gas, or free intraperitoneal air.  Case and addendum discussed with Dr. Bell at 3:28 AM 11/23/2024. Signed by Cayden Everett MD    Result Date: 11/23/2024  STUDY: CT Abdomen and Pelvis with IV Contrast; 11/23/2024 02:41 AM INDICATION: Abdominal pain epigastric region with cramping.  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 COMPARISON: None. ACCESSION NUMBER(S): [id number] ORDERING CLINICIAN: AKIN FARNSWORTH TECHNIQUE: CT of the abdomen and pelvis was performed.  Contiguous axial images were obtained at 3 mm slice thickness through the abdomen and pelvis. Coronal and sagittal reconstructions at 3 mm slice thickness were performed.  75 mL Omnipaque-350 was administered intravenously. FINDINGS: LOWER CHEST: No cardiomegaly.  No pericardial effusion.  Lung bases are clear.   ABDOMEN:  LIVER: There are multiple ill-defined low-attenuation lesions in the liver, measuring up to 1.9 x 1.6 cm.  No hepatomegaly.  Smooth surface contour.  BILE DUCTS: No intrahepatic or extrahepatic biliary ductal dilatation.  GALLBLADDER: Status post cholecystectomy with likely related lobular ductal dilatation.  STOMACH: No abnormalities identified.  PANCREAS: No masses or ductal dilatation.  SPLEEN: No splenomegaly or focal splenic lesion.  ADRENAL GLANDS: No thickening or nodules.  KIDNEYS AND URETERS: Kidneys are normal in size and location.  No renal or ureteral calculi.  PELVIS:  BLADDER: No abnormalities identified.  REPRODUCTIVE ORGANS: No abnormalities identified.  BOWEL: Moderate volume stool burden.  Appendix surgically absent.  VESSELS: No abnormalities identified.  Abdominal aorta is normal in caliber.  PERITONEUM/RETROPERITONEUM/LYMPH NODES: No free fluid.  No pneumoperitoneum. No lymphadenopathy.  ABDOMINAL WALL: No abnormalities identified. SOFT TISSUES: No abnormalities identified.  BONES: No acute fracture or aggressive osseous lesion.    No evidence of acute inflammatory process in the abdomen or pelvis. Moderate volume stool burden. Multiple ill-defined low-attenuation lesions in the liver, presumably metastatic disease. Signed by Cayden Everett MD           Plan  SBO (small bowel obstruction) (Multi)     - Continue conservative management for now  - NPO  - Pain medication - try to limit narcotics  - Nausea: antiemetics PRN  - Continue NGT to LIWS - 2550cc output in last 24 hrs.   - Waiting for return of bowel function  - Encourage OOB   - Repeat CT A/P today   - No acute surgical intervention at this time    Plan of care discussed and further recommendations as per Dr. Irvin.     MANUELITO English-CNP    I spent 20 minutes in the professional and overall care of this patient.

## 2024-11-27 ENCOUNTER — APPOINTMENT (OUTPATIENT)
Dept: HEMATOLOGY/ONCOLOGY | Facility: CLINIC | Age: 44
End: 2024-11-27
Payer: COMMERCIAL

## 2024-11-27 ENCOUNTER — APPOINTMENT (OUTPATIENT)
Dept: RADIOLOGY | Facility: HOSPITAL | Age: 44
End: 2024-11-27
Payer: COMMERCIAL

## 2024-11-27 LAB
ANION GAP SERPL CALC-SCNC: 13 MMOL/L (ref 10–20)
BACTERIA BLD CULT: NORMAL
BACTERIA BLD CULT: NORMAL
BUN SERPL-MCNC: 14 MG/DL (ref 6–23)
CALCIUM SERPL-MCNC: 8.7 MG/DL (ref 8.6–10.3)
CHLORIDE SERPL-SCNC: 96 MMOL/L (ref 98–107)
CO2 SERPL-SCNC: 31 MMOL/L (ref 21–32)
CREAT SERPL-MCNC: 0.96 MG/DL (ref 0.5–1.3)
EGFRCR SERPLBLD CKD-EPI 2021: >90 ML/MIN/1.73M*2
ERYTHROCYTE [DISTWIDTH] IN BLOOD BY AUTOMATED COUNT: 16.1 % (ref 11.5–14.5)
GLUCOSE SERPL-MCNC: 107 MG/DL (ref 74–99)
HCT VFR BLD AUTO: 38.9 % (ref 41–52)
HGB BLD-MCNC: 12.6 G/DL (ref 13.5–17.5)
MAGNESIUM SERPL-MCNC: 1.84 MG/DL (ref 1.6–2.4)
MCH RBC QN AUTO: 27.4 PG (ref 26–34)
MCHC RBC AUTO-ENTMCNC: 32.4 G/DL (ref 32–36)
MCV RBC AUTO: 85 FL (ref 80–100)
NRBC BLD-RTO: 0 /100 WBCS (ref 0–0)
PLATELET # BLD AUTO: 213 X10*3/UL (ref 150–450)
POTASSIUM SERPL-SCNC: 4 MMOL/L (ref 3.5–5.3)
RBC # BLD AUTO: 4.6 X10*6/UL (ref 4.5–5.9)
SODIUM SERPL-SCNC: 136 MMOL/L (ref 136–145)
WBC # BLD AUTO: 17.2 X10*3/UL (ref 4.4–11.3)

## 2024-11-27 PROCEDURE — 80048 BASIC METABOLIC PNL TOTAL CA: CPT

## 2024-11-27 PROCEDURE — 2500000004 HC RX 250 GENERAL PHARMACY W/ HCPCS (ALT 636 FOR OP/ED)

## 2024-11-27 PROCEDURE — 85027 COMPLETE CBC AUTOMATED: CPT

## 2024-11-27 PROCEDURE — 1100000001 HC PRIVATE ROOM DAILY

## 2024-11-27 PROCEDURE — 2500000001 HC RX 250 WO HCPCS SELF ADMINISTERED DRUGS (ALT 637 FOR MEDICARE OP)

## 2024-11-27 PROCEDURE — 74018 RADEX ABDOMEN 1 VIEW: CPT | Performed by: STUDENT IN AN ORGANIZED HEALTH CARE EDUCATION/TRAINING PROGRAM

## 2024-11-27 PROCEDURE — 99232 SBSQ HOSP IP/OBS MODERATE 35: CPT | Performed by: SURGERY

## 2024-11-27 PROCEDURE — 83735 ASSAY OF MAGNESIUM: CPT

## 2024-11-27 PROCEDURE — 74018 RADEX ABDOMEN 1 VIEW: CPT

## 2024-11-27 PROCEDURE — 99233 SBSQ HOSP IP/OBS HIGH 50: CPT

## 2024-11-27 RX ADMIN — FLUOXETINE HYDROCHLORIDE 20 MG: 20 CAPSULE ORAL at 20:35

## 2024-11-27 RX ADMIN — FLUOXETINE HYDROCHLORIDE 40 MG: 20 CAPSULE ORAL at 20:33

## 2024-11-27 RX ADMIN — PANTOPRAZOLE SODIUM 40 MG: 40 INJECTION, POWDER, FOR SOLUTION INTRAVENOUS at 06:33

## 2024-11-27 RX ADMIN — ACETAMINOPHEN 1000 MG: 650 SUSPENSION ORAL at 20:33

## 2024-11-27 RX ADMIN — LISINOPRIL 40 MG: 40 TABLET ORAL at 20:34

## 2024-11-27 RX ADMIN — ENOXAPARIN SODIUM 40 MG: 40 INJECTION SUBCUTANEOUS at 09:23

## 2024-11-27 RX ADMIN — ACETAMINOPHEN 1000 MG: 650 SUSPENSION ORAL at 05:31

## 2024-11-27 ASSESSMENT — COGNITIVE AND FUNCTIONAL STATUS - GENERAL
DAILY ACTIVITIY SCORE: 24
MOBILITY SCORE: 24

## 2024-11-27 ASSESSMENT — PAIN SCALES - GENERAL: PAINLEVEL_OUTOF10: 0 - NO PAIN

## 2024-11-27 ASSESSMENT — PAIN - FUNCTIONAL ASSESSMENT: PAIN_FUNCTIONAL_ASSESSMENT: 0-10

## 2024-11-27 NOTE — PROGRESS NOTES
"Thai Cristobal is a 44 y.o. male on day 4 of admission presenting with SBO (small bowel obstruction) (Multi).    Subjective   Patient reports no pain or nausea this AM. CT yesterday afternoon showed dilated LUQ loop of bowel with obstructive pattern and NG output over 24 hours was ~ 4L. However, abdominal x-ray this AM showed no dilated loops and NG has only put out ~ 200 mL in ~ 6 hours. As well, patient had a normal bowel movement.        Objective     Physical Exam  Vitals reviewed.   Constitutional:       Appearance: Normal appearance.   HENT:      Head: Normocephalic and atraumatic.   Cardiovascular:      Rate and Rhythm: Normal rate and regular rhythm.   Pulmonary:      Effort: Pulmonary effort is normal.      Breath sounds: Normal breath sounds.   Abdominal:      General: Abdomen is flat. There is no distension.      Palpations: Abdomen is soft.      Tenderness: There is no abdominal tenderness.   Musculoskeletal:         General: Normal range of motion.   Skin:     General: Skin is warm and dry.   Neurological:      General: No focal deficit present.      Mental Status: He is alert and oriented to person, place, and time.   Psychiatric:         Mood and Affect: Mood normal.         Behavior: Behavior normal.         Last Recorded Vitals  Blood pressure (!) 138/92, pulse 81, temperature 36 °C (96.8 °F), temperature source Temporal, resp. rate 17, height 1.702 m (5' 7\"), weight 66.7 kg (147 lb), SpO2 96%.  Intake/Output last 3 Shifts:  I/O last 3 completed shifts:  In: 3539.6 (53.1 mL/kg) [P.O.:80; I.V.:3279.6 (49.2 mL/kg); NG/GT:180]  Out: 6175 (92.6 mL/kg) [Urine:1550 (0.6 mL/kg/hr); Emesis/NG output:4625]  Weight: 66.7 kg     Relevant Results                       Assessment/Plan   Assessment & Plan  SBO (small bowel obstruction) (Multi)    - CT yesterday concerning for worsening obstruction and yesterday's NG output was high. However, AXR this AM with no bowel distention, patient having bowel function, " and NG output low. Discussed with patient and wife that we will keep NP in today and measure output throughout the day prior to making any changes.   - Keep strict NPO. OK to clamp NG for ambulation, but please hook right back up to accurately measure output.        I spent 20 minutes in the professional and overall care of this patient.      Nahum Irvin MD

## 2024-11-27 NOTE — PROGRESS NOTES
"ID:  Thai Cristobal is a 44 y.o. male on hospital day 4 of admission presenting with SBO (small bowel obstruction) (Multi).    Subjective   The patient seen and examined this morning at bedside. Patient reports significant improvement in his abdominal pain.  Nasogastric tube still in place with production of 2875 mL over yesterday.  However, patient reported having a bowel movement today.  Patient denied having any lightheadedness, dizziness, fever, chills, shortness of breath, chest pain, palpitation, abdominal pain, or urinary symptoms.  He does endorse passing gas however still did not have any bowel movement.       Objective     Visit Vitals  BP (!) 138/92 (BP Location: Right arm, Patient Position: Lying)   Pulse 81   Temp 36 °C (96.8 °F) (Temporal)   Resp 17   Ht 1.702 m (5' 7\")   Wt 66.7 kg (147 lb)   SpO2 96%   BMI 23.02 kg/m²   Smoking Status Never   BSA 1.78 m²        Physical Examination:  General: Appears stated age, well nourished, A&Ox4, conversational, lying comfortably in bed, not in pain or distress. On RA.  Nasogastric tube in place.  HEENT: Mucous membranes moist.  Head/Neck: Atraumatic, Normocephalic. Neck supple.   Respiratory: equal air entry bilaterally no crackles or wheezing were appreciated.  Cardiovascular: regular rhythm, normal S1 and S2. No added sounds or murmurs,  Gastrointestinal: soft, non-tender abdomen, bowel sounds present, no guarding or rebound.   Extremities: No edema in lower extremities.  Neurological:  no focal neurologic deficits.  Skin: Surgical scar can be observed on the abdomen.  Psychological: Appropriate mood, normal affect.      Laboratory Data:    Results from last 7 days   Lab Units 11/27/24  0646 11/26/24  0508 11/25/24  0702   WBC AUTO x10*3/uL 17.2* 18.1* 21.1*   RBC AUTO x10*6/uL 4.60 4.88 5.01   HEMOGLOBIN g/dL 12.6* 13.5 13.8     Results from last 7 days   Lab Units 11/27/24  0646 11/26/24  0508 11/25/24  0702 11/24/24  0537 11/23/24  0618 11/23/24  0143 " 11/22/24  1150   SODIUM mmol/L 136 136 136   < > 136 136 135*   POTASSIUM mmol/L 4.0 4.2 4.4   < > 4.2 4.6 4.4   CHLORIDE mmol/L 96* 96* 97*   < > 98 99 99   CO2 mmol/L 31 27 28   < > 30 30 30   BUN mg/dL 14 20 22   < > 13 13 16   CREATININE mg/dL 0.96 1.05 1.19   < > 0.97 1.03 0.95   CALCIUM mg/dL 8.7 9.1 9.1   < > 9.2 9.4 9.4   MAGNESIUM mg/dL 1.84 1.95 1.92   < >  --  2.27  --    BILIRUBIN TOTAL mg/dL  --   --   --   --  0.3 0.3 0.3   ALT U/L  --   --   --   --  30 34 31   AST U/L  --   --   --   --  27 30 30    < > = values in this interval not displayed.       Imaging:  XR abdomen 1 view    Result Date: 11/27/2024  Interpreted By:  Kye Gr, STUDY: XR ABDOMEN 1 VIEW;  11/27/2024 8:34 am   INDICATION: Signs/Symptoms:Evaluate bowel distention and passage of contrast.     COMPARISON: None.   ACCESSION NUMBER(S): [id number]   ORDERING CLINICIAN: SISSY BUSTILLO   FINDINGS: AP view of the abdomen.   An enteric tube is again identified with tip terminating in the distal gastric body and side port at the fundus.   The previously noted dilatation of small bowel in the left hemiabdomen has resolved. There is now a nonobstructive bowel gas pattern. Limited evaluation of pneumoperitoneum on supine imaging, however no gross evidence of free air is noted.   Visualized lungs are clear.   Osseous structures demonstrate no acute bony changes.       1. Interval resolution of previously noted dilated small bowel loops in the left upper quadrant. Nonobstructive bowel gas pattern. 2. Enteric tube tip projects over the distal gastric body.     MACRO: None   Signed by: Kye Gr 11/27/2024 9:34 AM Dictation workstation:   [id number]    CT abdomen pelvis w IV contrast    Result Date: 11/26/2024  Interpreted By:  Riki Alanis, STUDY: CT ABDOMEN PELVIS W IV CONTRAST; 11/26/2024 4:25 pm   INDICATION: Signs/Symptoms:sbo;   COMPARISON: 11/23/2024   ACCESSION NUMBER(S): [id number]   ORDERING CLINICIAN: JUAN IRENE    TECHNIQUE: Contiguous axial images of the abdomen/pelvis were performed with IV contrast. 75 ml of Omnipaque 350 was utilized. Coronal and sagittal reformatted images were also obtained. All CT examinations are performed with 1 or more of the following dose reduction techniques: Automated exposure control, adjustment of mA and/or kv according to patient's size, or use of iterative reconstruction techniques.     FINDINGS: The liver, common bile duct, pancreas, spleen, and adrenal glands are unremarkable. Prior cholecystectomy with surgical clips in the gallbladder fossa is again noted.   The kidneys enhance symmetrically. No urolithiasis is seen. No hydroureteronephrosis is seen.   The visualized aorta is unremarkable.   The small bowel is dilated measuring up to 4.5 cm in diameter, which is increased since the prior study. Transition point in the left of the midline near the mid-distal jejunum with abrupt caliber change and swirling of the mesentery in this location.   No free intraperitoneal air or fluid is seen.   The bladder is normally distended with no gross wall thickening.   The visualized osseous structures are intact.   Limited images of the lower thorax are unremarkable.       1. The small bowel is dilated measuring up to 4.5 cm in diameter, which is increased since the prior study. Transition point in the left of the midline near the mid-distal jejunum with abrupt caliber change and swirling of the mesentery in this location.   2. The remainder of the examination is stable.   Signed by: Riki Alanis 11/26/2024 6:45 PM Dictation workstation:   [id number]      Medications:  Scheduled medications  acetaminophen, 1,000 mg, oral, q8h  cholecalciferol, 1,000 Units, oral, Daily  enoxaparin, 40 mg, subcutaneous, q24h KAMILLA  FLUoxetine, 20 mg, oral, Nightly  FLUoxetine, 40 mg, oral, Nightly  lidocaine, 1 Application, urethral, Once  lisinopril, 40 mg, oral, Nightly  pantoprazole, 40 mg, intravenous, Daily  before breakfast      Continuous medications  lactated Ringer's, 150 mL/hr, Last Rate: 150 mL/hr (11/27/24 1127)      PRN medications  PRN medications: ALPRAZolam, ketorolac, metoclopramide **OR** metoclopramide, ondansetron **OR** ondansetron, phenoL, polyethylene glycol    Assessment    Assessment & Plan   Mr. Thai Cristobal is a 44 y.o. male who presents with abdominal pain.  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  Patient reports that his symptoms started the evening of 11/22 around 10:30 PM, and felt similar in nature to prior episodes of SBO.  Patient presented to [facility] ED, and imaging was concerning for possible small bowel obstruction.  Decision was made for admission for further management.  Patient continued to have nausea and vomiting overnight and he was agreeable to have nasogastric tube placed after initial decline.  Nasogastric tube provided relief of abdominal pain as reported by the patient (resulted in about 1550 mL).  Based on surgical team evaluation with the patient continue to improve we will try clamping NG tube and monitor for worsening of symptoms.  If symptoms starts to worsen may consider small intestinal follow-through for evaluation for further surgical intervention.  On 11/25/2024, nurse reported that overnight the patient NG tube got kinked and drainage was interrupted.  During this time the patient reported feeling nauseous.  After adjusting the NG tube patient reported relief of his nausea and abdominal pain.  G-tube resulted in 1800 mL of fluid drainage (total over the day 2800 mL).  Patient reports significant improvement in his abdominal pain.  Surgical team evaluated the patient and decided to continue conservative management with NG tube while giving the patient n.p.o. with no surgical  intervention at this time and may consider SBFT tomorrow after assessing the patient.  On 11/26/2024, patient was evaluated by surgical team which decided repeat CTAP today.  CT abdomen that was done showed dilated small bowel measuring up to 4.5 cm in diameter,  which is increased since the prior study. Transition point in the left of the midline near the mid-distal jejunum with abrupt caliber change and swirling of the mesentery in this location.  However, abdominal x-ray this morning (11/27/2024) showed Interval resolution of previously noted dilated small bowel loops in the left upper quadrant with nonobstructive bowel gas pattern.  The patient was able to have a bowel movement and reported significant relief compared to previously with reduction in the NG output.  Surgical team evaluated the patient and decided to hold off on any surgical intervention and continue monitoring the NG output at this time while keeping the patient n.p.o.    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  Patient does have a history of multiple small bowel obstructions the past year, as well as active malignancy of the small bowel with metastasis.  Patient does present with leukocytosis but otherwise does not have any signs or symptoms of an infectious process.  Low suspicion for bowel ischemia as the patient does not have any significant cardiovascular risk factors.  Leukocytosis could be secondary to recent dose of Neulasta.  Low suspicion for infectious etiology as he denies fevers or chills, and imaging was not suggestive of acute cholecystitis or pancreatitis or  diverticulitis.  -CT abdomen and pelvis showed a few loops of mildly distended and fluid-filled bowel with a possible zone of transition in the mid abdomen with some mesenteric congestion noted, negative of early partial small bowel obstruction  -S/p Zofran, Dilaudid, morphine in the ED  -General Surgery was consulted in the ED, recommended conservative management     Plan:  -Patient nausea and vomiting has significantly improved compared to yesterday (there was an episode where the NG tube was kinked interrupting the drainage during which the patient felt nauseous however it was relieved after adjusting the tube) additionally, he reported improvement in his abdominal pain.  -General surgery has been consulted, appreciate recommendations  -CT abdomen that was done showed dilated small bowel measuring up to 4.5 cm in diameter,  which is increased since the prior study. Transition point in the left of the midline near the mid-distal jejunum with abrupt caliber change and swirling of the mesentery in this location.  However, abdominal x-ray this morning (11/27/2024) showed Interval resolution of previously noted dilated small bowel loops in the left upper quadrant with nonobstructive bowel gas pattern.  The patient was able to have a bowel movement and reported significant relief compared to previously with reduction in the NG output.  Surgical team evaluated the patient and decided to hold off on any surgical intervention and continue monitoring the NG output at this time while keeping the patient n.p.o.  -Bowel rest, patient to be kept n.p.o.  -Continue IVF, LR at 150 mL/h  -Serial abdominal exams  -Optimize electrolytes, K>4, Mg>2  -Pain regimen: Scheduled Tylenol.  Toradol every 6 hours for pain.  Recommend avoidance of opiate medication due to risk of constipation.  -Zofran first-line for nausea as needed, Reglan second line  -Low suspicion for infection at this time, however will collect blood cultures.  -Patient  WBCs continue to trend down (18.1-->17.2).  Will continue monitoring at this time as there is no signs of infection  -Dietitian consulted.  Appreciate recommendations  -Patient blood pressure has been better controlled today.  Will continue monitoring at this time.     CHRONIC PROBLEMS:  # HTN  # Anxiety  # Migraines  -Continue home medications as indicated    Global Plan of Care  IVF: LR to 150 mL/h  Diet: N.p.o.  DVT prophylaxis: Lovenox  Dispo: Anticipate 2-3 midnight stays pending improvement small bowel obstruction  Bowel regimen: MiraLAX 17 g as needed  Consults: General Surgery  Code Status: Full Code     Emergency Contact: Extended Emergency Contact Information  Primary Emergency Contact: Danielle Cristobal  Mobile Phone: [phone]  Relation: Spouse     Patient seen and discussed with the attending.  Signature: SHAYLA MCGUIRE MD, PGY I Internal medicine  Date: November 27, 2024   This note has been transcribed using Dragon voice recognition system and there is a possibility of unintentional typing misprints.  Any information found to be copied from previous providers is done in the best interest of the patient to provide accurate, quality, and continuity of care.

## 2024-11-27 NOTE — CARE PLAN
The patient's goals for the shift include      The clinical goals for the shift include Pt will demonstarte comfort aeb sleeping in 3 hour in intervals or greater by end of shift.    Over the shift, the patient did  make progress toward the following goals. .pt is alert and oriented x3 denies pain or distress, has ng to r nares that has over 1500ml this shift, pt has only voided 250ml this shift, pt has been able to ambulate in hallway and to restroom, pt has been able to sleep in long interals this shift, pt has been able to make needs know via ncl and has remained safe this shift

## 2024-11-27 NOTE — NURSING NOTE
pt has read his mychart , on result of ct scan and are extremely worried, concern if he needs emgerent surgery can you please review and come and speak with them to reassure them , this message was sent to dr higgins awaiting response

## 2024-11-28 VITALS
HEART RATE: 92 BPM | OXYGEN SATURATION: 98 % | SYSTOLIC BLOOD PRESSURE: 134 MMHG | DIASTOLIC BLOOD PRESSURE: 99 MMHG | TEMPERATURE: 98.4 F | HEIGHT: 67 IN | RESPIRATION RATE: 18 BRPM | BODY MASS INDEX: 23.07 KG/M2 | WEIGHT: 147 LBS

## 2024-11-28 PROBLEM — K56.609 SBO (SMALL BOWEL OBSTRUCTION) (MULTI): Status: RESOLVED | Noted: 2024-11-23 | Resolved: 2024-11-28

## 2024-11-28 LAB
ANION GAP SERPL CALC-SCNC: 14 MMOL/L (ref 10–20)
BUN SERPL-MCNC: 10 MG/DL (ref 6–23)
CALCIUM SERPL-MCNC: 8.6 MG/DL (ref 8.6–10.3)
CHLORIDE SERPL-SCNC: 98 MMOL/L (ref 98–107)
CO2 SERPL-SCNC: 28 MMOL/L (ref 21–32)
CREAT SERPL-MCNC: 0.83 MG/DL (ref 0.5–1.3)
EGFRCR SERPLBLD CKD-EPI 2021: >90 ML/MIN/1.73M*2
ERYTHROCYTE [DISTWIDTH] IN BLOOD BY AUTOMATED COUNT: 16.3 % (ref 11.5–14.5)
GLUCOSE SERPL-MCNC: 71 MG/DL (ref 74–99)
HCT VFR BLD AUTO: 39.7 % (ref 41–52)
HGB BLD-MCNC: 12.8 G/DL (ref 13.5–17.5)
MAGNESIUM SERPL-MCNC: 1.74 MG/DL (ref 1.6–2.4)
MCH RBC QN AUTO: 27.6 PG (ref 26–34)
MCHC RBC AUTO-ENTMCNC: 32.2 G/DL (ref 32–36)
MCV RBC AUTO: 86 FL (ref 80–100)
NRBC BLD-RTO: 0 /100 WBCS (ref 0–0)
PLATELET # BLD AUTO: 198 X10*3/UL (ref 150–450)
POTASSIUM SERPL-SCNC: 3.8 MMOL/L (ref 3.5–5.3)
RBC # BLD AUTO: 4.64 X10*6/UL (ref 4.5–5.9)
SODIUM SERPL-SCNC: 136 MMOL/L (ref 136–145)
WBC # BLD AUTO: 15.7 X10*3/UL (ref 4.4–11.3)

## 2024-11-28 PROCEDURE — 83735 ASSAY OF MAGNESIUM: CPT

## 2024-11-28 PROCEDURE — 2500000004 HC RX 250 GENERAL PHARMACY W/ HCPCS (ALT 636 FOR OP/ED)

## 2024-11-28 PROCEDURE — 2500000001 HC RX 250 WO HCPCS SELF ADMINISTERED DRUGS (ALT 637 FOR MEDICARE OP)

## 2024-11-28 PROCEDURE — 80048 BASIC METABOLIC PNL TOTAL CA: CPT

## 2024-11-28 PROCEDURE — 99239 HOSP IP/OBS DSCHRG MGMT >30: CPT

## 2024-11-28 PROCEDURE — 85027 COMPLETE CBC AUTOMATED: CPT

## 2024-11-28 PROCEDURE — 99231 SBSQ HOSP IP/OBS SF/LOW 25: CPT | Performed by: SURGERY

## 2024-11-28 RX ADMIN — ENOXAPARIN SODIUM 40 MG: 40 INJECTION SUBCUTANEOUS at 08:52

## 2024-11-28 RX ADMIN — CHOLECALCIFEROL TAB 25 MCG (1000 UNIT) 1000 UNITS: 25 TAB at 08:52

## 2024-11-28 RX ADMIN — SODIUM CHLORIDE, POTASSIUM CHLORIDE, SODIUM LACTATE AND CALCIUM CHLORIDE 150 ML/HR: 600; 310; 30; 20 INJECTION, SOLUTION INTRAVENOUS at 03:25

## 2024-11-28 RX ADMIN — PANTOPRAZOLE SODIUM 40 MG: 40 INJECTION, POWDER, FOR SOLUTION INTRAVENOUS at 06:20

## 2024-11-28 ASSESSMENT — COGNITIVE AND FUNCTIONAL STATUS - GENERAL
MOBILITY SCORE: 24
DAILY ACTIVITIY SCORE: 24

## 2024-11-28 ASSESSMENT — PAIN - FUNCTIONAL ASSESSMENT: PAIN_FUNCTIONAL_ASSESSMENT: 0-10

## 2024-11-28 ASSESSMENT — PAIN SCALES - GENERAL
PAINLEVEL_OUTOF10: 0 - NO PAIN
PAINLEVEL_OUTOF10: 0 - NO PAIN

## 2024-11-28 NOTE — NURSING NOTE
Discharge teaching completed, voiced understanding.  To private vehicle.  Vitals stable, see flow sheet.

## 2024-11-28 NOTE — DISCHARGE INSTRUCTIONS
Thai Cristobal, you came to the hospital for abdominal pain found to have small bowel obstruction. You were treated with bowel rest and NG tube placement. You improved and were able to tolerate diet.     Please call your primary care physician's office and make an appointment for follow-up from this visit for sometime in the next 2-3 days.    Please take all your medications as directed.     If you have any concerning symptoms, or worsening symptoms please call or return.    Thank you for allowing us to participate in your health care.    -[facility] Teaching Service.

## 2024-11-28 NOTE — CARE PLAN
Patient had a quiet night resting in his room. Remained NPO during this shift. VSS. No c/o pain. NG clamped and reconnected to suction overnight with 275 output from 2230 until 0600 this morning. Patient has had x3 bm this shift. Voiding adequately. No acute events during this shift. Ambulating frequently and independently.     The clinical goals for the shift include Patient would like to have NG tube removed by end of shift.

## 2024-11-28 NOTE — DISCHARGE SUMMARY
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 Read the directions carefully, and ask your doctor or   other care provider to review them with you.     CONTINUE taking these medications     ALPRAZolam 0.5 mg tablet; Commonly known as: Xanax; Take 1 tablet (0.5   mg) by mouth once daily as needed for anxiety.   cholecalciferol 25 MCG (1000 UT) capsule; Commonly known as: Vitamin D-3   elderberry fruit 350 mg capsule   Emergen-C 500 mg tablet,chewable; Generic drug: vitamin   C-multivitamin-mineral   flaxseed oiL 1,000 mg capsule   lisinopril 40 mg tablet   ondansetron 8 mg tablet; Commonly known as: Zofran; Take 1 tablet (8 mg)   by mouth every 8 hours if needed for nausea or vomiting.   prochlorperazine 10 mg tablet; Commonly known as: Compazine; Take 1   tablet (10 mg) by mouth every 6 hours if needed for nausea or vomiting.   SUMAtriptan 50 mg tablet; Commonly known as: Imitrex; TAKE 1 TABLET (50   MG) BY MOUTH 1 TIME IF NEEDED FOR MIGRAINE FOR UP TO 36 DOSES.     STOP taking these medications     loperamide 2 mg capsule; Commonly known as: Imodium       Test Results Pending At Discharge  Pending Labs       No current pending labs.            Hospital Course  44 y.o. male with past medical history significant for stage IV small bowel carcinoma plus neuroendocrine tumor with metastasis to liver and lymph nodes, history  of Crohn's disease s/p partial right colectomy and small bowel resection May 2024 who presented the evening of 11/22 around 10:30 PM with abdominal pain that felt similar in nature to prior episodes of SBO.  Imaging was concerning for possible small bowel obstruction.  Decision was made for admission for further management.  Patient continued to have nausea and vomiting overnight and he was agreeable to have nasogastric tube placed after initial decline.  Nasogastric tube provided relief of abdominal pain as reported by the patient (resulted in about 1550 mL).  Based on surgical team evaluation with the patient continue to improve, NG tube was clamped and monitored for worsening of symptoms.  On 11/25/2024, nurse reported that overnight the patient NG tube got kinked and drainage was interrupted.  During this time the patient reported feeling nauseous.  After adjusting the NG tube patient reported relief of his nausea and abdominal pain.  NG-tube resulted in 1800 mL of fluid drainage (total over the day 2800 mL).  Patient reported significant improvement in his abdominal pain.  Surgical team evaluated the patient and decided to continue conservative management with NG tube while giving the patient n.p.o. On 11/26/2024 CT abdomen that was done showed dilated small bowel measuring up to 4.5 cm in diameter, which  increased from prior study. Transition point in the left of the midline near the mid-distal jejunum with abrupt caliber change and swirling of the mesentery in this location.  However, abdominal x-ray  (11/27/2024) showed Interval resolution of previously noted dilated small bowel loops in the left upper quadrant with nonobstructive bowel gas pattern.  The patient was able to have a bowel movement and reported significant relief compared to previously with reduction in the NG output.  Overnight from 11/27-11/28, patient had significant decreased in NG output and tolerated soft food diet after NG tube was  removed. Patient was discharged in stable condition home.     Pertinent Physical Exam At Time of Discharge  Physical Examination:  General: Appears stated age, well nourished, A&Ox4, conversational, lying comfortably in bed, not in pain or distress. On RA.  Nasogastric tube in place.  HEENT: Mucous membranes moist.  Head/Neck: Atraumatic, Normocephalic. Neck supple.   Respiratory: equal air entry bilaterally no crackles or wheezing were appreciated.  Cardiovascular: regular rhythm, normal S1 and S2. No added sounds or murmurs,  Gastrointestinal: soft, non-tender abdomen, bowel sounds present, no guarding or rebound.   Extremities: No edema in lower extremities.  Neurological:  no focal neurologic deficits.  Skin: Surgical scar can be observed on the abdomen.  Psychological: Appropriate mood, normal affect.       Outpatient Follow-Up  Future Appointments   Date Time Provider Department [place]   12/6/2024  9:45 AM [facility] [id number] 1 [id number] [facility] [place], [place]   12/10/2024  9:00 AM INF 01 ZEV [id number] [place]   12/10/2024  9:20 AM Nba Vargas MD [id number] [place]   12/10/2024 10:00 AM INF 11 ZEV [id number] [place]   12/12/2024 10:00 AM INF 01 ZEV [id number] [place]   1/30/2025  8:15 AM Charlotte VOSS DO [id number] [place]     The patient was seen and discussed with attending, Dr. Saldana.    Erika Bowers DO  PGY-2 Family Medicine

## 2024-11-28 NOTE — CARE PLAN
The patient's goals for the shift include      The clinical goals for the shift include Pt will not have any nausea or vomititng this shift      Problem: Pain - Adult  Goal: Verbalizes/displays adequate comfort level or baseline comfort level  Outcome: Adequate for Discharge     Problem: Safety - Adult  Goal: Free from fall injury  Outcome: Adequate for Discharge     Pt was safely discharged to home at this time.

## 2024-11-28 NOTE — PROGRESS NOTES
Spiritual Care Visit    Clinical Encounter Type  Visited With: Patient and family together  Routine Visit: Introduction  Continue Visiting: No                                            Taxonomy  Intended Effects: Promote sense of peace, Preserve dignity and respect, Meaning-making  Methods: Offer spiritual/Mormon support  Interventions: Share words of hope and inspiration, [place]    Patient was with his wife.  Patient shared his story as the  listened.  Patient has a two year old girl at home.   encouraged him in his maría elena and prayed at his request.

## 2024-11-28 NOTE — PROGRESS NOTES
"Thai Cristobal is a 44 y.o. male on day 5 of admission presenting with SBO (small bowel obstruction) (Multi).    Subjective   Feeling well this morning. NG out. Having bowel function. Tolerating clears.        Objective     Physical Exam  Vitals reviewed.   Constitutional:       Appearance: Normal appearance.   HENT:      Head: Normocephalic and atraumatic.   Cardiovascular:      Rate and Rhythm: Normal rate and regular rhythm.   Pulmonary:      Effort: Pulmonary effort is normal.      Breath sounds: Normal breath sounds.   Abdominal:      General: Abdomen is flat. There is no distension.      Palpations: Abdomen is soft.      Tenderness: There is no abdominal tenderness.   Musculoskeletal:         General: Normal range of motion.   Skin:     General: Skin is warm and dry.   Neurological:      General: No focal deficit present.      Mental Status: He is alert and oriented to person, place, and time.   Psychiatric:         Mood and Affect: Mood normal.         Behavior: Behavior normal.     Last Recorded Vitals  Blood pressure 119/80, pulse 73, temperature 36.3 °C (97.3 °F), temperature source Temporal, resp. rate 16, height 1.702 m (5' 7\"), weight 66.7 kg (147 lb), SpO2 96%.  Intake/Output last 3 Shifts:  I/O last 3 completed shifts:  In: 2862.5 (42.9 mL/kg) [P.O.:80; I.V.:2722.5 (40.8 mL/kg); NG/GT:60]  Out: 3650 (54.7 mL/kg) [Urine:1375 (0.6 mL/kg/hr); Emesis/NG output:2275]  Weight: 66.7 kg     Relevant Results                               Assessment/Plan   Assessment & Plan  SBO (small bowel obstruction) (Multi)    - Advance to soft food. OK to discharge in afternoon/evening from surgical perspective since he has been symptom free for over 24 hours.        I spent 15 minutes in the professional and overall care of this patient.      Nahum Irvin MD      "

## 2024-11-29 ENCOUNTER — PATIENT OUTREACH (OUTPATIENT)
Dept: PRIMARY CARE | Facility: CLINIC | Age: 44
End: 2024-11-29
Payer: COMMERCIAL

## 2024-11-29 NOTE — TELEPHONE ENCOUNTER
Wife called triage line and left message that Thai was discharged from hospital last night.      Appts reviewed. Pt currently scheduled:  12/6/24 CT CAP  12/10/24 Dr. Vargas  12/10/24 FOLFOX/Beva after seeing Dr. Vargas    Message sent to Lyubov Garner, RN who confirms pt to continue with these scheduled appts and plan of care.     Call placed to Danielle and advised to continue with care plan above with appointments on 12/6 & 12/10 as pt would need to see provider prior to receiving next chemo cycle after being discharged from hospital.  Danielle confirmed understanding via teachback method and denies any further questions or needs. Message forwarded to dr. Vargas and Lyubov to inform.

## 2024-11-29 NOTE — PROGRESS NOTES
Discharge Facility: [facility]  Discharge Diagnosis: SBO  Admission Date:11/23/2024  Discharge Date: 11/28/2024    PCP Appointment Date: none  Please call your primary care physician's office and make an appointment for follow-up from this visit for sometime in the next 2-3 days.     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 Voicemail left with contact information for patient to call back with any non-emergent questions or concerns.

## 2024-12-02 DIAGNOSIS — J45.909 ASTHMA, UNSPECIFIED ASTHMA SEVERITY, UNSPECIFIED WHETHER COMPLICATED, UNSPECIFIED WHETHER PERSISTENT (HHS-HCC): Primary | ICD-10-CM

## 2024-12-03 ENCOUNTER — APPOINTMENT (OUTPATIENT)
Dept: HEMATOLOGY/ONCOLOGY | Facility: CLINIC | Age: 44
End: 2024-12-03
Payer: COMMERCIAL

## 2024-12-04 ENCOUNTER — NURSE TRIAGE (OUTPATIENT)
Dept: ADMISSION | Facility: HOSPITAL | Age: 44
End: 2024-12-04
Payer: COMMERCIAL

## 2024-12-04 NOTE — TELEPHONE ENCOUNTER
Patient calls to reports has been having episode of dizzy/light -headed when getting up from sitting to standing. Checked Bp at home, 120/89, Report feeling  out of breath with steps, but recovers at rest. Also reports faint smells of ammonia randomly   Lost 10lbs while in hospital he is trying to slowly increase his nutrition and add very finely chopped/purree meat and protein sources and gets adequate hydration  CT scan scheduled Friday, labs/MD appt on 12/10  Forwarding to provider, patient asking if labs should be done sooner then the 10th    Additional Information   Commented on: Does it happen slowly or does it come on quickly?     Noticed when standing up from chair    Protocols used: Dizziness

## 2024-12-05 ENCOUNTER — LAB (OUTPATIENT)
Dept: LAB | Facility: LAB | Age: 44
End: 2024-12-05
Payer: COMMERCIAL

## 2024-12-05 DIAGNOSIS — C17.9 ADENOCARCINOMA OF SMALL BOWEL (MULTI): ICD-10-CM

## 2024-12-05 LAB
ALBUMIN SERPL BCP-MCNC: 4.2 G/DL (ref 3.4–5)
ALP SERPL-CCNC: 138 U/L (ref 33–120)
ALT SERPL W P-5'-P-CCNC: 51 U/L (ref 10–52)
ANION GAP SERPL CALC-SCNC: 12 MMOL/L (ref 10–20)
APPEARANCE UR: CLEAR
AST SERPL W P-5'-P-CCNC: 41 U/L (ref 9–39)
BASOPHILS # BLD AUTO: 0.13 X10*3/UL (ref 0–0.1)
BASOPHILS NFR BLD AUTO: 1.3 %
BILIRUB SERPL-MCNC: 0.4 MG/DL (ref 0–1.2)
BILIRUB UR STRIP.AUTO-MCNC: NEGATIVE MG/DL
BUN SERPL-MCNC: 25 MG/DL (ref 6–23)
CALCIUM SERPL-MCNC: 9.3 MG/DL (ref 8.6–10.3)
CHLORIDE SERPL-SCNC: 94 MMOL/L (ref 98–107)
CO2 SERPL-SCNC: 31 MMOL/L (ref 21–32)
COLOR UR: NORMAL
CREAT SERPL-MCNC: 0.95 MG/DL (ref 0.5–1.3)
EGFRCR SERPLBLD CKD-EPI 2021: >90 ML/MIN/1.73M*2
EOSINOPHIL # BLD AUTO: 0.15 X10*3/UL (ref 0–0.7)
EOSINOPHIL NFR BLD AUTO: 1.5 %
ERYTHROCYTE [DISTWIDTH] IN BLOOD BY AUTOMATED COUNT: 17.6 % (ref 11.5–14.5)
GLUCOSE SERPL-MCNC: 59 MG/DL (ref 74–99)
GLUCOSE UR STRIP.AUTO-MCNC: NORMAL MG/DL
HCT VFR BLD AUTO: 44.4 % (ref 41–52)
HGB BLD-MCNC: 14.1 G/DL (ref 13.5–17.5)
IMM GRANULOCYTES # BLD AUTO: 0.05 X10*3/UL (ref 0–0.7)
IMM GRANULOCYTES NFR BLD AUTO: 0.5 % (ref 0–0.9)
KETONES UR STRIP.AUTO-MCNC: NEGATIVE MG/DL
LEUKOCYTE ESTERASE UR QL STRIP.AUTO: NEGATIVE
LYMPHOCYTES # BLD AUTO: 1.83 X10*3/UL (ref 1.2–4.8)
LYMPHOCYTES NFR BLD AUTO: 18.5 %
MCH RBC QN AUTO: 27.9 PG (ref 26–34)
MCHC RBC AUTO-ENTMCNC: 31.8 G/DL (ref 32–36)
MCV RBC AUTO: 88 FL (ref 80–100)
MONOCYTES # BLD AUTO: 1.25 X10*3/UL (ref 0.1–1)
MONOCYTES NFR BLD AUTO: 12.6 %
NEUTROPHILS # BLD AUTO: 6.5 X10*3/UL (ref 1.2–7.7)
NEUTROPHILS NFR BLD AUTO: 65.6 %
NITRITE UR QL STRIP.AUTO: NEGATIVE
NRBC BLD-RTO: 0 /100 WBCS (ref 0–0)
PH UR STRIP.AUTO: 7 [PH]
PLATELET # BLD AUTO: 329 X10*3/UL (ref 150–450)
POTASSIUM SERPL-SCNC: 5.6 MMOL/L (ref 3.5–5.3)
PROT SERPL-MCNC: 7.2 G/DL (ref 6.4–8.2)
PROT UR STRIP.AUTO-MCNC: NEGATIVE MG/DL
RBC # BLD AUTO: 5.06 X10*6/UL (ref 4.5–5.9)
RBC # UR STRIP.AUTO: NEGATIVE /UL
SODIUM SERPL-SCNC: 131 MMOL/L (ref 136–145)
SP GR UR STRIP.AUTO: 1.02
UROBILINOGEN UR STRIP.AUTO-MCNC: NORMAL MG/DL
WBC # BLD AUTO: 9.9 X10*3/UL (ref 4.4–11.3)

## 2024-12-05 PROCEDURE — 80053 COMPREHEN METABOLIC PANEL: CPT

## 2024-12-05 PROCEDURE — 85025 COMPLETE CBC W/AUTO DIFF WBC: CPT

## 2024-12-05 PROCEDURE — 81003 URINALYSIS AUTO W/O SCOPE: CPT

## 2024-12-06 ENCOUNTER — HOSPITAL ENCOUNTER (OUTPATIENT)
Dept: RADIOLOGY | Facility: CLINIC | Age: 44
Discharge: HOME | End: 2024-12-06
Payer: COMMERCIAL

## 2024-12-06 DIAGNOSIS — C17.9 ADENOCARCINOMA OF SMALL BOWEL (MULTI): ICD-10-CM

## 2024-12-06 PROCEDURE — 2550000001 HC RX 255 CONTRASTS: Performed by: INTERNAL MEDICINE

## 2024-12-06 PROCEDURE — 74177 CT ABD & PELVIS W/CONTRAST: CPT

## 2024-12-10 ENCOUNTER — INFUSION (OUTPATIENT)
Dept: HEMATOLOGY/ONCOLOGY | Facility: CLINIC | Age: 44
End: 2024-12-10
Payer: COMMERCIAL

## 2024-12-10 ENCOUNTER — APPOINTMENT (OUTPATIENT)
Dept: HEMATOLOGY/ONCOLOGY | Facility: CLINIC | Age: 44
End: 2024-12-10
Payer: COMMERCIAL

## 2024-12-10 ENCOUNTER — OFFICE VISIT (OUTPATIENT)
Dept: HEMATOLOGY/ONCOLOGY | Facility: CLINIC | Age: 44
End: 2024-12-10
Payer: COMMERCIAL

## 2024-12-10 ENCOUNTER — LAB (OUTPATIENT)
Dept: HEMATOLOGY/ONCOLOGY | Facility: CLINIC | Age: 44
End: 2024-12-10
Payer: COMMERCIAL

## 2024-12-10 VITALS
TEMPERATURE: 97.7 F | RESPIRATION RATE: 18 BRPM | OXYGEN SATURATION: 98 % | HEART RATE: 94 BPM | WEIGHT: 152.6 LBS | DIASTOLIC BLOOD PRESSURE: 76 MMHG | BODY MASS INDEX: 23.9 KG/M2 | SYSTOLIC BLOOD PRESSURE: 108 MMHG

## 2024-12-10 DIAGNOSIS — C17.9 ADENOCARCINOMA OF SMALL BOWEL (MULTI): Primary | ICD-10-CM

## 2024-12-10 DIAGNOSIS — C17.9 ADENOCARCINOMA OF SMALL BOWEL (MULTI): ICD-10-CM

## 2024-12-10 LAB
ALBUMIN SERPL BCP-MCNC: 4 G/DL (ref 3.4–5)
ALP SERPL-CCNC: 99 U/L (ref 33–120)
ALT SERPL W P-5'-P-CCNC: 23 U/L (ref 10–52)
ANION GAP SERPL CALC-SCNC: 11 MMOL/L (ref 10–20)
AST SERPL W P-5'-P-CCNC: 19 U/L (ref 9–39)
BASOPHILS # BLD AUTO: 0.11 X10*3/UL (ref 0–0.1)
BASOPHILS NFR BLD AUTO: 1.7 %
BILIRUB SERPL-MCNC: 0.4 MG/DL (ref 0–1.2)
BUN SERPL-MCNC: 17 MG/DL (ref 6–23)
CALCIUM SERPL-MCNC: 9.3 MG/DL (ref 8.6–10.3)
CHLORIDE SERPL-SCNC: 103 MMOL/L (ref 98–107)
CO2 SERPL-SCNC: 25 MMOL/L (ref 21–32)
CREAT SERPL-MCNC: 0.81 MG/DL (ref 0.5–1.3)
EGFRCR SERPLBLD CKD-EPI 2021: >90 ML/MIN/1.73M*2
EOSINOPHIL # BLD AUTO: 0.16 X10*3/UL (ref 0–0.7)
EOSINOPHIL NFR BLD AUTO: 2.5 %
ERYTHROCYTE [DISTWIDTH] IN BLOOD BY AUTOMATED COUNT: 17.2 % (ref 11.5–14.5)
GLUCOSE SERPL-MCNC: 94 MG/DL (ref 74–99)
HCT VFR BLD AUTO: 40.6 % (ref 41–52)
HGB BLD-MCNC: 13.3 G/DL (ref 13.5–17.5)
IMM GRANULOCYTES # BLD AUTO: 0.01 X10*3/UL (ref 0–0.7)
IMM GRANULOCYTES NFR BLD AUTO: 0.2 % (ref 0–0.9)
LYMPHOCYTES # BLD AUTO: 1.31 X10*3/UL (ref 1.2–4.8)
LYMPHOCYTES NFR BLD AUTO: 20.5 %
MCH RBC QN AUTO: 28.9 PG (ref 26–34)
MCHC RBC AUTO-ENTMCNC: 32.8 G/DL (ref 32–36)
MCV RBC AUTO: 88 FL (ref 80–100)
MONOCYTES # BLD AUTO: 0.93 X10*3/UL (ref 0.1–1)
MONOCYTES NFR BLD AUTO: 14.6 %
NEUTROPHILS # BLD AUTO: 3.86 X10*3/UL (ref 1.2–7.7)
NEUTROPHILS NFR BLD AUTO: 60.5 %
NRBC BLD-RTO: ABNORMAL /100{WBCS}
PLATELET # BLD AUTO: 274 X10*3/UL (ref 150–450)
POTASSIUM SERPL-SCNC: 4.4 MMOL/L (ref 3.5–5.3)
PROT SERPL-MCNC: 7.1 G/DL (ref 6.4–8.2)
RBC # BLD AUTO: 4.61 X10*6/UL (ref 4.5–5.9)
SODIUM SERPL-SCNC: 135 MMOL/L (ref 136–145)
WBC # BLD AUTO: 6.4 X10*3/UL (ref 4.4–11.3)

## 2024-12-10 PROCEDURE — 96415 CHEMO IV INFUSION ADDL HR: CPT

## 2024-12-10 PROCEDURE — 3074F SYST BP LT 130 MM HG: CPT | Performed by: INTERNAL MEDICINE

## 2024-12-10 PROCEDURE — 1036F TOBACCO NON-USER: CPT | Performed by: INTERNAL MEDICINE

## 2024-12-10 PROCEDURE — 2500000004 HC RX 250 GENERAL PHARMACY W/ HCPCS (ALT 636 FOR OP/ED): Performed by: INTERNAL MEDICINE

## 2024-12-10 PROCEDURE — 96416 CHEMO PROLONG INFUSE W/PUMP: CPT

## 2024-12-10 PROCEDURE — 85025 COMPLETE CBC W/AUTO DIFF WBC: CPT

## 2024-12-10 PROCEDURE — 80053 COMPREHEN METABOLIC PANEL: CPT

## 2024-12-10 PROCEDURE — 96375 TX/PRO/DX INJ NEW DRUG ADDON: CPT | Mod: INF

## 2024-12-10 PROCEDURE — 99215 OFFICE O/P EST HI 40 MIN: CPT | Performed by: INTERNAL MEDICINE

## 2024-12-10 PROCEDURE — 3078F DIAST BP <80 MM HG: CPT | Performed by: INTERNAL MEDICINE

## 2024-12-10 PROCEDURE — 96411 CHEMO IV PUSH ADDL DRUG: CPT

## 2024-12-10 PROCEDURE — 36591 DRAW BLOOD OFF VENOUS DEVICE: CPT

## 2024-12-10 PROCEDURE — 96413 CHEMO IV INFUSION 1 HR: CPT

## 2024-12-10 RX ORDER — LORAZEPAM 2 MG/ML
1 INJECTION INTRAMUSCULAR AS NEEDED
OUTPATIENT
Start: 2025-01-07

## 2024-12-10 RX ORDER — EPINEPHRINE 0.3 MG/.3ML
0.3 INJECTION SUBCUTANEOUS EVERY 5 MIN PRN
OUTPATIENT
Start: 2025-01-07

## 2024-12-10 RX ORDER — PALONOSETRON 0.05 MG/ML
0.25 INJECTION, SOLUTION INTRAVENOUS ONCE
Status: COMPLETED | OUTPATIENT
Start: 2024-12-10 | End: 2024-12-10

## 2024-12-10 RX ORDER — DIPHENHYDRAMINE HYDROCHLORIDE 50 MG/ML
50 INJECTION INTRAMUSCULAR; INTRAVENOUS AS NEEDED
Status: DISCONTINUED | OUTPATIENT
Start: 2024-12-10 | End: 2024-12-10 | Stop reason: HOSPADM

## 2024-12-10 RX ORDER — HEPARIN 100 UNIT/ML
500 SYRINGE INTRAVENOUS AS NEEDED
Status: CANCELLED | OUTPATIENT
Start: 2024-12-10

## 2024-12-10 RX ORDER — ALBUTEROL SULFATE 0.83 MG/ML
3 SOLUTION RESPIRATORY (INHALATION) AS NEEDED
OUTPATIENT
Start: 2025-01-07

## 2024-12-10 RX ORDER — LORAZEPAM 2 MG/ML
1 INJECTION INTRAMUSCULAR AS NEEDED
Status: DISCONTINUED | OUTPATIENT
Start: 2024-12-10 | End: 2024-12-10 | Stop reason: HOSPADM

## 2024-12-10 RX ORDER — FAMOTIDINE 10 MG/ML
20 INJECTION INTRAVENOUS ONCE AS NEEDED
OUTPATIENT
Start: 2025-01-07

## 2024-12-10 RX ORDER — FAMOTIDINE 10 MG/ML
20 INJECTION INTRAVENOUS ONCE AS NEEDED
Status: DISCONTINUED | OUTPATIENT
Start: 2024-12-10 | End: 2024-12-10 | Stop reason: HOSPADM

## 2024-12-10 RX ORDER — PALONOSETRON 0.05 MG/ML
0.25 INJECTION, SOLUTION INTRAVENOUS ONCE
OUTPATIENT
Start: 2025-01-07

## 2024-12-10 RX ORDER — HEPARIN SODIUM,PORCINE/PF 10 UNIT/ML
50 SYRINGE (ML) INTRAVENOUS AS NEEDED
Status: CANCELLED | OUTPATIENT
Start: 2024-12-10

## 2024-12-10 RX ORDER — DIPHENHYDRAMINE HYDROCHLORIDE 50 MG/ML
50 INJECTION INTRAMUSCULAR; INTRAVENOUS AS NEEDED
OUTPATIENT
Start: 2025-01-07

## 2024-12-10 RX ORDER — PROCHLORPERAZINE MALEATE 10 MG
10 TABLET ORAL EVERY 6 HOURS PRN
Status: DISCONTINUED | OUTPATIENT
Start: 2024-12-10 | End: 2024-12-10 | Stop reason: HOSPADM

## 2024-12-10 RX ORDER — DEXAMETHASONE 6 MG/1
12 TABLET ORAL ONCE
Status: COMPLETED | OUTPATIENT
Start: 2024-12-10 | End: 2024-12-10

## 2024-12-10 RX ORDER — PROCHLORPERAZINE MALEATE 5 MG
10 TABLET ORAL EVERY 6 HOURS PRN
OUTPATIENT
Start: 2025-01-07

## 2024-12-10 RX ORDER — PROCHLORPERAZINE EDISYLATE 5 MG/ML
10 INJECTION INTRAMUSCULAR; INTRAVENOUS EVERY 6 HOURS PRN
Status: DISCONTINUED | OUTPATIENT
Start: 2024-12-10 | End: 2024-12-10 | Stop reason: HOSPADM

## 2024-12-10 RX ORDER — EPINEPHRINE 0.3 MG/.3ML
0.3 INJECTION SUBCUTANEOUS EVERY 5 MIN PRN
Status: DISCONTINUED | OUTPATIENT
Start: 2024-12-10 | End: 2024-12-10 | Stop reason: HOSPADM

## 2024-12-10 RX ORDER — PROCHLORPERAZINE EDISYLATE 5 MG/ML
10 INJECTION INTRAMUSCULAR; INTRAVENOUS EVERY 6 HOURS PRN
OUTPATIENT
Start: 2025-01-07

## 2024-12-10 RX ORDER — ALBUTEROL SULFATE 0.83 MG/ML
3 SOLUTION RESPIRATORY (INHALATION) AS NEEDED
Status: DISCONTINUED | OUTPATIENT
Start: 2024-12-10 | End: 2024-12-10 | Stop reason: HOSPADM

## 2024-12-10 RX ORDER — DEXAMETHASONE 6 MG/1
12 TABLET ORAL ONCE
OUTPATIENT
Start: 2025-01-07

## 2024-12-10 ASSESSMENT — PAIN SCALES - GENERAL: PAINLEVEL_OUTOF10: 0-NO PAIN

## 2024-12-10 NOTE — PROGRESS NOTES
.Patient ID: Thai Cristobal is a 44 y.o. male.  Referring Physician: bNa Vargas MD  [id number] [place] [address]  Primary Care Provider: Charlotte Yan DO      Chief complaint: SB Adenocarcinoma     HPI  This is a 44-year-old male with a known history of Crohn's disease patient is status post partial right colectomy small bowel resection on repair of a colonic fistula on 5/28/2024. Pathology he had a neuroendocrine tumor grade 3 well differentiated, with Ki67 30-40%. He did follow-up with oncology at [facility]. He had a PET-Ga68 which showed Postsurgical changes from right hemicolectomy and distal small bowel with no evidence of focal abnormal gallium 68 dotatate uptake.   Patient's only complaint is change in color of stools at this point, he is eating and drinking less and he has no abdominal pain. No diarrhea and no flushing.  Repeated liver biopsy showed: high grade malignancy, likely of colonic origin, showing focal adenocarcinoma-like features and focal neuroendocrine differentiation with Ki67 95%  He started on FOLFOX and avastin and finished 1 cycles and tolerated well. No abdominal pain, no nausea or vomiting. No new symptoms. His labs showed leucopenia and we delayed C2.  He had C2 and repeated labs showed normal WBCs  He finished 3 cycles and tolerated well    SYSTEMIC TREATMENT RECEIVED: None       Subjective   Please refer to Notes above for complete History of present illness.          Review of Systems:   All 14 systems have been reviewed and were negative except for the HPI.       MEDICAL HISTORY  Past Medical History:   Diagnosis Date    Acute upper respiratory infection, unspecified 02/21/2018    Acute URI    Crohn's disease (Multi)     Personal history of other specified conditions 04/23/2018    History of vertigo    SBO (small bowel obstruction) (Multi) 01/25/2024       FAMILY HISTORY  Family History   Problem Relation Name Age of Onset    Thyroid disease Mother      Other (bladder  cancer) Mother      Hypertension Father      Benign prostatic hyperplasia Father      Anxiety disorder Sister      No Known Problems Brother      No Known Problems Daughter      Heart failure Paternal Grandfather         TOBACCO HISTORY  Tobacco Use: Low Risk  (12/10/2024)    Patient History     Smoking Tobacco Use: Never     Smokeless Tobacco Use: Never     Passive Exposure: Not on file       SOCIAL HISTORY  Social Connections: Not on file        Outpatient Medication Profile:  Current Outpatient Medications on File Prior to Visit   Medication Sig Dispense Refill    ALPRAZolam (Xanax) 0.5 mg tablet Take 1 tablet (0.5 mg) by mouth once daily as needed for anxiety. 30 tablet 0    cholecalciferol (Vitamin D-3) 25 MCG (1000 UT) capsule Take 1 capsule (25 mcg) by mouth once daily.      elderberry fruit 350 mg capsule Take 1 capsule by mouth once daily.      flaxseed oiL 1,000 mg capsule Take 1 capsule (1,000 mg) by mouth once daily.      FLUoxetine (PROzac) 20 mg capsule Take 1 capsule (20 mg) by mouth once daily. (Patient taking differently: Take 1 capsule (20 mg) by mouth once daily at bedtime.) 90 capsule 1    FLUoxetine (PROzac) 40 mg capsule TAKE 1 CAPSULE BY MOUTH ONCE DAILY (Patient taking differently: Take 1 capsule (40 mg) by mouth once daily at bedtime.) 90 capsule 1    lisinopril 40 mg tablet Take 1 tablet (40 mg) by mouth once daily at bedtime.      ondansetron (Zofran) 8 mg tablet Take 1 tablet (8 mg) by mouth every 8 hours if needed for nausea or vomiting. 30 tablet 5    prochlorperazine (Compazine) 10 mg tablet Take 1 tablet (10 mg) by mouth every 6 hours if needed for nausea or vomiting. 30 tablet 5    SUMAtriptan (Imitrex) 50 mg tablet TAKE 1 TABLET (50 MG) BY MOUTH 1 TIME IF NEEDED FOR MIGRAINE FOR UP TO 36 DOSES. 9 tablet 3    vitamin C-multivitamin-mineral (Emergen-C) 500 mg tablet,chewable Chew 1 tablet once daily.       Current Facility-Administered Medications on File Prior to Visit   Medication  Dose Route Frequency Provider Last Rate Last Admin    heparin flush 100 unit/mL syringe 500 Units  500 Units intra-catheter PRN Nba Vargas MD   500 Units at 10/15/24 1019         Performance Status:  Symptomatic; fully ambulatory     Vitals and Measurements:   /76 (BP Location: Left arm, Patient Position: Sitting)   Pulse 94   Temp 36.5 °C (97.7 °F) (Temporal)   Resp 18   Wt 69.2 kg (152 lb 9.6 oz)   SpO2 98%   BMI 23.90 kg/m²       Physical Exam:   General: Appears well and in NAD  Eyes: PERRL, EOMI, clear sclera  ENMT: mucous membranes moist, no apparent injury, no lesions seen  Head/Neck: Neck supple, no apparent injury, thyroid without mass or tenderness, No JVD, trachea midline,   Thorax: Patent airways, CTAB, normal breath sounds with good chest expansion, thorax symmetric  Cardiovascular: Regular, rate and rhythm, no murmurs, 2+ equal pulses of the extremities, normal S 1and S 2  Gastrointestinal: Nondistended, soft, non-tender, no rebound tenderness or guarding, no masses palpable, no organomegaly, +BS  Musculoskeletal: ROM intact, no joint swelling, normal strength  Extremities: normal extremities, no cyanosis edema, contusions or wounds, no clubbing  Neurological: alert and oriented x3, intact senses, motor, response and reflexes, normal strength  Lymphatic: No significant lymphadenopathy  Psychological: Appropriate mood and behavior  Skin: Warm and dry, no lesions, no rashes          Lab Results:  I have reviewed these laboratory results:     Lab Results   Component Value Date    WBC 6.4 12/10/2024    HGB 13.3 (L) 12/10/2024    HCT 40.6 (L) 12/10/2024    MCV 88 12/10/2024     12/10/2024         Chemistry    Lab Results   Component Value Date/Time     (L) 12/05/2024 1311    K 5.6 (H) 12/05/2024 1311    CL 94 (L) 12/05/2024 1311    CO2 31 12/05/2024 1311    BUN 25 (H) 12/05/2024 1311    CREATININE 0.95 12/05/2024 1311    Lab Results   Component Value Date/Time    CALCIUM 9.3  12/05/2024 1311    ALKPHOS 138 (H) 12/05/2024 1311    AST 41 (H) 12/05/2024 1311    ALT 51 12/05/2024 1311    BILITOT 0.4 12/05/2024 1311            Lab Results   Component Value Date    TSH 3.17 04/18/2023        Radiology Result:  I have reviewed the latest Imaging in PACS and the findings are noted in this note. I discussed the results of the latest imaging with the patient. All previous imaging were reviewed at the time it was completed. Full records are available in the EMR for review as well.         NM PET CT net netspot    Result Date: 7/5/2024  Impression: 1. Postsurgical changes from right hemicolectomy and distal small bowel with no evidence of focal abnormal gallium 68 dotatate uptake within the region of documented neuroendocrine tumor to suggest residual disease. 2. Nonspecific, gallium 68 dotatate uptake within the pancreatic uncinate which can be seen with physiologic uptake.     I personally reviewed the images/study and I agree with the findings as stated by Resident Celso Ashford MD.   MACRO: None   Signed by: Maco Villafana 7/5/2024 3:06 PM Dictation workstation:   [id number]        Pathology Results:  I have reviewed the full pathology report recorded in the EMR. The pertinent portions indicating diagnosis are listed here in the note. for details please refer to the full report recorded in the EMR.    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 Pathology he had a neuroendocrine tumor grade 3 well differentiated, with Ki67 30-40%. He did follow-up with oncology at [facility]. He had a PET-Ga68 which showed Postsurgical changes from right hemicolectomy and distal small bowel with no evidence of focal abnormal gallium 68  dotatate uptake. Patient's only complaint is change in color of stools at this point he is eating and drinking and he has no abdominal pain. No diarrhea and no flushing. She had CT AP on 08/09/2024 at outside institution which showed new small liver nodules. 09/04/2024: MRI w [facility] showed Multiple T1 hypointense and T2 hyperintense lesions throughout the liver, and mesenteric LNS. PET-FDG showed FDG avid mesenteric kylie and liver metastases.   Repeated liver biopsy showed: high grade malignancy, likely of colonic origin, showing focal adenocarcinoma-like features and focal neuroendocrine differentiation with Ki67 95%  He started on FOLFOX and avastin and finished 1 cycles and tolerated well. No abdominal pain, no nausea or vomiting. No new symptoms. His labs showed leucopenia and keep delaying C2.  He finished 3 cycles  Labs today was normal   After C3 restaging scans on 12/06/2024: Showed IL mainly in liver lesions and stable mesenteric LNS    Plan:  1- Will resume C4 FOLFOX plus Bevacizumab with 20% dose reduction for 5FU  2- RTC in 2 week for  C5  3- Restaging imaging after C8 on 12/06       DISCLAIMER:   In preparing for this visit and writing this note, I reviewed all the previous electronic medical records (labs, imaging and medical charts) of the patient available in the physician portal. Significant findings which helped in decision making are recorded  in this chart.     The plan was discussed with the patient. We gave him ample opportunities to ask questions. All questions were answered to his satisfaction and he verbalized understanding.       Nba Vargas M.D.   and Director of the Neuroendocrine Tumor Program  Gastrointestinal Medical Oncology  Department of Hematology and Oncology  [facility] [place], [place] [address]  Phone (Office): [phone]  Fax:  [phone]   Email:  [email or web address]  Learn more about [facility] Comprehensive Neuroendocrine Tumor Program: Click Here  Learn more about [place] [employer]: [email or web address]

## 2024-12-12 ENCOUNTER — INFUSION (OUTPATIENT)
Dept: HEMATOLOGY/ONCOLOGY | Facility: CLINIC | Age: 44
End: 2024-12-12
Payer: COMMERCIAL

## 2024-12-12 ENCOUNTER — APPOINTMENT (OUTPATIENT)
Dept: HEMATOLOGY/ONCOLOGY | Facility: CLINIC | Age: 44
End: 2024-12-12
Payer: COMMERCIAL

## 2024-12-12 ENCOUNTER — PATIENT OUTREACH (OUTPATIENT)
Dept: PRIMARY CARE | Facility: CLINIC | Age: 44
End: 2024-12-12
Payer: COMMERCIAL

## 2024-12-12 VITALS
BODY MASS INDEX: 24.17 KG/M2 | HEART RATE: 92 BPM | SYSTOLIC BLOOD PRESSURE: 126 MMHG | OXYGEN SATURATION: 98 % | TEMPERATURE: 97.5 F | DIASTOLIC BLOOD PRESSURE: 86 MMHG | WEIGHT: 154.32 LBS | RESPIRATION RATE: 16 BRPM

## 2024-12-12 DIAGNOSIS — C17.9 ADENOCARCINOMA OF SMALL BOWEL (MULTI): ICD-10-CM

## 2024-12-12 PROCEDURE — 2500000004 HC RX 250 GENERAL PHARMACY W/ HCPCS (ALT 636 FOR OP/ED): Performed by: INTERNAL MEDICINE

## 2024-12-12 PROCEDURE — 96523 IRRIG DRUG DELIVERY DEVICE: CPT

## 2024-12-12 RX ORDER — HEPARIN SODIUM,PORCINE/PF 10 UNIT/ML
50 SYRINGE (ML) INTRAVENOUS AS NEEDED
Status: DISCONTINUED | OUTPATIENT
Start: 2024-12-12 | End: 2024-12-12 | Stop reason: HOSPADM

## 2024-12-12 RX ORDER — HEPARIN 100 UNIT/ML
500 SYRINGE INTRAVENOUS AS NEEDED
Status: DISCONTINUED | OUTPATIENT
Start: 2024-12-12 | End: 2024-12-12 | Stop reason: HOSPADM

## 2024-12-12 RX ORDER — HEPARIN SODIUM,PORCINE/PF 10 UNIT/ML
50 SYRINGE (ML) INTRAVENOUS AS NEEDED
OUTPATIENT
Start: 2024-12-12

## 2024-12-12 RX ORDER — HEPARIN 100 UNIT/ML
500 SYRINGE INTRAVENOUS AS NEEDED
OUTPATIENT
Start: 2024-12-12

## 2024-12-12 ASSESSMENT — PAIN SCALES - GENERAL: PAINLEVEL_OUTOF10: 0-NO PAIN

## 2024-12-23 DIAGNOSIS — C17.9 ADENOCARCINOMA OF SMALL BOWEL (MULTI): ICD-10-CM

## 2024-12-23 DIAGNOSIS — F41.9 ANXIETY: ICD-10-CM

## 2024-12-23 RX ORDER — ALPRAZOLAM 0.5 MG/1
0.5 TABLET ORAL DAILY PRN
Qty: 30 TABLET | Refills: 0 | Status: SHIPPED | OUTPATIENT
Start: 2024-12-23

## 2024-12-24 ENCOUNTER — INFUSION (OUTPATIENT)
Dept: HEMATOLOGY/ONCOLOGY | Facility: CLINIC | Age: 44
End: 2024-12-24
Payer: COMMERCIAL

## 2024-12-24 ENCOUNTER — OFFICE VISIT (OUTPATIENT)
Dept: HEMATOLOGY/ONCOLOGY | Facility: CLINIC | Age: 44
End: 2024-12-24
Payer: COMMERCIAL

## 2024-12-24 VITALS
OXYGEN SATURATION: 98 % | BODY MASS INDEX: 23.82 KG/M2 | TEMPERATURE: 97.7 F | DIASTOLIC BLOOD PRESSURE: 81 MMHG | WEIGHT: 152.12 LBS | HEART RATE: 88 BPM | RESPIRATION RATE: 18 BRPM | SYSTOLIC BLOOD PRESSURE: 119 MMHG

## 2024-12-24 DIAGNOSIS — C17.9 ADENOCARCINOMA OF SMALL BOWEL (MULTI): ICD-10-CM

## 2024-12-24 DIAGNOSIS — C17.9 ADENOCARCINOMA OF SMALL BOWEL (MULTI): Primary | ICD-10-CM

## 2024-12-24 LAB
ALBUMIN SERPL BCP-MCNC: 3.9 G/DL (ref 3.4–5)
ALP SERPL-CCNC: 80 U/L (ref 33–120)
ALT SERPL W P-5'-P-CCNC: 13 U/L (ref 10–52)
ANION GAP SERPL CALC-SCNC: 11 MMOL/L (ref 10–20)
AST SERPL W P-5'-P-CCNC: 18 U/L (ref 9–39)
BASOPHILS # BLD AUTO: 0.06 X10*3/UL (ref 0–0.1)
BASOPHILS NFR BLD AUTO: 1.5 %
BILIRUB SERPL-MCNC: 0.5 MG/DL (ref 0–1.2)
BUN SERPL-MCNC: 14 MG/DL (ref 6–23)
CALCIUM SERPL-MCNC: 9 MG/DL (ref 8.6–10.3)
CHLORIDE SERPL-SCNC: 102 MMOL/L (ref 98–107)
CO2 SERPL-SCNC: 27 MMOL/L (ref 21–32)
CREAT SERPL-MCNC: 0.89 MG/DL (ref 0.5–1.3)
EGFRCR SERPLBLD CKD-EPI 2021: >90 ML/MIN/1.73M*2
EOSINOPHIL # BLD AUTO: 0.25 X10*3/UL (ref 0–0.7)
EOSINOPHIL NFR BLD AUTO: 6.4 %
ERYTHROCYTE [DISTWIDTH] IN BLOOD BY AUTOMATED COUNT: 17.8 % (ref 11.5–14.5)
GLUCOSE SERPL-MCNC: 109 MG/DL (ref 74–99)
HCT VFR BLD AUTO: 38.1 % (ref 41–52)
HGB BLD-MCNC: 12.8 G/DL (ref 13.5–17.5)
IMM GRANULOCYTES # BLD AUTO: 0 X10*3/UL (ref 0–0.7)
IMM GRANULOCYTES NFR BLD AUTO: 0 % (ref 0–0.9)
LYMPHOCYTES # BLD AUTO: 1.06 X10*3/UL (ref 1.2–4.8)
LYMPHOCYTES NFR BLD AUTO: 27 %
MCH RBC QN AUTO: 29.7 PG (ref 26–34)
MCHC RBC AUTO-ENTMCNC: 33.6 G/DL (ref 32–36)
MCV RBC AUTO: 88 FL (ref 80–100)
MONOCYTES # BLD AUTO: 0.57 X10*3/UL (ref 0.1–1)
MONOCYTES NFR BLD AUTO: 14.5 %
NEUTROPHILS # BLD AUTO: 1.98 X10*3/UL (ref 1.2–7.7)
NEUTROPHILS NFR BLD AUTO: 50.6 %
NRBC BLD-RTO: ABNORMAL /100{WBCS}
PLATELET # BLD AUTO: 177 X10*3/UL (ref 150–450)
POTASSIUM SERPL-SCNC: 4.1 MMOL/L (ref 3.5–5.3)
PROT SERPL-MCNC: 6.8 G/DL (ref 6.4–8.2)
RBC # BLD AUTO: 4.31 X10*6/UL (ref 4.5–5.9)
SODIUM SERPL-SCNC: 136 MMOL/L (ref 136–145)
WBC # BLD AUTO: 3.9 X10*3/UL (ref 4.4–11.3)

## 2024-12-24 PROCEDURE — 3074F SYST BP LT 130 MM HG: CPT | Performed by: INTERNAL MEDICINE

## 2024-12-24 PROCEDURE — 2500000004 HC RX 250 GENERAL PHARMACY W/ HCPCS (ALT 636 FOR OP/ED): Performed by: INTERNAL MEDICINE

## 2024-12-24 PROCEDURE — 96375 TX/PRO/DX INJ NEW DRUG ADDON: CPT | Mod: INF

## 2024-12-24 PROCEDURE — 81003 URINALYSIS AUTO W/O SCOPE: CPT

## 2024-12-24 PROCEDURE — 99215 OFFICE O/P EST HI 40 MIN: CPT | Performed by: INTERNAL MEDICINE

## 2024-12-24 PROCEDURE — 96411 CHEMO IV PUSH ADDL DRUG: CPT

## 2024-12-24 PROCEDURE — 96416 CHEMO PROLONG INFUSE W/PUMP: CPT

## 2024-12-24 PROCEDURE — 85025 COMPLETE CBC W/AUTO DIFF WBC: CPT

## 2024-12-24 PROCEDURE — 84075 ASSAY ALKALINE PHOSPHATASE: CPT

## 2024-12-24 PROCEDURE — 99215 OFFICE O/P EST HI 40 MIN: CPT | Mod: 25 | Performed by: INTERNAL MEDICINE

## 2024-12-24 PROCEDURE — 3079F DIAST BP 80-89 MM HG: CPT | Performed by: INTERNAL MEDICINE

## 2024-12-24 PROCEDURE — 96415 CHEMO IV INFUSION ADDL HR: CPT

## 2024-12-24 PROCEDURE — 96413 CHEMO IV INFUSION 1 HR: CPT

## 2024-12-24 RX ORDER — PALONOSETRON 0.05 MG/ML
0.25 INJECTION, SOLUTION INTRAVENOUS ONCE
OUTPATIENT
Start: 2025-01-21

## 2024-12-24 RX ORDER — DEXAMETHASONE 6 MG/1
12 TABLET ORAL ONCE
Status: COMPLETED | OUTPATIENT
Start: 2024-12-24 | End: 2024-12-24

## 2024-12-24 RX ORDER — PALONOSETRON 0.05 MG/ML
0.25 INJECTION, SOLUTION INTRAVENOUS ONCE
Status: COMPLETED | OUTPATIENT
Start: 2024-12-24 | End: 2024-12-24

## 2024-12-24 RX ORDER — LORAZEPAM 2 MG/ML
1 INJECTION INTRAMUSCULAR AS NEEDED
OUTPATIENT
Start: 2025-01-21

## 2024-12-24 RX ORDER — PROCHLORPERAZINE MALEATE 10 MG
10 TABLET ORAL EVERY 6 HOURS PRN
Status: DISCONTINUED | OUTPATIENT
Start: 2024-12-24 | End: 2024-12-24 | Stop reason: HOSPADM

## 2024-12-24 RX ORDER — DEXAMETHASONE 6 MG/1
12 TABLET ORAL ONCE
OUTPATIENT
Start: 2025-01-21

## 2024-12-24 RX ORDER — PROCHLORPERAZINE EDISYLATE 5 MG/ML
10 INJECTION INTRAMUSCULAR; INTRAVENOUS EVERY 6 HOURS PRN
Status: DISCONTINUED | OUTPATIENT
Start: 2024-12-24 | End: 2024-12-24 | Stop reason: HOSPADM

## 2024-12-24 RX ORDER — DIPHENHYDRAMINE HYDROCHLORIDE 50 MG/ML
50 INJECTION INTRAMUSCULAR; INTRAVENOUS AS NEEDED
Status: DISCONTINUED | OUTPATIENT
Start: 2024-12-24 | End: 2024-12-24 | Stop reason: HOSPADM

## 2024-12-24 RX ORDER — EPINEPHRINE 0.3 MG/.3ML
0.3 INJECTION SUBCUTANEOUS EVERY 5 MIN PRN
Status: DISCONTINUED | OUTPATIENT
Start: 2024-12-24 | End: 2024-12-24 | Stop reason: HOSPADM

## 2024-12-24 RX ORDER — FAMOTIDINE 10 MG/ML
20 INJECTION INTRAVENOUS ONCE AS NEEDED
Status: DISCONTINUED | OUTPATIENT
Start: 2024-12-24 | End: 2024-12-24 | Stop reason: HOSPADM

## 2024-12-24 RX ORDER — PROCHLORPERAZINE EDISYLATE 5 MG/ML
10 INJECTION INTRAMUSCULAR; INTRAVENOUS EVERY 6 HOURS PRN
OUTPATIENT
Start: 2025-01-21

## 2024-12-24 RX ORDER — ALBUTEROL SULFATE 0.83 MG/ML
3 SOLUTION RESPIRATORY (INHALATION) AS NEEDED
OUTPATIENT
Start: 2025-01-21

## 2024-12-24 RX ORDER — DIPHENHYDRAMINE HYDROCHLORIDE 50 MG/ML
50 INJECTION INTRAMUSCULAR; INTRAVENOUS AS NEEDED
OUTPATIENT
Start: 2025-01-21

## 2024-12-24 RX ORDER — LORAZEPAM 2 MG/ML
1 INJECTION INTRAMUSCULAR AS NEEDED
Status: DISCONTINUED | OUTPATIENT
Start: 2024-12-24 | End: 2024-12-24 | Stop reason: HOSPADM

## 2024-12-24 RX ORDER — ALBUTEROL SULFATE 0.83 MG/ML
3 SOLUTION RESPIRATORY (INHALATION) AS NEEDED
Status: DISCONTINUED | OUTPATIENT
Start: 2024-12-24 | End: 2024-12-24 | Stop reason: HOSPADM

## 2024-12-24 RX ORDER — EPINEPHRINE 0.3 MG/.3ML
0.3 INJECTION SUBCUTANEOUS EVERY 5 MIN PRN
OUTPATIENT
Start: 2025-01-21

## 2024-12-24 RX ORDER — PROCHLORPERAZINE MALEATE 5 MG
10 TABLET ORAL EVERY 6 HOURS PRN
OUTPATIENT
Start: 2025-01-21

## 2024-12-24 RX ORDER — FAMOTIDINE 10 MG/ML
20 INJECTION INTRAVENOUS ONCE AS NEEDED
OUTPATIENT
Start: 2025-01-21

## 2024-12-24 RX ORDER — HEPARIN 100 UNIT/ML
500 SYRINGE INTRAVENOUS AS NEEDED
Status: CANCELLED | OUTPATIENT
Start: 2024-12-24

## 2024-12-24 RX ORDER — HEPARIN SODIUM,PORCINE/PF 10 UNIT/ML
50 SYRINGE (ML) INTRAVENOUS AS NEEDED
Status: CANCELLED | OUTPATIENT
Start: 2024-12-24

## 2024-12-24 ASSESSMENT — PAIN SCALES - GENERAL: PAINLEVEL_OUTOF10: 0-NO PAIN

## 2024-12-24 NOTE — SIGNIFICANT EVENT

## 2024-12-24 NOTE — PROGRESS NOTES
.Patient ID: Thai Cristobal is a 44 y.o. male.  Referring Physician: No referring provider defined for this encounter.  Primary Care Provider: Charlotte Yan DO      Chief complaint: SB Adenocarcinoma     HPI  This is a 44-year-old male with a known history of Crohn's disease patient is status post partial right colectomy small bowel resection on repair of a colonic fistula on 5/28/2024. Pathology he had a neuroendocrine tumor grade 3 well differentiated, with Ki67 30-40%. He did follow-up with oncology at [facility]. He had a PET-Ga68 which showed Postsurgical changes from right hemicolectomy and distal small bowel with no evidence of focal abnormal gallium 68 dotatate uptake.   Patient's only complaint is change in color of stools at this point, he is eating and drinking less and he has no abdominal pain. No diarrhea and no flushing.  Repeated liver biopsy showed: high grade malignancy, likely of colonic origin, showing focal adenocarcinoma-like features and focal neuroendocrine differentiation with Ki67 95%  He started on FOLFOX and avastin and finished 1 cycles and tolerated well. No abdominal pain, no nausea or vomiting. No new symptoms. His labs showed leucopenia and we delayed C2.  He had C2 and repeated labs showed normal WBCs  He finished 4 cycles and tolerated well with ME on restaging scans     SYSTEMIC TREATMENT RECEIVED: None       Subjective   Please refer to Notes above for complete History of present illness.          Review of Systems:   All 14 systems have been reviewed and were negative except for the HPI.       MEDICAL HISTORY  Past Medical History:   Diagnosis Date    Acute upper respiratory infection, unspecified 02/21/2018    Acute URI    Crohn's disease (Multi)     Personal history of other specified conditions 04/23/2018    History of vertigo    SBO (small bowel obstruction) (Multi) 01/25/2024       FAMILY HISTORY  Family History   Problem Relation Name Age of Onset    Thyroid disease Mother       Other (bladder cancer) Mother      Hypertension Father      Benign prostatic hyperplasia Father      Anxiety disorder Sister      No Known Problems Brother      No Known Problems Daughter      Heart failure Paternal Grandfather         TOBACCO HISTORY  Tobacco Use: Low Risk  (12/10/2024)    Patient History     Smoking Tobacco Use: Never     Smokeless Tobacco Use: Never     Passive Exposure: Not on file       SOCIAL HISTORY  Social Connections: Not on file        Outpatient Medication Profile:  Current Outpatient Medications on File Prior to Visit   Medication Sig Dispense Refill    ALPRAZolam (Xanax) 0.5 mg tablet Take 1 tablet (0.5 mg) by mouth once daily as needed for anxiety. 30 tablet 0    cholecalciferol (Vitamin D-3) 25 MCG (1000 UT) capsule Take 1 capsule (25 mcg) by mouth once daily.      elderberry fruit 350 mg capsule Take 1 capsule by mouth once daily.      flaxseed oiL 1,000 mg capsule Take 1 capsule (1,000 mg) by mouth once daily.      FLUoxetine (PROzac) 20 mg capsule Take 1 capsule (20 mg) by mouth once daily. (Patient taking differently: Take 1 capsule (20 mg) by mouth once daily at bedtime.) 90 capsule 1    FLUoxetine (PROzac) 40 mg capsule TAKE 1 CAPSULE BY MOUTH ONCE DAILY (Patient taking differently: Take 1 capsule (40 mg) by mouth once daily at bedtime.) 90 capsule 1    lisinopril 40 mg tablet Take 1 tablet (40 mg) by mouth once daily at bedtime.      ondansetron (Zofran) 8 mg tablet Take 1 tablet (8 mg) by mouth every 8 hours if needed for nausea or vomiting. 30 tablet 5    prochlorperazine (Compazine) 10 mg tablet Take 1 tablet (10 mg) by mouth every 6 hours if needed for nausea or vomiting. 30 tablet 5    SUMAtriptan (Imitrex) 50 mg tablet TAKE 1 TABLET (50 MG) BY MOUTH 1 TIME IF NEEDED FOR MIGRAINE FOR UP TO 36 DOSES. 9 tablet 3    vitamin C-multivitamin-mineral (Emergen-C) 500 mg tablet,chewable Chew 1 tablet once daily.      [DISCONTINUED] ALPRAZolam (Xanax) 0.5 mg tablet Take 1  tablet (0.5 mg) by mouth once daily as needed for anxiety. 30 tablet 0     Current Facility-Administered Medications on File Prior to Visit   Medication Dose Route Frequency Provider Last Rate Last Admin    heparin flush 100 unit/mL syringe 500 Units  500 Units intra-catheter PRN Nba Vargas MD   500 Units at 10/15/24 1019         Performance Status:  Symptomatic; fully ambulatory     Vitals and Measurements:   There were no vitals taken for this visit.      Physical Exam:   General: Appears well and in NAD  Eyes: PERRL, EOMI, clear sclera  ENMT: mucous membranes moist, no apparent injury, no lesions seen  Head/Neck: Neck supple, no apparent injury, thyroid without mass or tenderness, No JVD, trachea midline,   Thorax: Patent airways, CTAB, normal breath sounds with good chest expansion, thorax symmetric  Cardiovascular: Regular, rate and rhythm, no murmurs, 2+ equal pulses of the extremities, normal S 1and S 2  Gastrointestinal: Nondistended, soft, non-tender, no rebound tenderness or guarding, no masses palpable, no organomegaly, +BS  Musculoskeletal: ROM intact, no joint swelling, normal strength  Extremities: normal extremities, no cyanosis edema, contusions or wounds, no clubbing  Neurological: alert and oriented x3, intact senses, motor, response and reflexes, normal strength  Lymphatic: No significant lymphadenopathy  Psychological: Appropriate mood and behavior  Skin: Warm and dry, no lesions, no rashes          Lab Results:  I have reviewed these laboratory results:     Lab Results   Component Value Date    WBC 6.4 12/10/2024    HGB 13.3 (L) 12/10/2024    HCT 40.6 (L) 12/10/2024    MCV 88 12/10/2024     12/10/2024         Chemistry    Lab Results   Component Value Date/Time     (L) 12/10/2024 0915    K 4.4 12/10/2024 0915     12/10/2024 0915    CO2 25 12/10/2024 0915    BUN 17 12/10/2024 0915    CREATININE 0.81 12/10/2024 0915    Lab Results   Component Value Date/Time    CALCIUM 9.3  12/10/2024 0915    ALKPHOS 99 12/10/2024 0915    AST 19 12/10/2024 0915    ALT 23 12/10/2024 0915    BILITOT 0.4 12/10/2024 0915            Lab Results   Component Value Date    TSH 3.17 04/18/2023        Radiology Result:  I have reviewed the latest Imaging in PACS and the findings are noted in this note. I discussed the results of the latest imaging with the patient. All previous imaging were reviewed at the time it was completed. Full records are available in the EMR for review as well.         NM PET CT net netspot    Result Date: 7/5/2024  Impression: 1. Postsurgical changes from right hemicolectomy and distal small bowel with no evidence of focal abnormal gallium 68 dotatate uptake within the region of documented neuroendocrine tumor to suggest residual disease. 2. Nonspecific, gallium 68 dotatate uptake within the pancreatic uncinate which can be seen with physiologic uptake.     I personally reviewed the images/study and I agree with the findings as stated by Resident Celso Ashford MD.   MACRO: None   Signed by: Maco Villafana 7/5/2024 3:06 PM Dictation workstation:   [id number]        Pathology Results:  I have reviewed the full pathology report recorded in the EMR. The pertinent portions indicating diagnosis are listed here in the note. for details please refer to the full report recorded in the EMR.    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 Pathology he had a neuroendocrine tumor grade 3 well differentiated, with Ki67 30-40%. He did follow-up with oncology at [facility]. He had a PET-Ga68 which showed Postsurgical changes from right hemicolectomy and distal small bowel with no evidence of focal abnormal gallium 68 dotatate  uptake. Patient's only complaint is change in color of stools at this point he is eating and drinking and he has no abdominal pain. No diarrhea and no flushing. She had CT AP on 08/09/2024 at outside institution which showed new small liver nodules. 09/04/2024: MRI w [facility] showed Multiple T1 hypointense and T2 hyperintense lesions throughout the liver, and mesenteric LNS. PET-FDG showed FDG avid mesenteric kylie and liver metastases.   Repeated liver biopsy showed: high grade malignancy, likely of colonic origin, showing focal adenocarcinoma-like features and focal neuroendocrine differentiation with Ki67 95%  He started on FOLFOX and avastin and finished 1 cycles and tolerated well. No abdominal pain, no nausea or vomiting. No new symptoms. His labs showed leucopenia and keep delaying C2.  He finished 4 cycles  Labs today was normal   After C3 restaging scans on 12/06/2024: Showed TX mainly in liver lesions and stable mesenteric LNS    Plan:  1- Will resume C5 FOLFOX plus Bevacizumab with 20% dose reduction for 5FU  2- RTC in 2 week for  C6  3- Restaging imaging after C8        DISCLAIMER:   In preparing for this visit and writing this note, I reviewed all the previous electronic medical records (labs, imaging and medical charts) of the patient available in the physician portal. Significant findings which helped in decision making are recorded  in this chart.     The plan was discussed with the patient. We gave him ample opportunities to ask questions. All questions were answered to his satisfaction and he verbalized understanding.       Nba Vargas M.D.   and Director of the Neuroendocrine Tumor Program  Gastrointestinal Medical Oncology  Department of Hematology and Oncology  [facility] [place], [place] [address]  Phone (Office): [phone]  Fax:  [phone]   Email:  [email or web address]  Learn more about [facility] Comprehensive Neuroendocrine Tumor Program: Click Here  Learn more about [place] [employer]: [email or web address]

## 2024-12-25 LAB
APPEARANCE UR: CLEAR
BILIRUB UR STRIP.AUTO-MCNC: NEGATIVE MG/DL
COLOR UR: NORMAL
GLUCOSE UR STRIP.AUTO-MCNC: NORMAL MG/DL
KETONES UR STRIP.AUTO-MCNC: NEGATIVE MG/DL
LEUKOCYTE ESTERASE UR QL STRIP.AUTO: NEGATIVE
NITRITE UR QL STRIP.AUTO: NEGATIVE
PH UR STRIP.AUTO: 5 [PH]
PROT UR STRIP.AUTO-MCNC: NEGATIVE MG/DL
RBC # UR STRIP.AUTO: NEGATIVE /UL
SP GR UR STRIP.AUTO: 1.02
UROBILINOGEN UR STRIP.AUTO-MCNC: NORMAL MG/DL

## 2024-12-26 ENCOUNTER — TELEPHONE (OUTPATIENT)
Dept: HEMATOLOGY/ONCOLOGY | Facility: CLINIC | Age: 44
End: 2024-12-26

## 2024-12-26 ENCOUNTER — INFUSION (OUTPATIENT)
Dept: HEMATOLOGY/ONCOLOGY | Facility: CLINIC | Age: 44
End: 2024-12-26
Payer: COMMERCIAL

## 2024-12-26 VITALS
RESPIRATION RATE: 18 BRPM | HEART RATE: 93 BPM | BODY MASS INDEX: 23.88 KG/M2 | OXYGEN SATURATION: 98 % | SYSTOLIC BLOOD PRESSURE: 134 MMHG | TEMPERATURE: 98.2 F | DIASTOLIC BLOOD PRESSURE: 86 MMHG | WEIGHT: 152.45 LBS

## 2024-12-26 DIAGNOSIS — C17.9 ADENOCARCINOMA OF SMALL BOWEL (MULTI): ICD-10-CM

## 2024-12-26 DIAGNOSIS — C17.9 ADENOCARCINOMA OF SMALL BOWEL (MULTI): Primary | ICD-10-CM

## 2024-12-26 PROCEDURE — 2500000004 HC RX 250 GENERAL PHARMACY W/ HCPCS (ALT 636 FOR OP/ED): Mod: JZ | Performed by: INTERNAL MEDICINE

## 2024-12-26 PROCEDURE — 2500000004 HC RX 250 GENERAL PHARMACY W/ HCPCS (ALT 636 FOR OP/ED): Performed by: INTERNAL MEDICINE

## 2024-12-26 PROCEDURE — 96377 APPLICATON ON-BODY INJECTOR: CPT

## 2024-12-26 PROCEDURE — 96374 THER/PROPH/DIAG INJ IV PUSH: CPT | Mod: INF

## 2024-12-26 RX ORDER — HEPARIN 100 UNIT/ML
500 SYRINGE INTRAVENOUS AS NEEDED
Status: DISCONTINUED | OUTPATIENT
Start: 2024-12-26 | End: 2024-12-26 | Stop reason: HOSPADM

## 2024-12-26 RX ORDER — PROCHLORPERAZINE EDISYLATE 5 MG/ML
10 INJECTION INTRAMUSCULAR; INTRAVENOUS ONCE
Status: COMPLETED | OUTPATIENT
Start: 2024-12-26 | End: 2024-12-26

## 2024-12-26 RX ORDER — HEPARIN SODIUM,PORCINE/PF 10 UNIT/ML
50 SYRINGE (ML) INTRAVENOUS AS NEEDED
OUTPATIENT
Start: 2024-12-26

## 2024-12-26 RX ORDER — HEPARIN SODIUM,PORCINE/PF 10 UNIT/ML
50 SYRINGE (ML) INTRAVENOUS AS NEEDED
Status: DISCONTINUED | OUTPATIENT
Start: 2024-12-26 | End: 2024-12-26 | Stop reason: HOSPADM

## 2024-12-26 RX ORDER — PROCHLORPERAZINE EDISYLATE 5 MG/ML
10 INJECTION INTRAMUSCULAR; INTRAVENOUS ONCE
Status: CANCELLED | OUTPATIENT
Start: 2024-12-26 | End: 2024-12-26

## 2024-12-26 RX ORDER — HEPARIN 100 UNIT/ML
500 SYRINGE INTRAVENOUS AS NEEDED
OUTPATIENT
Start: 2024-12-26

## 2024-12-26 ASSESSMENT — PAIN SCALES - GENERAL: PAINLEVEL_OUTOF10: 4

## 2024-12-30 NOTE — PROGRESS NOTES
Discharge Facility: [facility]   Discharge Diagnosis: SBO  Admission Date: 1/25/2024  Discharge Date: 1/28/2024    PCP Appointment Date:  -Next appt 2/23/2024 1100; pt declines sooner appt at this time    Specialist Appointment Date:   -GI  -2/27/2024 0830 echo  -2/27/2024 0945 holter monitor    Hospital Encounter and Summary: Linked     See discharge assessment below for further details    Engagement  Call Start Time: 1219 (1/30/2024 12:19 PM)    Medications  Medications reviewed with patient/caregiver?: Yes (new prescription reviewed; prednisone) (1/30/2024 12:19 PM)  Is the patient having any side effects they believe may be caused by any medication additions or changes?: No (1/30/2024 12:19 PM)  Does the patient have all medications ordered at discharge?: Yes (1/30/2024 12:19 PM)  Care Management Interventions: No intervention needed (1/30/2024 12:19 PM)  Is the patient taking all medications as directed (includes completed medication regime)?: Yes (1/30/2024 12:19 PM)    Appointments  Does the patient have a primary care provider?: Yes (1/30/2024 12:19 PM)  Care Management Interventions: Verified appointment date/time/provider (1/30/2024 12:19 PM)  Has the patient kept scheduled appointments due by today?: Yes (1/30/2024 12:19 PM)    Self Management  Has home health visited the patient within 72 hours of discharge?: Not applicable (1/30/2024 12:19 PM)    Patient Teaching  Does the patient have access to their discharge instructions?: Yes (1/30/2024 12:19 PM)  What is the patient's perception of their health status since discharge?: Improving (1/30/2024 12:19 PM)  Is the patient/caregiver able to teach back the hierarchy of who to call/visit for symptoms/problems? PCP, Specialist, Home Health nurse, Urgent Care, ED, 911: Yes (1/30/2024 12:19 PM)    Wrap Up  Wrap Up Additional Comments: Pt admitted to [facility] 1/25-1/28/2024 for a small bowel obstruction. Pt reports feeling  better since discharge. Pt states he has been taking it easy with his diet and taking the ordered steriod; prednisone. Pt reports understanding of discharge instructions. He states he has a follow up scheduled with his GI doctor. Verified next PCP appt 2/23/2024 1100; pt declines sooner appt. Pt denies any questions, needs, or concerns at this time. Pt encouraged to call if questions or needs arise. (1/30/2024 12:19 PM)  Call End Time: 1222 (1/30/2024 12:19 PM)         [Follow-Up] : a follow-up visit

## 2024-12-31 ENCOUNTER — NURSE TRIAGE (OUTPATIENT)
Dept: ADMISSION | Facility: HOSPITAL | Age: 44
End: 2024-12-31
Payer: COMMERCIAL

## 2024-12-31 DIAGNOSIS — D3A.8 NEUROENDOCRINE TUMOR: Primary | ICD-10-CM

## 2024-12-31 RX ORDER — OMEPRAZOLE 20 MG/1
20 TABLET, DELAYED RELEASE ORAL DAILY
Qty: 30 TABLET | Refills: 2 | Status: SHIPPED | OUTPATIENT
Start: 2024-12-31 | End: 2025-06-29

## 2024-12-31 NOTE — TELEPHONE ENCOUNTER
Reviewed with team and specialty pharmacist.  Advised Thai that he is able to use Mylanta for acute relief but should resume his PPI- team will send in new rx for his Omeprazole. Also educated to avoid lying down after eating and avoid certain foods/drinks that may trigger reflux. He will notify the office if sx persist/worsen. Aware to go to ED if he develops chest pain/shortness of breath.   No further questions or concerns at this time.

## 2024-12-31 NOTE — TELEPHONE ENCOUNTER
Thai called to discuss indigestion. He received chemo on 12/24 and the indigestion started last night before bed. He took three TUMs and half a dose of Mylanta. This did help control his symptoms and he was able to sleep, however, it is back again this morning.    He has been drinking a lot of fruit juice lately so feels this may be attributing to the indigestion.    He also developed intermittent hiccups last night. He will get ~2 to 3 hiccups at a time but then they go away. This has happened about 4-5x since last night.     No nausea or vomiting. He is belching more frequently. Bowels are moving normally; he had a couple yesterday. No abdominal swelling. No shortness of breath.     He does endorse pain in chest under his right nipple that radiates up the back of his throat/into his mouth. Rating pain 5/10; constant dull burn. Pain was gone over night after taking medication but it is now back.     This morning he has tried some milk, water with baking soda and had a banana.     He does have Omeprazole 20mg at home which he has not been taking. This was prescribed by his PCP in the past prior to his cancer diagnosis.     With history of bowel obstructions and Crohn's disease he would like Dr. Vargas's approval on what he can take.     Secure Chat sent to team.     Additional Information   Any recent abdominal, thoracic, or neurologic surgery?     status post partial right colectomy small bowel resection on repair of a colonic fistula on 5/28/2024.   Commented on: What makes these problems worse?     Random    Protocols used: Hiccups

## 2025-01-02 ENCOUNTER — NURSE TRIAGE (OUTPATIENT)
Dept: ADMISSION | Facility: HOSPITAL | Age: 45
End: 2025-01-02
Payer: COMMERCIAL

## 2025-01-02 NOTE — TELEPHONE ENCOUNTER
"Thai called with stool concerns x2 episodes - started 2 days ago.  First episode with white mucous \"fatty looking\" mucous with blood, without stool.  Second episode occurred this AM with softer stool, white mucous (about pencil size around to slightly thicker, comparing to string cheese) about 2-3 inches long.  Very scant amount of blood in stool today.      No pain with stool.  No abdominal pain.  Denies acid reflux (recent call from 12/31 - now with relief with PPI) No nausea/vomiting.  No fever, no chest pain, no SOB.  Denies cough, slight runny nose.  Eating/drinking normally.   No other complaints at this time.   "

## 2025-01-02 NOTE — TELEPHONE ENCOUNTER
RN called patient, left detailed voicemail  stating infusion is already full on 1/7 and patient would need to keep scheduled infusion on 1/8 and pump disconnect will be 1/10.  Advised to call back if he has additional questions/concerns.

## 2025-01-02 NOTE — TELEPHONE ENCOUNTER
"Per Dr. Vargas: \"Noting concerning except he starts to have more frequent bowel movements and diarrhea we can bring for evaluation. He can use preparation-H for the anal fissure/hemorrhoids that can his bleed\"    RN called patient and advised advise per Dr. Vargas. Patient verbalized understanding and will call back if things worsen.   "

## 2025-01-07 ENCOUNTER — APPOINTMENT (OUTPATIENT)
Dept: HEMATOLOGY/ONCOLOGY | Facility: CLINIC | Age: 45
End: 2025-01-07
Payer: COMMERCIAL

## 2025-01-07 ENCOUNTER — LAB (OUTPATIENT)
Dept: HEMATOLOGY/ONCOLOGY | Facility: CLINIC | Age: 45
End: 2025-01-07
Payer: COMMERCIAL

## 2025-01-07 ENCOUNTER — OFFICE VISIT (OUTPATIENT)
Dept: HEMATOLOGY/ONCOLOGY | Facility: CLINIC | Age: 45
End: 2025-01-07
Payer: COMMERCIAL

## 2025-01-07 VITALS
TEMPERATURE: 97.7 F | HEART RATE: 91 BPM | DIASTOLIC BLOOD PRESSURE: 85 MMHG | RESPIRATION RATE: 18 BRPM | BODY MASS INDEX: 23.26 KG/M2 | WEIGHT: 148.5 LBS | SYSTOLIC BLOOD PRESSURE: 126 MMHG

## 2025-01-07 DIAGNOSIS — C17.9 ADENOCARCINOMA OF SMALL BOWEL (MULTI): ICD-10-CM

## 2025-01-07 DIAGNOSIS — C17.9 ADENOCARCINOMA OF SMALL BOWEL (MULTI): Primary | ICD-10-CM

## 2025-01-07 LAB
ALBUMIN SERPL BCP-MCNC: 4.5 G/DL (ref 3.4–5)
ALP SERPL-CCNC: 144 U/L (ref 33–120)
ALT SERPL W P-5'-P-CCNC: 11 U/L (ref 10–52)
ANION GAP SERPL CALC-SCNC: 11 MMOL/L (ref 10–20)
AST SERPL W P-5'-P-CCNC: 17 U/L (ref 9–39)
BASOPHILS # BLD AUTO: 0.03 X10*3/UL (ref 0–0.1)
BASOPHILS NFR BLD AUTO: 0.4 %
BILIRUB SERPL-MCNC: 0.5 MG/DL (ref 0–1.2)
BUN SERPL-MCNC: 16 MG/DL (ref 6–23)
CALCIUM SERPL-MCNC: 9.6 MG/DL (ref 8.6–10.3)
CHLORIDE SERPL-SCNC: 98 MMOL/L (ref 98–107)
CO2 SERPL-SCNC: 30 MMOL/L (ref 21–32)
CREAT SERPL-MCNC: 1.01 MG/DL (ref 0.5–1.3)
EGFRCR SERPLBLD CKD-EPI 2021: >90 ML/MIN/1.73M*2
EOSINOPHIL # BLD AUTO: 0.13 X10*3/UL (ref 0–0.7)
EOSINOPHIL NFR BLD AUTO: 1.8 %
ERYTHROCYTE [DISTWIDTH] IN BLOOD BY AUTOMATED COUNT: 18.1 % (ref 11.5–14.5)
GLUCOSE SERPL-MCNC: 87 MG/DL (ref 74–99)
HCT VFR BLD AUTO: 47.3 % (ref 41–52)
HGB BLD-MCNC: 15.6 G/DL (ref 13.5–17.5)
IMM GRANULOCYTES # BLD AUTO: 0.02 X10*3/UL (ref 0–0.7)
IMM GRANULOCYTES NFR BLD AUTO: 0.3 % (ref 0–0.9)
LYMPHOCYTES # BLD AUTO: 1.15 X10*3/UL (ref 1.2–4.8)
LYMPHOCYTES NFR BLD AUTO: 15.7 %
MAGNESIUM SERPL-MCNC: 2.08 MG/DL (ref 1.6–2.4)
MCH RBC QN AUTO: 29.8 PG (ref 26–34)
MCHC RBC AUTO-ENTMCNC: 33 G/DL (ref 32–36)
MCV RBC AUTO: 90 FL (ref 80–100)
MONOCYTES # BLD AUTO: 0.5 X10*3/UL (ref 0.1–1)
MONOCYTES NFR BLD AUTO: 6.8 %
NEUTROPHILS # BLD AUTO: 5.5 X10*3/UL (ref 1.2–7.7)
NEUTROPHILS NFR BLD AUTO: 75 %
NRBC BLD-RTO: ABNORMAL /100{WBCS}
PLATELET # BLD AUTO: 165 X10*3/UL (ref 150–450)
POTASSIUM SERPL-SCNC: 4.3 MMOL/L (ref 3.5–5.3)
PROT SERPL-MCNC: 7.8 G/DL (ref 6.4–8.2)
RBC # BLD AUTO: 5.23 X10*6/UL (ref 4.5–5.9)
SODIUM SERPL-SCNC: 135 MMOL/L (ref 136–145)
WBC # BLD AUTO: 7.3 X10*3/UL (ref 4.4–11.3)

## 2025-01-07 PROCEDURE — 99215 OFFICE O/P EST HI 40 MIN: CPT | Performed by: INTERNAL MEDICINE

## 2025-01-07 PROCEDURE — 85025 COMPLETE CBC W/AUTO DIFF WBC: CPT

## 2025-01-07 PROCEDURE — 1036F TOBACCO NON-USER: CPT | Performed by: INTERNAL MEDICINE

## 2025-01-07 PROCEDURE — 84075 ASSAY ALKALINE PHOSPHATASE: CPT

## 2025-01-07 PROCEDURE — 3074F SYST BP LT 130 MM HG: CPT | Performed by: INTERNAL MEDICINE

## 2025-01-07 PROCEDURE — 36415 COLL VENOUS BLD VENIPUNCTURE: CPT

## 2025-01-07 PROCEDURE — 83735 ASSAY OF MAGNESIUM: CPT

## 2025-01-07 PROCEDURE — 3079F DIAST BP 80-89 MM HG: CPT | Performed by: INTERNAL MEDICINE

## 2025-01-07 RX ORDER — HEPARIN 100 UNIT/ML
500 SYRINGE INTRAVENOUS AS NEEDED
Status: CANCELLED | OUTPATIENT
Start: 2025-01-07

## 2025-01-07 RX ORDER — HEPARIN 100 UNIT/ML
500 SYRINGE INTRAVENOUS AS NEEDED
Status: DISCONTINUED | OUTPATIENT
Start: 2025-01-07 | End: 2025-01-07 | Stop reason: HOSPADM

## 2025-01-07 RX ORDER — PALONOSETRON 0.05 MG/ML
0.25 INJECTION, SOLUTION INTRAVENOUS ONCE
OUTPATIENT
Start: 2025-02-04

## 2025-01-07 RX ORDER — DIPHENHYDRAMINE HYDROCHLORIDE 50 MG/ML
50 INJECTION INTRAMUSCULAR; INTRAVENOUS AS NEEDED
OUTPATIENT
Start: 2025-02-04

## 2025-01-07 RX ORDER — HEPARIN SODIUM,PORCINE/PF 10 UNIT/ML
50 SYRINGE (ML) INTRAVENOUS AS NEEDED
Status: CANCELLED | OUTPATIENT
Start: 2025-01-07

## 2025-01-07 RX ORDER — LORAZEPAM 2 MG/ML
1 INJECTION INTRAMUSCULAR AS NEEDED
OUTPATIENT
Start: 2025-02-04

## 2025-01-07 RX ORDER — ALBUTEROL SULFATE 0.83 MG/ML
3 SOLUTION RESPIRATORY (INHALATION) AS NEEDED
OUTPATIENT
Start: 2025-02-04

## 2025-01-07 RX ORDER — PROCHLORPERAZINE MALEATE 5 MG
10 TABLET ORAL EVERY 6 HOURS PRN
OUTPATIENT
Start: 2025-02-04

## 2025-01-07 RX ORDER — EPINEPHRINE 0.3 MG/.3ML
0.3 INJECTION SUBCUTANEOUS EVERY 5 MIN PRN
OUTPATIENT
Start: 2025-02-04

## 2025-01-07 RX ORDER — PROCHLORPERAZINE EDISYLATE 5 MG/ML
10 INJECTION INTRAMUSCULAR; INTRAVENOUS EVERY 6 HOURS PRN
OUTPATIENT
Start: 2025-02-04

## 2025-01-07 RX ORDER — FAMOTIDINE 10 MG/ML
20 INJECTION INTRAVENOUS ONCE AS NEEDED
OUTPATIENT
Start: 2025-02-04

## 2025-01-07 ASSESSMENT — COLUMBIA-SUICIDE SEVERITY RATING SCALE - C-SSRS
6. HAVE YOU EVER DONE ANYTHING, STARTED TO DO ANYTHING, OR PREPARED TO DO ANYTHING TO END YOUR LIFE?: NO
1. IN THE PAST MONTH, HAVE YOU WISHED YOU WERE DEAD OR WISHED YOU COULD GO TO SLEEP AND NOT WAKE UP?: NO
2. HAVE YOU ACTUALLY HAD ANY THOUGHTS OF KILLING YOURSELF?: NO

## 2025-01-07 ASSESSMENT — ENCOUNTER SYMPTOMS
OCCASIONAL FEELINGS OF UNSTEADINESS: 0
LOSS OF SENSATION IN FEET: 0
DEPRESSION: 0

## 2025-01-07 ASSESSMENT — PAIN SCALES - GENERAL: PAINLEVEL_OUTOF10: 4

## 2025-01-07 NOTE — PROGRESS NOTES
.Patient ID: Thai Cristobal is a 44 y.o. male.  Referring Physician: Nba Vargas MD  [address] [place] [address]  Primary Care Provider: Charlotte Yan DO      Chief complaint: SB Adenocarcinoma     HPI  This is a 44-year-old male with a known history of Crohn's disease patient is status post partial right colectomy small bowel resection on repair of a colonic fistula on 5/28/2024. Pathology he had a neuroendocrine tumor grade 3 well differentiated, with Ki67 30-40%. He did follow-up with oncology at [facility]. He had a PET-Ga68 which showed Postsurgical changes from right hemicolectomy and distal small bowel with no evidence of focal abnormal gallium 68 dotatate uptake.   Patient's only complaint is change in color of stools at this point, he is eating and drinking less and he has no abdominal pain. No diarrhea and no flushing.  Repeated liver biopsy showed: high grade malignancy, likely of colonic origin, showing focal adenocarcinoma-like features and focal neuroendocrine differentiation with Ki67 95%  He started on FOLFOX and avastin and finished 1 cycles and tolerated well. No abdominal pain, no nausea or vomiting. No new symptoms. His labs showed leucopenia and we delayed C2.  He had C2 and repeated labs showed normal WBCs  He finished 5 cycles and tolerated well with [place] on restaging scans     SYSTEMIC TREATMENT RECEIVED: None       Subjective   Please refer to Notes above for complete History of present illness.          Review of Systems:   All 14 systems have been reviewed and were negative except for the HPI.       MEDICAL HISTORY  Past Medical History:   Diagnosis Date    Acute upper respiratory infection, unspecified 02/21/2018    Acute URI    Crohn's disease (Multi)     Personal history of other specified conditions 04/23/2018    History of vertigo    SBO (small bowel obstruction) (Multi) 01/25/2024       FAMILY HISTORY  Family History   Problem Relation Name Age of Onset    Thyroid  disease Mother      Other (bladder cancer) Mother      Hypertension Father      Benign prostatic hyperplasia Father      Anxiety disorder Sister      No Known Problems Brother      No Known Problems Daughter      Heart failure Paternal Grandfather         TOBACCO HISTORY  Tobacco Use: Low Risk  (12/10/2024)    Patient History     Smoking Tobacco Use: Never     Smokeless Tobacco Use: Never     Passive Exposure: Not on file       SOCIAL HISTORY  Social Connections: Not on file        Outpatient Medication Profile:  Current Outpatient Medications on File Prior to Visit   Medication Sig Dispense Refill    ALPRAZolam (Xanax) 0.5 mg tablet Take 1 tablet (0.5 mg) by mouth once daily as needed for anxiety. 30 tablet 0    cholecalciferol (Vitamin D-3) 25 MCG (1000 UT) capsule Take 1 capsule (25 mcg) by mouth once daily.      elderberry fruit 350 mg capsule Take 1 capsule by mouth once daily.      flaxseed oiL 1,000 mg capsule Take 1 capsule (1,000 mg) by mouth once daily.      FLUoxetine (PROzac) 20 mg capsule Take 1 capsule (20 mg) by mouth once daily. (Patient taking differently: Take 1 capsule (20 mg) by mouth once daily at bedtime.) 90 capsule 1    FLUoxetine (PROzac) 40 mg capsule TAKE 1 CAPSULE BY MOUTH ONCE DAILY (Patient taking differently: Take 1 capsule (40 mg) by mouth once daily at bedtime.) 90 capsule 1    lisinopril 40 mg tablet Take 1 tablet (40 mg) by mouth once daily at bedtime.      omeprazole OTC (PriLOSEC OTC) 20 mg EC tablet Take 1 tablet (20 mg) by mouth once daily. Do not crush, chew, or split. 30 tablet 2    ondansetron (Zofran) 8 mg tablet Take 1 tablet (8 mg) by mouth every 8 hours if needed for nausea or vomiting. 30 tablet 5    prochlorperazine (Compazine) 10 mg tablet Take 1 tablet (10 mg) by mouth every 6 hours if needed for nausea or vomiting. 30 tablet 5    SUMAtriptan (Imitrex) 50 mg tablet TAKE 1 TABLET (50 MG) BY MOUTH 1 TIME IF NEEDED FOR MIGRAINE FOR UP TO 36 DOSES. 9 tablet 3    vitamin  C-multivitamin-mineral (Emergen-C) 500 mg tablet,chewable Chew 1 tablet once daily.       Current Facility-Administered Medications on File Prior to Visit   Medication Dose Route Frequency Provider Last Rate Last Admin    heparin flush 100 unit/mL syringe 500 Units  500 Units intra-catheter PRN Nba Vargas MD   500 Units at 10/15/24 1019         Performance Status:  Symptomatic; fully ambulatory     Vitals and Measurements:   There were no vitals taken for this visit.      Physical Exam:   General: Appears well and in NAD  Eyes: PERRL, EOMI, clear sclera  ENMT: mucous membranes moist, no apparent injury, no lesions seen  Head/Neck: Neck supple, no apparent injury, thyroid without mass or tenderness, No JVD, trachea midline,   Thorax: Patent airways, CTAB, normal breath sounds with good chest expansion, thorax symmetric  Cardiovascular: Regular, rate and rhythm, no murmurs, 2+ equal pulses of the extremities, normal S 1and S 2  Gastrointestinal: Nondistended, soft, non-tender, no rebound tenderness or guarding, no masses palpable, no organomegaly, +BS  Musculoskeletal: ROM intact, no joint swelling, normal strength  Extremities: normal extremities, no cyanosis edema, contusions or wounds, no clubbing  Neurological: alert and oriented x3, intact senses, motor, response and reflexes, normal strength  Lymphatic: No significant lymphadenopathy  Psychological: Appropriate mood and behavior  Skin: Warm and dry, no lesions, no rashes          Lab Results:  I have reviewed these laboratory results:     Lab Results   Component Value Date    WBC 7.3 01/07/2025    HGB 15.6 01/07/2025    HCT 47.3 01/07/2025    MCV 90 01/07/2025     01/07/2025         Chemistry    Lab Results   Component Value Date/Time     (L) 01/07/2025 1055    K 4.3 01/07/2025 1055    CL 98 01/07/2025 1055    CO2 30 01/07/2025 1055    BUN 16 01/07/2025 1055    CREATININE 1.01 01/07/2025 1055    Lab Results   Component Value Date/Time    CALCIUM  9.6 01/07/2025 1055    ALKPHOS 144 (H) 01/07/2025 1055    AST 17 01/07/2025 1055    ALT 11 01/07/2025 1055    BILITOT 0.5 01/07/2025 1055            Lab Results   Component Value Date    TSH 3.17 04/18/2023        Radiology Result:  I have reviewed the latest Imaging in PACS and the findings are noted in this note. I discussed the results of the latest imaging with the patient. All previous imaging were reviewed at the time it was completed. Full records are available in the EMR for review as well.         NM PET CT net netspot    Result Date: 7/5/2024  Impression: 1. Postsurgical changes from right hemicolectomy and distal small bowel with no evidence of focal abnormal gallium 68 dotatate uptake within the region of documented neuroendocrine tumor to suggest residual disease. 2. Nonspecific, gallium 68 dotatate uptake within the pancreatic uncinate which can be seen with physiologic uptake.     I personally reviewed the images/study and I agree with the findings as stated by Resident Celso Ashford MD.   MACRO: None   Signed by: Maco Villafana 7/5/2024 3:06 PM Dictation workstation:   [id number]        Pathology Results:  I have reviewed the full pathology report recorded in the EMR. The pertinent portions indicating diagnosis are listed here in the note. for details please refer to the full report recorded in the EMR.    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 Pathology he had a neuroendocrine tumor grade 3 well differentiated, with Ki67 30-40%. He did follow-up with oncology at [facility]. He had a PET-Ga68 which showed Postsurgical changes from right hemicolectomy and distal small bowel with no evidence of focal abnormal gallium 68  dotatate uptake. Patient's only complaint is change in color of stools at this point he is eating and drinking and he has no abdominal pain. No diarrhea and no flushing. She had CT AP on 08/09/2024 at outside institution which showed new small liver nodules. 09/04/2024: MRI w Eovist showed Multiple T1 hypointense and T2 hyperintense lesions throughout the liver, and mesenteric LNS. PET-FDG showed FDG avid mesenteric kylie and liver metastases.   Repeated liver biopsy showed: high grade malignancy, likely of colonic origin, showing focal adenocarcinoma-like features and focal neuroendocrine differentiation with Ki67 95%  He started on FOLFOX and avastin and finished 1 cycles and tolerated well. No abdominal pain, no nausea or vomiting. No new symptoms. His labs showed leucopenia and keep delaying C2.  He finished 5 cycles  After C3 restaging scans on 12/06/2024: Showed [place] mainly in liver lesions and stable mesenteric LNS  Labs today was normal  and he is feeling fine today     Plan:  1- Will resume C6 FOLFOX plus Bevacizumab with 20% dose reduction for 5FU  2- RTC in 2 week for  C7  3- Restaging imaging after C8        DISCLAIMER:   In preparing for this visit and writing this note, I reviewed all the previous electronic medical records (labs, imaging and medical charts) of the patient available in the physician portal. Significant findings which helped in decision making are recorded  in this chart.     The plan was discussed with the patient. We gave him ample opportunities to ask questions. All questions were answered to his satisfaction and he verbalized understanding.       Nba Vargas M.D.   and Director of the Neuroendocrine Tumor Program  Gastrointestinal Medical Oncology  Department of Hematology and Oncology  [facility] [place], [place] [address]  Phone (Office): [phone]  Fax:  [phone]    Email: [email or web address]  Learn more about [facility] Comprehensive Neuroendocrine Tumor Program: Click Here  Learn more about [place] [employer]end[employer]: [email or web address]

## 2025-01-08 ENCOUNTER — INFUSION (OUTPATIENT)
Dept: HEMATOLOGY/ONCOLOGY | Facility: CLINIC | Age: 45
End: 2025-01-08
Payer: COMMERCIAL

## 2025-01-08 VITALS
TEMPERATURE: 97.3 F | WEIGHT: 152.56 LBS | SYSTOLIC BLOOD PRESSURE: 129 MMHG | DIASTOLIC BLOOD PRESSURE: 84 MMHG | RESPIRATION RATE: 18 BRPM | HEART RATE: 94 BPM | BODY MASS INDEX: 23.89 KG/M2 | OXYGEN SATURATION: 99 %

## 2025-01-08 DIAGNOSIS — C17.9 ADENOCARCINOMA OF SMALL BOWEL (MULTI): ICD-10-CM

## 2025-01-08 PROCEDURE — 96416 CHEMO PROLONG INFUSE W/PUMP: CPT

## 2025-01-08 PROCEDURE — 2500000004 HC RX 250 GENERAL PHARMACY W/ HCPCS (ALT 636 FOR OP/ED): Performed by: INTERNAL MEDICINE

## 2025-01-08 PROCEDURE — 96375 TX/PRO/DX INJ NEW DRUG ADDON: CPT | Mod: INF

## 2025-01-08 PROCEDURE — 96413 CHEMO IV INFUSION 1 HR: CPT

## 2025-01-08 PROCEDURE — 96415 CHEMO IV INFUSION ADDL HR: CPT

## 2025-01-08 RX ORDER — FAMOTIDINE 10 MG/ML
20 INJECTION INTRAVENOUS ONCE AS NEEDED
Status: DISCONTINUED | OUTPATIENT
Start: 2025-01-08 | End: 2025-01-08 | Stop reason: HOSPADM

## 2025-01-08 RX ORDER — EPINEPHRINE 0.3 MG/.3ML
0.3 INJECTION SUBCUTANEOUS EVERY 5 MIN PRN
Status: DISCONTINUED | OUTPATIENT
Start: 2025-01-08 | End: 2025-01-08 | Stop reason: HOSPADM

## 2025-01-08 RX ORDER — PALONOSETRON 0.05 MG/ML
0.25 INJECTION, SOLUTION INTRAVENOUS ONCE
Status: COMPLETED | OUTPATIENT
Start: 2025-01-08 | End: 2025-01-08

## 2025-01-08 RX ORDER — HEPARIN SODIUM,PORCINE/PF 10 UNIT/ML
50 SYRINGE (ML) INTRAVENOUS AS NEEDED
Status: CANCELLED | OUTPATIENT
Start: 2025-01-08

## 2025-01-08 RX ORDER — PROCHLORPERAZINE EDISYLATE 5 MG/ML
10 INJECTION INTRAMUSCULAR; INTRAVENOUS EVERY 6 HOURS PRN
Status: DISCONTINUED | OUTPATIENT
Start: 2025-01-08 | End: 2025-01-08 | Stop reason: HOSPADM

## 2025-01-08 RX ORDER — HEPARIN 100 UNIT/ML
500 SYRINGE INTRAVENOUS AS NEEDED
Status: CANCELLED | OUTPATIENT
Start: 2025-01-08

## 2025-01-08 RX ORDER — ALBUTEROL SULFATE 0.83 MG/ML
3 SOLUTION RESPIRATORY (INHALATION) AS NEEDED
Status: DISCONTINUED | OUTPATIENT
Start: 2025-01-08 | End: 2025-01-08 | Stop reason: HOSPADM

## 2025-01-08 RX ORDER — DEXAMETHASONE 6 MG/1
12 TABLET ORAL ONCE
Status: COMPLETED | OUTPATIENT
Start: 2025-01-08 | End: 2025-01-08

## 2025-01-08 RX ORDER — LORAZEPAM 2 MG/ML
1 INJECTION INTRAMUSCULAR AS NEEDED
Status: DISCONTINUED | OUTPATIENT
Start: 2025-01-08 | End: 2025-01-08 | Stop reason: HOSPADM

## 2025-01-08 RX ORDER — PROCHLORPERAZINE MALEATE 10 MG
10 TABLET ORAL EVERY 6 HOURS PRN
Status: DISCONTINUED | OUTPATIENT
Start: 2025-01-08 | End: 2025-01-08 | Stop reason: HOSPADM

## 2025-01-08 RX ORDER — DIPHENHYDRAMINE HYDROCHLORIDE 50 MG/ML
50 INJECTION INTRAMUSCULAR; INTRAVENOUS AS NEEDED
Status: DISCONTINUED | OUTPATIENT
Start: 2025-01-08 | End: 2025-01-08 | Stop reason: HOSPADM

## 2025-01-08 RX ADMIN — OXALIPLATIN 160 MG: 5 INJECTION, SOLUTION INTRAVENOUS at 11:06

## 2025-01-08 RX ADMIN — PALONOSETRON HYDROCHLORIDE 250 MCG: 0.25 INJECTION INTRAVENOUS at 10:30

## 2025-01-08 RX ADMIN — FLUOROURACIL 3600 MG: 50 INJECTION, SOLUTION INTRAVENOUS at 13:16

## 2025-01-08 RX ADMIN — BEVACIZUMAB-AWWB 362.5 MG: 400 INJECTION, SOLUTION INTRAVENOUS at 10:48

## 2025-01-08 RX ADMIN — DEXAMETHASONE 12 MG: 6 TABLET ORAL at 10:30

## 2025-01-08 ASSESSMENT — PAIN SCALES - GENERAL: PAINLEVEL_OUTOF10: 0-NO PAIN

## 2025-01-09 ENCOUNTER — APPOINTMENT (OUTPATIENT)
Dept: HEMATOLOGY/ONCOLOGY | Facility: CLINIC | Age: 45
End: 2025-01-09
Payer: COMMERCIAL

## 2025-01-10 ENCOUNTER — INFUSION (OUTPATIENT)
Dept: HEMATOLOGY/ONCOLOGY | Facility: CLINIC | Age: 45
End: 2025-01-10
Payer: COMMERCIAL

## 2025-01-10 ENCOUNTER — PATIENT OUTREACH (OUTPATIENT)
Dept: PRIMARY CARE | Facility: CLINIC | Age: 45
End: 2025-01-10

## 2025-01-10 VITALS
OXYGEN SATURATION: 97 % | BODY MASS INDEX: 23.67 KG/M2 | SYSTOLIC BLOOD PRESSURE: 140 MMHG | HEART RATE: 102 BPM | WEIGHT: 151.13 LBS | RESPIRATION RATE: 18 BRPM | TEMPERATURE: 98.1 F | DIASTOLIC BLOOD PRESSURE: 97 MMHG

## 2025-01-10 DIAGNOSIS — C17.9 ADENOCARCINOMA OF SMALL BOWEL (MULTI): ICD-10-CM

## 2025-01-10 PROCEDURE — 96377 APPLICATON ON-BODY INJECTOR: CPT

## 2025-01-10 PROCEDURE — 2500000004 HC RX 250 GENERAL PHARMACY W/ HCPCS (ALT 636 FOR OP/ED): Mod: JZ | Performed by: INTERNAL MEDICINE

## 2025-01-10 PROCEDURE — 2500000004 HC RX 250 GENERAL PHARMACY W/ HCPCS (ALT 636 FOR OP/ED): Performed by: INTERNAL MEDICINE

## 2025-01-10 RX ORDER — HEPARIN 100 UNIT/ML
500 SYRINGE INTRAVENOUS AS NEEDED
Status: DISCONTINUED | OUTPATIENT
Start: 2025-01-10 | End: 2025-01-10 | Stop reason: HOSPADM

## 2025-01-10 RX ORDER — HEPARIN SODIUM,PORCINE/PF 10 UNIT/ML
50 SYRINGE (ML) INTRAVENOUS AS NEEDED
OUTPATIENT
Start: 2025-01-10

## 2025-01-10 RX ORDER — HEPARIN 100 UNIT/ML
500 SYRINGE INTRAVENOUS AS NEEDED
OUTPATIENT
Start: 2025-01-10

## 2025-01-10 RX ORDER — HEPARIN SODIUM,PORCINE/PF 10 UNIT/ML
50 SYRINGE (ML) INTRAVENOUS AS NEEDED
Status: DISCONTINUED | OUTPATIENT
Start: 2025-01-10 | End: 2025-01-10 | Stop reason: HOSPADM

## 2025-01-10 RX ADMIN — PEGFILGRASTIM 6 MG: KIT SUBCUTANEOUS at 11:41

## 2025-01-10 RX ADMIN — HEPARIN 500 UNITS: 100 SYRINGE at 11:38

## 2025-01-10 ASSESSMENT — PAIN SCALES - GENERAL: PAINLEVEL_OUTOF10: 0-NO PAIN

## 2025-01-21 ENCOUNTER — LAB (OUTPATIENT)
Dept: HEMATOLOGY/ONCOLOGY | Facility: CLINIC | Age: 45
End: 2025-01-21
Payer: COMMERCIAL

## 2025-01-21 ENCOUNTER — APPOINTMENT (OUTPATIENT)
Dept: HEMATOLOGY/ONCOLOGY | Facility: CLINIC | Age: 45
End: 2025-01-21
Payer: COMMERCIAL

## 2025-01-21 ENCOUNTER — INFUSION (OUTPATIENT)
Dept: HEMATOLOGY/ONCOLOGY | Facility: CLINIC | Age: 45
End: 2025-01-21
Payer: COMMERCIAL

## 2025-01-21 ENCOUNTER — OFFICE VISIT (OUTPATIENT)
Dept: HEMATOLOGY/ONCOLOGY | Facility: CLINIC | Age: 45
End: 2025-01-21
Payer: COMMERCIAL

## 2025-01-21 VITALS
DIASTOLIC BLOOD PRESSURE: 88 MMHG | TEMPERATURE: 98.9 F | BODY MASS INDEX: 22.92 KG/M2 | RESPIRATION RATE: 18 BRPM | HEART RATE: 80 BPM | SYSTOLIC BLOOD PRESSURE: 127 MMHG | OXYGEN SATURATION: 96 % | HEIGHT: 68 IN

## 2025-01-21 VITALS
DIASTOLIC BLOOD PRESSURE: 83 MMHG | BODY MASS INDEX: 23.41 KG/M2 | TEMPERATURE: 96.4 F | WEIGHT: 149.47 LBS | OXYGEN SATURATION: 96 % | RESPIRATION RATE: 16 BRPM | SYSTOLIC BLOOD PRESSURE: 118 MMHG | HEART RATE: 83 BPM

## 2025-01-21 DIAGNOSIS — C17.9 ADENOCARCINOMA OF SMALL BOWEL (MULTI): ICD-10-CM

## 2025-01-21 DIAGNOSIS — C17.9 ADENOCARCINOMA OF SMALL BOWEL (MULTI): Primary | ICD-10-CM

## 2025-01-21 LAB
ALBUMIN SERPL BCP-MCNC: 4.1 G/DL (ref 3.4–5)
ALP SERPL-CCNC: 253 U/L (ref 33–120)
ALT SERPL W P-5'-P-CCNC: 26 U/L (ref 10–52)
ANION GAP SERPL CALC-SCNC: 14 MMOL/L (ref 10–20)
APPEARANCE UR: CLEAR
AST SERPL W P-5'-P-CCNC: 32 U/L (ref 9–39)
BASOPHILS # BLD AUTO: 0.04 X10*3/UL (ref 0–0.1)
BASOPHILS NFR BLD AUTO: 0.3 %
BILIRUB SERPL-MCNC: 0.4 MG/DL (ref 0–1.2)
BILIRUB UR STRIP.AUTO-MCNC: NEGATIVE MG/DL
BUN SERPL-MCNC: 30 MG/DL (ref 6–23)
CALCIUM SERPL-MCNC: 9.8 MG/DL (ref 8.6–10.3)
CHLORIDE SERPL-SCNC: 99 MMOL/L (ref 98–107)
CO2 SERPL-SCNC: 27 MMOL/L (ref 21–32)
COLOR UR: YELLOW
CREAT SERPL-MCNC: 0.93 MG/DL (ref 0.5–1.3)
EGFRCR SERPLBLD CKD-EPI 2021: >90 ML/MIN/1.73M*2
EOSINOPHIL # BLD AUTO: 0.17 X10*3/UL (ref 0–0.7)
EOSINOPHIL NFR BLD AUTO: 1.1 %
ERYTHROCYTE [DISTWIDTH] IN BLOOD BY AUTOMATED COUNT: 17.2 % (ref 11.5–14.5)
GLUCOSE SERPL-MCNC: 107 MG/DL (ref 74–99)
GLUCOSE UR STRIP.AUTO-MCNC: NORMAL MG/DL
HCT VFR BLD AUTO: 41.6 % (ref 41–52)
HGB BLD-MCNC: 13.8 G/DL (ref 13.5–17.5)
IMM GRANULOCYTES # BLD AUTO: 0.09 X10*3/UL (ref 0–0.7)
IMM GRANULOCYTES NFR BLD AUTO: 0.6 % (ref 0–0.9)
KETONES UR STRIP.AUTO-MCNC: NEGATIVE MG/DL
LEUKOCYTE ESTERASE UR QL STRIP.AUTO: NEGATIVE
LYMPHOCYTES # BLD AUTO: 2.18 X10*3/UL (ref 1.2–4.8)
LYMPHOCYTES NFR BLD AUTO: 14.3 %
MCH RBC QN AUTO: 29.9 PG (ref 26–34)
MCHC RBC AUTO-ENTMCNC: 33.2 G/DL (ref 32–36)
MCV RBC AUTO: 90 FL (ref 80–100)
MONOCYTES # BLD AUTO: 1.08 X10*3/UL (ref 0.1–1)
MONOCYTES NFR BLD AUTO: 7.1 %
NEUTROPHILS # BLD AUTO: 11.66 X10*3/UL (ref 1.2–7.7)
NEUTROPHILS NFR BLD AUTO: 76.6 %
NITRITE UR QL STRIP.AUTO: NEGATIVE
NRBC BLD-RTO: ABNORMAL /100{WBCS}
PH UR STRIP.AUTO: 5.5 [PH]
PLATELET # BLD AUTO: 204 X10*3/UL (ref 150–450)
POTASSIUM SERPL-SCNC: 4.7 MMOL/L (ref 3.5–5.3)
PROT SERPL-MCNC: 7.5 G/DL (ref 6.4–8.2)
PROT UR STRIP.AUTO-MCNC: NEGATIVE MG/DL
RBC # BLD AUTO: 4.61 X10*6/UL (ref 4.5–5.9)
RBC # UR STRIP.AUTO: NEGATIVE /UL
SODIUM SERPL-SCNC: 135 MMOL/L (ref 136–145)
SP GR UR STRIP.AUTO: 1.02
UROBILINOGEN UR STRIP.AUTO-MCNC: NORMAL MG/DL
WBC # BLD AUTO: 15.2 X10*3/UL (ref 4.4–11.3)

## 2025-01-21 PROCEDURE — 36591 DRAW BLOOD OFF VENOUS DEVICE: CPT

## 2025-01-21 PROCEDURE — 85025 COMPLETE CBC W/AUTO DIFF WBC: CPT

## 2025-01-21 PROCEDURE — 96416 CHEMO PROLONG INFUSE W/PUMP: CPT

## 2025-01-21 PROCEDURE — 84075 ASSAY ALKALINE PHOSPHATASE: CPT

## 2025-01-21 PROCEDURE — 2500000004 HC RX 250 GENERAL PHARMACY W/ HCPCS (ALT 636 FOR OP/ED): Performed by: INTERNAL MEDICINE

## 2025-01-21 PROCEDURE — 1036F TOBACCO NON-USER: CPT | Performed by: INTERNAL MEDICINE

## 2025-01-21 PROCEDURE — 99215 OFFICE O/P EST HI 40 MIN: CPT | Performed by: INTERNAL MEDICINE

## 2025-01-21 PROCEDURE — 81003 URINALYSIS AUTO W/O SCOPE: CPT

## 2025-01-21 PROCEDURE — 96375 TX/PRO/DX INJ NEW DRUG ADDON: CPT | Mod: INF

## 2025-01-21 PROCEDURE — 96411 CHEMO IV PUSH ADDL DRUG: CPT

## 2025-01-21 PROCEDURE — 3079F DIAST BP 80-89 MM HG: CPT | Performed by: INTERNAL MEDICINE

## 2025-01-21 PROCEDURE — 96413 CHEMO IV INFUSION 1 HR: CPT

## 2025-01-21 PROCEDURE — 96415 CHEMO IV INFUSION ADDL HR: CPT

## 2025-01-21 PROCEDURE — 3074F SYST BP LT 130 MM HG: CPT | Performed by: INTERNAL MEDICINE

## 2025-01-21 RX ORDER — HEPARIN 100 UNIT/ML
500 SYRINGE INTRAVENOUS AS NEEDED
Status: CANCELLED | OUTPATIENT
Start: 2025-01-21

## 2025-01-21 RX ORDER — DIPHENHYDRAMINE HYDROCHLORIDE 50 MG/ML
25 INJECTION INTRAMUSCULAR; INTRAVENOUS ONCE
Status: CANCELLED | OUTPATIENT
Start: 2025-02-18 | End: 2025-02-18

## 2025-01-21 RX ORDER — FAMOTIDINE 10 MG/ML
20 INJECTION INTRAVENOUS ONCE
Status: CANCELLED | OUTPATIENT
Start: 2025-01-21

## 2025-01-21 RX ORDER — EPINEPHRINE 0.3 MG/.3ML
0.3 INJECTION SUBCUTANEOUS EVERY 5 MIN PRN
OUTPATIENT
Start: 2025-02-18

## 2025-01-21 RX ORDER — FAMOTIDINE 10 MG/ML
20 INJECTION INTRAVENOUS ONCE AS NEEDED
OUTPATIENT
Start: 2025-02-18

## 2025-01-21 RX ORDER — DIPHENHYDRAMINE HYDROCHLORIDE 50 MG/ML
50 INJECTION INTRAMUSCULAR; INTRAVENOUS AS NEEDED
OUTPATIENT
Start: 2025-02-18

## 2025-01-21 RX ORDER — PROCHLORPERAZINE EDISYLATE 5 MG/ML
10 INJECTION INTRAMUSCULAR; INTRAVENOUS EVERY 6 HOURS PRN
OUTPATIENT
Start: 2025-02-18

## 2025-01-21 RX ORDER — ALBUTEROL SULFATE 0.83 MG/ML
3 SOLUTION RESPIRATORY (INHALATION) AS NEEDED
Status: DISCONTINUED | OUTPATIENT
Start: 2025-01-21 | End: 2025-01-21 | Stop reason: HOSPADM

## 2025-01-21 RX ORDER — FAMOTIDINE 10 MG/ML
20 INJECTION INTRAVENOUS ONCE AS NEEDED
Status: COMPLETED | OUTPATIENT
Start: 2025-01-21 | End: 2025-01-21

## 2025-01-21 RX ORDER — FAMOTIDINE 10 MG/ML
20 INJECTION INTRAVENOUS ONCE
OUTPATIENT
Start: 2025-02-04

## 2025-01-21 RX ORDER — PROCHLORPERAZINE MALEATE 10 MG
10 TABLET ORAL EVERY 6 HOURS PRN
Status: DISCONTINUED | OUTPATIENT
Start: 2025-01-21 | End: 2025-01-21 | Stop reason: HOSPADM

## 2025-01-21 RX ORDER — PROCHLORPERAZINE MALEATE 10 MG
10 TABLET ORAL EVERY 6 HOURS PRN
OUTPATIENT
Start: 2025-02-18

## 2025-01-21 RX ORDER — ALBUTEROL SULFATE 0.83 MG/ML
3 SOLUTION RESPIRATORY (INHALATION) AS NEEDED
OUTPATIENT
Start: 2025-02-18

## 2025-01-21 RX ORDER — DEXAMETHASONE 6 MG/1
12 TABLET ORAL ONCE
Status: COMPLETED | OUTPATIENT
Start: 2025-01-21 | End: 2025-01-21

## 2025-01-21 RX ORDER — LORAZEPAM 2 MG/ML
1 INJECTION INTRAMUSCULAR AS NEEDED
Status: DISCONTINUED | OUTPATIENT
Start: 2025-01-21 | End: 2025-01-21 | Stop reason: HOSPADM

## 2025-01-21 RX ORDER — DIPHENHYDRAMINE HYDROCHLORIDE 50 MG/ML
25 INJECTION INTRAMUSCULAR; INTRAVENOUS ONCE
Status: CANCELLED | OUTPATIENT
Start: 2025-01-21

## 2025-01-21 RX ORDER — PROCHLORPERAZINE EDISYLATE 5 MG/ML
10 INJECTION INTRAMUSCULAR; INTRAVENOUS EVERY 6 HOURS PRN
Status: DISCONTINUED | OUTPATIENT
Start: 2025-01-21 | End: 2025-01-21 | Stop reason: HOSPADM

## 2025-01-21 RX ORDER — PALONOSETRON 0.05 MG/ML
0.25 INJECTION, SOLUTION INTRAVENOUS ONCE
Status: COMPLETED | OUTPATIENT
Start: 2025-01-21 | End: 2025-01-21

## 2025-01-21 RX ORDER — EPINEPHRINE 0.3 MG/.3ML
0.3 INJECTION SUBCUTANEOUS EVERY 5 MIN PRN
Status: DISCONTINUED | OUTPATIENT
Start: 2025-01-21 | End: 2025-01-21 | Stop reason: HOSPADM

## 2025-01-21 RX ORDER — DIPHENHYDRAMINE HYDROCHLORIDE 50 MG/ML
50 INJECTION INTRAMUSCULAR; INTRAVENOUS AS NEEDED
Status: COMPLETED | OUTPATIENT
Start: 2025-01-21 | End: 2025-01-21

## 2025-01-21 RX ORDER — FAMOTIDINE 10 MG/ML
20 INJECTION INTRAVENOUS ONCE
OUTPATIENT
Start: 2025-02-18 | End: 2025-02-18

## 2025-01-21 RX ORDER — LORAZEPAM 2 MG/ML
1 INJECTION INTRAMUSCULAR AS NEEDED
OUTPATIENT
Start: 2025-02-18

## 2025-01-21 RX ORDER — HEPARIN SODIUM,PORCINE/PF 10 UNIT/ML
50 SYRINGE (ML) INTRAVENOUS AS NEEDED
Status: CANCELLED | OUTPATIENT
Start: 2025-01-21

## 2025-01-21 RX ORDER — DIPHENHYDRAMINE HYDROCHLORIDE 50 MG/ML
25 INJECTION INTRAMUSCULAR; INTRAVENOUS ONCE
Status: CANCELLED | OUTPATIENT
Start: 2025-02-04

## 2025-01-21 RX ORDER — PALONOSETRON 0.05 MG/ML
0.25 INJECTION, SOLUTION INTRAVENOUS ONCE
OUTPATIENT
Start: 2025-02-18

## 2025-01-21 RX ADMIN — DEXAMETHASONE 12 MG: 6 TABLET ORAL at 10:04

## 2025-01-21 RX ADMIN — METHYLPREDNISOLONE SODIUM SUCCINATE 40 MG: 40 INJECTION, POWDER, FOR SOLUTION INTRAMUSCULAR; INTRAVENOUS at 12:19

## 2025-01-21 RX ADMIN — OXALIPLATIN 160 MG: 5 INJECTION, SOLUTION INTRAVENOUS at 10:44

## 2025-01-21 RX ADMIN — FLUOROURACIL 3600 MG: 50 INJECTION, SOLUTION INTRAVENOUS at 14:33

## 2025-01-21 RX ADMIN — PALONOSETRON 250 MCG: 0.05 INJECTION, SOLUTION INTRAVENOUS at 10:04

## 2025-01-21 RX ADMIN — BEVACIZUMAB-AWWB 362.5 MG: 400 INJECTION, SOLUTION INTRAVENOUS at 10:23

## 2025-01-21 RX ADMIN — FAMOTIDINE 20 MG: 10 INJECTION INTRAVENOUS at 12:19

## 2025-01-21 RX ADMIN — DIPHENHYDRAMINE HYDROCHLORIDE 50 MG: 50 INJECTION INTRAMUSCULAR; INTRAVENOUS at 12:19

## 2025-01-21 ASSESSMENT — PAIN SCALES - GENERAL: PAINLEVEL_OUTOF10: 0-NO PAIN

## 2025-01-21 NOTE — SIGNIFICANT EVENT
01/21/25 0949   Prechemo Checklist   Has the patient been in the hospital, ED, or urgent care since last date of service No   Chemo/Immuno Consent Completed and Signed Yes   Protocol/Indications Verified Yes   Confirmed to previous date/time of medication Yes   Compared to previous dose Yes   All medications are dated accurately Yes   Pregnancy Test Negative Not applicable   Parameters Met Yes  (ok to treat with pending urine sample per MD Vargas)   BSA/Weight-Height Verified Yes   Dose Calculations Verified (current, total, cumulative) Yes

## 2025-01-21 NOTE — PROGRESS NOTES
Adverse Event Note     Name:Thai Cristobal  : 1980  MRN: [id number]      Adverse Drug Reaction  Date and Time of Reaction: 2025 12:00 EST    Medication Administration Details  Medication Administered: OXALIPLATIN  Date and Time Medication Administration: 2025 11:00 EST  Patient Location at Time of Admin:    ADR Symptoms:  Symptoms: Hives/Urticara and Itching  Severity: Moderate    Actions Taken:  Actions Taken: Provider notified  Provider Notified:  Medications Given: Famotidine (Pepcid), Methylprednisolone (Solu-Medrol) and Diphenhydramine (Benadryl)  Outcome: Treatment modified    Additional Details:  Patient on cycle 7 today of treatment.  One hour into Oxaliplatin infusion patient called RN to report full body itching.  Medication paused and patient assessed.  Patient seen to have reddened ears and neck with reports of full body itching.  MD Vargas came to bedside.  Patient started on NS and given the emergency medications highlighted above.  Vital signs are 128/87, 89 hr, and 95% on room air. No fever.  Vitals remained stable during the reaction.  After about 10 minutes patient back to baseline.  Per MD Espitia resume at 50% rate to finish remaining drug volume in 2 hours. RN wiill continue to monitor patient after restarting medication per MD orders after at least 20 minutes recovery time if patient back to baseline.

## 2025-01-21 NOTE — PROGRESS NOTES
.Patient ID: Thai Cristobal is a 44 y.o. male.  Referring Physician: Nba Vargas MD  [address] [place] [address]  Primary Care Provider: Charlotte Yan DO      Chief complaint: SB Adenocarcinoma     HPI  This is a 44-year-old male with a known history of Crohn's disease patient is status post partial right colectomy small bowel resection on repair of a colonic fistula on 5/28/2024. Pathology he had a neuroendocrine tumor grade 3 well differentiated, with Ki67 30-40%. He did follow-up with oncology at [facility]. He had a PET-Ga68 which showed Postsurgical changes from right hemicolectomy and distal small bowel with no evidence of focal abnormal gallium 68 dotatate uptake.   Patient's only complaint is change in color of stools at this point, he is eating and drinking less and he has no abdominal pain. No diarrhea and no flushing.  Repeated liver biopsy showed: high grade malignancy, likely of colonic origin, showing focal adenocarcinoma-like features and focal neuroendocrine differentiation with Ki67 95%  He started on FOLFOX and avastin and finished 1 cycles and tolerated well. No abdominal pain, no nausea or vomiting. No new symptoms. His labs showed leucopenia and we delayed C2.  He had C2 and repeated labs showed normal WBCs  He finished 6 cycles and tolerated well with [place] on restaging scans   Last labs were normal  and he is feeling fine today     SYSTEMIC TREATMENT RECEIVED: None       Subjective   Please refer to Notes above for complete History of present illness.          Review of Systems:   All 14 systems have been reviewed and were negative except for the HPI.       MEDICAL HISTORY  Past Medical History:   Diagnosis Date    Acute upper respiratory infection, unspecified 02/21/2018    Acute URI    Crohn's disease (Multi)     Personal history of other specified conditions 04/23/2018    History of vertigo    SBO (small bowel obstruction) (Multi) 01/25/2024       FAMILY HISTORY  Family History    Problem Relation Name Age of Onset    Thyroid disease Mother      Other (bladder cancer) Mother      Hypertension Father      Benign prostatic hyperplasia Father      Anxiety disorder Sister      No Known Problems Brother      No Known Problems Daughter      Heart failure Paternal Grandfather         TOBACCO HISTORY  Tobacco Use: Low Risk  (1/7/2025)    Patient History     Smoking Tobacco Use: Never     Smokeless Tobacco Use: Never     Passive Exposure: Never       SOCIAL HISTORY  Social Connections: Not on file        Outpatient Medication Profile:  Current Outpatient Medications on File Prior to Visit   Medication Sig Dispense Refill    ALPRAZolam (Xanax) 0.5 mg tablet Take 1 tablet (0.5 mg) by mouth once daily as needed for anxiety. 30 tablet 0    cholecalciferol (Vitamin D-3) 25 MCG (1000 UT) capsule Take 1 capsule (25 mcg) by mouth once daily.      elderberry fruit 350 mg capsule Take 1 capsule by mouth once daily.      flaxseed oiL 1,000 mg capsule Take 1 capsule (1,000 mg) by mouth once daily.      FLUoxetine (PROzac) 20 mg capsule Take 1 capsule (20 mg) by mouth once daily. (Patient taking differently: Take 1 capsule (20 mg) by mouth once daily at bedtime.) 90 capsule 1    FLUoxetine (PROzac) 40 mg capsule TAKE 1 CAPSULE BY MOUTH ONCE DAILY (Patient taking differently: Take 1 capsule (40 mg) by mouth once daily at bedtime.) 90 capsule 1    lisinopril 40 mg tablet Take 1 tablet (40 mg) by mouth once daily at bedtime.      omeprazole OTC (PriLOSEC OTC) 20 mg EC tablet Take 1 tablet (20 mg) by mouth once daily. Do not crush, chew, or split. 30 tablet 2    ondansetron (Zofran) 8 mg tablet Take 1 tablet (8 mg) by mouth every 8 hours if needed for nausea or vomiting. 30 tablet 5    prochlorperazine (Compazine) 10 mg tablet Take 1 tablet (10 mg) by mouth every 6 hours if needed for nausea or vomiting. 30 tablet 5    SUMAtriptan (Imitrex) 50 mg tablet TAKE 1 TABLET (50 MG) BY MOUTH 1 TIME IF NEEDED FOR MIGRAINE  FOR UP TO 36 DOSES. 9 tablet 3    vitamin C-multivitamin-mineral (Emergen-C) 500 mg tablet,chewable Chew 1 tablet once daily.       Current Facility-Administered Medications on File Prior to Visit   Medication Dose Route Frequency Provider Last Rate Last Admin    heparin flush 100 unit/mL syringe 500 Units  500 Units intra-catheter PRN Nba Vargas MD   500 Units at 10/15/24 1019         Performance Status:  Symptomatic; fully ambulatory     Vitals and Measurements:   There were no vitals taken for this visit.      Physical Exam:   General: Appears well and in NAD  Eyes: PERRL, EOMI, clear sclera  ENMT: mucous membranes moist, no apparent injury, no lesions seen  Head/Neck: Neck supple, no apparent injury, thyroid without mass or tenderness, No JVD, trachea midline,   Thorax: Patent airways, CTAB, normal breath sounds with good chest expansion, thorax symmetric  Cardiovascular: Regular, rate and rhythm, no murmurs, 2+ equal pulses of the extremities, normal S 1and S 2  Gastrointestinal: Nondistended, soft, non-tender, no rebound tenderness or guarding, no masses palpable, no organomegaly, +BS  Musculoskeletal: ROM intact, no joint swelling, normal strength  Extremities: normal extremities, no cyanosis edema, contusions or wounds, no clubbing  Neurological: alert and oriented x3, intact senses, motor, response and reflexes, normal strength  Lymphatic: No significant lymphadenopathy  Psychological: Appropriate mood and behavior  Skin: Warm and dry, no lesions, no rashes          Lab Results:  I have reviewed these laboratory results:     Lab Results   Component Value Date    WBC 7.3 01/07/2025    HGB 15.6 01/07/2025    HCT 47.3 01/07/2025    MCV 90 01/07/2025     01/07/2025         Chemistry    Lab Results   Component Value Date/Time     (L) 01/07/2025 1055    K 4.3 01/07/2025 1055    CL 98 01/07/2025 1055    CO2 30 01/07/2025 1055    BUN 16 01/07/2025 1055    CREATININE 1.01 01/07/2025 1055    Lab  Results   Component Value Date/Time    CALCIUM 9.6 01/07/2025 1055    ALKPHOS 144 (H) 01/07/2025 1055    AST 17 01/07/2025 1055    ALT 11 01/07/2025 1055    BILITOT 0.5 01/07/2025 1055            Lab Results   Component Value Date    TSH 3.17 04/18/2023        Radiology Result:  I have reviewed the latest Imaging in PACS and the findings are noted in this note. I discussed the results of the latest imaging with the patient. All previous imaging were reviewed at the time it was completed. Full records are available in the EMR for review as well.         NM PET CT net netspot    Result Date: 7/5/2024  Impression: 1. Postsurgical changes from right hemicolectomy and distal small bowel with no evidence of focal abnormal gallium 68 dotatate uptake within the region of documented neuroendocrine tumor to suggest residual disease. 2. Nonspecific, gallium 68 dotatate uptake within the pancreatic uncinate which can be seen with physiologic uptake.     I personally reviewed the images/study and I agree with the findings as stated by Resident Celso Ashford MD.   MACRO: None   Signed by: Maco Villafana 7/5/2024 3:06 PM Dictation workstation:   [id number]        Pathology Results:  I have reviewed the full pathology report recorded in the EMR. The pertinent portions indicating diagnosis are listed here in the note. for details please refer to the full report recorded in the EMR.    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 Pathology he had a neuroendocrine tumor grade 3 well differentiated, with Ki67 30-40%. He did follow-up with oncology at [facility]. He had a PET-Ga68 which showed Postsurgical changes from right hemicolectomy and distal small bowel  with no evidence of focal abnormal gallium 68 dotatate uptake. Patient's only complaint is change in color of stools at this point he is eating and drinking and he has no abdominal pain. No diarrhea and no flushing. She had CT AP on 08/09/2024 at outside institution which showed new small liver nodules. 09/04/2024: MRI w Eovist showed Multiple T1 hypointense and T2 hyperintense lesions throughout the liver, and mesenteric LNS. PET-FDG showed FDG avid mesenteric kylie and liver metastases.   Repeated liver biopsy showed: high grade malignancy, likely of colonic origin, showing focal adenocarcinoma-like features and focal neuroendocrine differentiation with Ki67 95%  He started on FOLFOX and avastin and finished 1 cycles and tolerated well. No abdominal pain, no nausea or vomiting. No new symptoms. His labs showed leucopenia and keep delaying C2.  He finished 5 cycles  After C3 restaging scans on 12/06/2024: Showed [place] mainly in liver lesions and stable mesenteric LNS  Last labs were normal  and he is feeling fine today     Plan:  1- Will resume C7 FOLFOX plus Bevacizumab with 20% dose reduction for 5FU  2- RTC in 2 week for  C8  3- Restaging imaging after C8        DISCLAIMER:   In preparing for this visit and writing this note, I reviewed all the previous electronic medical records (labs, imaging and medical charts) of the patient available in the physician portal. Significant findings which helped in decision making are recorded  in this chart.     The plan was discussed with the patient. We gave him ample opportunities to ask questions. All questions were answered to his satisfaction and he verbalized understanding.       Nba Vargas M.D.   and Director of the Neuroendocrine Tumor Program  Gastrointestinal Medical Oncology  Department of Hematology and Oncology  [facility] [place], [address]  [address]  Phone (Office): [phone]  Fax:  [phone]   Email: [email or web address]  Learn more about [facility] Comprehensive Neuroendocrine Tumor Program: Click Here  Learn more about [place] Neuroend[employer]: [email or web address]

## 2025-01-21 NOTE — PROGRESS NOTES
Half rate resumed after reaction, see reaction note, rate to resume per MD Espitia is 145ml/hr for 2 hours.  Will restart Oxaliplatin infusion in 15 minutes.

## 2025-01-23 ENCOUNTER — INFUSION (OUTPATIENT)
Dept: HEMATOLOGY/ONCOLOGY | Facility: CLINIC | Age: 45
End: 2025-01-23
Payer: COMMERCIAL

## 2025-01-23 VITALS
HEART RATE: 97 BPM | WEIGHT: 146.94 LBS | DIASTOLIC BLOOD PRESSURE: 81 MMHG | SYSTOLIC BLOOD PRESSURE: 143 MMHG | BODY MASS INDEX: 22.53 KG/M2 | OXYGEN SATURATION: 95 % | RESPIRATION RATE: 18 BRPM | TEMPERATURE: 97 F

## 2025-01-23 DIAGNOSIS — C17.9 ADENOCARCINOMA OF SMALL BOWEL (MULTI): ICD-10-CM

## 2025-01-23 PROCEDURE — 96377 APPLICATON ON-BODY INJECTOR: CPT

## 2025-01-23 PROCEDURE — 2500000004 HC RX 250 GENERAL PHARMACY W/ HCPCS (ALT 636 FOR OP/ED): Mod: JZ | Performed by: INTERNAL MEDICINE

## 2025-01-23 PROCEDURE — 2500000004 HC RX 250 GENERAL PHARMACY W/ HCPCS (ALT 636 FOR OP/ED): Performed by: INTERNAL MEDICINE

## 2025-01-23 RX ORDER — HEPARIN SODIUM,PORCINE/PF 10 UNIT/ML
50 SYRINGE (ML) INTRAVENOUS AS NEEDED
Status: DISCONTINUED | OUTPATIENT
Start: 2025-01-23 | End: 2025-01-23 | Stop reason: HOSPADM

## 2025-01-23 RX ORDER — HEPARIN SODIUM,PORCINE/PF 10 UNIT/ML
50 SYRINGE (ML) INTRAVENOUS AS NEEDED
OUTPATIENT
Start: 2025-01-23

## 2025-01-23 RX ORDER — HEPARIN 100 UNIT/ML
500 SYRINGE INTRAVENOUS AS NEEDED
OUTPATIENT
Start: 2025-01-23

## 2025-01-23 RX ORDER — HEPARIN 100 UNIT/ML
500 SYRINGE INTRAVENOUS AS NEEDED
Status: DISCONTINUED | OUTPATIENT
Start: 2025-01-23 | End: 2025-01-23 | Stop reason: HOSPADM

## 2025-01-23 RX ADMIN — PEGFILGRASTIM 6 MG: KIT SUBCUTANEOUS at 13:14

## 2025-01-23 RX ADMIN — HEPARIN 500 UNITS: 100 SYRINGE at 13:11

## 2025-01-23 ASSESSMENT — PAIN SCALES - GENERAL: PAINLEVEL_OUTOF10: 0-NO PAIN

## 2025-01-27 ENCOUNTER — NURSE TRIAGE (OUTPATIENT)
Dept: ADMISSION | Facility: HOSPITAL | Age: 45
End: 2025-01-27
Payer: COMMERCIAL

## 2025-01-27 NOTE — TELEPHONE ENCOUNTER
Dr. Vargas in agreement with plan.  Pt instructed to see PCP sooner or go to ED with fevers, worsening dizziness, chest pain or shortness of breath at rest.  Pt verbalized understanding.

## 2025-01-27 NOTE — TELEPHONE ENCOUNTER
Pt called to report dizziness and worsening dyspnea over the past few days.  He notes feeling dizzy when going from lying to standing and has had associated sensation of his vision going black.  No syncope and dizziness resolves quickly. No vomiting or diarrhea and he is eating ok though notes loss of sense of taste. He endorses drinking less than usual since his chemo last week and has been pushing fluids today.       He also notes worsening dyspnea on exertion.  No fevers, chest pain or shortness of breath at rest.  He's had a productive cough for a few days with yellow/green sputum.  His wife had COVID last week.  Pt has not tested himself.    Pt instructed to continue pushing fluids and he will obtain a home COVID test to complete today.    Additional Information   Do you have new or worsening problems breathing?     Worsening GARCÍA   Commented on: Do you/patient have any of these signs of a stroke?     denies   Commented on: Do you have any of these signs of dehydration?     Urine is yellow, somewhat less volume than usual   Commented on: What else do you want to tell me about this problem?     Had chemo last week-has had nausea without vomiting, not drinking as much as usual.  Over the past day, has tried to increase his fluid intake and is eating better.  Today drank, water, gatorade, kambucha.  Ate banana, orange, peanut butter toast x3 slices, soup, salad, hashbrowns.    Protocols used: Dyspena (Shortness of Breath), Dizziness

## 2025-01-30 ENCOUNTER — APPOINTMENT (OUTPATIENT)
Dept: PRIMARY CARE | Facility: CLINIC | Age: 45
End: 2025-01-30
Payer: COMMERCIAL

## 2025-01-30 VITALS
DIASTOLIC BLOOD PRESSURE: 69 MMHG | BODY MASS INDEX: 22.4 KG/M2 | TEMPERATURE: 96.9 F | HEART RATE: 94 BPM | SYSTOLIC BLOOD PRESSURE: 112 MMHG | RESPIRATION RATE: 14 BRPM | WEIGHT: 146.1 LBS | OXYGEN SATURATION: 97 %

## 2025-01-30 DIAGNOSIS — C7B.8 METASTATIC MALIGNANT NEUROENDOCRINE TUMOR TO LYMPH NODE: ICD-10-CM

## 2025-01-30 DIAGNOSIS — K50.918 CROHN'S DISEASE WITH OTHER COMPLICATION, UNSPECIFIED GASTROINTESTINAL TRACT LOCATION: ICD-10-CM

## 2025-01-30 DIAGNOSIS — I10 PRIMARY HYPERTENSION: ICD-10-CM

## 2025-01-30 DIAGNOSIS — I95.1 ORTHOSTATIC HYPOTENSION: ICD-10-CM

## 2025-01-30 DIAGNOSIS — I47.10 PSVT (PAROXYSMAL SUPRAVENTRICULAR TACHYCARDIA) (CMS-HCC): ICD-10-CM

## 2025-01-30 DIAGNOSIS — J45.20 MILD INTERMITTENT ASTHMA WITHOUT COMPLICATION (HHS-HCC): ICD-10-CM

## 2025-01-30 DIAGNOSIS — F41.9 ANXIETY: Primary | ICD-10-CM

## 2025-01-30 DIAGNOSIS — C17.9 ADENOCARCINOMA OF SMALL BOWEL (MULTI): ICD-10-CM

## 2025-01-30 PROCEDURE — 3074F SYST BP LT 130 MM HG: CPT | Performed by: INTERNAL MEDICINE

## 2025-01-30 PROCEDURE — 99214 OFFICE O/P EST MOD 30 MIN: CPT | Performed by: INTERNAL MEDICINE

## 2025-01-30 PROCEDURE — 3078F DIAST BP <80 MM HG: CPT | Performed by: INTERNAL MEDICINE

## 2025-01-30 RX ORDER — ALPRAZOLAM 0.5 MG/1
0.5 TABLET ORAL DAILY PRN
Qty: 30 TABLET | Refills: 0 | Status: SHIPPED | OUTPATIENT
Start: 2025-01-30

## 2025-01-30 RX ORDER — LISINOPRIL 20 MG/1
20 TABLET ORAL DAILY
Qty: 90 TABLET | Refills: 1 | Status: SHIPPED | OUTPATIENT
Start: 2025-01-30

## 2025-01-30 ASSESSMENT — PATIENT HEALTH QUESTIONNAIRE - PHQ9
SUM OF ALL RESPONSES TO PHQ9 QUESTIONS 1 AND 2: 0
1. LITTLE INTEREST OR PLEASURE IN DOING THINGS: NOT AT ALL
2. FEELING DOWN, DEPRESSED OR HOPELESS: NOT AT ALL

## 2025-01-30 ASSESSMENT — ENCOUNTER SYMPTOMS
ACTIVITY CHANGE: 0
CHOKING: 0
CHILLS: 0
HEADACHES: 0
APNEA: 0
LIGHT-HEADEDNESS: 1
FACIAL ASYMMETRY: 0
APPETITE CHANGE: 0
UNEXPECTED WEIGHT CHANGE: 0
SHORTNESS OF BREATH: 1
STRIDOR: 0
FATIGUE: 0
FEVER: 0
COUGH: 0
CHEST TIGHTNESS: 0
WHEEZING: 0
DIZZINESS: 0

## 2025-01-30 NOTE — PATIENT INSTRUCTIONS
The lightheadedness and near syncope (passing out) is due to low blood pressure  We will reduce Lisinopril to 20 mg/day-new script was sent  If you still feel lightheaded/as if you will pass out then please let me know we will reduce further  Can presume likely Covid + as well with wife having it and some minor symptoms  Monitor shortness of breath-get scans when scheduled-if this worsens please let me know  Follow up here in 3 months

## 2025-01-30 NOTE — ASSESSMENT & PLAN NOTE
Receiving chemo at present  Has scans in 12/2024 that show response with decrease size of liver mets and no new areas of concern  He follows with oncology

## 2025-01-30 NOTE — ASSESSMENT & PLAN NOTE
Receiving chemotherapy cycle 8 coming up with repeat scans in few weeks   Noting more fatigue/weakness/shortness of breath which is likely from chemo

## 2025-01-30 NOTE — ASSESSMENT & PLAN NOTE
On Fluoxetine and Alprazolam  Since diagnosis of cancer using Alprazolam daily which is appropriate  OARRS reviewed and is appropriate and CSA done today

## 2025-01-30 NOTE — ASSESSMENT & PLAN NOTE
Pt having symptoms of near syncope with changes in position  BP low now and weight down further  Will reduce Lisinopril to 20 mg/day from 40 mg and he will monitor  Stay well hydrated including salt sport drinks to assist if needed

## 2025-01-30 NOTE — ASSESSMENT & PLAN NOTE
Having some shortness of breath but not sure related to Asthma  No wheezing/cough or chest tightness  More like easy tiring out and winded with exertion-more likely from cancer treatment

## 2025-01-30 NOTE — PROGRESS NOTES
Subjective   Patient ID: Thai Cristobal is a 44 y.o. male who presents for Follow-up (3 month ).    HPI  Pt here in 3 month follow up.      His wife did test positive for Covid last week.  He didn't feel great himself last week-lost of taste and did have some orthostatic lightheadedness-one episode where he got up from bed to stand and lost vision for few seconds out left eye.  He didn't test himself for Covid.    He also feels short of breath but this has been going on off and on for a while now.  Easily tires with minor exertion these days.  .     He just finished his 7 chemo treatment.  He had scans in early 12/2024 that showed improvements and lessening of the metastatic lesions in liver and no new lesions.  He will go next week for chemo 8th treatment and scan in early 2/2025.  He feels pretty decent.      He is using Alprazolam 0.5 mg and will take 1/2 tablet 1-2 times a day as needed.  He is in need of refill.  No side effects.  Mood is stable but he admits to more anxiety leading up to repeat scans to reevaluate cancer.      Review of Systems   Constitutional:  Negative for activity change, appetite change, chills, fatigue, fever and unexpected weight change.   Respiratory:  Positive for shortness of breath. Negative for apnea, cough, choking, chest tightness, wheezing and stridor.    Neurological:  Positive for light-headedness. Negative for dizziness, syncope, facial asymmetry and headaches.       Objective   /69 (BP Location: Right arm, Patient Position: Sitting)   Pulse 94   Temp 36.1 °C (96.9 °F) (Tympanic)   Resp 14   Wt 66.3 kg (146 lb 1.6 oz)   SpO2 97%   BMI 22.40 kg/m²      Physical Exam  Constitutional:       Appearance: Normal appearance.   Cardiovascular:      Rate and Rhythm: Normal rate and regular rhythm.      Heart sounds: Normal heart sounds.   Pulmonary:      Effort: Pulmonary effort is normal.      Breath sounds: Normal breath sounds.   Abdominal:      General: Bowel sounds  are normal.      Palpations: Abdomen is soft.   Musculoskeletal:      Right lower leg: No edema.      Left lower leg: No edema.   Lymphadenopathy:      Cervical: No cervical adenopathy.   Neurological:      Mental Status: He is alert and oriented to person, place, and time.   Psychiatric:         Mood and Affect: Mood normal.         Assessment/Plan   Problem List Items Addressed This Visit       Anxiety - Primary     On Fluoxetine and Alprazolam  Since diagnosis of cancer using Alprazolam daily which is appropriate  OARRS reviewed and is appropriate and CSA done today           Relevant Medications    ALPRAZolam (Xanax) 0.5 mg tablet    Primary hypertension    Relevant Medications    lisinopril 20 mg tablet    Other Relevant Orders    Follow Up In Primary Care - Established    Crohn's disease (Multi)    Relevant Orders    Follow Up In Primary Care - Established    PSVT (paroxysmal supraventricular tachycardia) (CMS-HCC)     No current issues          Asthma     Having some shortness of breath but not sure related to Asthma  No wheezing/cough or chest tightness  More like easy tiring out and winded with exertion-more likely from cancer treatment          Orthostatic hypotension     Pt having symptoms of near syncope with changes in position  BP low now and weight down further  Will reduce Lisinopril to 20 mg/day from 40 mg and he will monitor  Stay well hydrated including salt sport drinks to assist if needed         Secondary neuroendocrine tumor of liver     Receiving chemotherapy cycle 8 coming up with repeat scans in few weeks   Noting more fatigue/weakness/shortness of breath which is likely from chemo          Adenocarcinoma of small bowel (Multi)     Receiving chemo at present  Has scans in 12/2024 that show response with decrease size of liver mets and no new areas of concern  He follows with oncology          Relevant Medications    ALPRAZolam (Xanax) 0.5 mg tablet    Other Relevant Orders    Follow Up In  Primary Care - Established

## 2025-02-04 ENCOUNTER — LAB (OUTPATIENT)
Dept: HEMATOLOGY/ONCOLOGY | Facility: CLINIC | Age: 45
End: 2025-02-04
Payer: COMMERCIAL

## 2025-02-04 DIAGNOSIS — C17.9 ADENOCARCINOMA OF SMALL BOWEL (MULTI): ICD-10-CM

## 2025-02-04 LAB
ALBUMIN SERPL BCP-MCNC: 3.6 G/DL (ref 3.4–5)
ALP SERPL-CCNC: 180 U/L (ref 33–120)
ALT SERPL W P-5'-P-CCNC: 19 U/L (ref 10–52)
ANION GAP SERPL CALC-SCNC: 11 MMOL/L (ref 10–20)
AST SERPL W P-5'-P-CCNC: 22 U/L (ref 9–39)
BASOPHILS # BLD AUTO: 0.04 X10*3/UL (ref 0–0.1)
BASOPHILS NFR BLD AUTO: 0.4 %
BILIRUB SERPL-MCNC: 0.5 MG/DL (ref 0–1.2)
BUN SERPL-MCNC: 14 MG/DL (ref 6–23)
CALCIUM SERPL-MCNC: 9.2 MG/DL (ref 8.6–10.3)
CHLORIDE SERPL-SCNC: 104 MMOL/L (ref 98–107)
CO2 SERPL-SCNC: 27 MMOL/L (ref 21–32)
CREAT SERPL-MCNC: 0.88 MG/DL (ref 0.5–1.3)
EGFRCR SERPLBLD CKD-EPI 2021: >90 ML/MIN/1.73M*2
EOSINOPHIL # BLD AUTO: 0.09 X10*3/UL (ref 0–0.7)
EOSINOPHIL NFR BLD AUTO: 1 %
ERYTHROCYTE [DISTWIDTH] IN BLOOD BY AUTOMATED COUNT: 18.3 % (ref 11.5–14.5)
GLUCOSE SERPL-MCNC: 90 MG/DL (ref 74–99)
HCT VFR BLD AUTO: 37 % (ref 41–52)
HGB BLD-MCNC: 12.3 G/DL (ref 13.5–17.5)
IMM GRANULOCYTES # BLD AUTO: 0.03 X10*3/UL (ref 0–0.7)
IMM GRANULOCYTES NFR BLD AUTO: 0.3 % (ref 0–0.9)
LYMPHOCYTES # BLD AUTO: 1.64 X10*3/UL (ref 1.2–4.8)
LYMPHOCYTES NFR BLD AUTO: 17.8 %
MCH RBC QN AUTO: 30.7 PG (ref 26–34)
MCHC RBC AUTO-ENTMCNC: 33.2 G/DL (ref 32–36)
MCV RBC AUTO: 92 FL (ref 80–100)
MONOCYTES # BLD AUTO: 0.93 X10*3/UL (ref 0.1–1)
MONOCYTES NFR BLD AUTO: 10.1 %
NEUTROPHILS # BLD AUTO: 6.46 X10*3/UL (ref 1.2–7.7)
NEUTROPHILS NFR BLD AUTO: 70.4 %
NRBC BLD-RTO: ABNORMAL /100{WBCS}
PLATELET # BLD AUTO: 122 X10*3/UL (ref 150–450)
POTASSIUM SERPL-SCNC: 4.1 MMOL/L (ref 3.5–5.3)
PROT SERPL-MCNC: 6.5 G/DL (ref 6.4–8.2)
RBC # BLD AUTO: 4.01 X10*6/UL (ref 4.5–5.9)
SODIUM SERPL-SCNC: 138 MMOL/L (ref 136–145)
WBC # BLD AUTO: 9.2 X10*3/UL (ref 4.4–11.3)

## 2025-02-04 PROCEDURE — 85025 COMPLETE CBC W/AUTO DIFF WBC: CPT

## 2025-02-04 PROCEDURE — 80053 COMPREHEN METABOLIC PANEL: CPT

## 2025-02-04 PROCEDURE — 36415 COLL VENOUS BLD VENIPUNCTURE: CPT

## 2025-02-05 ENCOUNTER — INFUSION (OUTPATIENT)
Dept: HEMATOLOGY/ONCOLOGY | Facility: CLINIC | Age: 45
End: 2025-02-05
Payer: COMMERCIAL

## 2025-02-05 VITALS
OXYGEN SATURATION: 98 % | RESPIRATION RATE: 18 BRPM | HEART RATE: 92 BPM | WEIGHT: 149.58 LBS | TEMPERATURE: 97.9 F | SYSTOLIC BLOOD PRESSURE: 130 MMHG | BODY MASS INDEX: 22.93 KG/M2 | DIASTOLIC BLOOD PRESSURE: 85 MMHG

## 2025-02-05 DIAGNOSIS — C17.9 ADENOCARCINOMA OF SMALL BOWEL (MULTI): ICD-10-CM

## 2025-02-05 DIAGNOSIS — C17.9 ADENOCARCINOMA OF SMALL BOWEL (MULTI): Primary | ICD-10-CM

## 2025-02-05 PROCEDURE — 96415 CHEMO IV INFUSION ADDL HR: CPT

## 2025-02-05 PROCEDURE — 2500000004 HC RX 250 GENERAL PHARMACY W/ HCPCS (ALT 636 FOR OP/ED): Performed by: INTERNAL MEDICINE

## 2025-02-05 PROCEDURE — 96375 TX/PRO/DX INJ NEW DRUG ADDON: CPT | Mod: INF

## 2025-02-05 PROCEDURE — 96411 CHEMO IV PUSH ADDL DRUG: CPT

## 2025-02-05 PROCEDURE — 96416 CHEMO PROLONG INFUSE W/PUMP: CPT

## 2025-02-05 PROCEDURE — 96413 CHEMO IV INFUSION 1 HR: CPT

## 2025-02-05 PROCEDURE — 2500000001 HC RX 250 WO HCPCS SELF ADMINISTERED DRUGS (ALT 637 FOR MEDICARE OP): Performed by: INTERNAL MEDICINE

## 2025-02-05 PROCEDURE — 2500000004 HC RX 250 GENERAL PHARMACY W/ HCPCS (ALT 636 FOR OP/ED)

## 2025-02-05 RX ORDER — HEPARIN 100 UNIT/ML
500 SYRINGE INTRAVENOUS AS NEEDED
Status: DISCONTINUED | OUTPATIENT
Start: 2025-02-05 | End: 2025-02-05 | Stop reason: HOSPADM

## 2025-02-05 RX ORDER — FAMOTIDINE 10 MG/ML
20 INJECTION INTRAVENOUS ONCE AS NEEDED
Status: DISCONTINUED | OUTPATIENT
Start: 2025-02-05 | End: 2025-02-05 | Stop reason: HOSPADM

## 2025-02-05 RX ORDER — FAMOTIDINE 10 MG/ML
20 INJECTION INTRAVENOUS ONCE
Status: COMPLETED | OUTPATIENT
Start: 2025-02-05 | End: 2025-02-05

## 2025-02-05 RX ORDER — EPINEPHRINE 0.3 MG/.3ML
0.3 INJECTION SUBCUTANEOUS EVERY 5 MIN PRN
Status: DISCONTINUED | OUTPATIENT
Start: 2025-02-05 | End: 2025-02-05 | Stop reason: HOSPADM

## 2025-02-05 RX ORDER — PROCHLORPERAZINE MALEATE 10 MG
10 TABLET ORAL EVERY 6 HOURS PRN
Status: DISCONTINUED | OUTPATIENT
Start: 2025-02-05 | End: 2025-02-05 | Stop reason: HOSPADM

## 2025-02-05 RX ORDER — HEPARIN 100 UNIT/ML
500 SYRINGE INTRAVENOUS AS NEEDED
Status: CANCELLED | OUTPATIENT
Start: 2025-02-05

## 2025-02-05 RX ORDER — DIPHENHYDRAMINE HCL 25 MG
25 CAPSULE ORAL ONCE
Status: CANCELLED | OUTPATIENT
Start: 2025-02-05

## 2025-02-05 RX ORDER — HEPARIN SODIUM,PORCINE/PF 10 UNIT/ML
50 SYRINGE (ML) INTRAVENOUS AS NEEDED
Status: DISCONTINUED | OUTPATIENT
Start: 2025-02-05 | End: 2025-02-05 | Stop reason: HOSPADM

## 2025-02-05 RX ORDER — DIPHENHYDRAMINE HYDROCHLORIDE 50 MG/ML
50 INJECTION INTRAMUSCULAR; INTRAVENOUS AS NEEDED
Status: DISCONTINUED | OUTPATIENT
Start: 2025-02-05 | End: 2025-02-05 | Stop reason: HOSPADM

## 2025-02-05 RX ORDER — DIPHENHYDRAMINE HCL 25 MG
25 CAPSULE ORAL ONCE
Status: COMPLETED | OUTPATIENT
Start: 2025-02-05 | End: 2025-02-05

## 2025-02-05 RX ORDER — ALBUTEROL SULFATE 0.83 MG/ML
3 SOLUTION RESPIRATORY (INHALATION) AS NEEDED
Status: DISCONTINUED | OUTPATIENT
Start: 2025-02-05 | End: 2025-02-05 | Stop reason: HOSPADM

## 2025-02-05 RX ORDER — PROCHLORPERAZINE EDISYLATE 5 MG/ML
10 INJECTION INTRAMUSCULAR; INTRAVENOUS EVERY 6 HOURS PRN
Status: DISCONTINUED | OUTPATIENT
Start: 2025-02-05 | End: 2025-02-05 | Stop reason: HOSPADM

## 2025-02-05 RX ORDER — HEPARIN SODIUM,PORCINE/PF 10 UNIT/ML
50 SYRINGE (ML) INTRAVENOUS AS NEEDED
Status: CANCELLED | OUTPATIENT
Start: 2025-02-05

## 2025-02-05 RX ORDER — LORAZEPAM 2 MG/ML
1 INJECTION INTRAMUSCULAR AS NEEDED
Status: DISCONTINUED | OUTPATIENT
Start: 2025-02-05 | End: 2025-02-05 | Stop reason: HOSPADM

## 2025-02-05 RX ORDER — DIPHENHYDRAMINE HCL 25 MG
25 CAPSULE ORAL ONCE
OUTPATIENT
Start: 2025-02-18

## 2025-02-05 RX ORDER — PALONOSETRON 0.05 MG/ML
0.25 INJECTION, SOLUTION INTRAVENOUS ONCE
Status: COMPLETED | OUTPATIENT
Start: 2025-02-05 | End: 2025-02-05

## 2025-02-05 RX ADMIN — PALONOSETRON 250 MCG: 0.05 INJECTION, SOLUTION INTRAVENOUS at 11:37

## 2025-02-05 RX ADMIN — FLUOROURACIL 3600 MG: 50 INJECTION, SOLUTION INTRAVENOUS at 16:28

## 2025-02-05 RX ADMIN — DIPHENHYDRAMINE HYDROCHLORIDE 25 MG: 25 CAPSULE ORAL at 11:37

## 2025-02-05 RX ADMIN — METHYLPREDNISOLONE SODIUM SUCCINATE 100 MG: 125 INJECTION, POWDER, FOR SOLUTION INTRAMUSCULAR; INTRAVENOUS at 11:37

## 2025-02-05 RX ADMIN — BEVACIZUMAB-AWWB 362.5 MG: 400 INJECTION, SOLUTION INTRAVENOUS at 11:49

## 2025-02-05 RX ADMIN — FAMOTIDINE 20 MG: 10 INJECTION INTRAVENOUS at 11:36

## 2025-02-05 RX ADMIN — OXALIPLATIN 160 MG: 5 INJECTION, SOLUTION INTRAVENOUS at 12:08

## 2025-02-05 ASSESSMENT — PAIN SCALES - GENERAL: PAINLEVEL_OUTOF10: 0-NO PAIN

## 2025-02-05 NOTE — SIGNIFICANT EVENT

## 2025-02-07 ENCOUNTER — INFUSION (OUTPATIENT)
Dept: HEMATOLOGY/ONCOLOGY | Facility: CLINIC | Age: 45
End: 2025-02-07
Payer: COMMERCIAL

## 2025-02-07 VITALS
BODY MASS INDEX: 22.8 KG/M2 | SYSTOLIC BLOOD PRESSURE: 130 MMHG | DIASTOLIC BLOOD PRESSURE: 88 MMHG | WEIGHT: 148.7 LBS | HEART RATE: 91 BPM | OXYGEN SATURATION: 96 % | RESPIRATION RATE: 18 BRPM | TEMPERATURE: 97.5 F

## 2025-02-07 DIAGNOSIS — C17.9 ADENOCARCINOMA OF SMALL BOWEL (MULTI): ICD-10-CM

## 2025-02-07 PROCEDURE — 2500000004 HC RX 250 GENERAL PHARMACY W/ HCPCS (ALT 636 FOR OP/ED): Performed by: INTERNAL MEDICINE

## 2025-02-07 PROCEDURE — 96377 APPLICATON ON-BODY INJECTOR: CPT

## 2025-02-07 PROCEDURE — 2500000004 HC RX 250 GENERAL PHARMACY W/ HCPCS (ALT 636 FOR OP/ED): Mod: JZ | Performed by: INTERNAL MEDICINE

## 2025-02-07 RX ORDER — HEPARIN SODIUM,PORCINE/PF 10 UNIT/ML
50 SYRINGE (ML) INTRAVENOUS AS NEEDED
OUTPATIENT
Start: 2025-02-07

## 2025-02-07 RX ORDER — HEPARIN 100 UNIT/ML
500 SYRINGE INTRAVENOUS AS NEEDED
OUTPATIENT
Start: 2025-02-07

## 2025-02-07 RX ORDER — HEPARIN SODIUM,PORCINE/PF 10 UNIT/ML
50 SYRINGE (ML) INTRAVENOUS AS NEEDED
Status: DISCONTINUED | OUTPATIENT
Start: 2025-02-07 | End: 2025-02-07 | Stop reason: HOSPADM

## 2025-02-07 RX ORDER — HEPARIN 100 UNIT/ML
500 SYRINGE INTRAVENOUS AS NEEDED
Status: DISCONTINUED | OUTPATIENT
Start: 2025-02-07 | End: 2025-02-07 | Stop reason: HOSPADM

## 2025-02-07 RX ADMIN — HEPARIN 500 UNITS: 100 SYRINGE at 14:47

## 2025-02-07 RX ADMIN — PEGFILGRASTIM 6 MG: KIT SUBCUTANEOUS at 14:47

## 2025-02-07 ASSESSMENT — PAIN SCALES - GENERAL: PAINLEVEL_OUTOF10: 0-NO PAIN

## 2025-02-07 NOTE — PROGRESS NOTES
Patient tolerated pump disconnect without any problems. Patient denies any chest pain, shortness of breath, or side effects from treatment. Patient states he is feeling great. Patient knows when to return to the clinic for their next appointment.

## 2025-02-11 ENCOUNTER — PATIENT OUTREACH (OUTPATIENT)
Dept: PRIMARY CARE | Facility: CLINIC | Age: 45
End: 2025-02-11
Payer: COMMERCIAL

## 2025-02-13 ENCOUNTER — HOSPITAL ENCOUNTER (OUTPATIENT)
Dept: RADIOLOGY | Facility: HOSPITAL | Age: 45
Discharge: HOME | End: 2025-02-13
Payer: COMMERCIAL

## 2025-02-13 DIAGNOSIS — C17.9 ADENOCARCINOMA OF SMALL BOWEL (MULTI): ICD-10-CM

## 2025-02-13 PROCEDURE — 2550000001 HC RX 255 CONTRASTS: Performed by: INTERNAL MEDICINE

## 2025-02-13 PROCEDURE — 74177 CT ABD & PELVIS W/CONTRAST: CPT

## 2025-02-13 RX ADMIN — IOHEXOL 75 ML: 350 INJECTION, SOLUTION INTRAVENOUS at 10:34

## 2025-02-18 ENCOUNTER — LAB (OUTPATIENT)
Dept: HEMATOLOGY/ONCOLOGY | Facility: CLINIC | Age: 45
End: 2025-02-18
Payer: COMMERCIAL

## 2025-02-18 ENCOUNTER — OFFICE VISIT (OUTPATIENT)
Dept: HEMATOLOGY/ONCOLOGY | Facility: CLINIC | Age: 45
End: 2025-02-18
Payer: COMMERCIAL

## 2025-02-18 VITALS
BODY MASS INDEX: 22.54 KG/M2 | OXYGEN SATURATION: 98 % | DIASTOLIC BLOOD PRESSURE: 77 MMHG | WEIGHT: 147 LBS | SYSTOLIC BLOOD PRESSURE: 115 MMHG | HEART RATE: 98 BPM | RESPIRATION RATE: 16 BRPM | TEMPERATURE: 98.1 F

## 2025-02-18 DIAGNOSIS — C17.9 ADENOCARCINOMA OF SMALL BOWEL (MULTI): ICD-10-CM

## 2025-02-18 DIAGNOSIS — C17.9 ADENOCARCINOMA OF SMALL BOWEL (MULTI): Primary | ICD-10-CM

## 2025-02-18 LAB
ALBUMIN SERPL BCP-MCNC: 3.8 G/DL (ref 3.4–5)
ALP SERPL-CCNC: 234 U/L (ref 33–120)
ALT SERPL W P-5'-P-CCNC: 16 U/L (ref 10–52)
ANION GAP SERPL CALC-SCNC: 13 MMOL/L (ref 10–20)
APPEARANCE UR: CLEAR
AST SERPL W P-5'-P-CCNC: 22 U/L (ref 9–39)
BASOPHILS # BLD AUTO: 0.01 X10*3/UL (ref 0–0.1)
BASOPHILS NFR BLD AUTO: 0.1 %
BILIRUB SERPL-MCNC: 0.4 MG/DL (ref 0–1.2)
BILIRUB UR STRIP.AUTO-MCNC: NEGATIVE MG/DL
BUN SERPL-MCNC: 13 MG/DL (ref 6–23)
CALCIUM SERPL-MCNC: 9.1 MG/DL (ref 8.6–10.3)
CHLORIDE SERPL-SCNC: 105 MMOL/L (ref 98–107)
CO2 SERPL-SCNC: 25 MMOL/L (ref 21–32)
COLOR UR: YELLOW
CREAT SERPL-MCNC: 0.89 MG/DL (ref 0.5–1.3)
EGFRCR SERPLBLD CKD-EPI 2021: >90 ML/MIN/1.73M*2
EOSINOPHIL # BLD AUTO: 0.1 X10*3/UL (ref 0–0.7)
EOSINOPHIL NFR BLD AUTO: 1.1 %
ERYTHROCYTE [DISTWIDTH] IN BLOOD BY AUTOMATED COUNT: 17.9 % (ref 11.5–14.5)
GLUCOSE SERPL-MCNC: 88 MG/DL (ref 74–99)
GLUCOSE UR STRIP.AUTO-MCNC: NORMAL MG/DL
HCT VFR BLD AUTO: 39.1 % (ref 41–52)
HGB BLD-MCNC: 12.8 G/DL (ref 13.5–17.5)
IMM GRANULOCYTES # BLD AUTO: 0.05 X10*3/UL (ref 0–0.7)
IMM GRANULOCYTES NFR BLD AUTO: 0.6 % (ref 0–0.9)
KETONES UR STRIP.AUTO-MCNC: NEGATIVE MG/DL
LEUKOCYTE ESTERASE UR QL STRIP.AUTO: NEGATIVE
LYMPHOCYTES # BLD AUTO: 1.5 X10*3/UL (ref 1.2–4.8)
LYMPHOCYTES NFR BLD AUTO: 17.2 %
MCH RBC QN AUTO: 31 PG (ref 26–34)
MCHC RBC AUTO-ENTMCNC: 32.7 G/DL (ref 32–36)
MCV RBC AUTO: 95 FL (ref 80–100)
MONOCYTES # BLD AUTO: 0.86 X10*3/UL (ref 0.1–1)
MONOCYTES NFR BLD AUTO: 9.9 %
NEUTROPHILS # BLD AUTO: 6.2 X10*3/UL (ref 1.2–7.7)
NEUTROPHILS NFR BLD AUTO: 71.1 %
NITRITE UR QL STRIP.AUTO: NEGATIVE
NRBC BLD-RTO: ABNORMAL /100{WBCS}
PH UR STRIP.AUTO: 5.5 [PH]
PLATELET # BLD AUTO: 112 X10*3/UL (ref 150–450)
POTASSIUM SERPL-SCNC: 4.2 MMOL/L (ref 3.5–5.3)
PROT SERPL-MCNC: 6.9 G/DL (ref 6.4–8.2)
PROT UR STRIP.AUTO-MCNC: NEGATIVE MG/DL
RBC # BLD AUTO: 4.13 X10*6/UL (ref 4.5–5.9)
RBC # UR STRIP.AUTO: NEGATIVE MG/DL
SODIUM SERPL-SCNC: 139 MMOL/L (ref 136–145)
SP GR UR STRIP.AUTO: 1.02
UROBILINOGEN UR STRIP.AUTO-MCNC: NORMAL MG/DL
WBC # BLD AUTO: 8.7 X10*3/UL (ref 4.4–11.3)

## 2025-02-18 PROCEDURE — 1036F TOBACCO NON-USER: CPT | Performed by: INTERNAL MEDICINE

## 2025-02-18 PROCEDURE — 36415 COLL VENOUS BLD VENIPUNCTURE: CPT

## 2025-02-18 PROCEDURE — 3078F DIAST BP <80 MM HG: CPT | Performed by: INTERNAL MEDICINE

## 2025-02-18 PROCEDURE — 81003 URINALYSIS AUTO W/O SCOPE: CPT

## 2025-02-18 PROCEDURE — 99215 OFFICE O/P EST HI 40 MIN: CPT | Performed by: INTERNAL MEDICINE

## 2025-02-18 PROCEDURE — 80053 COMPREHEN METABOLIC PANEL: CPT

## 2025-02-18 PROCEDURE — 3074F SYST BP LT 130 MM HG: CPT | Performed by: INTERNAL MEDICINE

## 2025-02-18 PROCEDURE — 85025 COMPLETE CBC W/AUTO DIFF WBC: CPT

## 2025-02-18 RX ORDER — FAMOTIDINE 10 MG/ML
20 INJECTION, SOLUTION INTRAVENOUS ONCE
OUTPATIENT
Start: 2025-03-19 | End: 2025-03-19

## 2025-02-18 RX ORDER — PROCHLORPERAZINE EDISYLATE 5 MG/ML
10 INJECTION INTRAMUSCULAR; INTRAVENOUS EVERY 6 HOURS PRN
OUTPATIENT
Start: 2025-04-02

## 2025-02-18 RX ORDER — EPINEPHRINE 0.3 MG/.3ML
0.3 INJECTION SUBCUTANEOUS EVERY 5 MIN PRN
OUTPATIENT
Start: 2025-03-19

## 2025-02-18 RX ORDER — PROCHLORPERAZINE EDISYLATE 5 MG/ML
10 INJECTION INTRAMUSCULAR; INTRAVENOUS EVERY 6 HOURS PRN
OUTPATIENT
Start: 2025-03-05

## 2025-02-18 RX ORDER — PROCHLORPERAZINE EDISYLATE 5 MG/ML
10 INJECTION INTRAMUSCULAR; INTRAVENOUS EVERY 6 HOURS PRN
OUTPATIENT
Start: 2025-03-19

## 2025-02-18 RX ORDER — FAMOTIDINE 10 MG/ML
20 INJECTION, SOLUTION INTRAVENOUS ONCE AS NEEDED
OUTPATIENT
Start: 2025-03-05

## 2025-02-18 RX ORDER — DIPHENHYDRAMINE HYDROCHLORIDE 50 MG/ML
50 INJECTION INTRAMUSCULAR; INTRAVENOUS AS NEEDED
OUTPATIENT
Start: 2025-03-19

## 2025-02-18 RX ORDER — EPINEPHRINE 0.3 MG/.3ML
0.3 INJECTION SUBCUTANEOUS EVERY 5 MIN PRN
OUTPATIENT
Start: 2025-03-05

## 2025-02-18 RX ORDER — FAMOTIDINE 10 MG/ML
20 INJECTION, SOLUTION INTRAVENOUS ONCE
OUTPATIENT
Start: 2025-04-02 | End: 2025-04-02

## 2025-02-18 RX ORDER — ALBUTEROL SULFATE 0.83 MG/ML
3 SOLUTION RESPIRATORY (INHALATION) AS NEEDED
OUTPATIENT
Start: 2025-04-02

## 2025-02-18 RX ORDER — LORAZEPAM 2 MG/ML
1 INJECTION INTRAMUSCULAR AS NEEDED
OUTPATIENT
Start: 2025-03-19

## 2025-02-18 RX ORDER — DIPHENHYDRAMINE HCL 25 MG
25 TABLET ORAL ONCE
OUTPATIENT
Start: 2025-04-02

## 2025-02-18 RX ORDER — DIPHENHYDRAMINE HYDROCHLORIDE 50 MG/ML
50 INJECTION INTRAMUSCULAR; INTRAVENOUS AS NEEDED
OUTPATIENT
Start: 2025-03-05

## 2025-02-18 RX ORDER — EPINEPHRINE 0.3 MG/.3ML
0.3 INJECTION SUBCUTANEOUS EVERY 5 MIN PRN
OUTPATIENT
Start: 2025-04-02

## 2025-02-18 RX ORDER — ALBUTEROL SULFATE 0.83 MG/ML
3 SOLUTION RESPIRATORY (INHALATION) AS NEEDED
OUTPATIENT
Start: 2025-03-05

## 2025-02-18 RX ORDER — PALONOSETRON 0.05 MG/ML
0.25 INJECTION, SOLUTION INTRAVENOUS ONCE
OUTPATIENT
Start: 2025-03-05

## 2025-02-18 RX ORDER — FAMOTIDINE 10 MG/ML
20 INJECTION, SOLUTION INTRAVENOUS ONCE AS NEEDED
OUTPATIENT
Start: 2025-03-19

## 2025-02-18 RX ORDER — LORAZEPAM 2 MG/ML
1 INJECTION INTRAMUSCULAR AS NEEDED
OUTPATIENT
Start: 2025-04-02

## 2025-02-18 RX ORDER — LORAZEPAM 2 MG/ML
1 INJECTION INTRAMUSCULAR AS NEEDED
OUTPATIENT
Start: 2025-03-05

## 2025-02-18 RX ORDER — PROCHLORPERAZINE MALEATE 5 MG
10 TABLET ORAL EVERY 6 HOURS PRN
OUTPATIENT
Start: 2025-03-05

## 2025-02-18 RX ORDER — PALONOSETRON 0.05 MG/ML
0.25 INJECTION, SOLUTION INTRAVENOUS ONCE
OUTPATIENT
Start: 2025-04-02

## 2025-02-18 RX ORDER — ALBUTEROL SULFATE 0.83 MG/ML
3 SOLUTION RESPIRATORY (INHALATION) AS NEEDED
OUTPATIENT
Start: 2025-03-19

## 2025-02-18 RX ORDER — PROCHLORPERAZINE MALEATE 5 MG
10 TABLET ORAL EVERY 6 HOURS PRN
OUTPATIENT
Start: 2025-04-02

## 2025-02-18 RX ORDER — PALONOSETRON 0.05 MG/ML
0.25 INJECTION, SOLUTION INTRAVENOUS ONCE
OUTPATIENT
Start: 2025-03-19

## 2025-02-18 RX ORDER — DIPHENHYDRAMINE HCL 25 MG
25 TABLET ORAL ONCE
OUTPATIENT
Start: 2025-03-05

## 2025-02-18 RX ORDER — FAMOTIDINE 10 MG/ML
20 INJECTION, SOLUTION INTRAVENOUS ONCE
OUTPATIENT
Start: 2025-03-05 | End: 2025-03-05

## 2025-02-18 RX ORDER — FAMOTIDINE 10 MG/ML
20 INJECTION, SOLUTION INTRAVENOUS ONCE AS NEEDED
OUTPATIENT
Start: 2025-04-02

## 2025-02-18 RX ORDER — DIPHENHYDRAMINE HYDROCHLORIDE 50 MG/ML
50 INJECTION INTRAMUSCULAR; INTRAVENOUS AS NEEDED
OUTPATIENT
Start: 2025-04-02

## 2025-02-18 RX ORDER — PROCHLORPERAZINE MALEATE 5 MG
10 TABLET ORAL EVERY 6 HOURS PRN
OUTPATIENT
Start: 2025-03-19

## 2025-02-18 RX ORDER — DIPHENHYDRAMINE HCL 25 MG
25 TABLET ORAL ONCE
OUTPATIENT
Start: 2025-03-19

## 2025-02-18 ASSESSMENT — PAIN SCALES - GENERAL: PAINLEVEL_OUTOF10: 0-NO PAIN

## 2025-02-18 NOTE — PROGRESS NOTES
Patient tolerated blood draw without any problems. Patient denies chest pain or shortness of breath. Patient knows when to return to the clinic for their next appointment.

## 2025-02-18 NOTE — PROGRESS NOTES
.Patient ID: Thai Cristobal is a 44 y.o. male.  Referring Physician: Nba Vargas [place]  [address] [place] [address]  Primary Care Provider: Charlotte Yan DO      Chief complaint: SB Adenocarcinoma     HPI  This is a 44-year-old male with a known history of Crohn's disease patient is status post partial right colectomy small bowel resection on repair of a colonic fistula on 5/28/2024. Pathology he had a neuroendocrine tumor grade 3 well differentiated, with Ki67 30-40%. He did follow-up with oncology at [facility]. He had a PET-Ga68 which showed Postsurgical changes from right hemicolectomy and distal small bowel with no evidence of focal abnormal gallium 68 dotatate uptake.   Patient's only complaint is change in color of stools at this point, he is eating and drinking less and he has no abdominal pain. No diarrhea and no flushing.  Repeated liver biopsy showed: high grade malignancy, likely of colonic origin, showing focal adenocarcinoma-like features and focal neuroendocrine differentiation with Ki67 95%  He started on FOLFOX and avastin and finished 1 cycles and tolerated well. No abdominal pain, no nausea or vomiting. No new symptoms. His labs showed leucopenia and we delayed C2.  He had C2 and repeated labs showed normal WBCs  He finished 8 cycles and tolerated well with GA on restaging scans   Last labs were normal  and he is feeling fine today     SYSTEMIC TREATMENT RECEIVED: None       Subjective   Please refer to Notes above for complete History of present illness.          Review of Systems:   All 14 systems have been reviewed and were negative except for the HPI.       MEDICAL HISTORY  Past Medical History:   Diagnosis Date    Acute upper respiratory infection, unspecified 02/21/2018    Acute URI    Crohn's disease (Multi)     Personal history of other specified conditions 04/23/2018    History of vertigo    SBO (small bowel obstruction) (Multi) 01/25/2024       FAMILY HISTORY  Family History    Problem Relation Name Age of Onset    Thyroid disease Mother      Other (bladder cancer) Mother      Hypertension Father      Benign prostatic hyperplasia Father      Anxiety disorder Sister      No Known Problems Brother      No Known Problems Daughter      Heart failure Paternal Grandfather         TOBACCO HISTORY  Tobacco Use: Low Risk  (2/18/2025)    Patient History     Smoking Tobacco Use: Never     Smokeless Tobacco Use: Never     Passive Exposure: Never       SOCIAL HISTORY  Social Connections: Not on file        Outpatient Medication Profile:  Current Outpatient Medications on File Prior to Visit   Medication Sig Dispense Refill    ALPRAZolam (Xanax) 0.5 mg tablet Take 1 tablet (0.5 mg) by mouth once daily as needed for anxiety. 30 tablet 0    cholecalciferol (Vitamin D-3) 25 MCG (1000 UT) capsule Take 1 capsule (25 mcg) by mouth once daily.      elderberry fruit 350 mg capsule Take 1 capsule by mouth once daily.      flaxseed oiL 1,000 mg capsule Take 1 capsule (1,000 mg) by mouth once daily.      FLUoxetine (PROzac) 20 mg capsule Take 1 capsule (20 mg) by mouth once daily. (Patient taking differently: Take 1 capsule (20 mg) by mouth once daily at bedtime.) 90 capsule 1    FLUoxetine (PROzac) 40 mg capsule TAKE 1 CAPSULE BY MOUTH ONCE DAILY (Patient taking differently: Take 1 capsule (40 mg) by mouth once daily at bedtime.) 90 capsule 1    lisinopril 20 mg tablet Take 1 tablet (20 mg) by mouth once daily. 90 tablet 1    omeprazole OTC (PriLOSEC OTC) 20 mg EC tablet Take 1 tablet (20 mg) by mouth once daily. Do not crush, chew, or split. 30 tablet 2    ondansetron (Zofran) 8 mg tablet Take 1 tablet (8 mg) by mouth every 8 hours if needed for nausea or vomiting. 30 tablet 5    prochlorperazine (Compazine) 10 mg tablet Take 1 tablet (10 mg) by mouth every 6 hours if needed for nausea or vomiting. 30 tablet 5    SUMAtriptan (Imitrex) 50 mg tablet TAKE 1 TABLET (50 MG) BY MOUTH 1 TIME IF NEEDED FOR MIGRAINE  FOR UP TO 36 DOSES. 9 tablet 3    vitamin C-multivitamin-mineral (Emergen-C) 500 mg tablet,chewable Chew 1 tablet once daily.       Current Facility-Administered Medications on File Prior to Visit   Medication Dose Route Frequency Provider Last Rate Last Admin    heparin flush 100 unit/mL syringe 500 Units  500 Units intra-catheter PRN Nba Vargas MD   500 Units at 10/15/24 1019         Performance Status:  Symptomatic; fully ambulatory     Vitals and Measurements:   /77 (BP Location: Left arm, Patient Position: Sitting)   Pulse 98   Temp 36.7 °C (98.1 °F) (Temporal)   Resp 16   Wt 66.7 kg (147 lb)   SpO2 98%   BMI 22.54 kg/m²       Physical Exam:   General: Appears well and in NAD  Eyes: PERRL, EOMI, clear sclera  ENMT: mucous membranes moist, no apparent injury, no lesions seen  Head/Neck: Neck supple, no apparent injury, thyroid without mass or tenderness, No JVD, trachea midline,   Thorax: Patent airways, CTAB, normal breath sounds with good chest expansion, thorax symmetric  Cardiovascular: Regular, rate and rhythm, no murmurs, 2+ equal pulses of the extremities, normal S 1and S 2  Gastrointestinal: Nondistended, soft, non-tender, no rebound tenderness or guarding, no masses palpable, no organomegaly, +BS  Musculoskeletal: ROM intact, no joint swelling, normal strength  Extremities: normal extremities, no cyanosis edema, contusions or wounds, no clubbing  Neurological: alert and oriented x3, intact senses, motor, response and reflexes, normal strength  Lymphatic: No significant lymphadenopathy  Psychological: Appropriate mood and behavior  Skin: Warm and dry, no lesions, no rashes          Lab Results:  I have reviewed these laboratory results:     Lab Results   Component Value Date    WBC 8.7 02/18/2025    HGB 12.8 (L) 02/18/2025    HCT 39.1 (L) 02/18/2025    MCV 95 02/18/2025     (L) 02/18/2025         Chemistry    Lab Results   Component Value Date/Time     02/18/2025 0817    K  4.2 02/18/2025 0817     02/18/2025 0817    CO2 25 02/18/2025 0817    BUN 13 02/18/2025 0817    CREATININE 0.89 02/18/2025 0817    Lab Results   Component Value Date/Time    CALCIUM 9.1 02/18/2025 0817    ALKPHOS 234 (H) 02/18/2025 0817    AST 22 02/18/2025 0817    ALT 16 02/18/2025 0817    BILITOT 0.4 02/18/2025 0817            Lab Results   Component Value Date    TSH 3.17 04/18/2023        Radiology Result:  I have reviewed the latest Imaging in PACS and the findings are noted in this note. I discussed the results of the latest imaging with the patient. All previous imaging were reviewed at the time it was completed. Full records are available in the EMR for review as well.         NM PET CT net netspot    Result Date: 7/5/2024  Impression: 1. Postsurgical changes from right hemicolectomy and distal small bowel with no evidence of focal abnormal gallium 68 dotatate uptake within the region of documented neuroendocrine tumor to suggest residual disease. 2. Nonspecific, gallium 68 dotatate uptake within the pancreatic uncinate which can be seen with physiologic uptake.     I personally reviewed the images/study and I agree with the findings as stated by Resident Celso Ashford MD.   MACRO: None   Signed by: Maco Villafana 7/5/2024 3:06 PM Dictation workstation:   [id number]        Pathology Results:  I have reviewed the full pathology report recorded in the EMR. The pertinent portions indicating diagnosis are listed here in the note. for details please refer to the full report recorded in the EMR.    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 Pathology he had a neuroendocrine tumor grade 3 well differentiated, with Ki67 30-40%. He  did follow-up with oncology at [facility]. He had a PET-Ga68 which showed Postsurgical changes from right hemicolectomy and distal small bowel with no evidence of focal abnormal gallium 68 dotatate uptake. Patient's only complaint is change in color of stools at this point he is eating and drinking and he has no abdominal pain. No diarrhea and no flushing. She had CT AP on 08/09/2024 at outside institution which showed new small liver nodules. 09/04/2024: MRI w E[facility] showed Multiple T1 hypointense and T2 hyperintense lesions throughout the liver, and mesenteric LNS. PET-FDG showed FDG avid mesenteric kylie and liver metastases.   Repeated liver biopsy showed: high grade malignancy, likely of colonic origin, showing focal adenocarcinoma-like features and focal neuroendocrine differentiation with Ki67 95%  He started on FOLFOX and avastin and finished 1 cycles and tolerated well. No abdominal pain, no nausea or vomiting. No new symptoms. His labs showed leucopenia and keep delaying C2.  After C3 restaging scans on 12/06/2024: Showed OH mainly in liver lesions and stable mesenteric LNS  Last labs were normal  and he is feeling fine today   He finished 8 cycles  Restaging scans showed OH    Plan:  1- Will resume C9 FOLFOX plus Bevacizumab with 20% dose reduction for 5FU  2- RTC in 2 week for  C10  3- Restaging imaging after C12       DISCLAIMER:   In preparing for this visit and writing this note, I reviewed all the previous electronic medical records (labs, imaging and medical charts) of the patient available in the physician portal. Significant findings which helped in decision making are recorded  in this chart.     The plan was discussed with the patient. We gave him ample opportunities to ask questions. All questions were answered to his satisfaction and he verbalized understanding.       Nba Vargas M.D.   and Director of the Neuroendocrine Tumor Program  Gastrointestinal [facility]  Oncology  Department of Hematology and Oncology  [facility], [place] [address]  Phone (Office): [phone]  Fax:  [phone]   Email: [email or web address]  Learn more about [facility] Comprehensive Neuroendocrine Tumor Program: Click Here  Learn more about [place] [employer]: [email or web address]

## 2025-02-19 ENCOUNTER — APPOINTMENT (OUTPATIENT)
Dept: HEMATOLOGY/ONCOLOGY | Facility: CLINIC | Age: 45
End: 2025-02-19
Payer: COMMERCIAL

## 2025-02-19 ENCOUNTER — INFUSION (OUTPATIENT)
Dept: HEMATOLOGY/ONCOLOGY | Facility: CLINIC | Age: 45
End: 2025-02-19
Payer: COMMERCIAL

## 2025-02-19 VITALS
OXYGEN SATURATION: 97 % | BODY MASS INDEX: 22.61 KG/M2 | TEMPERATURE: 97 F | HEART RATE: 93 BPM | WEIGHT: 147.49 LBS | DIASTOLIC BLOOD PRESSURE: 72 MMHG | SYSTOLIC BLOOD PRESSURE: 127 MMHG | RESPIRATION RATE: 18 BRPM

## 2025-02-19 DIAGNOSIS — C17.9 ADENOCARCINOMA OF SMALL BOWEL (MULTI): ICD-10-CM

## 2025-02-19 PROCEDURE — 2500000004 HC RX 250 GENERAL PHARMACY W/ HCPCS (ALT 636 FOR OP/ED): Performed by: INTERNAL MEDICINE

## 2025-02-19 PROCEDURE — 96413 CHEMO IV INFUSION 1 HR: CPT

## 2025-02-19 PROCEDURE — 96415 CHEMO IV INFUSION ADDL HR: CPT

## 2025-02-19 PROCEDURE — 2500000001 HC RX 250 WO HCPCS SELF ADMINISTERED DRUGS (ALT 637 FOR MEDICARE OP): Performed by: INTERNAL MEDICINE

## 2025-02-19 PROCEDURE — 2500000004 HC RX 250 GENERAL PHARMACY W/ HCPCS (ALT 636 FOR OP/ED)

## 2025-02-19 PROCEDURE — 96416 CHEMO PROLONG INFUSE W/PUMP: CPT

## 2025-02-19 PROCEDURE — 96411 CHEMO IV PUSH ADDL DRUG: CPT

## 2025-02-19 PROCEDURE — 96375 TX/PRO/DX INJ NEW DRUG ADDON: CPT | Mod: INF

## 2025-02-19 RX ORDER — PROCHLORPERAZINE EDISYLATE 5 MG/ML
10 INJECTION INTRAMUSCULAR; INTRAVENOUS EVERY 6 HOURS PRN
Status: DISCONTINUED | OUTPATIENT
Start: 2025-02-19 | End: 2025-02-19 | Stop reason: HOSPADM

## 2025-02-19 RX ORDER — HEPARIN 100 UNIT/ML
500 SYRINGE INTRAVENOUS AS NEEDED
Status: CANCELLED | OUTPATIENT
Start: 2025-02-19

## 2025-02-19 RX ORDER — ALBUTEROL SULFATE 0.83 MG/ML
3 SOLUTION RESPIRATORY (INHALATION) AS NEEDED
Status: DISCONTINUED | OUTPATIENT
Start: 2025-02-19 | End: 2025-02-19 | Stop reason: HOSPADM

## 2025-02-19 RX ORDER — LORAZEPAM 2 MG/ML
1 INJECTION INTRAMUSCULAR AS NEEDED
Status: DISCONTINUED | OUTPATIENT
Start: 2025-02-19 | End: 2025-02-19 | Stop reason: HOSPADM

## 2025-02-19 RX ORDER — PROCHLORPERAZINE MALEATE 10 MG
10 TABLET ORAL EVERY 6 HOURS PRN
Status: DISCONTINUED | OUTPATIENT
Start: 2025-02-19 | End: 2025-02-19 | Stop reason: HOSPADM

## 2025-02-19 RX ORDER — FAMOTIDINE 10 MG/ML
20 INJECTION, SOLUTION INTRAVENOUS ONCE
Status: COMPLETED | OUTPATIENT
Start: 2025-02-19 | End: 2025-02-19

## 2025-02-19 RX ORDER — EPINEPHRINE 0.3 MG/.3ML
0.3 INJECTION SUBCUTANEOUS EVERY 5 MIN PRN
Status: DISCONTINUED | OUTPATIENT
Start: 2025-02-19 | End: 2025-02-19 | Stop reason: HOSPADM

## 2025-02-19 RX ORDER — FAMOTIDINE 10 MG/ML
20 INJECTION, SOLUTION INTRAVENOUS ONCE AS NEEDED
Status: DISCONTINUED | OUTPATIENT
Start: 2025-02-19 | End: 2025-02-19 | Stop reason: HOSPADM

## 2025-02-19 RX ORDER — PALONOSETRON 0.05 MG/ML
0.25 INJECTION, SOLUTION INTRAVENOUS ONCE
Status: COMPLETED | OUTPATIENT
Start: 2025-02-19 | End: 2025-02-19

## 2025-02-19 RX ORDER — HEPARIN SODIUM,PORCINE/PF 10 UNIT/ML
50 SYRINGE (ML) INTRAVENOUS AS NEEDED
Status: CANCELLED | OUTPATIENT
Start: 2025-02-19

## 2025-02-19 RX ORDER — DIPHENHYDRAMINE HYDROCHLORIDE 50 MG/ML
50 INJECTION INTRAMUSCULAR; INTRAVENOUS AS NEEDED
Status: DISCONTINUED | OUTPATIENT
Start: 2025-02-19 | End: 2025-02-19 | Stop reason: HOSPADM

## 2025-02-19 RX ORDER — DIPHENHYDRAMINE HCL 25 MG
25 CAPSULE ORAL ONCE
Status: COMPLETED | OUTPATIENT
Start: 2025-02-19 | End: 2025-02-19

## 2025-02-19 RX ADMIN — OXALIPLATIN 155 MG: 5 INJECTION, SOLUTION INTRAVENOUS at 09:40

## 2025-02-19 RX ADMIN — DIPHENHYDRAMINE HYDROCHLORIDE 25 MG: 25 CAPSULE ORAL at 08:56

## 2025-02-19 RX ADMIN — METHYLPREDNISOLONE SODIUM SUCCINATE 100 MG: 125 INJECTION, POWDER, FOR SOLUTION INTRAMUSCULAR; INTRAVENOUS at 08:57

## 2025-02-19 RX ADMIN — FAMOTIDINE 20 MG: 10 INJECTION INTRAVENOUS at 08:56

## 2025-02-19 RX ADMIN — BEVACIZUMAB-AWWB 337.5 MG: 400 INJECTION, SOLUTION INTRAVENOUS at 09:15

## 2025-02-19 RX ADMIN — FLUOROURACIL 3450 MG: 50 INJECTION, SOLUTION INTRAVENOUS at 14:54

## 2025-02-19 RX ADMIN — PALONOSETRON 250 MCG: 0.05 INJECTION, SOLUTION INTRAVENOUS at 08:57

## 2025-02-19 ASSESSMENT — PAIN SCALES - GENERAL: PAINLEVEL_OUTOF10: 0-NO PAIN

## 2025-02-19 NOTE — SIGNIFICANT EVENT

## 2025-02-20 ENCOUNTER — APPOINTMENT (OUTPATIENT)
Dept: HEMATOLOGY/ONCOLOGY | Facility: CLINIC | Age: 45
End: 2025-02-20
Payer: COMMERCIAL

## 2025-02-21 ENCOUNTER — INFUSION (OUTPATIENT)
Dept: HEMATOLOGY/ONCOLOGY | Facility: CLINIC | Age: 45
End: 2025-02-21
Payer: COMMERCIAL

## 2025-02-21 VITALS
RESPIRATION RATE: 16 BRPM | DIASTOLIC BLOOD PRESSURE: 91 MMHG | OXYGEN SATURATION: 97 % | HEART RATE: 90 BPM | TEMPERATURE: 96.6 F | SYSTOLIC BLOOD PRESSURE: 127 MMHG | BODY MASS INDEX: 22.28 KG/M2 | WEIGHT: 145.28 LBS

## 2025-02-21 DIAGNOSIS — C17.9 ADENOCARCINOMA OF SMALL BOWEL (MULTI): ICD-10-CM

## 2025-02-21 PROCEDURE — 96377 APPLICATON ON-BODY INJECTOR: CPT

## 2025-02-21 PROCEDURE — 2500000004 HC RX 250 GENERAL PHARMACY W/ HCPCS (ALT 636 FOR OP/ED): Mod: JZ | Performed by: INTERNAL MEDICINE

## 2025-02-21 PROCEDURE — 2500000004 HC RX 250 GENERAL PHARMACY W/ HCPCS (ALT 636 FOR OP/ED): Performed by: INTERNAL MEDICINE

## 2025-02-21 RX ORDER — HEPARIN SODIUM,PORCINE/PF 10 UNIT/ML
50 SYRINGE (ML) INTRAVENOUS AS NEEDED
Status: DISCONTINUED | OUTPATIENT
Start: 2025-02-21 | End: 2025-02-21 | Stop reason: HOSPADM

## 2025-02-21 RX ORDER — HEPARIN 100 UNIT/ML
500 SYRINGE INTRAVENOUS AS NEEDED
OUTPATIENT
Start: 2025-02-21

## 2025-02-21 RX ORDER — HEPARIN SODIUM,PORCINE/PF 10 UNIT/ML
50 SYRINGE (ML) INTRAVENOUS AS NEEDED
OUTPATIENT
Start: 2025-02-21

## 2025-02-21 RX ORDER — HEPARIN 100 UNIT/ML
500 SYRINGE INTRAVENOUS AS NEEDED
Status: DISCONTINUED | OUTPATIENT
Start: 2025-02-21 | End: 2025-02-21 | Stop reason: HOSPADM

## 2025-02-21 RX ADMIN — PEGFILGRASTIM 6 MG: KIT SUBCUTANEOUS at 13:22

## 2025-02-21 RX ADMIN — HEPARIN 500 UNITS: 100 SYRINGE at 13:22

## 2025-02-21 ASSESSMENT — PAIN SCALES - GENERAL: PAINLEVEL_OUTOF10: 0-NO PAIN

## 2025-02-27 ENCOUNTER — TELEPHONE (OUTPATIENT)
Dept: HEMATOLOGY/ONCOLOGY | Facility: HOSPITAL | Age: 45
End: 2025-02-27

## 2025-02-27 ENCOUNTER — TELEPHONE (OUTPATIENT)
Dept: HEMATOLOGY/ONCOLOGY | Facility: CLINIC | Age: 45
End: 2025-02-27
Payer: COMMERCIAL

## 2025-02-27 NOTE — TELEPHONE ENCOUNTER
Called patient to reschedule an appointment. Due to the closing of clinic on 4/1, we needed to ask patient reschedule the follow up visit on 4/1 with Dr. Vargas to 4/1 virtual with Bertha Long in the afternoon?    If patient is agreeable, this schedule change is already requested. If patient refuses this change, follow-up and treatment can be delayed to the second week in April.

## 2025-03-04 ENCOUNTER — APPOINTMENT (OUTPATIENT)
Dept: HEMATOLOGY/ONCOLOGY | Facility: CLINIC | Age: 45
End: 2025-03-04
Payer: COMMERCIAL

## 2025-03-04 ENCOUNTER — LAB (OUTPATIENT)
Dept: HEMATOLOGY/ONCOLOGY | Facility: CLINIC | Age: 45
End: 2025-03-04
Payer: COMMERCIAL

## 2025-03-04 ENCOUNTER — TELEPHONE (OUTPATIENT)
Dept: HEMATOLOGY/ONCOLOGY | Facility: HOSPITAL | Age: 45
End: 2025-03-04

## 2025-03-04 ENCOUNTER — OFFICE VISIT (OUTPATIENT)
Dept: HEMATOLOGY/ONCOLOGY | Facility: CLINIC | Age: 45
End: 2025-03-04
Payer: COMMERCIAL

## 2025-03-04 DIAGNOSIS — C17.9 ADENOCARCINOMA OF SMALL BOWEL (MULTI): ICD-10-CM

## 2025-03-04 DIAGNOSIS — C17.9 ADENOCARCINOMA OF SMALL BOWEL (MULTI): Primary | ICD-10-CM

## 2025-03-04 LAB
ALBUMIN SERPL BCP-MCNC: 4 G/DL (ref 3.4–5)
ALP SERPL-CCNC: 252 U/L (ref 33–120)
ALT SERPL W P-5'-P-CCNC: 22 U/L (ref 10–52)
ANION GAP SERPL CALC-SCNC: 13 MMOL/L (ref 10–20)
AST SERPL W P-5'-P-CCNC: 29 U/L (ref 9–39)
BASOPHILS # BLD AUTO: 0.01 X10*3/UL (ref 0–0.1)
BASOPHILS NFR BLD AUTO: 0.1 %
BILIRUB SERPL-MCNC: 0.5 MG/DL (ref 0–1.2)
BUN SERPL-MCNC: 12 MG/DL (ref 6–23)
CALCIUM SERPL-MCNC: 9.3 MG/DL (ref 8.6–10.3)
CHLORIDE SERPL-SCNC: 102 MMOL/L (ref 98–107)
CO2 SERPL-SCNC: 27 MMOL/L (ref 21–32)
CREAT SERPL-MCNC: 0.97 MG/DL (ref 0.5–1.3)
EGFRCR SERPLBLD CKD-EPI 2021: >90 ML/MIN/1.73M*2
EOSINOPHIL # BLD AUTO: 0.08 X10*3/UL (ref 0–0.7)
EOSINOPHIL NFR BLD AUTO: 0.9 %
ERYTHROCYTE [DISTWIDTH] IN BLOOD BY AUTOMATED COUNT: 18.4 % (ref 11.5–14.5)
GLUCOSE SERPL-MCNC: 118 MG/DL (ref 74–99)
HCT VFR BLD AUTO: 37.5 % (ref 41–52)
HGB BLD-MCNC: 12.4 G/DL (ref 13.5–17.5)
IMM GRANULOCYTES # BLD AUTO: 0.09 X10*3/UL (ref 0–0.7)
IMM GRANULOCYTES NFR BLD AUTO: 1 % (ref 0–0.9)
LYMPHOCYTES # BLD AUTO: 1.19 X10*3/UL (ref 1.2–4.8)
LYMPHOCYTES NFR BLD AUTO: 12.9 %
MCH RBC QN AUTO: 32 PG (ref 26–34)
MCHC RBC AUTO-ENTMCNC: 33.1 G/DL (ref 32–36)
MCV RBC AUTO: 97 FL (ref 80–100)
MONOCYTES # BLD AUTO: 0.6 X10*3/UL (ref 0.1–1)
MONOCYTES NFR BLD AUTO: 6.5 %
NEUTROPHILS # BLD AUTO: 7.23 X10*3/UL (ref 1.2–7.7)
NEUTROPHILS NFR BLD AUTO: 78.6 %
NRBC BLD-RTO: ABNORMAL /100{WBCS}
PLATELET # BLD AUTO: 84 X10*3/UL (ref 150–450)
POTASSIUM SERPL-SCNC: 4.6 MMOL/L (ref 3.5–5.3)
PROT SERPL-MCNC: 6.9 G/DL (ref 6.4–8.2)
RBC # BLD AUTO: 3.88 X10*6/UL (ref 4.5–5.9)
SODIUM SERPL-SCNC: 137 MMOL/L (ref 136–145)
WBC # BLD AUTO: 9.2 X10*3/UL (ref 4.4–11.3)

## 2025-03-04 PROCEDURE — 85025 COMPLETE CBC W/AUTO DIFF WBC: CPT

## 2025-03-04 PROCEDURE — 99215 OFFICE O/P EST HI 40 MIN: CPT | Performed by: INTERNAL MEDICINE

## 2025-03-04 PROCEDURE — 36415 COLL VENOUS BLD VENIPUNCTURE: CPT

## 2025-03-04 PROCEDURE — 84075 ASSAY ALKALINE PHOSPHATASE: CPT

## 2025-03-04 RX ORDER — HEPARIN 100 UNIT/ML
500 SYRINGE INTRAVENOUS AS NEEDED
OUTPATIENT
Start: 2025-03-04

## 2025-03-04 RX ORDER — HEPARIN SODIUM,PORCINE/PF 10 UNIT/ML
50 SYRINGE (ML) INTRAVENOUS AS NEEDED
OUTPATIENT
Start: 2025-03-04

## 2025-03-04 RX ORDER — HEPARIN 100 UNIT/ML
500 SYRINGE INTRAVENOUS AS NEEDED
Status: DISCONTINUED | OUTPATIENT
Start: 2025-03-04 | End: 2025-03-04 | Stop reason: HOSPADM

## 2025-03-04 NOTE — PROGRESS NOTES
.Patient ID: Thai Cristobal is a 44 y.o. male.  Referring Physician: Nba Vargas [place]  [id number] [place] [address]  Primary Care Provider: Charlotte Yan DO      Chief complaint: SB Adenocarcinoma     HPI  This is a 44-year-old male with a known history of Crohn's disease patient is status post partial right colectomy small bowel resection on repair of a colonic fistula on 5/28/2024. Pathology he had a neuroendocrine tumor grade 3 well differentiated, with Ki67 30-40%. He did follow-up with oncology at [facility]. He had a PET-Ga68 which showed Postsurgical changes from right hemicolectomy and distal small bowel with no evidence of focal abnormal gallium 68 dotatate uptake.   Patient's only complaint is change in color of stools at this point, he is eating and drinking less and he has no abdominal pain. No diarrhea and no flushing.  Repeated liver biopsy showed: high grade malignancy, likely of colonic origin, showing focal adenocarcinoma-like features and focal neuroendocrine differentiation with Ki67 95%  He started on FOLFOX and avastin and finished 1 cycles and tolerated well. No abdominal pain, no nausea or vomiting. No new symptoms. His labs showed leucopenia and we delayed C2.  He had C2 and repeated labs showed normal WBCs  He finished 8 cycles and tolerated well with MS on restaging scans       SYSTEMIC TREATMENT RECEIVED: None       Subjective   Please refer to Notes above for complete History of present illness.          Review of Systems:   All 14 systems have been reviewed and were negative except for the HPI.       MEDICAL HISTORY  Past Medical History:   Diagnosis Date    Acute upper respiratory infection, unspecified 02/21/2018    Acute URI    Crohn's disease (Multi)     Personal history of other specified conditions 04/23/2018    History of vertigo    SBO (small bowel obstruction) (Multi) 01/25/2024       FAMILY HISTORY  Family History   Problem Relation Name Age of Onset    Thyroid  disease Mother      Other (bladder cancer) Mother      Hypertension Father      Benign prostatic hyperplasia Father      Anxiety disorder Sister      No Known Problems Brother      No Known Problems Daughter      Heart failure Paternal Grandfather         TOBACCO HISTORY  Tobacco Use: Low Risk  (2/18/2025)    Patient History     Smoking Tobacco Use: Never     Smokeless Tobacco Use: Never     Passive Exposure: Never       SOCIAL HISTORY  Social Connections: Not on file        Outpatient Medication Profile:  Current Outpatient Medications on File Prior to Visit   Medication Sig Dispense Refill    ALPRAZolam (Xanax) 0.5 mg tablet Take 1 tablet (0.5 mg) by mouth once daily as needed for anxiety. 30 tablet 0    cholecalciferol (Vitamin D-3) 25 MCG (1000 UT) capsule Take 1 capsule (25 mcg) by mouth once daily.      elderberry fruit 350 mg capsule Take 1 capsule by mouth once daily.      flaxseed oiL 1,000 mg capsule Take 1 capsule (1,000 mg) by mouth once daily.      FLUoxetine (PROzac) 20 mg capsule Take 1 capsule (20 mg) by mouth once daily. (Patient taking differently: Take 1 capsule (20 mg) by mouth once daily at bedtime.) 90 capsule 1    FLUoxetine (PROzac) 40 mg capsule TAKE 1 CAPSULE BY MOUTH ONCE DAILY (Patient taking differently: Take 1 capsule (40 mg) by mouth once daily at bedtime.) 90 capsule 1    lisinopril 20 mg tablet Take 1 tablet (20 mg) by mouth once daily. 90 tablet 1    omeprazole OTC (PriLOSEC OTC) 20 mg EC tablet Take 1 tablet (20 mg) by mouth once daily. Do not crush, chew, or split. 30 tablet 2    ondansetron (Zofran) 8 mg tablet Take 1 tablet (8 mg) by mouth every 8 hours if needed for nausea or vomiting. 30 tablet 5    prochlorperazine (Compazine) 10 mg tablet Take 1 tablet (10 mg) by mouth every 6 hours if needed for nausea or vomiting. 30 tablet 5    SUMAtriptan (Imitrex) 50 mg tablet TAKE 1 TABLET (50 MG) BY MOUTH 1 TIME IF NEEDED FOR MIGRAINE FOR UP TO 36 DOSES. 9 tablet 3    vitamin  C-multivitamin-mineral (Emergen-C) 500 mg tablet,chewable Chew 1 tablet once daily.       Current Facility-Administered Medications on File Prior to Visit   Medication Dose Route Frequency Provider Last Rate Last Admin    heparin flush 100 unit/mL syringe 500 Units  500 Units intra-catheter PRN Nba Vargas MD   500 Units at 10/15/24 1019         Performance Status:  Symptomatic; fully ambulatory     Vitals and Measurements:   There were no vitals taken for this visit.      Physical Exam:   General: Appears well and in NAD  Eyes: PERRL, EOMI, clear sclera  ENMT: mucous membranes moist, no apparent injury, no lesions seen  Head/Neck: Neck supple, no apparent injury, thyroid without mass or tenderness, No JVD, trachea midline,   Thorax: Patent airways, CTAB, normal breath sounds with good chest expansion, thorax symmetric  Cardiovascular: Regular, rate and rhythm, no murmurs, 2+ equal pulses of the extremities, normal S 1and S 2  Gastrointestinal: Nondistended, soft, non-tender, no rebound tenderness or guarding, no masses palpable, no organomegaly, +BS  Musculoskeletal: ROM intact, no joint swelling, normal strength  Extremities: normal extremities, no cyanosis edema, contusions or wounds, no clubbing  Neurological: alert and oriented x3, intact senses, motor, response and reflexes, normal strength  Lymphatic: No significant lymphadenopathy  Psychological: Appropriate mood and behavior  Skin: Warm and dry, no lesions, no rashes          Lab Results:  I have reviewed these laboratory results:     Lab Results   Component Value Date    WBC 9.2 03/04/2025    HGB 12.4 (L) 03/04/2025    HCT 37.5 (L) 03/04/2025    MCV 97 03/04/2025    PLT 84 (L) 03/04/2025         Chemistry    Lab Results   Component Value Date/Time     02/18/2025 0817    K 4.2 02/18/2025 0817     02/18/2025 0817    CO2 25 02/18/2025 0817    BUN 13 02/18/2025 0817    CREATININE 0.89 02/18/2025 0817    Lab Results   Component Value Date/Time     CALCIUM 9.1 02/18/2025 0817    ALKPHOS 234 (H) 02/18/2025 0817    AST 22 02/18/2025 0817    ALT 16 02/18/2025 0817    BILITOT 0.4 02/18/2025 0817            Lab Results   Component Value Date    TSH 3.17 04/18/2023        Radiology Result:  I have reviewed the latest Imaging in PACS and the findings are noted in this note. I discussed the results of the latest imaging with the patient. All previous imaging were reviewed at the time it was completed. Full records are available in the EMR for review as well.         NM PET CT net netspot    Result Date: 7/5/2024  Impression: 1. Postsurgical changes from right hemicolectomy and distal small bowel with no evidence of focal abnormal gallium 68 dotatate uptake within the region of documented neuroendocrine tumor to suggest residual disease. 2. Nonspecific, gallium 68 dotatate uptake within the pancreatic uncinate which can be seen with physiologic uptake.     I personally reviewed the images/study and I agree with the findings as stated by Resident Celso Ashford MD.   MACRO: None   Signed by: Maco Villafana 7/5/2024 3:06 PM Dictation workstation:   [id number]        Pathology Results:  I have reviewed the full pathology report recorded in the EMR. The pertinent portions indicating diagnosis are listed here in the note. for details please refer to the full report recorded in the EMR.    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 Pathology he had a neuroendocrine tumor grade 3 well differentiated, with Ki67 30-40%. He did follow-up with oncology at [facility]. He had a PET-Ga68 which showed Postsurgical changes from right hemicolectomy and distal small bowel with no evidence of focal abnormal  gallium 68 dotatate uptake. Patient's only complaint is change in color of stools at this point he is eating and drinking and he has no abdominal pain. No diarrhea and no flushing. She had CT AP on 08/09/2024 at outside institution which showed new small liver nodules. 09/04/2024: MRI w Eovist showed Multiple T1 hypointense and T2 hyperintense lesions throughout the liver, and mesenteric LNS. PET-FDG showed FDG avid mesenteric kylie and liver metastases.   Repeated liver biopsy showed: high grade malignancy, likely of colonic origin, showing focal adenocarcinoma-like features and focal neuroendocrine differentiation with Ki67 95%  He started on FOLFOX and avastin and finished 1 cycles and tolerated well. No abdominal pain, no nausea or vomiting. No new symptoms. His labs showed leucopenia and keep delaying C2.  After C3 restaging scans on 12/06/2024: Showed SD mainly in liver lesions and stable mesenteric LNS  Last labs were normal  and he is feeling fine today   He finished 9 cycles  Restaging scans after C8 showed SD  Labs showed thrombocytopenia today     Plan:  1- Will delay C10 FOLFOX plus Bevacizumab with 20% dose reduction for 5FU due to thrombocytopenia   2- RTC in 1 week for  C10  3- Restaging imaging after C12       DISCLAIMER:   In preparing for this visit and writing this note, I reviewed all the previous electronic medical records (labs, imaging and medical charts) of the patient available in the physician portal. Significant findings which helped in decision making are recorded  in this chart.     The plan was discussed with the patient. We gave him ample opportunities to ask questions. All questions were answered to his satisfaction and he verbalized understanding.       Nba Vargas M.D.   and Director of the Neuroendocrine Tumor Program  Gastrointestinal Medical Oncology  Department of Hematology and Oncology  [facility], [facility] [place] [address]  Phone (Office): [phone]  Fax:  [phone]   Email: [email or web address]  Learn more about [facility] Comprehensive Neuroendocrine Tumor Program: Click Here  Learn more about [place] [employer]: [email or web address]

## 2025-03-05 ENCOUNTER — APPOINTMENT (OUTPATIENT)
Dept: HEMATOLOGY/ONCOLOGY | Facility: CLINIC | Age: 45
End: 2025-03-05
Payer: COMMERCIAL

## 2025-03-06 ENCOUNTER — APPOINTMENT (OUTPATIENT)
Dept: HEMATOLOGY/ONCOLOGY | Facility: CLINIC | Age: 45
End: 2025-03-06
Payer: COMMERCIAL

## 2025-03-07 ENCOUNTER — APPOINTMENT (OUTPATIENT)
Dept: HEMATOLOGY/ONCOLOGY | Facility: CLINIC | Age: 45
End: 2025-03-07
Payer: COMMERCIAL

## 2025-03-10 DIAGNOSIS — F41.9 ANXIETY: ICD-10-CM

## 2025-03-10 DIAGNOSIS — C17.9 ADENOCARCINOMA OF SMALL BOWEL (MULTI): ICD-10-CM

## 2025-03-10 RX ORDER — ALPRAZOLAM 0.5 MG/1
0.5 TABLET ORAL DAILY PRN
Qty: 30 TABLET | Refills: 0 | Status: SHIPPED | OUTPATIENT
Start: 2025-03-10

## 2025-03-12 ENCOUNTER — INFUSION (OUTPATIENT)
Dept: HEMATOLOGY/ONCOLOGY | Facility: CLINIC | Age: 45
End: 2025-03-12
Payer: COMMERCIAL

## 2025-03-12 VITALS
SYSTOLIC BLOOD PRESSURE: 117 MMHG | TEMPERATURE: 98.1 F | BODY MASS INDEX: 22.38 KG/M2 | OXYGEN SATURATION: 97 % | WEIGHT: 145.94 LBS | DIASTOLIC BLOOD PRESSURE: 83 MMHG | HEART RATE: 103 BPM | RESPIRATION RATE: 18 BRPM

## 2025-03-12 DIAGNOSIS — D3A.8 NEUROENDOCRINE TUMOR: Primary | ICD-10-CM

## 2025-03-12 DIAGNOSIS — C17.9 ADENOCARCINOMA OF SMALL BOWEL (MULTI): ICD-10-CM

## 2025-03-12 LAB
ALBUMIN SERPL BCP-MCNC: 4 G/DL (ref 3.4–5)
ALP SERPL-CCNC: 150 U/L (ref 33–120)
ALT SERPL W P-5'-P-CCNC: 18 U/L (ref 10–52)
ANION GAP SERPL CALC-SCNC: 10 MMOL/L (ref 10–20)
AST SERPL W P-5'-P-CCNC: 27 U/L (ref 9–39)
BASOPHILS # BLD AUTO: 0 X10*3/UL (ref 0–0.1)
BASOPHILS NFR BLD AUTO: 0 %
BILIRUB SERPL-MCNC: 0.5 MG/DL (ref 0–1.2)
BUN SERPL-MCNC: 12 MG/DL (ref 6–23)
CALCIUM SERPL-MCNC: 9.3 MG/DL (ref 8.6–10.3)
CHLORIDE SERPL-SCNC: 102 MMOL/L (ref 98–107)
CO2 SERPL-SCNC: 28 MMOL/L (ref 21–32)
CREAT SERPL-MCNC: 0.85 MG/DL (ref 0.5–1.3)
EGFRCR SERPLBLD CKD-EPI 2021: >90 ML/MIN/1.73M*2
EOSINOPHIL # BLD AUTO: 0.12 X10*3/UL (ref 0–0.7)
EOSINOPHIL NFR BLD AUTO: 1.7 %
ERYTHROCYTE [DISTWIDTH] IN BLOOD BY AUTOMATED COUNT: 18.8 % (ref 11.5–14.5)
GLUCOSE SERPL-MCNC: 115 MG/DL (ref 74–99)
HCT VFR BLD AUTO: 37.4 % (ref 41–52)
HGB BLD-MCNC: 12.1 G/DL (ref 13.5–17.5)
IMM GRANULOCYTES # BLD AUTO: 0.04 X10*3/UL (ref 0–0.7)
IMM GRANULOCYTES NFR BLD AUTO: 0.6 % (ref 0–0.9)
LYMPHOCYTES # BLD AUTO: 1.12 X10*3/UL (ref 1.2–4.8)
LYMPHOCYTES NFR BLD AUTO: 16 %
MCH RBC QN AUTO: 31.8 PG (ref 26–34)
MCHC RBC AUTO-ENTMCNC: 32.4 G/DL (ref 32–36)
MCV RBC AUTO: 98 FL (ref 80–100)
MONOCYTES # BLD AUTO: 0.6 X10*3/UL (ref 0.1–1)
MONOCYTES NFR BLD AUTO: 8.6 %
NEUTROPHILS # BLD AUTO: 5.11 X10*3/UL (ref 1.2–7.7)
NEUTROPHILS NFR BLD AUTO: 73.1 %
NRBC BLD-RTO: ABNORMAL /100{WBCS}
PLATELET # BLD AUTO: 120 X10*3/UL (ref 150–450)
POTASSIUM SERPL-SCNC: 4.3 MMOL/L (ref 3.5–5.3)
PROT SERPL-MCNC: 7.1 G/DL (ref 6.4–8.2)
RBC # BLD AUTO: 3.8 X10*6/UL (ref 4.5–5.9)
SODIUM SERPL-SCNC: 136 MMOL/L (ref 136–145)
WBC # BLD AUTO: 7 X10*3/UL (ref 4.4–11.3)

## 2025-03-12 PROCEDURE — 80053 COMPREHEN METABOLIC PANEL: CPT

## 2025-03-12 PROCEDURE — 2500000001 HC RX 250 WO HCPCS SELF ADMINISTERED DRUGS (ALT 637 FOR MEDICARE OP): Performed by: INTERNAL MEDICINE

## 2025-03-12 PROCEDURE — 96413 CHEMO IV INFUSION 1 HR: CPT

## 2025-03-12 PROCEDURE — 96375 TX/PRO/DX INJ NEW DRUG ADDON: CPT | Mod: INF

## 2025-03-12 PROCEDURE — 2500000004 HC RX 250 GENERAL PHARMACY W/ HCPCS (ALT 636 FOR OP/ED): Performed by: INTERNAL MEDICINE

## 2025-03-12 PROCEDURE — 96376 TX/PRO/DX INJ SAME DRUG ADON: CPT

## 2025-03-12 PROCEDURE — 96411 CHEMO IV PUSH ADDL DRUG: CPT

## 2025-03-12 PROCEDURE — 96416 CHEMO PROLONG INFUSE W/PUMP: CPT

## 2025-03-12 PROCEDURE — 96415 CHEMO IV INFUSION ADDL HR: CPT

## 2025-03-12 PROCEDURE — 85025 COMPLETE CBC W/AUTO DIFF WBC: CPT

## 2025-03-12 PROCEDURE — 2500000004 HC RX 250 GENERAL PHARMACY W/ HCPCS (ALT 636 FOR OP/ED): Mod: JW,TB | Performed by: INTERNAL MEDICINE

## 2025-03-12 RX ORDER — EPINEPHRINE 0.3 MG/.3ML
0.3 INJECTION SUBCUTANEOUS EVERY 5 MIN PRN
Status: DISCONTINUED | OUTPATIENT
Start: 2025-03-12 | End: 2025-03-12 | Stop reason: HOSPADM

## 2025-03-12 RX ORDER — PROCHLORPERAZINE EDISYLATE 5 MG/ML
10 INJECTION INTRAMUSCULAR; INTRAVENOUS EVERY 6 HOURS PRN
Status: DISCONTINUED | OUTPATIENT
Start: 2025-03-12 | End: 2025-03-12 | Stop reason: HOSPADM

## 2025-03-12 RX ORDER — PALONOSETRON 0.05 MG/ML
0.25 INJECTION, SOLUTION INTRAVENOUS ONCE
Status: COMPLETED | OUTPATIENT
Start: 2025-03-12 | End: 2025-03-12

## 2025-03-12 RX ORDER — LORAZEPAM 2 MG/ML
1 INJECTION INTRAMUSCULAR AS NEEDED
Status: DISCONTINUED | OUTPATIENT
Start: 2025-03-12 | End: 2025-03-12 | Stop reason: HOSPADM

## 2025-03-12 RX ORDER — DIPHENHYDRAMINE HCL 25 MG
25 CAPSULE ORAL ONCE
Status: COMPLETED | OUTPATIENT
Start: 2025-03-12 | End: 2025-03-12

## 2025-03-12 RX ORDER — HEPARIN SODIUM,PORCINE/PF 10 UNIT/ML
50 SYRINGE (ML) INTRAVENOUS AS NEEDED
Status: DISCONTINUED | OUTPATIENT
Start: 2025-03-12 | End: 2025-03-12 | Stop reason: HOSPADM

## 2025-03-12 RX ORDER — HEPARIN 100 UNIT/ML
500 SYRINGE INTRAVENOUS AS NEEDED
Status: CANCELLED | OUTPATIENT
Start: 2025-03-12

## 2025-03-12 RX ORDER — HEPARIN SODIUM,PORCINE/PF 10 UNIT/ML
50 SYRINGE (ML) INTRAVENOUS AS NEEDED
Status: CANCELLED | OUTPATIENT
Start: 2025-03-12

## 2025-03-12 RX ORDER — FAMOTIDINE 10 MG/ML
20 INJECTION, SOLUTION INTRAVENOUS ONCE AS NEEDED
Status: COMPLETED | OUTPATIENT
Start: 2025-03-12 | End: 2025-03-12

## 2025-03-12 RX ORDER — ALBUTEROL SULFATE 0.83 MG/ML
3 SOLUTION RESPIRATORY (INHALATION) AS NEEDED
Status: DISCONTINUED | OUTPATIENT
Start: 2025-03-12 | End: 2025-03-12 | Stop reason: HOSPADM

## 2025-03-12 RX ORDER — HEPARIN 100 UNIT/ML
500 SYRINGE INTRAVENOUS AS NEEDED
Status: DISCONTINUED | OUTPATIENT
Start: 2025-03-12 | End: 2025-03-12 | Stop reason: HOSPADM

## 2025-03-12 RX ORDER — FAMOTIDINE 10 MG/ML
20 INJECTION, SOLUTION INTRAVENOUS ONCE
Status: COMPLETED | OUTPATIENT
Start: 2025-03-12 | End: 2025-03-12

## 2025-03-12 RX ORDER — PROCHLORPERAZINE MALEATE 10 MG
10 TABLET ORAL EVERY 6 HOURS PRN
Status: DISCONTINUED | OUTPATIENT
Start: 2025-03-12 | End: 2025-03-12 | Stop reason: HOSPADM

## 2025-03-12 RX ORDER — DIPHENHYDRAMINE HYDROCHLORIDE 50 MG/ML
50 INJECTION INTRAMUSCULAR; INTRAVENOUS AS NEEDED
Status: COMPLETED | OUTPATIENT
Start: 2025-03-12 | End: 2025-03-12

## 2025-03-12 RX ADMIN — BEVACIZUMAB-AWWB 337.5 MG: 400 INJECTION, SOLUTION INTRAVENOUS at 09:51

## 2025-03-12 RX ADMIN — FLUOROURACIL 3450 MG: 50 INJECTION, SOLUTION INTRAVENOUS at 15:15

## 2025-03-12 RX ADMIN — METHYLPREDNISOLONE SODIUM SUCCINATE 40 MG: 40 INJECTION, POWDER, FOR SOLUTION INTRAMUSCULAR; INTRAVENOUS at 14:07

## 2025-03-12 RX ADMIN — METHYLPREDNISOLONE SODIUM SUCCINATE 100 MG: 125 INJECTION, POWDER, FOR SOLUTION INTRAMUSCULAR; INTRAVENOUS at 09:20

## 2025-03-12 RX ADMIN — FAMOTIDINE 20 MG: 10 INJECTION INTRAVENOUS at 09:20

## 2025-03-12 RX ADMIN — OXALIPLATIN 155 MG: 5 INJECTION, SOLUTION INTRAVENOUS at 10:08

## 2025-03-12 RX ADMIN — PALONOSETRON 0.25 MG: 0.05 INJECTION, SOLUTION INTRAVENOUS at 09:20

## 2025-03-12 RX ADMIN — DIPHENHYDRAMINE HYDROCHLORIDE 50 MG: 50 INJECTION INTRAMUSCULAR; INTRAVENOUS at 14:06

## 2025-03-12 RX ADMIN — FAMOTIDINE 20 MG: 10 INJECTION INTRAVENOUS at 14:11

## 2025-03-12 RX ADMIN — DIPHENHYDRAMINE HYDROCHLORIDE 25 MG: 25 CAPSULE ORAL at 09:20

## 2025-03-12 ASSESSMENT — PAIN SCALES - GENERAL: PAINLEVEL_OUTOF10: 0-NO PAIN

## 2025-03-12 NOTE — PROGRESS NOTES
Report from Brook Louise RN was given to this RN to take over care of patient.    Adverse reaction occurred during infusion. See ADR note for details. Per Dr. Vargas and Dr. Horne, do not continue oxaliplatin today and proceed with 5FU pump hookup.  Patient was hooked up to 5fu pump at 1515 and will be disconnected on Friday at 1315. Patient discharged home after symptoms of reaction resolved with 5FU pump intact.

## 2025-03-12 NOTE — SIGNIFICANT EVENT
03/12/25 0907   Prechemo Checklist   Has the patient been in the hospital, ED, or urgent care since last date of service No   Chemo/Immuno Consent Completed and Signed Yes   Protocol/Indications Verified Yes   Confirmed to previous date/time of medication Yes  (delayed because of low plts)   Compared to previous dose Yes   All medications are dated accurately Yes   Pregnancy Test Negative Not applicable   Parameters Met Yes   BSA/Weight-Height Verified Yes   Dose Calculations Verified (current, total, cumulative) Yes        5

## 2025-03-12 NOTE — PROGRESS NOTES
Adverse Drug Reaction  Date and Time of Reaction: 3/12/2025 14:00 EDT    Medication Administration Details  Medication Administered: OXALIPLATIN  Date and Time Medication Administration: 3/12/2025 10:08 EDT  Patient Location at Time of Admin:    ADR Symptoms:  Symptoms: Itching and Hives/Urticara  Severity:    Actions Taken:  Actions Taken: Provider notified, Medications given (refer to MAR), IV fluids given (refer to MAR), Vital signs taken (refer to flowsheet) and Code Blue called (refer to code documentation)  Provider Notified: Dr. Vargas And On Call On Site Doctor Dr. Escoto At Chairside During Reaction  Medications Given: Diphenhydramine (Benadryl), Famotidine (Pepcid) and Methylprednisolone (Solu-Medrol)  Outcome: Other    Additional Details:  Itching resolved after interventions   1405: patient called out with itching which was reaction patient experienced previously with oxaliplatin. Upon assessment, hives on patients ears, and chest- code button activated /73  O2 97% T 98.1 F Patient was on final infusion rate of 222.5 ml.hr with 285 ml left in oxaliplatin bag  1407: IVF bolus initiated, Dr. Lopez at chairside. 50 mg IV benadryl administered  1408: 40mg IV solumedrol administered  1409: 20 mg pepcid administered. Hives still present but patient having less itching /83  1459: Per Dr. Horne, discontinue oxaliplatin and OK to give 5FU still. Patient educated on plan of care and will return for pump disconnect at 1315 on Friday.

## 2025-03-14 ENCOUNTER — INFUSION (OUTPATIENT)
Dept: HEMATOLOGY/ONCOLOGY | Facility: CLINIC | Age: 45
End: 2025-03-14
Payer: COMMERCIAL

## 2025-03-14 VITALS
SYSTOLIC BLOOD PRESSURE: 125 MMHG | OXYGEN SATURATION: 98 % | WEIGHT: 149.8 LBS | TEMPERATURE: 98.4 F | BODY MASS INDEX: 22.97 KG/M2 | DIASTOLIC BLOOD PRESSURE: 88 MMHG | RESPIRATION RATE: 16 BRPM | HEART RATE: 83 BPM

## 2025-03-14 DIAGNOSIS — C17.9 ADENOCARCINOMA OF SMALL BOWEL (MULTI): ICD-10-CM

## 2025-03-14 PROCEDURE — 2500000004 HC RX 250 GENERAL PHARMACY W/ HCPCS (ALT 636 FOR OP/ED): Mod: JZ,TB | Performed by: INTERNAL MEDICINE

## 2025-03-14 PROCEDURE — 2500000004 HC RX 250 GENERAL PHARMACY W/ HCPCS (ALT 636 FOR OP/ED): Performed by: INTERNAL MEDICINE

## 2025-03-14 PROCEDURE — 96377 APPLICATON ON-BODY INJECTOR: CPT

## 2025-03-14 RX ORDER — HEPARIN 100 UNIT/ML
500 SYRINGE INTRAVENOUS AS NEEDED
Status: DISCONTINUED | OUTPATIENT
Start: 2025-03-14 | End: 2025-03-14 | Stop reason: HOSPADM

## 2025-03-14 RX ORDER — HEPARIN 100 UNIT/ML
500 SYRINGE INTRAVENOUS AS NEEDED
OUTPATIENT
Start: 2025-03-14

## 2025-03-14 RX ORDER — HEPARIN SODIUM,PORCINE/PF 10 UNIT/ML
50 SYRINGE (ML) INTRAVENOUS AS NEEDED
Status: DISCONTINUED | OUTPATIENT
Start: 2025-03-14 | End: 2025-03-14 | Stop reason: HOSPADM

## 2025-03-14 RX ORDER — HEPARIN SODIUM,PORCINE/PF 10 UNIT/ML
50 SYRINGE (ML) INTRAVENOUS AS NEEDED
OUTPATIENT
Start: 2025-03-14

## 2025-03-14 RX ADMIN — HEPARIN 500 UNITS: 100 SYRINGE at 13:54

## 2025-03-14 RX ADMIN — PEGFILGRASTIM 6 MG: KIT SUBCUTANEOUS at 14:00

## 2025-03-14 ASSESSMENT — PAIN SCALES - GENERAL: PAINLEVEL_OUTOF10: 0-NO PAIN

## 2025-03-17 ENCOUNTER — PATIENT MESSAGE (OUTPATIENT)
Dept: HEMATOLOGY/ONCOLOGY | Facility: CLINIC | Age: 45
End: 2025-03-17
Payer: COMMERCIAL

## 2025-03-18 ENCOUNTER — APPOINTMENT (OUTPATIENT)
Dept: HEMATOLOGY/ONCOLOGY | Facility: CLINIC | Age: 45
End: 2025-03-18
Payer: COMMERCIAL

## 2025-03-18 NOTE — PROGRESS NOTES
Foot is better, redness and itching has subsided. Benedryl kicked in and relatively quickly had reduced any sign of discoloration or raised hive.     Called to offer ACC if necessary. However, due to resolution of the issue after Benedryl it appears unnecessary.  Patient and wife in agreement. Provider updated and also in agreement.    No further needs at this time.

## 2025-03-19 ENCOUNTER — APPOINTMENT (OUTPATIENT)
Dept: HEMATOLOGY/ONCOLOGY | Facility: CLINIC | Age: 45
End: 2025-03-19
Payer: COMMERCIAL

## 2025-03-20 ENCOUNTER — APPOINTMENT (OUTPATIENT)
Dept: HEMATOLOGY/ONCOLOGY | Facility: CLINIC | Age: 45
End: 2025-03-20
Payer: COMMERCIAL

## 2025-03-21 ENCOUNTER — APPOINTMENT (OUTPATIENT)
Dept: HEMATOLOGY/ONCOLOGY | Facility: CLINIC | Age: 45
End: 2025-03-21
Payer: COMMERCIAL

## 2025-03-25 ENCOUNTER — LAB (OUTPATIENT)
Dept: LAB | Facility: CLINIC | Age: 45
End: 2025-03-25
Payer: COMMERCIAL

## 2025-03-25 ENCOUNTER — OFFICE VISIT (OUTPATIENT)
Dept: HEMATOLOGY/ONCOLOGY | Facility: CLINIC | Age: 45
End: 2025-03-25
Payer: COMMERCIAL

## 2025-03-25 ENCOUNTER — LAB (OUTPATIENT)
Dept: HEMATOLOGY/ONCOLOGY | Facility: CLINIC | Age: 45
End: 2025-03-25
Payer: COMMERCIAL

## 2025-03-25 VITALS
OXYGEN SATURATION: 98 % | BODY MASS INDEX: 22.94 KG/M2 | TEMPERATURE: 98.2 F | HEART RATE: 91 BPM | WEIGHT: 149.6 LBS | DIASTOLIC BLOOD PRESSURE: 88 MMHG | SYSTOLIC BLOOD PRESSURE: 132 MMHG | RESPIRATION RATE: 18 BRPM

## 2025-03-25 DIAGNOSIS — C17.9 ADENOCARCINOMA OF SMALL BOWEL (MULTI): ICD-10-CM

## 2025-03-25 DIAGNOSIS — D3A.8 NEUROENDOCRINE TUMOR: ICD-10-CM

## 2025-03-25 LAB
ALBUMIN SERPL BCP-MCNC: 4 G/DL (ref 3.4–5)
ALP SERPL-CCNC: 219 U/L (ref 33–120)
ALT SERPL W P-5'-P-CCNC: 17 U/L (ref 10–52)
ANION GAP SERPL CALC-SCNC: 13 MMOL/L (ref 10–20)
AST SERPL W P-5'-P-CCNC: 26 U/L (ref 9–39)
BASOPHILS # BLD AUTO: 0.08 X10*3/UL (ref 0–0.1)
BASOPHILS NFR BLD AUTO: 0.8 %
BILIRUB SERPL-MCNC: 0.4 MG/DL (ref 0–1.2)
BUN SERPL-MCNC: 14 MG/DL (ref 6–23)
CALCIUM SERPL-MCNC: 9.4 MG/DL (ref 8.6–10.3)
CHLORIDE SERPL-SCNC: 103 MMOL/L (ref 98–107)
CO2 SERPL-SCNC: 28 MMOL/L (ref 21–32)
CREAT SERPL-MCNC: 0.97 MG/DL (ref 0.5–1.3)
DACRYOCYTES BLD QL SMEAR: NORMAL
EGFRCR SERPLBLD CKD-EPI 2021: >90 ML/MIN/1.73M*2
EOSINOPHIL # BLD AUTO: 0.14 X10*3/UL (ref 0–0.7)
EOSINOPHIL NFR BLD AUTO: 1.4 %
ERYTHROCYTE [DISTWIDTH] IN BLOOD BY AUTOMATED COUNT: 18.5 % (ref 11.5–14.5)
GLUCOSE SERPL-MCNC: 78 MG/DL (ref 74–99)
HCT VFR BLD AUTO: 34.7 % (ref 41–52)
HGB BLD-MCNC: 11.2 G/DL (ref 13.5–17.5)
IMM GRANULOCYTES # BLD AUTO: 0.11 X10*3/UL (ref 0–0.7)
IMM GRANULOCYTES NFR BLD AUTO: 1.1 % (ref 0–0.9)
LYMPHOCYTES # BLD AUTO: 1.23 X10*3/UL (ref 1.2–4.8)
LYMPHOCYTES NFR BLD AUTO: 12.4 %
MCH RBC QN AUTO: 32.4 PG (ref 26–34)
MCHC RBC AUTO-ENTMCNC: 32.3 G/DL (ref 32–36)
MCV RBC AUTO: 100 FL (ref 80–100)
MONOCYTES # BLD AUTO: 0.74 X10*3/UL (ref 0.1–1)
MONOCYTES NFR BLD AUTO: 7.4 %
NEUTROPHILS # BLD AUTO: 7.65 X10*3/UL (ref 1.2–7.7)
NEUTROPHILS NFR BLD AUTO: 76.9 %
NRBC BLD-RTO: ABNORMAL /100{WBCS}
PLATELET # BLD AUTO: 79 X10*3/UL (ref 150–450)
POLYCHROMASIA BLD QL SMEAR: NORMAL
POTASSIUM SERPL-SCNC: 4.7 MMOL/L (ref 3.5–5.3)
PROT SERPL-MCNC: 7.1 G/DL (ref 6.4–8.2)
RBC # BLD AUTO: 3.46 X10*6/UL (ref 4.5–5.9)
RBC MORPH BLD: NORMAL
SODIUM SERPL-SCNC: 139 MMOL/L (ref 136–145)
WBC # BLD AUTO: 10 X10*3/UL (ref 4.4–11.3)

## 2025-03-25 PROCEDURE — 1036F TOBACCO NON-USER: CPT | Performed by: INTERNAL MEDICINE

## 2025-03-25 PROCEDURE — 3075F SYST BP GE 130 - 139MM HG: CPT | Performed by: INTERNAL MEDICINE

## 2025-03-25 PROCEDURE — 81003 URINALYSIS AUTO W/O SCOPE: CPT

## 2025-03-25 PROCEDURE — 99215 OFFICE O/P EST HI 40 MIN: CPT | Performed by: INTERNAL MEDICINE

## 2025-03-25 PROCEDURE — 85025 COMPLETE CBC W/AUTO DIFF WBC: CPT

## 2025-03-25 PROCEDURE — 3079F DIAST BP 80-89 MM HG: CPT | Performed by: INTERNAL MEDICINE

## 2025-03-25 PROCEDURE — 80053 COMPREHEN METABOLIC PANEL: CPT

## 2025-03-25 PROCEDURE — 36415 COLL VENOUS BLD VENIPUNCTURE: CPT

## 2025-03-25 RX ORDER — HEPARIN SODIUM,PORCINE/PF 10 UNIT/ML
50 SYRINGE (ML) INTRAVENOUS AS NEEDED
OUTPATIENT
Start: 2025-03-25

## 2025-03-25 RX ORDER — HEPARIN SODIUM,PORCINE/PF 10 UNIT/ML
50 SYRINGE (ML) INTRAVENOUS AS NEEDED
Status: DISCONTINUED | OUTPATIENT
Start: 2025-03-25 | End: 2025-03-25 | Stop reason: HOSPADM

## 2025-03-25 RX ORDER — HEPARIN 100 UNIT/ML
500 SYRINGE INTRAVENOUS AS NEEDED
Status: DISCONTINUED | OUTPATIENT
Start: 2025-03-25 | End: 2025-03-25 | Stop reason: HOSPADM

## 2025-03-25 RX ORDER — HEPARIN 100 UNIT/ML
500 SYRINGE INTRAVENOUS AS NEEDED
OUTPATIENT
Start: 2025-03-25

## 2025-03-25 ASSESSMENT — PAIN SCALES - GENERAL: PAINLEVEL_OUTOF10: 0-NO PAIN

## 2025-03-25 NOTE — PROGRESS NOTES
.Patient ID: Thai Cristobal is a 44 y.o. male.  Referring Physician: Nba Vargas [place]  [id number] [place] [address]  Primary Care Provider: Charlotte Yan DO      Chief complaint: SB Adenocarcinoma     HPI  This is a 44-year-old male with a known history of Crohn's disease patient is status post partial right colectomy small bowel resection on repair of a colonic fistula on 5/28/2024. Pathology he had a neuroendocrine tumor grade 3 well differentiated, with Ki67 30-40%. He did follow-up with oncology at [facility]. He had a PET-Ga68 which showed Postsurgical changes from right hemicolectomy and distal small bowel with no evidence of focal abnormal gallium 68 dotatate uptake.   Patient's only complaint is change in color of stools at this point, he is eating and drinking less and he has no abdominal pain. No diarrhea and no flushing.  Repeated liver biopsy showed: high grade malignancy, likely of colonic origin, showing focal adenocarcinoma-like features and focal neuroendocrine differentiation with Ki67 95%  He started on FOLFOX and avastin and finished 1 cycles and tolerated well. No abdominal pain, no nausea or vomiting. No new symptoms. His labs showed leucopenia and we delayed C2.  He had C2 and repeated labs showed normal WBCs  He finished 10 cycles and tolerated well with LA on restaging scans       SYSTEMIC TREATMENT RECEIVED: None       Subjective   Please refer to Notes above for complete History of present illness.          Review of Systems:   All 14 systems have been reviewed and were negative except for the HPI.       MEDICAL HISTORY  Past Medical History:   Diagnosis Date    Acute upper respiratory infection, unspecified 02/21/2018    Acute URI    Crohn's disease (Multi)     Personal history of other specified conditions 04/23/2018    History of vertigo    SBO (small bowel obstruction) (Multi) 01/25/2024       FAMILY HISTORY  Family History   Problem Relation Name Age of Onset    Thyroid  disease Mother      Other (bladder cancer) Mother      Hypertension Father      Benign prostatic hyperplasia Father      Anxiety disorder Sister      No Known Problems Brother      No Known Problems Daughter      Heart failure Paternal Grandfather         TOBACCO HISTORY  Tobacco Use: Low Risk  (3/25/2025)    Patient History     Smoking Tobacco Use: Never     Smokeless Tobacco Use: Never     Passive Exposure: Never       SOCIAL HISTORY  Social Connections: Not on file        Outpatient Medication Profile:  Current Outpatient Medications on File Prior to Visit   Medication Sig Dispense Refill    ALPRAZolam (Xanax) 0.5 mg tablet Take 1 tablet (0.5 mg) by mouth once daily as needed for anxiety. 30 tablet 0    cholecalciferol (Vitamin D-3) 25 MCG (1000 UT) capsule Take 1 capsule (25 mcg) by mouth once daily.      elderberry fruit 350 mg capsule Take 1 capsule by mouth once daily.      flaxseed oiL 1,000 mg capsule Take 1 capsule (1,000 mg) by mouth once daily.      FLUoxetine (PROzac) 20 mg capsule Take 1 capsule (20 mg) by mouth once daily. (Patient taking differently: Take 1 capsule (20 mg) by mouth once daily at bedtime.) 90 capsule 1    FLUoxetine (PROzac) 40 mg capsule TAKE 1 CAPSULE BY MOUTH ONCE DAILY (Patient taking differently: Take 1 capsule (40 mg) by mouth once daily at bedtime.) 90 capsule 1    lisinopril 20 mg tablet Take 1 tablet (20 mg) by mouth once daily. 90 tablet 1    omeprazole OTC (PriLOSEC OTC) 20 mg EC tablet Take 1 tablet (20 mg) by mouth once daily. Do not crush, chew, or split. 30 tablet 2    ondansetron (Zofran) 8 mg tablet Take 1 tablet (8 mg) by mouth every 8 hours if needed for nausea or vomiting. 30 tablet 5    prochlorperazine (Compazine) 10 mg tablet Take 1 tablet (10 mg) by mouth every 6 hours if needed for nausea or vomiting. 30 tablet 5    SUMAtriptan (Imitrex) 50 mg tablet TAKE 1 TABLET (50 MG) BY MOUTH 1 TIME IF NEEDED FOR MIGRAINE FOR UP TO 36 DOSES. 9 tablet 3    vitamin  C-multivitamin-mineral (Emergen-C) 500 mg tablet,chewable Chew 1 tablet once daily.       Current Facility-Administered Medications on File Prior to Visit   Medication Dose Route Frequency Provider Last Rate Last Admin    heparin flush 100 unit/mL syringe 500 Units  500 Units intra-catheter PRN Nba Vargas MD   500 Units at 10/15/24 1019         Performance Status:  Symptomatic; fully ambulatory     Vitals and Measurements:   /88 (BP Location: Left arm, Patient Position: Sitting)   Pulse 91   Temp 36.8 °C (98.2 °F) (Temporal)   Resp 18   Wt 67.9 kg (149 lb 9.6 oz)   SpO2 98%   BMI 22.94 kg/m²       Physical Exam:   General: Appears well and in NAD  Eyes: PERRL, EOMI, clear sclera  ENMT: mucous membranes moist, no apparent injury, no lesions seen  Head/Neck: Neck supple, no apparent injury, thyroid without mass or tenderness, No JVD, trachea midline,   Thorax: Patent airways, CTAB, normal breath sounds with good chest expansion, thorax symmetric  Cardiovascular: Regular, rate and rhythm, no murmurs, 2+ equal pulses of the extremities, normal S 1and S 2  Gastrointestinal: Nondistended, soft, non-tender, no rebound tenderness or guarding, no masses palpable, no organomegaly, +BS  Musculoskeletal: ROM intact, no joint swelling, normal strength  Extremities: normal extremities, no cyanosis edema, contusions or wounds, no clubbing  Neurological: alert and oriented x3, intact senses, motor, response and reflexes, normal strength  Lymphatic: No significant lymphadenopathy  Psychological: Appropriate mood and behavior  Skin: Warm and dry, no lesions, no rashes          Lab Results:  I have reviewed these laboratory results:     Lab Results   Component Value Date    WBC 10.0 03/25/2025    HGB 11.2 (L) 03/25/2025    HCT 34.7 (L) 03/25/2025     03/25/2025    PLT 79 (L) 03/25/2025         Chemistry    Lab Results   Component Value Date/Time     03/25/2025 1254    K 4.7 03/25/2025 1254      03/25/2025 1254    CO2 28 03/25/2025 1254    BUN 14 03/25/2025 1254    CREATININE 0.97 03/25/2025 1254    Lab Results   Component Value Date/Time    CALCIUM 9.4 03/25/2025 1254    ALKPHOS 219 (H) 03/25/2025 1254    AST 26 03/25/2025 1254    ALT 17 03/25/2025 1254    BILITOT 0.4 03/25/2025 1254            Lab Results   Component Value Date    TSH 3.17 04/18/2023        Radiology Result:  I have reviewed the latest Imaging in PACS and the findings are noted in this note. I discussed the results of the latest imaging with the patient. All previous imaging were reviewed at the time it was completed. Full records are available in the EMR for review as well.         NM PET CT net netspot    Result Date: 7/5/2024  Impression: 1. Postsurgical changes from right hemicolectomy and distal small bowel with no evidence of focal abnormal gallium 68 dotatate uptake within the region of documented neuroendocrine tumor to suggest residual disease. 2. Nonspecific, gallium 68 dotatate uptake within the pancreatic uncinate which can be seen with physiologic uptake.     I personally reviewed the images/study and I agree with the findings as stated by Resident Celso Ashford MD.   MACRO: None   Signed by: Maco Villafana 7/5/2024 3:06 PM Dictation workstation:   [id number]        Pathology Results:  I have reviewed the full pathology report recorded in the EMR. The pertinent portions indicating diagnosis are listed here in the note. for details please refer to the full report recorded in the EMR.    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 Pathology he had a neuroendocrine tumor grade 3 well differentiated, with Ki67 30-40%. He did follow-up with oncology at  [facility]. He had a PET-Ga68 which showed Postsurgical changes from right hemicolectomy and distal small bowel with no evidence of focal abnormal gallium 68 dotatate uptake. Patient's only complaint is change in color of stools at this point he is eating and drinking and he has no abdominal pain. No diarrhea and no flushing. She had CT AP on 08/09/2024 at outside institution which showed new small liver nodules. 09/04/2024: MRI w Eovist showed Multiple T1 hypointense and T2 hyperintense lesions throughout the liver, and mesenteric LNS. PET-FDG showed FDG avid mesenteric kylie and liver metastases.   Repeated liver biopsy showed: high grade malignancy, likely of colonic origin, showing focal adenocarcinoma-like features and focal neuroendocrine differentiation with Ki67 95%  He started on FOLFOX and avastin and finished 1 cycles and tolerated well. No abdominal pain, no nausea or vomiting. No new symptoms. His labs showed leucopenia and keep delaying C2.  After C3 restaging scans on 12/06/2024: Showed MA mainly in liver lesions and stable mesenteric LNS  Last labs were normal  and he is feeling fine today   Restaging scans after C8 showed MA  He finished 10 cycles  Had the third allergic reaction after C10 even with desensitization  Labs showed thrombocytopenia today     Plan:  1- Will hold C11 FOLFOX plus Bevacizumab due to allergic reactions  2- Restaging scans in 1 week for follow up   3- RTC in 2 weeks for follow up         DISCLAIMER:   In preparing for this visit and writing this note, I reviewed all the previous electronic medical records (labs, imaging and medical charts) of the patient available in the physician portal. Significant findings which helped in decision making are recorded  in this chart.     The plan was discussed with the patient. We gave him ample opportunities to ask questions. All questions were answered to his satisfaction and he verbalized understanding.       Nba Vargas M.D.  Associate  Professor and Director of the Neuroendocrine Tumor Program  Gastrointestinal Medical Oncology  Department of Hematology and Oncology  [facility] [place] [address]  Phone (Office): [phone]  Fax:  [phone]   Email: [email or web address]  Learn more about [facility] Comprehensive Neuroendocrine Tumor Program: Click Here  Learn more about [place] [employer]: [email or web address]

## 2025-03-26 ENCOUNTER — APPOINTMENT (OUTPATIENT)
Dept: HEMATOLOGY/ONCOLOGY | Facility: CLINIC | Age: 45
End: 2025-03-26
Payer: COMMERCIAL

## 2025-03-26 LAB
APPEARANCE UR: CLEAR
BILIRUB UR STRIP.AUTO-MCNC: NEGATIVE MG/DL
COLOR UR: NORMAL
GLUCOSE UR STRIP.AUTO-MCNC: NORMAL MG/DL
KETONES UR STRIP.AUTO-MCNC: NEGATIVE MG/DL
LEUKOCYTE ESTERASE UR QL STRIP.AUTO: NEGATIVE
NITRITE UR QL STRIP.AUTO: NEGATIVE
PH UR STRIP.AUTO: 5 [PH]
PROT UR STRIP.AUTO-MCNC: NEGATIVE MG/DL
RBC # UR STRIP.AUTO: NEGATIVE MG/DL
SP GR UR STRIP.AUTO: 1.01
UROBILINOGEN UR STRIP.AUTO-MCNC: NORMAL MG/DL

## 2025-03-28 ENCOUNTER — APPOINTMENT (OUTPATIENT)
Dept: HEMATOLOGY/ONCOLOGY | Facility: CLINIC | Age: 45
End: 2025-03-28
Payer: COMMERCIAL

## 2025-04-01 ENCOUNTER — APPOINTMENT (OUTPATIENT)
Dept: HEMATOLOGY/ONCOLOGY | Facility: CLINIC | Age: 45
End: 2025-04-01
Payer: COMMERCIAL

## 2025-04-01 ENCOUNTER — APPOINTMENT (OUTPATIENT)
Dept: HEMATOLOGY/ONCOLOGY | Facility: HOSPITAL | Age: 45
End: 2025-04-01
Payer: COMMERCIAL

## 2025-04-02 ENCOUNTER — APPOINTMENT (OUTPATIENT)
Dept: HEMATOLOGY/ONCOLOGY | Facility: CLINIC | Age: 45
End: 2025-04-02
Payer: COMMERCIAL

## 2025-04-03 ENCOUNTER — APPOINTMENT (OUTPATIENT)
Dept: HEMATOLOGY/ONCOLOGY | Facility: CLINIC | Age: 45
End: 2025-04-03
Payer: COMMERCIAL

## 2025-04-04 ENCOUNTER — APPOINTMENT (OUTPATIENT)
Dept: HEMATOLOGY/ONCOLOGY | Facility: CLINIC | Age: 45
End: 2025-04-04
Payer: COMMERCIAL

## 2025-04-04 ENCOUNTER — HOSPITAL ENCOUNTER (OUTPATIENT)
Dept: RADIOLOGY | Facility: HOSPITAL | Age: 45
Discharge: HOME | End: 2025-04-04
Payer: COMMERCIAL

## 2025-04-04 DIAGNOSIS — C17.9 ADENOCARCINOMA OF SMALL BOWEL (MULTI): ICD-10-CM

## 2025-04-04 PROCEDURE — 74177 CT ABD & PELVIS W/CONTRAST: CPT

## 2025-04-04 PROCEDURE — 2550000001 HC RX 255 CONTRASTS: Performed by: INTERNAL MEDICINE

## 2025-04-04 RX ADMIN — IOHEXOL 75 ML: 350 INJECTION, SOLUTION INTRAVENOUS at 16:47

## 2025-04-06 DIAGNOSIS — F41.9 ANXIETY: ICD-10-CM

## 2025-04-07 RX ORDER — FLUOXETINE HYDROCHLORIDE 20 MG/1
20 CAPSULE ORAL NIGHTLY
Qty: 90 CAPSULE | Refills: 1 | Status: SHIPPED | OUTPATIENT
Start: 2025-04-07

## 2025-04-08 ENCOUNTER — OFFICE VISIT (OUTPATIENT)
Dept: HEMATOLOGY/ONCOLOGY | Facility: CLINIC | Age: 45
End: 2025-04-08
Payer: COMMERCIAL

## 2025-04-08 ENCOUNTER — APPOINTMENT (OUTPATIENT)
Dept: HEMATOLOGY/ONCOLOGY | Facility: CLINIC | Age: 45
End: 2025-04-08
Payer: COMMERCIAL

## 2025-04-08 ENCOUNTER — LAB (OUTPATIENT)
Dept: HEMATOLOGY/ONCOLOGY | Facility: CLINIC | Age: 45
End: 2025-04-08
Payer: COMMERCIAL

## 2025-04-08 VITALS
WEIGHT: 159.9 LBS | BODY MASS INDEX: 24.52 KG/M2 | RESPIRATION RATE: 17 BRPM | OXYGEN SATURATION: 98 % | DIASTOLIC BLOOD PRESSURE: 87 MMHG | TEMPERATURE: 97.3 F | HEART RATE: 88 BPM | SYSTOLIC BLOOD PRESSURE: 135 MMHG

## 2025-04-08 DIAGNOSIS — C17.9 ADENOCARCINOMA OF SMALL BOWEL (MULTI): ICD-10-CM

## 2025-04-08 DIAGNOSIS — C17.9 ADENOCARCINOMA OF SMALL BOWEL (MULTI): Primary | ICD-10-CM

## 2025-04-08 LAB
ALBUMIN SERPL BCP-MCNC: 3.7 G/DL (ref 3.4–5)
ALP SERPL-CCNC: 134 U/L (ref 33–120)
ALT SERPL W P-5'-P-CCNC: 19 U/L (ref 10–52)
ANION GAP SERPL CALC-SCNC: 11 MMOL/L (ref 10–20)
AST SERPL W P-5'-P-CCNC: 27 U/L (ref 9–39)
BASOPHILS # BLD AUTO: 0.09 X10*3/UL (ref 0–0.1)
BASOPHILS NFR BLD AUTO: 1.9 %
BILIRUB SERPL-MCNC: 0.4 MG/DL (ref 0–1.2)
BUN SERPL-MCNC: 16 MG/DL (ref 6–23)
CALCIUM SERPL-MCNC: 8.9 MG/DL (ref 8.6–10.3)
CEA SERPL-MCNC: 4.8 UG/L
CHLORIDE SERPL-SCNC: 103 MMOL/L (ref 98–107)
CO2 SERPL-SCNC: 28 MMOL/L (ref 21–32)
CREAT SERPL-MCNC: 0.86 MG/DL (ref 0.5–1.3)
EGFRCR SERPLBLD CKD-EPI 2021: >90 ML/MIN/1.73M*2
EOSINOPHIL # BLD AUTO: 0.2 X10*3/UL (ref 0–0.7)
EOSINOPHIL NFR BLD AUTO: 4.2 %
ERYTHROCYTE [DISTWIDTH] IN BLOOD BY AUTOMATED COUNT: 16.3 % (ref 11.5–14.5)
GLUCOSE SERPL-MCNC: 123 MG/DL (ref 74–99)
HCT VFR BLD AUTO: 28.6 % (ref 41–52)
HGB BLD-MCNC: 8.9 G/DL (ref 13.5–17.5)
IMM GRANULOCYTES # BLD AUTO: 0 X10*3/UL (ref 0–0.7)
IMM GRANULOCYTES NFR BLD AUTO: 0 % (ref 0–0.9)
LYMPHOCYTES # BLD AUTO: 0.77 X10*3/UL (ref 1.2–4.8)
LYMPHOCYTES NFR BLD AUTO: 16 %
MCH RBC QN AUTO: 30.5 PG (ref 26–34)
MCHC RBC AUTO-ENTMCNC: 31.1 G/DL (ref 32–36)
MCV RBC AUTO: 98 FL (ref 80–100)
MONOCYTES # BLD AUTO: 0.58 X10*3/UL (ref 0.1–1)
MONOCYTES NFR BLD AUTO: 12.1 %
NEUTROPHILS # BLD AUTO: 3.17 X10*3/UL (ref 1.2–7.7)
NEUTROPHILS NFR BLD AUTO: 65.8 %
NRBC BLD-RTO: ABNORMAL /100{WBCS}
PLATELET # BLD AUTO: 113 X10*3/UL (ref 150–450)
POTASSIUM SERPL-SCNC: 4.2 MMOL/L (ref 3.5–5.3)
PROT SERPL-MCNC: 6.5 G/DL (ref 6.4–8.2)
RBC # BLD AUTO: 2.92 X10*6/UL (ref 4.5–5.9)
SODIUM SERPL-SCNC: 138 MMOL/L (ref 136–145)
WBC # BLD AUTO: 4.8 X10*3/UL (ref 4.4–11.3)

## 2025-04-08 PROCEDURE — 99215 OFFICE O/P EST HI 40 MIN: CPT | Performed by: INTERNAL MEDICINE

## 2025-04-08 PROCEDURE — 82378 CARCINOEMBRYONIC ANTIGEN: CPT

## 2025-04-08 PROCEDURE — 85025 COMPLETE CBC W/AUTO DIFF WBC: CPT

## 2025-04-08 PROCEDURE — 3075F SYST BP GE 130 - 139MM HG: CPT | Performed by: INTERNAL MEDICINE

## 2025-04-08 PROCEDURE — 84075 ASSAY ALKALINE PHOSPHATASE: CPT

## 2025-04-08 PROCEDURE — 36415 COLL VENOUS BLD VENIPUNCTURE: CPT

## 2025-04-08 PROCEDURE — 3079F DIAST BP 80-89 MM HG: CPT | Performed by: INTERNAL MEDICINE

## 2025-04-08 RX ORDER — EPINEPHRINE 0.3 MG/.3ML
0.3 INJECTION SUBCUTANEOUS EVERY 5 MIN PRN
OUTPATIENT
Start: 2025-04-15

## 2025-04-08 RX ORDER — PROCHLORPERAZINE EDISYLATE 5 MG/ML
10 INJECTION INTRAMUSCULAR; INTRAVENOUS EVERY 6 HOURS PRN
OUTPATIENT
Start: 2025-04-15

## 2025-04-08 RX ORDER — ALBUTEROL SULFATE 0.83 MG/ML
3 SOLUTION RESPIRATORY (INHALATION) AS NEEDED
OUTPATIENT
Start: 2025-04-15

## 2025-04-08 RX ORDER — FAMOTIDINE 10 MG/ML
20 INJECTION, SOLUTION INTRAVENOUS ONCE AS NEEDED
OUTPATIENT
Start: 2025-04-29

## 2025-04-08 RX ORDER — ATROPINE SULFATE 0.1 MG/ML
0.4 INJECTION INTRAVENOUS
OUTPATIENT
Start: 2025-04-15

## 2025-04-08 RX ORDER — HEPARIN 100 UNIT/ML
500 SYRINGE INTRAVENOUS AS NEEDED
OUTPATIENT
Start: 2025-04-08

## 2025-04-08 RX ORDER — HEPARIN 100 UNIT/ML
500 SYRINGE INTRAVENOUS AS NEEDED
Status: DISCONTINUED | OUTPATIENT
Start: 2025-04-08 | End: 2025-04-08 | Stop reason: HOSPADM

## 2025-04-08 RX ORDER — HEPARIN SODIUM,PORCINE/PF 10 UNIT/ML
50 SYRINGE (ML) INTRAVENOUS AS NEEDED
OUTPATIENT
Start: 2025-04-08

## 2025-04-08 RX ORDER — DIPHENHYDRAMINE HYDROCHLORIDE 50 MG/ML
50 INJECTION, SOLUTION INTRAMUSCULAR; INTRAVENOUS AS NEEDED
OUTPATIENT
Start: 2025-04-15

## 2025-04-08 RX ORDER — PALONOSETRON 0.05 MG/ML
0.25 INJECTION, SOLUTION INTRAVENOUS ONCE
OUTPATIENT
Start: 2025-04-15

## 2025-04-08 RX ORDER — PROCHLORPERAZINE EDISYLATE 5 MG/ML
10 INJECTION INTRAMUSCULAR; INTRAVENOUS EVERY 6 HOURS PRN
OUTPATIENT
Start: 2025-04-29

## 2025-04-08 RX ORDER — ALBUTEROL SULFATE 0.83 MG/ML
3 SOLUTION RESPIRATORY (INHALATION) AS NEEDED
OUTPATIENT
Start: 2025-04-29

## 2025-04-08 RX ORDER — PROCHLORPERAZINE MALEATE 5 MG
10 TABLET ORAL EVERY 6 HOURS PRN
OUTPATIENT
Start: 2025-04-15

## 2025-04-08 RX ORDER — ATROPINE SULFATE 0.1 MG/ML
0.4 INJECTION INTRAVENOUS
OUTPATIENT
Start: 2025-04-29

## 2025-04-08 RX ORDER — PALONOSETRON 0.05 MG/ML
0.25 INJECTION, SOLUTION INTRAVENOUS ONCE
OUTPATIENT
Start: 2025-04-29

## 2025-04-08 RX ORDER — EPINEPHRINE 0.3 MG/.3ML
0.3 INJECTION SUBCUTANEOUS EVERY 5 MIN PRN
OUTPATIENT
Start: 2025-04-29

## 2025-04-08 RX ORDER — HEPARIN SODIUM,PORCINE/PF 10 UNIT/ML
50 SYRINGE (ML) INTRAVENOUS AS NEEDED
Status: DISCONTINUED | OUTPATIENT
Start: 2025-04-08 | End: 2025-04-08 | Stop reason: HOSPADM

## 2025-04-08 RX ORDER — PROCHLORPERAZINE MALEATE 5 MG
10 TABLET ORAL EVERY 6 HOURS PRN
OUTPATIENT
Start: 2025-04-29

## 2025-04-08 RX ORDER — DIPHENHYDRAMINE HYDROCHLORIDE 50 MG/ML
50 INJECTION, SOLUTION INTRAMUSCULAR; INTRAVENOUS AS NEEDED
OUTPATIENT
Start: 2025-04-29

## 2025-04-08 RX ORDER — FAMOTIDINE 10 MG/ML
20 INJECTION, SOLUTION INTRAVENOUS ONCE AS NEEDED
OUTPATIENT
Start: 2025-04-15

## 2025-04-08 ASSESSMENT — PAIN SCALES - GENERAL: PAINLEVEL_OUTOF10: 0-NO PAIN

## 2025-04-08 NOTE — PROGRESS NOTES
.Patient ID: Thai Cristobal is a 44 y.o. male.  Referring Physician: Nba Vargas MD  [address] [place] [address]  Primary Care Provider: Charlotte Yan DO      Chief complaint: SB Adenocarcinoma     HPI  This is a 44-year-old male with a known history of Crohn's disease patient is status post partial right colectomy small bowel resection on repair of a colonic fistula on 5/28/2024. Pathology he had a neuroendocrine tumor grade 3 well differentiated, with Ki67 30-40%. He did follow-up with oncology at [facility]. He had a PET-Ga68 which showed Postsurgical changes from right hemicolectomy and distal small bowel with no evidence of focal abnormal gallium 68 dotatate uptake.   Patient's only complaint is change in color of stools at this point, he is eating and drinking less and he has no abdominal pain. No diarrhea and no flushing.  Repeated liver biopsy showed: high grade malignancy, likely of colonic origin, showing focal adenocarcinoma-like features and focal neuroendocrine differentiation with Ki67 95%  He started on FOLFOX and avastin and finished 1 cycles and tolerated well. No abdominal pain, no nausea or vomiting. No new symptoms. His labs showed leucopenia and we delayed C2.  He had C2 and repeated labs showed normal WBCs  He finished 10 cycles and tolerated well with CA on restaging scans after C8 and SD after last cycle       SYSTEMIC TREATMENT RECEIVED: None       Subjective   Please refer to Notes above for complete History of present illness.          Review of Systems:   All 14 systems have been reviewed and were negative except for the HPI.       MEDICAL HISTORY  Past Medical History:   Diagnosis Date    Acute upper respiratory infection, unspecified 02/21/2018    Acute URI    Crohn's disease (Multi)     Personal history of other specified conditions 04/23/2018    History of vertigo    SBO (small bowel obstruction) (Multi) 01/25/2024       FAMILY HISTORY  Family History   Problem Relation  Name Age of Onset    Thyroid disease Mother      Other (bladder cancer) Mother      Hypertension Father      Benign prostatic hyperplasia Father      Anxiety disorder Sister      No Known Problems Brother      No Known Problems Daughter      Heart failure Paternal Grandfather         TOBACCO HISTORY  Tobacco Use: Low Risk  (3/25/2025)    Patient History     Smoking Tobacco Use: Never     Smokeless Tobacco Use: Never     Passive Exposure: Never       SOCIAL HISTORY  Social Connections: Not on file        Outpatient Medication Profile:  Current Outpatient Medications on File Prior to Visit   Medication Sig Dispense Refill    ALPRAZolam (Xanax) 0.5 mg tablet Take 1 tablet (0.5 mg) by mouth once daily as needed for anxiety. 30 tablet 0    cholecalciferol (Vitamin D-3) 25 MCG (1000 UT) capsule Take 1 capsule (25 mcg) by mouth once daily.      elderberry fruit 350 mg capsule Take 1 capsule by mouth once daily.      flaxseed oiL 1,000 mg capsule Take 1 capsule (1,000 mg) by mouth once daily.      FLUoxetine (PROzac) 20 mg capsule Take 1 capsule (20 mg) by mouth once daily at bedtime. 90 capsule 1    FLUoxetine (PROzac) 40 mg capsule TAKE 1 CAPSULE BY MOUTH ONCE DAILY (Patient taking differently: Take 1 capsule (40 mg) by mouth once daily at bedtime.) 90 capsule 1    lisinopril 20 mg tablet Take 1 tablet (20 mg) by mouth once daily. 90 tablet 1    omeprazole OTC (PriLOSEC OTC) 20 mg EC tablet Take 1 tablet (20 mg) by mouth once daily. Do not crush, chew, or split. 30 tablet 2    ondansetron (Zofran) 8 mg tablet Take 1 tablet (8 mg) by mouth every 8 hours if needed for nausea or vomiting. 30 tablet 5    prochlorperazine (Compazine) 10 mg tablet Take 1 tablet (10 mg) by mouth every 6 hours if needed for nausea or vomiting. 30 tablet 5    SUMAtriptan (Imitrex) 50 mg tablet TAKE 1 TABLET (50 MG) BY MOUTH 1 TIME IF NEEDED FOR MIGRAINE FOR UP TO 36 DOSES. 9 tablet 3    vitamin C-multivitamin-mineral (Emergen-C) 500 mg  tablet,chewable Chew 1 tablet once daily.      [DISCONTINUED] FLUoxetine (PROzac) 20 mg capsule Take 1 capsule (20 mg) by mouth once daily. (Patient taking differently: Take 1 capsule (20 mg) by mouth once daily at bedtime.) 90 capsule 1     Current Facility-Administered Medications on File Prior to Visit   Medication Dose Route Frequency Provider Last Rate Last Admin    heparin flush 100 unit/mL syringe 500 Units  500 Units intra-catheter PRN Nba Vargas MD   500 Units at 10/15/24 1019         Performance Status:  Symptomatic; fully ambulatory     Vitals and Measurements:   There were no vitals taken for this visit.      Physical Exam:   General: Appears well and in NAD  Eyes: PERRL, EOMI, clear sclera  ENMT: mucous membranes moist, no apparent injury, no lesions seen  Head/Neck: Neck supple, no apparent injury, thyroid without mass or tenderness, No JVD, trachea midline,   Thorax: Patent airways, CTAB, normal breath sounds with good chest expansion, thorax symmetric  Cardiovascular: Regular, rate and rhythm, no murmurs, 2+ equal pulses of the extremities, normal S 1and S 2  Gastrointestinal: Nondistended, soft, non-tender, no rebound tenderness or guarding, no masses palpable, no organomegaly, +BS  Musculoskeletal: ROM intact, no joint swelling, normal strength  Extremities: normal extremities, no cyanosis edema, contusions or wounds, no clubbing  Neurological: alert and oriented x3, intact senses, motor, response and reflexes, normal strength  Lymphatic: No significant lymphadenopathy  Psychological: Appropriate mood and behavior  Skin: Warm and dry, no lesions, no rashes          Lab Results:  I have reviewed these laboratory results:     Lab Results   Component Value Date    WBC 10.0 03/25/2025    HGB 11.2 (L) 03/25/2025    HCT 34.7 (L) 03/25/2025     03/25/2025    PLT 79 (L) 03/25/2025         Chemistry    Lab Results   Component Value Date/Time     03/25/2025 1254    K 4.7 03/25/2025 1254      03/25/2025 1254    CO2 28 03/25/2025 1254    BUN 14 03/25/2025 1254    CREATININE 0.97 03/25/2025 1254    Lab Results   Component Value Date/Time    CALCIUM 9.4 03/25/2025 1254    ALKPHOS 219 (H) 03/25/2025 1254    AST 26 03/25/2025 1254    ALT 17 03/25/2025 1254    BILITOT 0.4 03/25/2025 1254            Lab Results   Component Value Date    TSH 3.17 04/18/2023        Radiology Result:  I have reviewed the latest Imaging in PACS and the findings are noted in this note. I discussed the results of the latest imaging with the patient. All previous imaging were reviewed at the time it was completed. Full records are available in the EMR for review as well.         NM PET CT net netspot    Result Date: 7/5/2024  Impression: 1. Postsurgical changes from right hemicolectomy and distal small bowel with no evidence of focal abnormal gallium 68 dotatate uptake within the region of documented neuroendocrine tumor to suggest residual disease. 2. Nonspecific, gallium 68 dotatate uptake within the pancreatic uncinate which can be seen with physiologic uptake.     I personally reviewed the images/study and I agree with the findings as stated by Resident Celso Ashford MD.   MACRO: None   Signed by: Maco Villafana 7/5/2024 3:06 PM Dictation workstation:   [id number]        Pathology Results:  I have reviewed the full pathology report recorded in the EMR. The pertinent portions indicating diagnosis are listed here in the note. for details please refer to the full report recorded in the EMR.    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 Pathology he had a neuroendocrine tumor grade 3 well differentiated, with Ki67 30-40%. He did follow-up with  oncology at [facility]. He had a PET-Ga68 which showed Postsurgical changes from right hemicolectomy and distal small bowel with no evidence of focal abnormal gallium 68 dotatate uptake. Patient's only complaint is change in color of stools at this point he is eating and drinking and he has no abdominal pain. No diarrhea and no flushing. She had CT AP on 08/09/2024 at outside institution which showed new small liver nodules. 09/04/2024: MRI w Eovist showed Multiple T1 hypointense and T2 hyperintense lesions throughout the liver, and mesenteric LNS. PET-FDG showed FDG avid mesenteric kylie and liver metastases.   Repeated liver biopsy showed: high grade malignancy, likely of colonic origin, showing focal adenocarcinoma-like features and focal neuroendocrine differentiation with Ki67 95%  He started on FOLFOX and avastin and finished 1 cycles and tolerated well. No abdominal pain, no nausea or vomiting. No new symptoms. His labs showed leucopenia and keep delaying C2.  After C3 restaging scans on 12/06/2024: Showed DE mainly in liver lesions and stable mesenteric LNS  Last labs were normal  and he is feeling fine today   Restaging scans after C8 showed DE  He finished 10 cycles  Had the third allergic reaction after C10 even with desensitization  Restaging scans showed stable liver lesions and no new metastatic lesions   We held FOLFOX plus bevacizumab and will start FOLFIRI plus bevacizumab   Labs showed thrombocytopenia today which is improved     Plan:  1- Will start C1 FOLFIRI plus Bevacizumab due to allergic reactions to oxaliplatin   2- Restaging scans after 4 cycles for follow up   3- RTC in 2 weeks for follow up         DISCLAIMER:   In preparing for this visit and writing this note, I reviewed all the previous electronic medical records (labs, imaging and medical charts) of the patient available in the physician portal. Significant findings which helped in decision making are recorded  in this chart.     The  plan was discussed with the patient. We gave him ample opportunities to ask questions. All questions were answered to his satisfaction and he verbalized understanding.       Nba Vargas M.D.   and Director of the Neuroendocrine Tumor Program  Gastrointestinal Medical Oncology  Department of Hematology and Oncology  [facility], [place] [address]  Phone (Office): [phone]  Fax:  [phone]   Email: [email or web address]  Learn more about [facility] Comprehensive Neuroendocrine Tumor Program: Click Here  Learn more about [place] [employer]: [email or web address]

## 2025-04-09 ENCOUNTER — APPOINTMENT (OUTPATIENT)
Dept: HEMATOLOGY/ONCOLOGY | Facility: CLINIC | Age: 45
End: 2025-04-09
Payer: COMMERCIAL

## 2025-04-10 ENCOUNTER — APPOINTMENT (OUTPATIENT)
Dept: HEMATOLOGY/ONCOLOGY | Facility: CLINIC | Age: 45
End: 2025-04-10
Payer: COMMERCIAL

## 2025-04-11 ENCOUNTER — APPOINTMENT (OUTPATIENT)
Dept: HEMATOLOGY/ONCOLOGY | Facility: CLINIC | Age: 45
End: 2025-04-11
Payer: COMMERCIAL

## 2025-04-11 RX ORDER — DEXAMETHASONE 6 MG/1
12 TABLET ORAL ONCE
OUTPATIENT
Start: 2025-04-29

## 2025-04-14 DIAGNOSIS — F41.9 ANXIETY: ICD-10-CM

## 2025-04-14 DIAGNOSIS — C17.9 ADENOCARCINOMA OF SMALL BOWEL (MULTI): ICD-10-CM

## 2025-04-14 RX ORDER — ALPRAZOLAM 0.5 MG/1
0.5 TABLET ORAL DAILY PRN
Qty: 30 TABLET | Refills: 0 | Status: SHIPPED | OUTPATIENT
Start: 2025-04-14

## 2025-04-15 ENCOUNTER — OFFICE VISIT (OUTPATIENT)
Dept: HEMATOLOGY/ONCOLOGY | Facility: CLINIC | Age: 45
End: 2025-04-15
Payer: COMMERCIAL

## 2025-04-15 ENCOUNTER — LAB (OUTPATIENT)
Dept: HEMATOLOGY/ONCOLOGY | Facility: CLINIC | Age: 45
End: 2025-04-15
Payer: COMMERCIAL

## 2025-04-15 ENCOUNTER — LAB (OUTPATIENT)
Dept: LAB | Facility: CLINIC | Age: 45
End: 2025-04-15
Payer: COMMERCIAL

## 2025-04-15 VITALS
SYSTOLIC BLOOD PRESSURE: 111 MMHG | HEART RATE: 92 BPM | TEMPERATURE: 98.2 F | WEIGHT: 153.9 LBS | RESPIRATION RATE: 18 BRPM | OXYGEN SATURATION: 98 % | BODY MASS INDEX: 23.6 KG/M2 | DIASTOLIC BLOOD PRESSURE: 74 MMHG

## 2025-04-15 DIAGNOSIS — C17.9 ADENOCARCINOMA OF SMALL BOWEL (MULTI): Primary | ICD-10-CM

## 2025-04-15 DIAGNOSIS — C17.9 ADENOCARCINOMA OF SMALL BOWEL (MULTI): ICD-10-CM

## 2025-04-15 LAB
BASOPHILS # BLD AUTO: 0.11 X10*3/UL (ref 0–0.1)
BASOPHILS NFR BLD AUTO: 2.2 %
EOSINOPHIL # BLD AUTO: 0.24 X10*3/UL (ref 0–0.7)
EOSINOPHIL NFR BLD AUTO: 4.9 %
ERYTHROCYTE [DISTWIDTH] IN BLOOD BY AUTOMATED COUNT: 16.4 % (ref 11.5–14.5)
HCT VFR BLD AUTO: 25.1 % (ref 41–52)
HGB BLD-MCNC: 7.8 G/DL (ref 13.5–17.5)
IMM GRANULOCYTES # BLD AUTO: 0 X10*3/UL (ref 0–0.7)
IMM GRANULOCYTES NFR BLD AUTO: 0 % (ref 0–0.9)
LYMPHOCYTES # BLD AUTO: 0.88 X10*3/UL (ref 1.2–4.8)
LYMPHOCYTES NFR BLD AUTO: 17.9 %
MCH RBC QN AUTO: 28.6 PG (ref 26–34)
MCHC RBC AUTO-ENTMCNC: 31.1 G/DL (ref 32–36)
MCV RBC AUTO: 92 FL (ref 80–100)
MONOCYTES # BLD AUTO: 0.62 X10*3/UL (ref 0.1–1)
MONOCYTES NFR BLD AUTO: 12.6 %
NEUTROPHILS # BLD AUTO: 3.06 X10*3/UL (ref 1.2–7.7)
NEUTROPHILS NFR BLD AUTO: 62.4 %
NRBC BLD-RTO: ABNORMAL /100{WBCS}
PLATELET # BLD AUTO: 147 X10*3/UL (ref 150–450)
RBC # BLD AUTO: 2.73 X10*6/UL (ref 4.5–5.9)
WBC # BLD AUTO: 4.9 X10*3/UL (ref 4.4–11.3)

## 2025-04-15 PROCEDURE — 99215 OFFICE O/P EST HI 40 MIN: CPT | Performed by: INTERNAL MEDICINE

## 2025-04-15 PROCEDURE — 1036F TOBACCO NON-USER: CPT | Performed by: INTERNAL MEDICINE

## 2025-04-15 PROCEDURE — 81232 DPYD GENE COMMON VARIANTS: CPT

## 2025-04-15 PROCEDURE — 3074F SYST BP LT 130 MM HG: CPT | Performed by: INTERNAL MEDICINE

## 2025-04-15 PROCEDURE — 81003 URINALYSIS AUTO W/O SCOPE: CPT

## 2025-04-15 PROCEDURE — 36415 COLL VENOUS BLD VENIPUNCTURE: CPT

## 2025-04-15 PROCEDURE — 3078F DIAST BP <80 MM HG: CPT | Performed by: INTERNAL MEDICINE

## 2025-04-15 PROCEDURE — 80053 COMPREHEN METABOLIC PANEL: CPT

## 2025-04-15 PROCEDURE — 85025 COMPLETE CBC W/AUTO DIFF WBC: CPT

## 2025-04-15 PROCEDURE — 83540 ASSAY OF IRON: CPT

## 2025-04-15 RX ORDER — PALONOSETRON 0.05 MG/ML
0.25 INJECTION, SOLUTION INTRAVENOUS ONCE
OUTPATIENT
Start: 2025-05-27

## 2025-04-15 RX ORDER — ATROPINE SULFATE 0.1 MG/ML
0.4 INJECTION INTRAVENOUS
OUTPATIENT
Start: 2025-05-27

## 2025-04-15 RX ORDER — PROCHLORPERAZINE MALEATE 5 MG
10 TABLET ORAL EVERY 6 HOURS PRN
OUTPATIENT
Start: 2025-05-27

## 2025-04-15 RX ORDER — FAMOTIDINE 10 MG/ML
20 INJECTION, SOLUTION INTRAVENOUS ONCE AS NEEDED
OUTPATIENT
Start: 2025-05-13

## 2025-04-15 RX ORDER — ALBUTEROL SULFATE 0.83 MG/ML
3 SOLUTION RESPIRATORY (INHALATION) AS NEEDED
OUTPATIENT
Start: 2025-05-27

## 2025-04-15 RX ORDER — PALONOSETRON 0.05 MG/ML
0.25 INJECTION, SOLUTION INTRAVENOUS ONCE
OUTPATIENT
Start: 2025-05-13

## 2025-04-15 RX ORDER — ALBUTEROL SULFATE 0.83 MG/ML
3 SOLUTION RESPIRATORY (INHALATION) AS NEEDED
OUTPATIENT
Start: 2025-05-13

## 2025-04-15 RX ORDER — ATROPINE SULFATE 0.1 MG/ML
0.4 INJECTION INTRAVENOUS
OUTPATIENT
Start: 2025-05-13

## 2025-04-15 RX ORDER — DIPHENHYDRAMINE HYDROCHLORIDE 50 MG/ML
50 INJECTION, SOLUTION INTRAMUSCULAR; INTRAVENOUS AS NEEDED
OUTPATIENT
Start: 2025-05-13

## 2025-04-15 RX ORDER — PROCHLORPERAZINE EDISYLATE 5 MG/ML
10 INJECTION INTRAMUSCULAR; INTRAVENOUS EVERY 6 HOURS PRN
OUTPATIENT
Start: 2025-05-13

## 2025-04-15 RX ORDER — DEXAMETHASONE 6 MG/1
12 TABLET ORAL ONCE
OUTPATIENT
Start: 2025-05-27

## 2025-04-15 RX ORDER — PROCHLORPERAZINE MALEATE 5 MG
10 TABLET ORAL EVERY 6 HOURS PRN
OUTPATIENT
Start: 2025-05-13

## 2025-04-15 RX ORDER — FAMOTIDINE 10 MG/ML
20 INJECTION, SOLUTION INTRAVENOUS ONCE AS NEEDED
OUTPATIENT
Start: 2025-05-27

## 2025-04-15 RX ORDER — EPINEPHRINE 0.3 MG/.3ML
0.3 INJECTION SUBCUTANEOUS EVERY 5 MIN PRN
OUTPATIENT
Start: 2025-05-27

## 2025-04-15 RX ORDER — PROCHLORPERAZINE EDISYLATE 5 MG/ML
10 INJECTION INTRAMUSCULAR; INTRAVENOUS EVERY 6 HOURS PRN
OUTPATIENT
Start: 2025-05-27

## 2025-04-15 RX ORDER — DEXAMETHASONE 6 MG/1
12 TABLET ORAL ONCE
OUTPATIENT
Start: 2025-05-13

## 2025-04-15 RX ORDER — EPINEPHRINE 0.3 MG/.3ML
0.3 INJECTION SUBCUTANEOUS EVERY 5 MIN PRN
OUTPATIENT
Start: 2025-05-13

## 2025-04-15 RX ORDER — DIPHENHYDRAMINE HYDROCHLORIDE 50 MG/ML
50 INJECTION, SOLUTION INTRAMUSCULAR; INTRAVENOUS AS NEEDED
OUTPATIENT
Start: 2025-05-27

## 2025-04-15 ASSESSMENT — PAIN SCALES - GENERAL: PAINLEVEL_OUTOF10: 0-NO PAIN

## 2025-04-15 NOTE — PROGRESS NOTES
.Patient ID: Thai Cristobal is a 44 y.o. male.  Referring Physician: Nba Vargas MD  [address] [place] [address]  Primary Care Provider: Charlotte Yan DO      Chief complaint: SB Adenocarcinoma     HPI  This is a 44-year-old male with a known history of Crohn's disease patient is status post partial right colectomy small bowel resection on repair of a colonic fistula on 5/28/2024. Pathology he had a neuroendocrine tumor grade 3 well differentiated, with Ki67 30-40%. He did follow-up with oncology at [facility]. He had a PET-Ga68 which showed Postsurgical changes from right hemicolectomy and distal small bowel with no evidence of focal abnormal gallium 68 dotatate uptake.   Patient's only complaint is change in color of stools at this point, he is eating and drinking less and he has no abdominal pain. No diarrhea and no flushing.  Repeated liver biopsy showed: high grade malignancy, likely of colonic origin, showing focal adenocarcinoma-like features and focal neuroendocrine differentiation with Ki67 95%  He started on FOLFOX and avastin and finished 1 cycles and tolerated well. No abdominal pain, no nausea or vomiting. No new symptoms. His labs showed leucopenia and we delayed C2.  He had C2 and repeated labs showed normal WBCs  He finished 10 cycles and tolerated well with [place] on restaging scans after C8 and SD after last cycle       SYSTEMIC TREATMENT RECEIVED: None       Subjective   Please refer to Notes above for complete History of present illness.          Review of Systems:   All 14 systems have been reviewed and were negative except for the HPI.       MEDICAL HISTORY  Past Medical History:   Diagnosis Date    Acute upper respiratory infection, unspecified 02/21/2018    Acute URI    Crohn's disease (Multi)     Personal history of other specified conditions 04/23/2018    History of vertigo    SBO (small bowel obstruction) (Multi) 01/25/2024       FAMILY HISTORY  Family History   Problem Relation  Name Age of Onset    Thyroid disease Mother      Other (bladder cancer) Mother      Hypertension Father      Benign prostatic hyperplasia Father      Anxiety disorder Sister      No Known Problems Brother      No Known Problems Daughter      Heart failure Paternal Grandfather         TOBACCO HISTORY  Tobacco Use: Low Risk  (4/15/2025)    Patient History     Smoking Tobacco Use: Never     Smokeless Tobacco Use: Never     Passive Exposure: Never       SOCIAL HISTORY  Social Connections: Not on file        Outpatient Medication Profile:  Current Outpatient Medications on File Prior to Visit   Medication Sig Dispense Refill    ALPRAZolam (Xanax) 0.5 mg tablet Take 1 tablet (0.5 mg) by mouth once daily as needed for anxiety. 30 tablet 0    cholecalciferol (Vitamin D-3) 25 MCG (1000 UT) capsule Take 1 capsule (25 mcg) by mouth once daily.      elderberry fruit 350 mg capsule Take 1 capsule by mouth once daily.      flaxseed oiL 1,000 mg capsule Take 1 capsule (1,000 mg) by mouth once daily.      FLUoxetine (PROzac) 20 mg capsule Take 1 capsule (20 mg) by mouth once daily at bedtime. 90 capsule 1    FLUoxetine (PROzac) 40 mg capsule TAKE 1 CAPSULE BY MOUTH ONCE DAILY 90 capsule 1    lisinopril 20 mg tablet Take 1 tablet (20 mg) by mouth once daily. 90 tablet 1    omeprazole OTC (PriLOSEC OTC) 20 mg EC tablet Take 1 tablet (20 mg) by mouth once daily. Do not crush, chew, or split. 30 tablet 2    SUMAtriptan (Imitrex) 50 mg tablet TAKE 1 TABLET (50 MG) BY MOUTH 1 TIME IF NEEDED FOR MIGRAINE FOR UP TO 36 DOSES. 9 tablet 3    vitamin C-multivitamin-mineral (Emergen-C) 500 mg tablet,chewable Chew 1 tablet once daily.      [DISCONTINUED] ALPRAZolam (Xanax) 0.5 mg tablet Take 1 tablet (0.5 mg) by mouth once daily as needed for anxiety. 30 tablet 0     Current Facility-Administered Medications on File Prior to Visit   Medication Dose Route Frequency Provider Last Rate Last Admin    heparin flush 100 unit/mL syringe 500 Units  500  Units intra-catheter PRN Nab Vargas MD   500 Units at 10/15/24 1019         Performance Status:  Symptomatic; fully ambulatory     Vitals and Measurements:   /74 (BP Location: Left arm, Patient Position: Sitting)   Pulse 92   Temp 36.8 °C (98.2 °F) (Temporal)   Resp 18   Wt 69.8 kg (153 lb 14.4 oz)   SpO2 98%   BMI 23.60 kg/m²       Physical Exam:   General: Appears well and in NAD  Eyes: PERRL, EOMI, clear sclera  ENMT: mucous membranes moist, no apparent injury, no lesions seen  Head/Neck: Neck supple, no apparent injury, thyroid without mass or tenderness, No JVD, trachea midline,   Thorax: Patent airways, CTAB, normal breath sounds with good chest expansion, thorax symmetric  Cardiovascular: Regular, rate and rhythm, no murmurs, 2+ equal pulses of the extremities, normal S 1and S 2  Gastrointestinal: Nondistended, soft, non-tender, no rebound tenderness or guarding, no masses palpable, no organomegaly, +BS  Musculoskeletal: ROM intact, no joint swelling, normal strength  Extremities: normal extremities, no cyanosis edema, contusions or wounds, no clubbing  Neurological: alert and oriented x3, intact senses, motor, response and reflexes, normal strength  Lymphatic: No significant lymphadenopathy  Psychological: Appropriate mood and behavior  Skin: Warm and dry, no lesions, no rashes          Lab Results:  I have reviewed these laboratory results:     Lab Results   Component Value Date    WBC 4.9 04/15/2025    HGB 7.8 (L) 04/15/2025    HCT 25.1 (L) 04/15/2025    MCV 92 04/15/2025     (L) 04/15/2025         Chemistry    Lab Results   Component Value Date/Time     04/08/2025 0914    K 4.2 04/08/2025 0914     04/08/2025 0914    CO2 28 04/08/2025 0914    BUN 16 04/08/2025 0914    CREATININE 0.86 04/08/2025 0914    Lab Results   Component Value Date/Time    CALCIUM 8.9 04/08/2025 0914    ALKPHOS 134 (H) 04/08/2025 0914    AST 27 04/08/2025 0914    ALT 19 04/08/2025 0914    BILITOT 0.4  04/08/2025 0914            Lab Results   Component Value Date    TSH 3.17 04/18/2023        Radiology Result:  I have reviewed the latest Imaging in PACS and the findings are noted in this note. I discussed the results of the latest imaging with the patient. All previous imaging were reviewed at the time it was completed. Full records are available in the EMR for review as well.         NM PET CT net netspot    Result Date: 7/5/2024  Impression: 1. Postsurgical changes from right hemicolectomy and distal small bowel with no evidence of focal abnormal gallium 68 dotatate uptake within the region of documented neuroendocrine tumor to suggest residual disease. 2. Nonspecific, gallium 68 dotatate uptake within the pancreatic uncinate which can be seen with physiologic uptake.     I personally reviewed the images/study and I agree with the findings as stated by Resident Celso Ashford MD.   MACRO: None   Signed by: Maco Villafana 7/5/2024 3:06 PM Dictation workstation:   [id number]        Pathology Results:  I have reviewed the full pathology report recorded in the EMR. The pertinent portions indicating diagnosis are listed here in the note. for details please refer to the full report recorded in the EMR.    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 Pathology he had a neuroendocrine tumor grade 3 well differentiated, with Ki67 30-40%. He did follow-up with oncology at [facility]. He had a PET-Ga68 which showed Postsurgical changes from right hemicolectomy and distal small bowel with no evidence of focal abnormal gallium 68 dotatate uptake. Patient's only complaint is change in color of stools at this point he is eating and drinking and he has  no abdominal pain. No diarrhea and no flushing. She had CT AP on 08/09/2024 at outside institution which showed new small liver nodules. 09/04/2024: MRI w Eovist showed Multiple T1 hypointense and T2 hyperintense lesions throughout the liver, and mesenteric LNS. PET-FDG showed FDG avid mesenteric kylie and liver metastases.   Repeated liver biopsy showed: high grade malignancy, likely of colonic origin, showing focal adenocarcinoma-like features and focal neuroendocrine differentiation with Ki67 95%  He started on FOLFOX and avastin and finished 1 cycles and tolerated well. No abdominal pain, no nausea or vomiting. No new symptoms. His labs showed leucopenia and keep delaying C2.  After C3 restaging scans on 12/06/2024: Showed ID mainly in liver lesions and stable mesenteric LNS  Last labs were normal  and he is feeling fine today   Restaging scans after C8 showed ID  He finished 10 cycles  Had the third allergic reaction after C10 even with desensitization  Restaging scans showed stable liver lesions and no new metastatic lesions   We held FOLFOX plus bevacizumab and will start FOLFIRI plus bevacizumab   Labs showed thrombocytopenia today which is improved . His Hb is trending down     Plan:  1- Will start C1 FOLFIRI and hold Bevacizumab due to trending down in Hb   2- Will resume omeprazole, iron labs, and send to colonoscopy  3-Restaging scans after 4 cycles for follow up   4-RTC in 2 weeks for follow up         DISCLAIMER:   In preparing for this visit and writing this note, I reviewed all the previous electronic medical records (labs, imaging and medical charts) of the patient available in the physician portal. Significant findings which helped in decision making are recorded  in this chart.     The plan was discussed with the patient. We gave him ample opportunities to ask questions. All questions were answered to his satisfaction and he verbalized understanding.       Nba Vargas M.D.    and Director of the Neuroendocrine Tumor Program  Gastrointestinal Medical Oncology  Department of Hematology and Oncology  [facility], [place] [address]  Phone (Office): [phone]  Fax:  [phone]   Email: [email or web address]  Learn more about [facility] Comprehensive Neuroendocrine Tumor Program: Click Here  Learn more about [place] [employer]: [email or web address]

## 2025-04-16 ENCOUNTER — ANESTHESIA EVENT (OUTPATIENT)
Dept: GASTROENTEROLOGY | Facility: EXTERNAL LOCATION | Age: 45
End: 2025-04-16

## 2025-04-16 ENCOUNTER — INFUSION (OUTPATIENT)
Dept: HEMATOLOGY/ONCOLOGY | Facility: CLINIC | Age: 45
End: 2025-04-16
Payer: COMMERCIAL

## 2025-04-16 VITALS
RESPIRATION RATE: 18 BRPM | DIASTOLIC BLOOD PRESSURE: 78 MMHG | TEMPERATURE: 97.5 F | SYSTOLIC BLOOD PRESSURE: 115 MMHG | OXYGEN SATURATION: 98 % | HEART RATE: 86 BPM | WEIGHT: 151.9 LBS | BODY MASS INDEX: 23.29 KG/M2

## 2025-04-16 DIAGNOSIS — C17.9 ADENOCARCINOMA OF SMALL BOWEL (MULTI): ICD-10-CM

## 2025-04-16 LAB
ALBUMIN SERPL BCP-MCNC: 3.9 G/DL (ref 3.4–5)
ALP SERPL-CCNC: 132 U/L (ref 33–120)
ALT SERPL W P-5'-P-CCNC: 14 U/L (ref 10–52)
ANION GAP SERPL CALC-SCNC: 13 MMOL/L (ref 10–20)
APPEARANCE UR: CLEAR
AST SERPL W P-5'-P-CCNC: 19 U/L (ref 9–39)
BASOPHILS # BLD AUTO: 0.1 X10*3/UL (ref 0–0.1)
BASOPHILS NFR BLD AUTO: 1.6 %
BILIRUB SERPL-MCNC: 0.4 MG/DL (ref 0–1.2)
BILIRUB UR STRIP.AUTO-MCNC: NEGATIVE MG/DL
BUN SERPL-MCNC: 20 MG/DL (ref 6–23)
CALCIUM SERPL-MCNC: 8.7 MG/DL (ref 8.6–10.6)
CHLORIDE SERPL-SCNC: 101 MMOL/L (ref 98–107)
CO2 SERPL-SCNC: 26 MMOL/L (ref 21–32)
COLOR UR: NORMAL
CREAT SERPL-MCNC: 0.98 MG/DL (ref 0.5–1.3)
EGFRCR SERPLBLD CKD-EPI 2021: >90 ML/MIN/1.73M*2
EOSINOPHIL # BLD AUTO: 0.23 X10*3/UL (ref 0–0.7)
EOSINOPHIL NFR BLD AUTO: 3.8 %
ERYTHROCYTE [DISTWIDTH] IN BLOOD BY AUTOMATED COUNT: 16.5 % (ref 11.5–14.5)
GLUCOSE SERPL-MCNC: 107 MG/DL (ref 74–99)
GLUCOSE UR STRIP.AUTO-MCNC: NORMAL MG/DL
HCT VFR BLD AUTO: 25.3 % (ref 41–52)
HGB BLD-MCNC: 7.8 G/DL (ref 13.5–17.5)
IMM GRANULOCYTES # BLD AUTO: 0.01 X10*3/UL (ref 0–0.7)
IMM GRANULOCYTES NFR BLD AUTO: 0.2 % (ref 0–0.9)
IRON SATN MFR SERPL: ABNORMAL %
IRON SERPL-MCNC: 42 UG/DL (ref 35–150)
KETONES UR STRIP.AUTO-MCNC: NEGATIVE MG/DL
LEUKOCYTE ESTERASE UR QL STRIP.AUTO: NEGATIVE
LYMPHOCYTES # BLD AUTO: 0.95 X10*3/UL (ref 1.2–4.8)
LYMPHOCYTES NFR BLD AUTO: 15.6 %
MCH RBC QN AUTO: 28 PG (ref 26–34)
MCHC RBC AUTO-ENTMCNC: 30.8 G/DL (ref 32–36)
MCV RBC AUTO: 91 FL (ref 80–100)
MONOCYTES # BLD AUTO: 0.72 X10*3/UL (ref 0.1–1)
MONOCYTES NFR BLD AUTO: 11.8 %
NEUTROPHILS # BLD AUTO: 4.08 X10*3/UL (ref 1.2–7.7)
NEUTROPHILS NFR BLD AUTO: 67 %
NITRITE UR QL STRIP.AUTO: NEGATIVE
NRBC BLD-RTO: ABNORMAL /100{WBCS}
PH UR STRIP.AUTO: 5.5 [PH]
PLATELET # BLD AUTO: 156 X10*3/UL (ref 150–450)
POTASSIUM SERPL-SCNC: 4.5 MMOL/L (ref 3.5–5.3)
PROT SERPL-MCNC: 6.5 G/DL (ref 6.4–8.2)
PROT UR STRIP.AUTO-MCNC: NEGATIVE MG/DL
RBC # BLD AUTO: 2.79 X10*6/UL (ref 4.5–5.9)
RBC # UR STRIP.AUTO: NEGATIVE MG/DL
SODIUM SERPL-SCNC: 135 MMOL/L (ref 136–145)
SP GR UR STRIP.AUTO: 1.02
TIBC SERPL-MCNC: ABNORMAL UG/DL
UIBC SERPL-MCNC: >450 UG/DL (ref 110–370)
UROBILINOGEN UR STRIP.AUTO-MCNC: NORMAL MG/DL
WBC # BLD AUTO: 6.1 X10*3/UL (ref 4.4–11.3)

## 2025-04-16 PROCEDURE — 96375 TX/PRO/DX INJ NEW DRUG ADDON: CPT | Mod: INF

## 2025-04-16 PROCEDURE — 85025 COMPLETE CBC W/AUTO DIFF WBC: CPT

## 2025-04-16 PROCEDURE — 96368 THER/DIAG CONCURRENT INF: CPT

## 2025-04-16 PROCEDURE — 96413 CHEMO IV INFUSION 1 HR: CPT

## 2025-04-16 PROCEDURE — 2500000004 HC RX 250 GENERAL PHARMACY W/ HCPCS (ALT 636 FOR OP/ED): Performed by: INTERNAL MEDICINE

## 2025-04-16 PROCEDURE — 2500000004 HC RX 250 GENERAL PHARMACY W/ HCPCS (ALT 636 FOR OP/ED): Mod: JZ | Performed by: INTERNAL MEDICINE

## 2025-04-16 PROCEDURE — 96416 CHEMO PROLONG INFUSE W/PUMP: CPT

## 2025-04-16 RX ORDER — ATROPINE SULFATE 0.4 MG/ML
0.4 INJECTION, SOLUTION ENDOTRACHEAL; INTRAMEDULLARY; INTRAMUSCULAR; INTRAVENOUS; SUBCUTANEOUS
Status: DISCONTINUED | OUTPATIENT
Start: 2025-04-16 | End: 2025-04-16 | Stop reason: HOSPADM

## 2025-04-16 RX ORDER — DEXAMETHASONE 6 MG/1
12 TABLET ORAL ONCE
Status: COMPLETED | OUTPATIENT
Start: 2025-04-16 | End: 2025-04-16

## 2025-04-16 RX ORDER — PROCHLORPERAZINE EDISYLATE 5 MG/ML
10 INJECTION INTRAMUSCULAR; INTRAVENOUS EVERY 6 HOURS PRN
Status: DISCONTINUED | OUTPATIENT
Start: 2025-04-16 | End: 2025-04-16 | Stop reason: HOSPADM

## 2025-04-16 RX ORDER — DIPHENHYDRAMINE HYDROCHLORIDE 50 MG/ML
50 INJECTION, SOLUTION INTRAMUSCULAR; INTRAVENOUS AS NEEDED
Status: DISCONTINUED | OUTPATIENT
Start: 2025-04-16 | End: 2025-04-16 | Stop reason: HOSPADM

## 2025-04-16 RX ORDER — PALONOSETRON 0.05 MG/ML
0.25 INJECTION, SOLUTION INTRAVENOUS ONCE
Status: COMPLETED | OUTPATIENT
Start: 2025-04-16 | End: 2025-04-16

## 2025-04-16 RX ORDER — FAMOTIDINE 10 MG/ML
20 INJECTION, SOLUTION INTRAVENOUS ONCE AS NEEDED
Status: DISCONTINUED | OUTPATIENT
Start: 2025-04-16 | End: 2025-04-16 | Stop reason: HOSPADM

## 2025-04-16 RX ORDER — HEPARIN 100 UNIT/ML
500 SYRINGE INTRAVENOUS AS NEEDED
Status: CANCELLED | OUTPATIENT
Start: 2025-04-16

## 2025-04-16 RX ORDER — HEPARIN SODIUM,PORCINE/PF 10 UNIT/ML
50 SYRINGE (ML) INTRAVENOUS AS NEEDED
Status: CANCELLED | OUTPATIENT
Start: 2025-04-16

## 2025-04-16 RX ORDER — ALBUTEROL SULFATE 0.83 MG/ML
3 SOLUTION RESPIRATORY (INHALATION) AS NEEDED
Status: DISCONTINUED | OUTPATIENT
Start: 2025-04-16 | End: 2025-04-16 | Stop reason: HOSPADM

## 2025-04-16 RX ORDER — EPINEPHRINE 0.3 MG/.3ML
0.3 INJECTION SUBCUTANEOUS EVERY 5 MIN PRN
Status: DISCONTINUED | OUTPATIENT
Start: 2025-04-16 | End: 2025-04-16 | Stop reason: HOSPADM

## 2025-04-16 RX ORDER — PROCHLORPERAZINE MALEATE 10 MG
10 TABLET ORAL EVERY 6 HOURS PRN
Status: DISCONTINUED | OUTPATIENT
Start: 2025-04-16 | End: 2025-04-16 | Stop reason: HOSPADM

## 2025-04-16 RX ADMIN — PALONOSETRON 0.25 MG: 0.05 INJECTION, SOLUTION INTRAVENOUS at 12:49

## 2025-04-16 RX ADMIN — ATROPINE SULFATE 0.4 MG: 0.4 INJECTION, SOLUTION INTRAVENOUS at 15:06

## 2025-04-16 RX ADMIN — DEXAMETHASONE 12 MG: 6 TABLET ORAL at 12:49

## 2025-04-16 RX ADMIN — IRINOTECAN HYDROCHLORIDE 280 MG: 20 INJECTION, SOLUTION INTRAVENOUS at 13:21

## 2025-04-16 RX ADMIN — LEUCOVORIN CALCIUM 740 MG: 350 INJECTION, POWDER, LYOPHILIZED, FOR SOLUTION INTRAMUSCULAR; INTRAVENOUS at 13:21

## 2025-04-16 RX ADMIN — FLUOROURACIL 3550 MG: 50 INJECTION, SOLUTION INTRAVENOUS at 15:09

## 2025-04-16 ASSESSMENT — PAIN SCALES - GENERAL: PAINLEVEL_OUTOF10: 0-NO PAIN

## 2025-04-16 NOTE — SIGNIFICANT EVENT
04/16/25 1238   Prechemo Checklist   Has the patient been in the hospital, ED, or urgent care since last date of service No   Chemo/Immuno Consent Completed and Signed Yes   Confirmed to previous date/time of medication N/A  (1st irino)   Compared to previous dose N/A  (1st irino)   All medications are dated accurately Yes   Pregnancy Test Negative Not applicable   Parameters Met Yes   BSA/Weight-Height Verified Yes   Dose Calculations Verified (current, total, cumulative) Yes

## 2025-04-17 LAB
DPYD GENE MUT ANL BLD/T: NORMAL
ELECTRONICALLY SIGNED BY: NORMAL

## 2025-04-18 ENCOUNTER — INFUSION (OUTPATIENT)
Dept: HEMATOLOGY/ONCOLOGY | Facility: CLINIC | Age: 45
End: 2025-04-18
Payer: COMMERCIAL

## 2025-04-18 VITALS
OXYGEN SATURATION: 99 % | RESPIRATION RATE: 18 BRPM | BODY MASS INDEX: 23.39 KG/M2 | WEIGHT: 152.56 LBS | HEART RATE: 97 BPM | DIASTOLIC BLOOD PRESSURE: 80 MMHG | SYSTOLIC BLOOD PRESSURE: 120 MMHG | TEMPERATURE: 97.9 F

## 2025-04-18 DIAGNOSIS — C17.9 ADENOCARCINOMA OF SMALL BOWEL (MULTI): ICD-10-CM

## 2025-04-18 PROCEDURE — 96523 IRRIG DRUG DELIVERY DEVICE: CPT

## 2025-04-18 RX ORDER — HEPARIN 100 UNIT/ML
500 SYRINGE INTRAVENOUS AS NEEDED
Status: DISCONTINUED | OUTPATIENT
Start: 2025-04-18 | End: 2025-04-18 | Stop reason: HOSPADM

## 2025-04-18 RX ORDER — HEPARIN 100 UNIT/ML
500 SYRINGE INTRAVENOUS AS NEEDED
OUTPATIENT
Start: 2025-04-18

## 2025-04-18 RX ORDER — HEPARIN SODIUM,PORCINE/PF 10 UNIT/ML
50 SYRINGE (ML) INTRAVENOUS AS NEEDED
OUTPATIENT
Start: 2025-04-18

## 2025-04-18 ASSESSMENT — PAIN SCALES - GENERAL: PAINLEVEL_OUTOF10: 0-NO PAIN

## 2025-04-18 NOTE — PROGRESS NOTES
PT's mediport deaccessed and home infusion pump removed. Treatment complete. Mediport flushed with NS and heparin per policy. +BBR. Pump returned to storage.

## 2025-04-23 ENCOUNTER — APPOINTMENT (OUTPATIENT)
Dept: GASTROENTEROLOGY | Facility: EXTERNAL LOCATION | Age: 45
End: 2025-04-23
Payer: COMMERCIAL

## 2025-04-23 ENCOUNTER — ANESTHESIA (OUTPATIENT)
Dept: GASTROENTEROLOGY | Facility: EXTERNAL LOCATION | Age: 45
End: 2025-04-23

## 2025-04-23 ENCOUNTER — NURSE TRIAGE (OUTPATIENT)
Dept: HEMATOLOGY/ONCOLOGY | Facility: CLINIC | Age: 45
End: 2025-04-23

## 2025-04-23 VITALS
HEIGHT: 68 IN | TEMPERATURE: 97.3 F | DIASTOLIC BLOOD PRESSURE: 76 MMHG | RESPIRATION RATE: 14 BRPM | BODY MASS INDEX: 22.73 KG/M2 | OXYGEN SATURATION: 100 % | SYSTOLIC BLOOD PRESSURE: 123 MMHG | HEART RATE: 81 BPM | WEIGHT: 150 LBS

## 2025-04-23 DIAGNOSIS — C17.9 ADENOCARCINOMA OF SMALL BOWEL (MULTI): ICD-10-CM

## 2025-04-23 PROCEDURE — 45380 COLONOSCOPY AND BIOPSY: CPT | Performed by: INTERNAL MEDICINE

## 2025-04-23 RX ORDER — PROPOFOL 10 MG/ML
INJECTION, EMULSION INTRAVENOUS AS NEEDED
Status: DISCONTINUED | OUTPATIENT
Start: 2025-04-23 | End: 2025-04-23

## 2025-04-23 RX ORDER — SODIUM CHLORIDE 9 MG/ML
INJECTION, SOLUTION INTRAVENOUS CONTINUOUS PRN
Status: DISCONTINUED | OUTPATIENT
Start: 2025-04-23 | End: 2025-04-23

## 2025-04-23 RX ORDER — LIDOCAINE HYDROCHLORIDE 20 MG/ML
INJECTION, SOLUTION INFILTRATION; PERINEURAL AS NEEDED
Status: DISCONTINUED | OUTPATIENT
Start: 2025-04-23 | End: 2025-04-23

## 2025-04-23 RX ADMIN — PROPOFOL 50 MG: 10 INJECTION, EMULSION INTRAVENOUS at 10:24

## 2025-04-23 RX ADMIN — PROPOFOL 50 MG: 10 INJECTION, EMULSION INTRAVENOUS at 10:32

## 2025-04-23 RX ADMIN — SODIUM CHLORIDE: 9 INJECTION, SOLUTION INTRAVENOUS at 10:14

## 2025-04-23 RX ADMIN — PROPOFOL 80 MG: 10 INJECTION, EMULSION INTRAVENOUS at 10:17

## 2025-04-23 RX ADMIN — PROPOFOL 20 MG: 10 INJECTION, EMULSION INTRAVENOUS at 10:20

## 2025-04-23 RX ADMIN — LIDOCAINE HYDROCHLORIDE 50 MG: 20 INJECTION, SOLUTION INFILTRATION; PERINEURAL at 10:17

## 2025-04-23 RX ADMIN — PROPOFOL 50 MG: 10 INJECTION, EMULSION INTRAVENOUS at 10:28

## 2025-04-23 SDOH — HEALTH STABILITY: MENTAL HEALTH: CURRENT SMOKER: 0

## 2025-04-23 ASSESSMENT — COLUMBIA-SUICIDE SEVERITY RATING SCALE - C-SSRS
2. HAVE YOU ACTUALLY HAD ANY THOUGHTS OF KILLING YOURSELF?: NO
6. HAVE YOU EVER DONE ANYTHING, STARTED TO DO ANYTHING, OR PREPARED TO DO ANYTHING TO END YOUR LIFE?: NO
1. IN THE PAST MONTH, HAVE YOU WISHED YOU WERE DEAD OR WISHED YOU COULD GO TO SLEEP AND NOT WAKE UP?: NO

## 2025-04-23 ASSESSMENT — PAIN SCALES - GENERAL
PAINLEVEL_OUTOF10: 0 - NO PAIN
PAINLEVEL_OUTOF10: 0 - NO PAIN
PAIN_LEVEL: 0
PAINLEVEL_OUTOF10: 0 - NO PAIN
PAINLEVEL_OUTOF10: 0 - NO PAIN

## 2025-04-23 ASSESSMENT — PAIN - FUNCTIONAL ASSESSMENT
PAIN_FUNCTIONAL_ASSESSMENT: 0-10

## 2025-04-23 NOTE — DISCHARGE INSTRUCTIONS

## 2025-04-23 NOTE — H&P
Outpatient Hospital Procedure    Patient Profile-Procedures  Initial Info  Patient Demographics  Name Thai Cristobal  Date of Birth 1980  MRN [id number]  Address   [address] [facility] [id number] [facility] [address]    Primary Phone Number [phone]  Secondary Phone Number    Charlotte Meza    Procedures   Colonoscopy      Indication:  H/o Crohn's ds, NET-grade 3  Liver mets adenocarcinoma  anemia    Primary contact name and number   Extended Emergency Contact Information  Primary Emergency Contact: Danielle Cristobal  Address: [address] [place] [address] [facility]  Home Phone: [phone]  Mobile Phone: [phone]  Relation: Spouse    General Health  Weight There were no vitals filed for this visit.  BMI There is no height or weight on file to calculate BMI.    Allergies  RX Allergies[1]    Past Medical History   Medical History[2]    Provider assessment  Diagnosis  Medication Reviewed - yes  Prior to Admission medications    Medication Sig Start Date End Date Taking? Authorizing Provider   ALPRAZolam (Xanax) 0.5 mg tablet Take 1 tablet (0.5 mg) by mouth once daily as needed for anxiety. 4/14/25  Yes Charlotte Yan V DO   cholecalciferol (Vitamin D-3) 25 MCG (1000 UT) capsule Take 1 capsule (25 mcg) by mouth once daily.   Yes Historical Provider, MD   elderberry fruit 350 mg capsule Take 1 capsule by mouth once daily.   Yes Historical Provider, MD   flaxseed oiL 1,000 mg capsule Take 1 capsule (1,000 mg) by mouth once daily.   Yes Historical Provider, MD   FLUoxetine (PROzac) 20 mg capsule Take 1 capsule (20 mg) by mouth once daily at bedtime. 4/7/25  Yes Charlotte VOSS DO   FLUoxetine (PROzac) 40 mg capsule TAKE 1 CAPSULE BY MOUTH ONCE DAILY 10/31/24  Yes Charlotte VOSS DO   lisinopril 20 mg tablet Take 1 tablet (20 mg) by mouth once daily. 1/30/25  Yes Charlotte Yan V DO   omeprazole OTC (PriLOSEC OTC) 20 mg EC tablet Take  1 tablet (20 mg) by mouth once daily. Do not crush, chew, or split. 12/31/24 6/29/25 Yes Nba Vargas MD   vitamin C-multivitamin-mineral (Emergen-C) 500 mg tablet,chewable Chew 1 tablet once daily.   Yes Historical Provider, MD   SUMAtriptan (Imitrex) 50 mg tablet TAKE 1 TABLET (50 MG) BY MOUTH 1 TIME IF NEEDED FOR MIGRAINE FOR UP TO 36 DOSES. 10/24/24   Charlotte Yan V, DO       This is my H&P    Physical Exam  Physical Exam  Constitutional:       Appearance: Normal appearance.   HENT:      Head: Normocephalic.      Mouth/Throat:      Mouth: Mucous membranes are moist.   Eyes:      Extraocular Movements: Extraocular movements intact.      Pupils: Pupils are equal, round, and reactive to light.   Cardiovascular:      Rate and Rhythm: Normal rate and regular rhythm.      Pulses: Normal pulses.      Heart sounds: Normal heart sounds.   Pulmonary:      Effort: Pulmonary effort is normal.      Breath sounds: Normal breath sounds.   Abdominal:      General: Bowel sounds are normal.      Palpations: Abdomen is soft.   Neurological:      General: No focal deficit present.      Mental Status: He is alert.           Oropharyngeal Classification II (hard and soft palate, upper portion of tonsils and uvula visible)  ASA PS Classification 3  Sedation Plan Moderate  Procedure Plan - pre-procedural (re)assesment completed by physician:  discharge/transfer patient when discharge criteria met    Yossi Caceres MD  4/23/2025 9:59 AM           [1]   Allergies  Allergen Reactions    Oxaliplatin Itching   [2]   Past Medical History:  Diagnosis Date    Acute upper respiratory infection, unspecified 02/21/2018    Acute URI    Crohn's disease (Multi)     Personal history of other specified conditions 04/23/2018    History of vertigo    SBO (small bowel obstruction) (Multi) 01/25/2024

## 2025-04-23 NOTE — H&P (VIEW-ONLY)
Outpatient Hospital Procedure    Patient Profile-Procedures  Initial Info  Patient Demographics  Name Thai Cristobal  Date of Birth 1980  MRN [id number]  Address   [address] [facility] [id number] [facility] [address]    Primary Phone Number [phone]  Secondary Phone Number    Charlotte Meza    Procedures   Colonoscopy      Indication:  H/o Crohn's ds, NET-grade 3  Liver mets adenocarcinoma  anemia    Primary contact name and number   Extended Emergency Contact Information  Primary Emergency Contact: Danielle Cristobal  Address: [address] [place] [address] [facility]  Home Phone: [phone]  Mobile Phone: [phone]  Relation: Spouse    General Health  Weight There were no vitals filed for this visit.  BMI There is no height or weight on file to calculate BMI.    Allergies  RX Allergies[1]    Past Medical History   Medical History[2]    Provider assessment  Diagnosis  Medication Reviewed - yes  Prior to Admission medications    Medication Sig Start Date End Date Taking? Authorizing Provider   ALPRAZolam (Xanax) 0.5 mg tablet Take 1 tablet (0.5 mg) by mouth once daily as needed for anxiety. 4/14/25  Yes Charlotte Yan V DO   cholecalciferol (Vitamin D-3) 25 MCG (1000 UT) capsule Take 1 capsule (25 mcg) by mouth once daily.   Yes Historical Provider, MD   elderberry fruit 350 mg capsule Take 1 capsule by mouth once daily.   Yes Historical Provider, MD   flaxseed oiL 1,000 mg capsule Take 1 capsule (1,000 mg) by mouth once daily.   Yes Historical Provider, MD   FLUoxetine (PROzac) 20 mg capsule Take 1 capsule (20 mg) by mouth once daily at bedtime. 4/7/25  Yes Charlotte VOSS DO   FLUoxetine (PROzac) 40 mg capsule TAKE 1 CAPSULE BY MOUTH ONCE DAILY 10/31/24  Yes Charlotte VOSS DO   lisinopril 20 mg tablet Take 1 tablet (20 mg) by mouth once daily. 1/30/25  Yes Charlotte Yan V DO   omeprazole OTC (PriLOSEC OTC) 20 mg EC tablet Take  1 tablet (20 mg) by mouth once daily. Do not crush, chew, or split. 12/31/24 6/29/25 Yes Nba Vargas MD   vitamin C-multivitamin-mineral (Emergen-C) 500 mg tablet,chewable Chew 1 tablet once daily.   Yes Historical Provider, MD   SUMAtriptan (Imitrex) 50 mg tablet TAKE 1 TABLET (50 MG) BY MOUTH 1 TIME IF NEEDED FOR MIGRAINE FOR UP TO 36 DOSES. 10/24/24   Charlotte Yan V, DO       This is my H&P    Physical Exam  Physical Exam  Constitutional:       Appearance: Normal appearance.   HENT:      Head: Normocephalic.      Mouth/Throat:      Mouth: Mucous membranes are moist.   Eyes:      Extraocular Movements: Extraocular movements intact.      Pupils: Pupils are equal, round, and reactive to light.   Cardiovascular:      Rate and Rhythm: Normal rate and regular rhythm.      Pulses: Normal pulses.      Heart sounds: Normal heart sounds.   Pulmonary:      Effort: Pulmonary effort is normal.      Breath sounds: Normal breath sounds.   Abdominal:      General: Bowel sounds are normal.      Palpations: Abdomen is soft.   Neurological:      General: No focal deficit present.      Mental Status: He is alert.           Oropharyngeal Classification II (hard and soft palate, upper portion of tonsils and uvula visible)  ASA PS Classification 3  Sedation Plan Moderate  Procedure Plan - pre-procedural (re)assesment completed by physician:  discharge/transfer patient when discharge criteria met    Yossi Caceres MD  4/23/2025 9:59 AM           [1]   Allergies  Allergen Reactions    Oxaliplatin Itching   [2]   Past Medical History:  Diagnosis Date    Acute upper respiratory infection, unspecified 02/21/2018    Acute URI    Crohn's disease (Multi)     Personal history of other specified conditions 04/23/2018    History of vertigo    SBO (small bowel obstruction) (Multi) 01/25/2024

## 2025-04-23 NOTE — ANESTHESIA PREPROCEDURE EVALUATION
Patient: Thai Cristobal    Procedure Information       Date/Time: 04/23/25 0950    Scheduled providers: Yossi Caceres MD    Procedure: COLONOSCOPY    Location: [facility]            Relevant Problems   Cardiac   (+) Atypical chest pain   (+) Benign essential hypertension   (+) PSVT (paroxysmal supraventricular tachycardia) (CMS-HCC)   (+) Paroxysmal supraventricular tachycardia (CMS-HCC)   (+) Primary hypertension      Pulmonary   (+) Asthma      Neuro   (+) Anxiety   (+) Depression   (+) Panic attacks      GI   (+) Adenocarcinoma of small bowel (Multi)   (+) Crohn's disease (Multi)      Liver   (+) Adenocarcinoma of small bowel (Multi)   (+) Secondary neuroendocrine tumor of liver      HEENT   (+) Seasonal allergies       Clinical information reviewed:    Allergies                NPO Detail:  No data recorded     Physical Exam    Airway  Mallampati: II  TM distance: >3 FB  Neck ROM: full     Cardiovascular - normal exam   Dental - normal exam     Pulmonary - normal examBreath sounds clear to auscultation     Abdominal            Anesthesia Plan    History of general anesthesia?: yes  History of complications of general anesthesia?: no    ASA 3     MAC     The patient is not a current smoker.    intravenous induction   Anesthetic plan and risks discussed with patient.    Plan discussed with CRNA.

## 2025-04-23 NOTE — ANESTHESIA POSTPROCEDURE EVALUATION
Patient: Thai Cristobal    Procedure Summary       Date: 04/23/25 Room / Location: [facility]    Anesthesia Start: 1014 Anesthesia Stop:     Procedure: COLONOSCOPY Diagnosis: Adenocarcinoma of small bowel (Multi)    Scheduled Providers: Yossi Caceres MD Responsible Provider: SHIRA Kramer    Anesthesia Type: MAC ASA Status: 3            Anesthesia Type: MAC    Vitals Value Taken Time   /72 04/23/25 10:44   Temp 36.3 °C (97.3 °F) 04/23/25 10:44   Pulse 86 04/23/25 10:44   Resp 14 04/23/25 10:44   SpO2 100 % 04/23/25 10:44       Anesthesia Post Evaluation    Patient location during evaluation: bedside  Patient participation: complete - patient cannot participate  Level of consciousness: awake and responsive to verbal stimuli  Pain score: 0  Pain management: adequate  Airway patency: patent  Cardiovascular status: acceptable and hemodynamically stable  Respiratory status: acceptable  Hydration status: acceptable  Postoperative Nausea and Vomiting: none        No notable events documented.

## 2025-04-24 ENCOUNTER — APPOINTMENT (OUTPATIENT)
Dept: CARDIOLOGY | Facility: HOSPITAL | Age: 45
End: 2025-04-24
Payer: COMMERCIAL

## 2025-04-24 ENCOUNTER — APPOINTMENT (OUTPATIENT)
Dept: RADIOLOGY | Facility: HOSPITAL | Age: 45
End: 2025-04-24
Payer: COMMERCIAL

## 2025-04-24 ENCOUNTER — HOSPITAL ENCOUNTER (EMERGENCY)
Facility: HOSPITAL | Age: 45
Discharge: HOME | End: 2025-04-24
Attending: EMERGENCY MEDICINE
Payer: COMMERCIAL

## 2025-04-24 VITALS
SYSTOLIC BLOOD PRESSURE: 106 MMHG | HEIGHT: 68 IN | BODY MASS INDEX: 22.72 KG/M2 | DIASTOLIC BLOOD PRESSURE: 67 MMHG | TEMPERATURE: 97.5 F | OXYGEN SATURATION: 99 % | WEIGHT: 149.91 LBS | RESPIRATION RATE: 14 BRPM | HEART RATE: 82 BPM

## 2025-04-24 DIAGNOSIS — D64.9 ANEMIA, UNSPECIFIED TYPE: ICD-10-CM

## 2025-04-24 DIAGNOSIS — R06.02 SHORTNESS OF BREATH: Primary | ICD-10-CM

## 2025-04-24 LAB
ABO GROUP (TYPE) IN BLOOD: NORMAL
ABO GROUP (TYPE) IN BLOOD: NORMAL
ALBUMIN SERPL BCP-MCNC: 4.1 G/DL (ref 3.4–5)
ALP SERPL-CCNC: 102 U/L (ref 33–120)
ALT SERPL W P-5'-P-CCNC: 11 U/L (ref 10–52)
ANION GAP SERPL CALC-SCNC: 12 MMOL/L (ref 10–20)
ANTIBODY SCREEN: NORMAL
AST SERPL W P-5'-P-CCNC: 16 U/L (ref 9–39)
ATRIAL RATE: 85 BPM
ATRIAL RATE: 88 BPM
BASOPHILS # BLD AUTO: 0.05 X10*3/UL (ref 0–0.1)
BASOPHILS NFR BLD AUTO: 1.2 %
BILIRUB SERPL-MCNC: 0.4 MG/DL (ref 0–1.2)
BLOOD EXPIRATION DATE: NORMAL
BNP SERPL-MCNC: 70 PG/ML (ref 0–99)
BUN SERPL-MCNC: 14 MG/DL (ref 6–23)
CALCIUM SERPL-MCNC: 8.9 MG/DL (ref 8.6–10.3)
CHLORIDE SERPL-SCNC: 103 MMOL/L (ref 98–107)
CO2 SERPL-SCNC: 25 MMOL/L (ref 21–32)
CREAT SERPL-MCNC: 0.95 MG/DL (ref 0.5–1.3)
DISPENSE STATUS: NORMAL
EGFRCR SERPLBLD CKD-EPI 2021: >90 ML/MIN/1.73M*2
EOSINOPHIL # BLD AUTO: 0.08 X10*3/UL (ref 0–0.7)
EOSINOPHIL NFR BLD AUTO: 1.9 %
ERYTHROCYTE [DISTWIDTH] IN BLOOD BY AUTOMATED COUNT: 17 % (ref 11.5–14.5)
GLUCOSE SERPL-MCNC: 99 MG/DL (ref 74–99)
HCT VFR BLD AUTO: 22.7 % (ref 41–52)
HGB BLD-MCNC: 7 G/DL (ref 13.5–17.5)
HOLD SPECIMEN: NORMAL
HOLD SPECIMEN: NORMAL
IMM GRANULOCYTES # BLD AUTO: 0.02 X10*3/UL (ref 0–0.7)
IMM GRANULOCYTES NFR BLD AUTO: 0.5 % (ref 0–0.9)
LYMPHOCYTES # BLD AUTO: 0.76 X10*3/UL (ref 1.2–4.8)
LYMPHOCYTES NFR BLD AUTO: 18.3 %
MCH RBC QN AUTO: 28 PG (ref 26–34)
MCHC RBC AUTO-ENTMCNC: 30.8 G/DL (ref 32–36)
MCV RBC AUTO: 91 FL (ref 80–100)
MONOCYTES # BLD AUTO: 0.22 X10*3/UL (ref 0.1–1)
MONOCYTES NFR BLD AUTO: 5.3 %
NEUTROPHILS # BLD AUTO: 3.02 X10*3/UL (ref 1.2–7.7)
NEUTROPHILS NFR BLD AUTO: 72.8 %
NRBC BLD-RTO: 0 /100 WBCS (ref 0–0)
P AXIS: 44 DEGREES
P AXIS: 64 DEGREES
P OFFSET: 203 MS
P OFFSET: 216 MS
P ONSET: 152 MS
P ONSET: 162 MS
PLATELET # BLD AUTO: 113 X10*3/UL (ref 150–450)
POTASSIUM SERPL-SCNC: 3.6 MMOL/L (ref 3.5–5.3)
PR INTERVAL: 124 MS
PR INTERVAL: 130 MS
PRODUCT BLOOD TYPE: 5100
PRODUCT CODE: NORMAL
PROT SERPL-MCNC: 6.6 G/DL (ref 6.4–8.2)
Q ONSET: 214 MS
Q ONSET: 227 MS
QRS COUNT: 14 BEATS
QRS COUNT: 14 BEATS
QRS DURATION: 86 MS
QRS DURATION: 88 MS
QT INTERVAL: 338 MS
QT INTERVAL: 342 MS
QTC CALCULATION(BAZETT): 406 MS
QTC CALCULATION(BAZETT): 408 MS
QTC FREDERICIA: 384 MS
QTC FREDERICIA: 384 MS
R AXIS: 56 DEGREES
R AXIS: 67 DEGREES
RBC # BLD AUTO: 2.5 X10*6/UL (ref 4.5–5.9)
RH FACTOR (ANTIGEN D): NORMAL
RH FACTOR (ANTIGEN D): NORMAL
SODIUM SERPL-SCNC: 136 MMOL/L (ref 136–145)
T AXIS: 65 DEGREES
T AXIS: 68 DEGREES
T OFFSET: 383 MS
T OFFSET: 398 MS
UNIT ABO: NORMAL
UNIT NUMBER: NORMAL
UNIT RH: NORMAL
UNIT VOLUME: 350
VENTRICULAR RATE: 85 BPM
VENTRICULAR RATE: 88 BPM
WBC # BLD AUTO: 4.2 X10*3/UL (ref 4.4–11.3)
XM INTEP: NORMAL

## 2025-04-24 PROCEDURE — 93005 ELECTROCARDIOGRAM TRACING: CPT

## 2025-04-24 PROCEDURE — 86901 BLOOD TYPING SEROLOGIC RH(D): CPT | Performed by: EMERGENCY MEDICINE

## 2025-04-24 PROCEDURE — 71045 X-RAY EXAM CHEST 1 VIEW: CPT | Mod: FOREIGN READ | Performed by: RADIOLOGY

## 2025-04-24 PROCEDURE — 36430 TRANSFUSION BLD/BLD COMPNT: CPT

## 2025-04-24 PROCEDURE — P9040 RBC LEUKOREDUCED IRRADIATED: HCPCS

## 2025-04-24 PROCEDURE — 84075 ASSAY ALKALINE PHOSPHATASE: CPT | Performed by: EMERGENCY MEDICINE

## 2025-04-24 PROCEDURE — 86922 COMPATIBILITY TEST ANTIGLOB: CPT

## 2025-04-24 PROCEDURE — 99285 EMERGENCY DEPT VISIT HI MDM: CPT | Performed by: NURSE PRACTITIONER

## 2025-04-24 PROCEDURE — 71045 X-RAY EXAM CHEST 1 VIEW: CPT

## 2025-04-24 PROCEDURE — 99285 EMERGENCY DEPT VISIT HI MDM: CPT | Mod: 25 | Performed by: EMERGENCY MEDICINE

## 2025-04-24 PROCEDURE — 85025 COMPLETE CBC W/AUTO DIFF WBC: CPT | Performed by: EMERGENCY MEDICINE

## 2025-04-24 PROCEDURE — 83880 ASSAY OF NATRIURETIC PEPTIDE: CPT | Performed by: EMERGENCY MEDICINE

## 2025-04-24 ASSESSMENT — PAIN SCALES - GENERAL
PAINLEVEL_OUTOF10: 0 - NO PAIN
PAINLEVEL_OUTOF10: 0 - NO PAIN

## 2025-04-24 ASSESSMENT — PAIN - FUNCTIONAL ASSESSMENT
PAIN_FUNCTIONAL_ASSESSMENT: 0-10
PAIN_FUNCTIONAL_ASSESSMENT: 0-10

## 2025-04-24 ASSESSMENT — LIFESTYLE VARIABLES
TOTAL SCORE: 0
EVER FELT BAD OR GUILTY ABOUT YOUR DRINKING: NO
HAVE YOU EVER FELT YOU SHOULD CUT DOWN ON YOUR DRINKING: NO
HAVE PEOPLE ANNOYED YOU BY CRITICIZING YOUR DRINKING: NO
EVER HAD A DRINK FIRST THING IN THE MORNING TO STEADY YOUR NERVES TO GET RID OF A HANGOVER: NO

## 2025-04-24 NOTE — ED PROVIDER NOTES
Emergency Department Encounter  [facility] EMERGENCY MEDICINE    Patient: Thai Cristobal  MRN: [id number]  : 1980  Date of Evaluation: 2025  ED Provider: REYES Corona    ED care was supervised by Dr. Reed who independently examined and evaluated the patient. Please see their attestation note for further details.    Limitations to history: none  Independent Historian: self  Records reviewed: Care everywhere, paper chart, Inpatient and outpatient notes    Chief Complaint       Chief Complaint   Patient presents with    Shortness of Breath     SOB with activity hx of low H&H      [place]    (Location/Symptom, Timing/Onset, Context/Setting, Quality, Duration, Modifying Factors, Severity) Note limiting factors.   Thai Cristobal is a 44 y.o. male with past medical history of Crohn's, status post right colectomy small bowel resection and repair of a colonic fistula, pathology showing neuroendocrine tumor grade 3 well-differentiated, who presents to the emergency department complaining of anemia, is undergoing chemotherapy, last dose of chemotherapy was last week Wednesday has been complaining of shortness of breath for the last 2 weeks, paleness, had a colonoscopy yesterday which was clear from bleeding.  Patient without any new abdominal pain, has a known ulcer that is not bleeding to the right abdomen that was found when they did surgery.  Sent in by his doctor due to symptomatic anemia, does report that he has a history of anemia and has had 1 transfusion in the past at the end of last year.  Denies any hematemesis, denies any dark stools, states his stool is lighter and tan.  Denies any fevers, chills, chest pain, cough.  States that it may be due to a side effect from chemotherapy    ROS:     Review of Systems  14 systems reviewed and otherwise acutely negative except as in the [place].      Past History   Medical History[1]  Surgical History[2]  Social  History[3]    Medications/Allergies     Previous Medications    ALPRAZOLAM (XANAX) 0.5 MG TABLET    Take 1 tablet (0.5 mg) by mouth once daily as needed for anxiety.    CHOLECALCIFEROL (VITAMIN D-3) 25 MCG (1000 UT) CAPSULE    Take 1 capsule (25 mcg) by mouth once daily.    ELDERBERRY FRUIT 350 MG CAPSULE    Take 1 capsule by mouth once daily.    FLAXSEED OIL 1,000 MG CAPSULE    Take 1 capsule (1,000 mg) by mouth once daily.    FLUOXETINE (PROZAC) 20 MG CAPSULE    Take 1 capsule (20 mg) by mouth once daily at bedtime.    FLUOXETINE (PROZAC) 40 MG CAPSULE    TAKE 1 CAPSULE BY MOUTH ONCE DAILY    LISINOPRIL 20 MG TABLET    Take 1 tablet (20 mg) by mouth once daily.    OMEPRAZOLE OTC (PRILOSEC OTC) 20 MG EC TABLET    Take 1 tablet (20 mg) by mouth once daily. Do not crush, chew, or split.    SUMATRIPTAN (IMITREX) 50 MG TABLET    TAKE 1 TABLET (50 MG) BY MOUTH 1 TIME IF NEEDED FOR MIGRAINE FOR UP TO 36 DOSES.    VITAMIN C-MULTIVITAMIN-MINERAL (EMERGEN-C) 500 MG TABLET,CHEWABLE    Chew 1 tablet once daily.     Allergies[4]     Physical Exam       ED Triage Vitals [04/24/25 1007]   Temperature Heart Rate Respirations BP   36.9 °C (98.4 °F) 92 16 130/62      Pulse Ox Temp Source Heart Rate Source Patient Position   100 % Temporal Monitor Sitting      BP Location FiO2 (%)     Left arm --         Physical Exam    GENERAL:  The patient appears nourished and normally developed. Vital signs as documented.     HEENT:  Head normocephalic, atraumatic, EOMs intact, PERRLA, Mucous membranes moist and pale. Nares patent without copious rhinorrhea.  No lymphadenopathy.    PULMONARY:  Lungs are clear to auscultation, without any respiratory distress. Able to speak full sentences, no accessory muscle use    CARDIAC:   Normal rate. No murmurs, rubs or gallops    ABDOMEN:  Soft, non-distended, non-tender, BS positive x 4 quadrants, No rebound or guarding, no peritoneal signs, no CVA tenderness, no masses or  organomegaly    MUSCULOSKELETAL:   Able to ambulate, Non edematous, with no obvious deformities. Pulses intact distal    SKIN:   Pale, warm, dry, no significant rashes    NEURO:  No obvious neurological deficits, normal sensation and strength bilaterally.  Able to follow commands, NIH 0, CN 2-12 intact.        Diagnostics   Labs:  Results for orders placed or performed during the hospital encounter of 04/24/25   CBC with Differential    Collection Time: 04/24/25 10:19 AM   Result Value Ref Range    WBC 4.2 (L) 4.4 - 11.3 x10*3/uL    nRBC 0.0 0.0 - 0.0 /100 WBCs    RBC 2.50 (L) 4.50 - 5.90 x10*6/uL    Hemoglobin 7.0 (L) 13.5 - 17.5 g/dL    Hematocrit 22.7 (L) 41.0 - 52.0 %    MCV 91 80 - 100 fL    MCH 28.0 26.0 - 34.0 pg    MCHC 30.8 (L) 32.0 - 36.0 g/dL    RDW 17.0 (H) 11.5 - 14.5 %    Platelets 113 (L) 150 - 450 x10*3/uL    Neutrophils % 72.8 40.0 - 80.0 %    Immature Granulocytes %, Automated 0.5 0.0 - 0.9 %    Lymphocytes % 18.3 13.0 - 44.0 %    Monocytes % 5.3 2.0 - 10.0 %    Eosinophils % 1.9 0.0 - 6.0 %    Basophils % 1.2 0.0 - 2.0 %    Neutrophils Absolute 3.02 1.20 - 7.70 x10*3/uL    Immature Granulocytes Absolute, Automated 0.02 0.00 - 0.70 x10*3/uL    Lymphocytes Absolute 0.76 (L) 1.20 - 4.80 x10*3/uL    Monocytes Absolute 0.22 0.10 - 1.00 x10*3/uL    Eosinophils Absolute 0.08 0.00 - 0.70 x10*3/uL    Basophils Absolute 0.05 0.00 - 0.10 x10*3/uL   Comprehensive Metabolic Panel    Collection Time: 04/24/25 10:19 AM   Result Value Ref Range    Glucose 99 74 - 99 mg/dL    Sodium 136 136 - 145 mmol/L    Potassium 3.6 3.5 - 5.3 mmol/L    Chloride 103 98 - 107 mmol/L    Bicarbonate 25 21 - 32 mmol/L    Anion Gap 12 10 - 20 mmol/L    Urea Nitrogen 14 6 - 23 mg/dL    Creatinine 0.95 0.50 - 1.30 mg/dL    eGFR >90 >60 mL/min/1.73m*2    Calcium 8.9 8.6 - 10.3 mg/dL    Albumin 4.1 3.4 - 5.0 g/dL    Alkaline Phosphatase 102 33 - 120 U/L    Total Protein 6.6 6.4 - 8.2 g/dL    AST 16 9 - 39 U/L    Bilirubin, Total 0.4 0.0  - 1.2 mg/dL    ALT 11 10 - 52 U/L   Brain Natriuretic Peptide    Collection Time: 04/24/25 10:19 AM   Result Value Ref Range    BNP 70 0 - 99 pg/mL   Light Blue Top    Collection Time: 04/24/25 10:19 AM   Result Value Ref Range    Extra Tube Hold for add-ons.    PST Top    Collection Time: 04/24/25 10:19 AM   Result Value Ref Range    Extra Tube Hold for add-ons.    ECG 12 lead    Collection Time: 04/24/25 10:20 AM   Result Value Ref Range    Ventricular Rate 88 BPM    Atrial Rate 88 BPM    LA Interval 124 ms    QRS Duration 88 ms    QT Interval 338 ms    QTC Calculation(Bazett) 408 ms    P Axis 44 degrees    R Axis 56 degrees    T Axis 65 degrees    QRS Count 14 beats    Q Onset 214 ms    P Onset 152 ms    P Offset 203 ms    T Offset 383 ms    QTC Fredericia 384 ms   Type and screen    Collection Time: 04/24/25 10:58 AM   Result Value Ref Range    ABO TYPE O     Rh TYPE POS     ANTIBODY SCREEN NEG    Prepare RBC: 1 Units    Collection Time: 04/24/25 11:23 AM   Result Value Ref Range    PRODUCT CODE G7970V72     Unit Number [id number]     Unit ABO O     Unit RH POS     XM INTEP COMP     Dispense Status IS     Blood Expiration Date 4/28/2025 11:59:00 PM EDT     PRODUCT BLOOD TYPE 5100     UNIT VOLUME 350    ECG 12 lead    Collection Time: 04/24/25 11:32 AM   Result Value Ref Range    Ventricular Rate 85 BPM    Atrial Rate 85 BPM    LA Interval 130 ms    QRS Duration 86 ms    QT Interval 342 ms    QTC Calculation(Bazett) 406 ms    P Axis 64 degrees    R Axis 67 degrees    T Axis 68 degrees    QRS Count 14 beats    Q Onset 227 ms    P Onset 162 ms    P Offset 216 ms    T Offset 398 ms    QTC Fredericia 384 ms   VERIFY ABO/Rh Group Test    Collection Time: 04/24/25 11:52 AM   Result Value Ref Range    ABO TYPE O     Rh TYPE POS      Radiographs:  XR chest 1 view   Final Result   No acute process.   Signed by Kevin Iniguez MD          Procedures:   Procedures       Assessment   In brief, Thai Cristobal is a 44  "y.o. male who presented to the emergency department for dyspnea found to be anemic with a hemoglobin of 7, in the setting of active chemotherapy    Plan   IV, lab work, blood transfusion    Differentials   Anemia due to chemotherapy  Anemia due to GI bleed  Anemia due to malignancy  Iron deficiency anemia    ED Course     Diagnoses as of 04/24/25 1413   Shortness of breath   Anemia, unspecified type       Visit Vitals  /68   Pulse 90   Temp 36.3 °C (97.3 °F)   Resp 16   Ht 1.727 m (5' 8\")   Wt 68 kg (149 lb 14.6 oz)   SpO2 99%   BMI 22.79 kg/m²   Smoking Status Never   BSA 1.81 m²       Medications - No data to display    Plan of care discussed, patient is stable appearing, is not hypoxic or tachycardic, does appear pale, denies any active bleeding, had a negative colonoscopy yesterday done by gastroenterology without any active bleeding or bloody stools.  No fevers, chills, no infectious symptoms.  Hemoglobin was reviewed and is at 7.0, since patient is symptomatic plan is to give patient a unit of blood and contact his oncologist, messaged patient's oncologist Dr. Espitia who advised that patient can receive 1 unit of blood and can be followed up as an outpatient, family states they have an appointment with him on Tuesday.  A unit of blood was ordered, patient was consented, will be discharged home in stable condition, educated on any worsening signs and symptoms to return to the emergency department      Final Impression      1. Shortness of breath    2. Anemia, unspecified type          DISPOSITION  Disposition: Discharge to home  Patient condition is stable    Comment: Please note this report has been produced using speech recognition software and may contain errors related to that system including errors in grammar, punctuation, and spelling, as well as words and phrases that may be inappropriate.  If there are any questions or concerns please feel free to contact the dictating provider for " clarification.    Jenifer Rubio, MANUELITO-CNP       Jenifer DumontMANUELITO silva-CNP  04/24/25 1237         [1]   Past Medical History:  Diagnosis Date    Acute upper respiratory infection, unspecified 02/21/2018    Acute URI    Asthma     Cancer (Multi)     Cancer, metastatic to liver (Multi)     Crohn's disease (Multi)     Personal history of other specified conditions 04/23/2018    History of vertigo    SBO (small bowel obstruction) (Multi) 01/25/2024   [2]   Past Surgical History:  Procedure Laterality Date    COLECTOMY PARTIAL / TOTAL      COLON SURGERY      GALLBLADDER SURGERY  02/23/2015    Gallbladder Surgery    OTHER SURGICAL HISTORY  04/16/2021    Small bowel resection   [3]   Social History  Socioeconomic History    Marital status:     Number of children: 1   Tobacco Use    Smoking status: Never     Passive exposure: Never    Smokeless tobacco: Never   Vaping Use    Vaping status: Never Used   Substance and Sexual Activity    Alcohol use: Not Currently     Comment: was drinking 1-3 drinks/daily    Drug use: Not Currently    Sexual activity: Yes     Partners: Female     Social Drivers of Health     Financial Resource Strain: Low Risk  (11/23/2024)    Overall Financial Resource Strain (CARDIA)     Difficulty of Paying Living Expenses: Not hard at all   Food Insecurity: No Food Insecurity (11/23/2024)    Hunger Vital Sign     Worried About Running Out of Food in the Last Year: Never true     Ran Out of Food in the Last Year: Never true   Transportation Needs: No Transportation Needs (11/23/2024)    PRAPARE - Transportation     Lack of Transportation (Medical): No     Lack of Transportation (Non-Medical): No   Intimate Partner Violence: Not At Risk (11/23/2024)    Humiliation, Afraid, Rape, and Kick questionnaire     Fear of Current or Ex-Partner: No     Emotionally Abused: No     Physically Abused: No     Sexually Abused: No   Housing Stability: Low Risk  (11/23/2024)    Housing Stability Vital Sign     Unable  to Pay for Housing in the Last Year: No     Number of Times Moved in the Last Year: 1     Homeless in the Last Year: No   [4]   Allergies  Allergen Reactions    Oxaliplatin Itching        MANUELITO Corona-JANUARY  04/24/25 [id number]

## 2025-04-25 ENCOUNTER — SOCIAL WORK (OUTPATIENT)
Dept: CASE MANAGEMENT | Facility: HOSPITAL | Age: 45
End: 2025-04-25
Payer: COMMERCIAL

## 2025-04-25 NOTE — PROGRESS NOTES
Social Work Note  4/25/2025 SW received an email from patient's wife yesterday.  She stated that they are having more difficulty paying patient's co-pays since his ins changed.  SW let her know that with the new ins what they are already met with the old ins did not count.  SW explained that this writer would make a referral to the Financial Navigator for hospital assistance.  She was agreeable and let this writer know she and patient were headed to the ED.  SW made a referral today for patient.  SW will remain available to assist patient.  Rosana Gastelum, AUBREY, W [phone]

## 2025-04-28 ENCOUNTER — TELEMEDICINE (OUTPATIENT)
Dept: HEMATOLOGY/ONCOLOGY | Facility: HOSPITAL | Age: 45
End: 2025-04-28
Payer: COMMERCIAL

## 2025-04-28 ENCOUNTER — LAB (OUTPATIENT)
Dept: HEMATOLOGY/ONCOLOGY | Facility: CLINIC | Age: 45
End: 2025-04-28
Payer: COMMERCIAL

## 2025-04-28 ENCOUNTER — APPOINTMENT (OUTPATIENT)
Dept: HEMATOLOGY/ONCOLOGY | Facility: CLINIC | Age: 45
End: 2025-04-28
Payer: COMMERCIAL

## 2025-04-28 DIAGNOSIS — D50.0 IRON DEFICIENCY ANEMIA DUE TO CHRONIC BLOOD LOSS: ICD-10-CM

## 2025-04-28 DIAGNOSIS — C17.9 ADENOCARCINOMA OF SMALL BOWEL (MULTI): Primary | ICD-10-CM

## 2025-04-28 DIAGNOSIS — C7B.8 SECONDARY NEUROENDOCRINE TUMOR OF LIVER: ICD-10-CM

## 2025-04-28 DIAGNOSIS — Z51.11 ENCOUNTER FOR ANTINEOPLASTIC CHEMOTHERAPY: ICD-10-CM

## 2025-04-28 DIAGNOSIS — C17.9 ADENOCARCINOMA OF SMALL BOWEL (MULTI): ICD-10-CM

## 2025-04-28 LAB
ALBUMIN SERPL BCP-MCNC: 3.9 G/DL (ref 3.4–5)
ALP SERPL-CCNC: 98 U/L (ref 33–120)
ALT SERPL W P-5'-P-CCNC: 10 U/L (ref 10–52)
ANION GAP SERPL CALC-SCNC: 10 MMOL/L (ref 10–20)
AST SERPL W P-5'-P-CCNC: 19 U/L (ref 9–39)
BASOPHILS # BLD AUTO: 0.04 X10*3/UL (ref 0–0.1)
BASOPHILS NFR BLD AUTO: 1.2 %
BILIRUB SERPL-MCNC: 0.4 MG/DL (ref 0–1.2)
BUN SERPL-MCNC: 16 MG/DL (ref 6–23)
CALCIUM SERPL-MCNC: 8.5 MG/DL (ref 8.6–10.3)
CHLORIDE SERPL-SCNC: 106 MMOL/L (ref 98–107)
CO2 SERPL-SCNC: 24 MMOL/L (ref 21–32)
CREAT SERPL-MCNC: 0.97 MG/DL (ref 0.5–1.3)
EGFRCR SERPLBLD CKD-EPI 2021: >90 ML/MIN/1.73M*2
EOSINOPHIL # BLD AUTO: 0.15 X10*3/UL (ref 0–0.7)
EOSINOPHIL NFR BLD AUTO: 4.3 %
ERYTHROCYTE [DISTWIDTH] IN BLOOD BY AUTOMATED COUNT: 16.6 % (ref 11.5–14.5)
GLUCOSE SERPL-MCNC: 98 MG/DL (ref 74–99)
HCT VFR BLD AUTO: 26.7 % (ref 41–52)
HGB BLD-MCNC: 8 G/DL (ref 13.5–17.5)
IMM GRANULOCYTES # BLD AUTO: 0 X10*3/UL (ref 0–0.7)
IMM GRANULOCYTES NFR BLD AUTO: 0 % (ref 0–0.9)
LYMPHOCYTES # BLD AUTO: 0.68 X10*3/UL (ref 1.2–4.8)
LYMPHOCYTES NFR BLD AUTO: 19.7 %
MCH RBC QN AUTO: 26.5 PG (ref 26–34)
MCHC RBC AUTO-ENTMCNC: 30 G/DL (ref 32–36)
MCV RBC AUTO: 88 FL (ref 80–100)
MONOCYTES # BLD AUTO: 0.42 X10*3/UL (ref 0.1–1)
MONOCYTES NFR BLD AUTO: 12.1 %
NEUTROPHILS # BLD AUTO: 2.17 X10*3/UL (ref 1.2–7.7)
NEUTROPHILS NFR BLD AUTO: 62.7 %
NRBC BLD-RTO: ABNORMAL /100{WBCS}
PLATELET # BLD AUTO: 122 X10*3/UL (ref 150–450)
POTASSIUM SERPL-SCNC: 4.3 MMOL/L (ref 3.5–5.3)
PROT SERPL-MCNC: 6.6 G/DL (ref 6.4–8.2)
RBC # BLD AUTO: 3.02 X10*6/UL (ref 4.5–5.9)
SODIUM SERPL-SCNC: 136 MMOL/L (ref 136–145)
WBC # BLD AUTO: 3.5 X10*3/UL (ref 4.4–11.3)

## 2025-04-28 PROCEDURE — 36415 COLL VENOUS BLD VENIPUNCTURE: CPT

## 2025-04-28 PROCEDURE — 80053 COMPREHEN METABOLIC PANEL: CPT

## 2025-04-28 PROCEDURE — 99215 OFFICE O/P EST HI 40 MIN: CPT | Performed by: STUDENT IN AN ORGANIZED HEALTH CARE EDUCATION/TRAINING PROGRAM

## 2025-04-28 PROCEDURE — 85025 COMPLETE CBC W/AUTO DIFF WBC: CPT

## 2025-04-28 RX ORDER — DIPHENHYDRAMINE HYDROCHLORIDE 50 MG/ML
50 INJECTION, SOLUTION INTRAMUSCULAR; INTRAVENOUS AS NEEDED
Status: CANCELLED | OUTPATIENT
Start: 2025-05-01

## 2025-04-28 RX ORDER — ALBUTEROL SULFATE 0.83 MG/ML
3 SOLUTION RESPIRATORY (INHALATION) AS NEEDED
Status: CANCELLED | OUTPATIENT
Start: 2025-05-01

## 2025-04-28 RX ORDER — EPINEPHRINE 0.3 MG/.3ML
0.3 INJECTION SUBCUTANEOUS EVERY 5 MIN PRN
Status: CANCELLED | OUTPATIENT
Start: 2025-05-01

## 2025-04-28 RX ORDER — HEPARIN 100 UNIT/ML
500 SYRINGE INTRAVENOUS AS NEEDED
Status: CANCELLED | OUTPATIENT
Start: 2025-04-28

## 2025-04-28 RX ORDER — HEPARIN 100 UNIT/ML
500 SYRINGE INTRAVENOUS AS NEEDED
Status: DISCONTINUED | OUTPATIENT
Start: 2025-04-28 | End: 2025-04-28 | Stop reason: HOSPADM

## 2025-04-28 RX ORDER — FAMOTIDINE 10 MG/ML
20 INJECTION, SOLUTION INTRAVENOUS ONCE AS NEEDED
Status: CANCELLED | OUTPATIENT
Start: 2025-05-01

## 2025-04-28 RX ORDER — HEPARIN SODIUM,PORCINE/PF 10 UNIT/ML
50 SYRINGE (ML) INTRAVENOUS AS NEEDED
Status: CANCELLED | OUTPATIENT
Start: 2025-04-28

## 2025-04-28 NOTE — PROGRESS NOTES
Virtual or Telephone Consent    An interactive audio and video telecommunication system which permits real time communications between the patient (at the originating site) and provider (at the distant site) was utilized to provide this telehealth service.   Verbal consent was requested and obtained from Thai Cristobal on this date, 04/28/25 for a telehealth visit and the patient's location was confirmed at the time of the visit.     .Patient ID: Thai Cristobal is a 44 y.o. male.  Referring Physician: Nba Vargas MD  [id number] [place] [id number]  Primary Care Provider: Charlotte Yan DO      Chief complaint: SB Adenocarcinoma     HPI  This is a 44 y.o. male with a known history of Crohn's disease patient is status post partial right colectomy small bowel resection on repair of a colonic fistula on 5/28/2024. Pathology he had a neuroendocrine tumor grade 3 well differentiated, with Ki67 30-40%. He did follow-up with oncology at [facility]. He had a PET-Ga68 which showed Postsurgical changes from right hemicolectomy and distal small bowel with no evidence of focal abnormal gallium 68 dotatate uptake.   Patient's only complaint is change in color of stools at this point, he is eating and drinking less and he has no abdominal pain. No diarrhea and no flushing.  Repeated liver biopsy showed: high grade malignancy, likely of colonic origin, showing focal adenocarcinoma-like features and focal neuroendocrine differentiation with Ki67 95%  He started on FOLFOX and avastin and finished 1 cycles and tolerated well. No abdominal pain, no nausea or vomiting. No new symptoms. His labs showed leucopenia and we delayed C2.  He had C2 and repeated labs showed normal WBCs  He finished 10 cycles and tolerated well with NY on restaging scans after C8 and SD after last cycle       SYSTEMIC TREATMENT RECEIVED: None       Subjective   Thai reports he is still very fatigued, low energy even after a unit of blood last week  when he went to the ED. His fingertips remain cold. He can't take oral iron because when he took it previously he had a bowel obstruction.  Had a colonoscopy 4/23/25 to find the source of bleeding. Findings showed ulcerated ileocolonic anastomosis, no bleeding observed.   He and his wife continue to wonder if surgery is an option          Review of Systems:   All 14 systems have been reviewed and were negative except for the HPI.       MEDICAL HISTORY  Past Medical History:   Diagnosis Date    Acute upper respiratory infection, unspecified 02/21/2018    Acute URI    Asthma     Cancer (Multi)     Cancer, metastatic to liver (Multi)     Crohn's disease (Multi)     Personal history of other specified conditions 04/23/2018    History of vertigo    SBO (small bowel obstruction) (Multi) 01/25/2024       FAMILY HISTORY  Family History   Problem Relation Name Age of Onset    Thyroid disease Mother      Other (bladder cancer) Mother      Hypertension Father      Benign prostatic hyperplasia Father      Anxiety disorder Sister      No Known Problems Brother      No Known Problems Daughter      Heart failure Paternal Grandfather         TOBACCO HISTORY  Tobacco Use: Low Risk  (4/24/2025)    Patient History     Smoking Tobacco Use: Never     Smokeless Tobacco Use: Never     Passive Exposure: Never       SOCIAL HISTORY  Social Connections: Not on file        Outpatient Medication Profile:  Current Outpatient Medications on File Prior to Visit   Medication Sig Dispense Refill    ALPRAZolam (Xanax) 0.5 mg tablet Take 1 tablet (0.5 mg) by mouth once daily as needed for anxiety. 30 tablet 0    cholecalciferol (Vitamin D-3) 25 MCG (1000 UT) capsule Take 1 capsule (25 mcg) by mouth once daily.      elderberry fruit 350 mg capsule Take 1 capsule by mouth once daily.      flaxseed oiL 1,000 mg capsule Take 1 capsule (1,000 mg) by mouth once daily.      FLUoxetine (PROzac) 20 mg capsule Take 1 capsule (20 mg) by mouth once daily at  bedtime. 90 capsule 1    FLUoxetine (PROzac) 40 mg capsule TAKE 1 CAPSULE BY MOUTH ONCE DAILY 90 capsule 1    lisinopril 20 mg tablet Take 1 tablet (20 mg) by mouth once daily. 90 tablet 1    omeprazole OTC (PriLOSEC OTC) 20 mg EC tablet Take 1 tablet (20 mg) by mouth once daily. Do not crush, chew, or split. 30 tablet 2    SUMAtriptan (Imitrex) 50 mg tablet TAKE 1 TABLET (50 MG) BY MOUTH 1 TIME IF NEEDED FOR MIGRAINE FOR UP TO 36 DOSES. 9 tablet 3    vitamin C-multivitamin-mineral (Emergen-C) 500 mg tablet,chewable Chew 1 tablet once daily.       Current Facility-Administered Medications on File Prior to Visit   Medication Dose Route Frequency Provider Last Rate Last Admin    heparin flush 100 unit/mL syringe 500 Units  500 Units intra-catheter PRN Nba Vargas MD   500 Units at 10/15/24 1019    [DISCONTINUED] heparin flush 100 unit/mL syringe 500 Units  500 Units intra-catheter PRN Nba Vargas MD             Performance Status:  Symptomatic; fully ambulatory     Vitals and Measurements:   There were no vitals taken for this visit.      Physical Exam:   Gen: A&O, NAD  Head: Normocephalic, atraumatic  Eyes: no scleral icterus  ENT: mucous membranes moist, no apparent oropharyngeal lesions  Resp: no respiratory distress  Psych: appropriate mood & affect  Skin: no apparent rashes           Lab Results:  I have reviewed these laboratory results:     Lab Results   Component Value Date    WBC 3.5 (L) 04/28/2025    HGB 8.0 (L) 04/28/2025    HCT 26.7 (L) 04/28/2025    MCV 88 04/28/2025     (L) 04/28/2025         Chemistry    Lab Results   Component Value Date/Time     04/28/2025 1012    K 4.3 04/28/2025 1012     04/28/2025 1012    CO2 24 04/28/2025 1012    BUN 16 04/28/2025 1012    CREATININE 0.97 04/28/2025 1012    Lab Results   Component Value Date/Time    CALCIUM 8.5 (L) 04/28/2025 1012    ALKPHOS 98 04/28/2025 1012    AST 19 04/28/2025 1012    ALT 10 04/28/2025 1012    BILITOT 0.4 04/28/2025  1012            Lab Results   Component Value Date    TSH 3.17 04/18/2023        Radiology Result:  I have reviewed the latest Imaging in PACS and the findings are noted in this note. I discussed the results of the latest imaging with the patient. All previous imaging were reviewed at the time it was completed. Full records are available in the EMR for review as well.         NM PET CT net netspot    Result Date: 7/5/2024  Impression: 1. Postsurgical changes from right hemicolectomy and distal small bowel with no evidence of focal abnormal gallium 68 dotatate uptake within the region of documented neuroendocrine tumor to suggest residual disease. 2. Nonspecific, gallium 68 dotatate uptake within the pancreatic uncinate which can be seen with physiologic uptake.     I personally reviewed the images/study and I agree with the findings as stated by Resident Celso Ashford MD.   MACRO: None   Signed by: Maco Villafana 7/5/2024 3:06 PM Dictation workstation:   [id number]        Pathology Results:  I have reviewed the full pathology report recorded in the EMR. The pertinent portions indicating diagnosis are listed here in the note. for details please refer to the full report recorded in the EMR.    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 male with a known history of Crohn's disease patient is status post partial right colectomy w small bowel resection on repair of a colonic fistula on 5/28/2024. Pathology he had a neuroendocrine tumor grade 3 well differentiated, with Ki67 30-40%. He did follow-up with oncology at [facility]. He had a PET-Ga68 which showed Postsurgical changes from right hemicolectomy and distal small bowel with no evidence of focal abnormal gallium 68 dotatate uptake. Patient's only complaint is change in color of stools at this point he is eating and drinking and he has no abdominal  pain. No diarrhea and no flushing. She had CT AP on 08/09/2024 at outside institution which showed new small liver nodules. 09/04/2024: MRI w [facility] showed Multiple T1 hypointense and T2 hyperintense lesions throughout the liver, and mesenteric LNS. PET-FDG showed FDG avid mesenteric kylie and liver metastases.   Repeated liver biopsy showed: high grade malignancy, likely of colonic origin, showing focal adenocarcinoma-like features and focal neuroendocrine differentiation with Ki67 95%  He started on FOLFOX and avastin and finished 1 cycles and tolerated well. No abdominal pain, no nausea or vomiting. No new symptoms. His labs showed leucopenia and keep delaying C2.  After C3 restaging scans on 12/06/2024: Showed HI mainly in liver lesions and stable mesenteric LNS  Last labs were normal  and he is feeling fine today   Restaging scans after C8 showed HI  He finished 10 cycles  Had the third allergic reaction after C10 even with desensitization  Restaging scans showed stable liver lesions and no new metastatic lesions   We held FOLFOX plus bevacizumab and will start FOLFIRI plus bevacizumab   Labs showed thrombocytopenia today which is improved . His Hb is trending down     4/28/25: Discussed plan with Dr. Vargas who was in agreement.    Plan:  1- Proceed with C2 FOLFIRI and hold Bevacizumab due to trending down in Hb and need to find source of potential bleeding  2- Ordered Feraheme IV for iron deficiency anemia. Don't start oral iron due to previous bowel obstruction with oral iron  3- EGD to evaluate source of bleeding  4-Restaging scans after 4 cycles for follow up   5- Hold black seed oil until source of bleeding is ruled out  5-RTC in 2 weeks for follow up

## 2025-04-28 NOTE — PROGRESS NOTES
Virtual or Telephone Consent    An interactive audio and video telecommunication system which permits real time communications between the patient (at the originating site) and provider (at the distant site) was utilized to provide this telehealth service.   Verbal consent was requested and obtained from Thai Cristobal on this date, 04/28/25 for a telehealth visit and the patient's location was confirmed at the time of the visit.

## 2025-04-29 ENCOUNTER — APPOINTMENT (OUTPATIENT)
Dept: HEMATOLOGY/ONCOLOGY | Facility: CLINIC | Age: 45
End: 2025-04-29
Payer: COMMERCIAL

## 2025-04-29 ENCOUNTER — INFUSION (OUTPATIENT)
Dept: HEMATOLOGY/ONCOLOGY | Facility: CLINIC | Age: 45
End: 2025-04-29
Payer: COMMERCIAL

## 2025-04-29 VITALS
DIASTOLIC BLOOD PRESSURE: 75 MMHG | WEIGHT: 144.51 LBS | RESPIRATION RATE: 18 BRPM | BODY MASS INDEX: 21.9 KG/M2 | HEART RATE: 43 BPM | TEMPERATURE: 98.1 F | HEIGHT: 68 IN | SYSTOLIC BLOOD PRESSURE: 120 MMHG | OXYGEN SATURATION: 95 %

## 2025-04-29 DIAGNOSIS — D3A.8 NEUROENDOCRINE TUMOR: Primary | ICD-10-CM

## 2025-04-29 DIAGNOSIS — C17.9 ADENOCARCINOMA OF SMALL BOWEL (MULTI): ICD-10-CM

## 2025-04-29 PROCEDURE — 96366 THER/PROPH/DIAG IV INF ADDON: CPT | Mod: INF

## 2025-04-29 PROCEDURE — 96368 THER/DIAG CONCURRENT INF: CPT

## 2025-04-29 PROCEDURE — 2500000004 HC RX 250 GENERAL PHARMACY W/ HCPCS (ALT 636 FOR OP/ED): Performed by: INTERNAL MEDICINE

## 2025-04-29 PROCEDURE — 96416 CHEMO PROLONG INFUSE W/PUMP: CPT

## 2025-04-29 PROCEDURE — 96375 TX/PRO/DX INJ NEW DRUG ADDON: CPT | Mod: INF

## 2025-04-29 PROCEDURE — 96413 CHEMO IV INFUSION 1 HR: CPT

## 2025-04-29 PROCEDURE — 96415 CHEMO IV INFUSION ADDL HR: CPT

## 2025-04-29 PROCEDURE — 2500000004 HC RX 250 GENERAL PHARMACY W/ HCPCS (ALT 636 FOR OP/ED): Mod: JZ | Performed by: INTERNAL MEDICINE

## 2025-04-29 RX ORDER — ATROPINE SULFATE 0.4 MG/ML
0.4 INJECTION, SOLUTION ENDOTRACHEAL; INTRAMEDULLARY; INTRAMUSCULAR; INTRAVENOUS; SUBCUTANEOUS
Status: DISCONTINUED | OUTPATIENT
Start: 2025-04-29 | End: 2025-04-29 | Stop reason: HOSPADM

## 2025-04-29 RX ORDER — ALBUTEROL SULFATE 0.83 MG/ML
3 SOLUTION RESPIRATORY (INHALATION) AS NEEDED
Status: DISCONTINUED | OUTPATIENT
Start: 2025-04-29 | End: 2025-04-29 | Stop reason: HOSPADM

## 2025-04-29 RX ORDER — EPINEPHRINE 0.3 MG/.3ML
0.3 INJECTION SUBCUTANEOUS EVERY 5 MIN PRN
Status: DISCONTINUED | OUTPATIENT
Start: 2025-04-29 | End: 2025-04-29 | Stop reason: HOSPADM

## 2025-04-29 RX ORDER — DIPHENHYDRAMINE HYDROCHLORIDE 50 MG/ML
50 INJECTION, SOLUTION INTRAMUSCULAR; INTRAVENOUS AS NEEDED
Status: DISCONTINUED | OUTPATIENT
Start: 2025-04-29 | End: 2025-04-29 | Stop reason: HOSPADM

## 2025-04-29 RX ORDER — PALONOSETRON 0.05 MG/ML
0.25 INJECTION, SOLUTION INTRAVENOUS ONCE
Status: COMPLETED | OUTPATIENT
Start: 2025-04-29 | End: 2025-04-29

## 2025-04-29 RX ORDER — DEXAMETHASONE 6 MG/1
12 TABLET ORAL ONCE
Status: COMPLETED | OUTPATIENT
Start: 2025-04-29 | End: 2025-04-29

## 2025-04-29 RX ORDER — PROCHLORPERAZINE MALEATE 10 MG
10 TABLET ORAL EVERY 6 HOURS PRN
Status: DISCONTINUED | OUTPATIENT
Start: 2025-04-29 | End: 2025-04-29 | Stop reason: HOSPADM

## 2025-04-29 RX ORDER — PROCHLORPERAZINE EDISYLATE 5 MG/ML
10 INJECTION INTRAMUSCULAR; INTRAVENOUS EVERY 6 HOURS PRN
Status: DISCONTINUED | OUTPATIENT
Start: 2025-04-29 | End: 2025-04-29 | Stop reason: HOSPADM

## 2025-04-29 RX ORDER — LOPERAMIDE HYDROCHLORIDE 2 MG/1
CAPSULE ORAL
Qty: 30 CAPSULE | Refills: 3 | Status: SHIPPED | OUTPATIENT
Start: 2025-04-29

## 2025-04-29 RX ORDER — FAMOTIDINE 10 MG/ML
20 INJECTION, SOLUTION INTRAVENOUS ONCE AS NEEDED
Status: DISCONTINUED | OUTPATIENT
Start: 2025-04-29 | End: 2025-04-29 | Stop reason: HOSPADM

## 2025-04-29 RX ORDER — ONDANSETRON HYDROCHLORIDE 8 MG/1
8 TABLET, FILM COATED ORAL EVERY 8 HOURS PRN
Qty: 30 TABLET | Refills: 3 | Status: SHIPPED | OUTPATIENT
Start: 2025-04-29

## 2025-04-29 RX ORDER — PROCHLORPERAZINE MALEATE 10 MG
10 TABLET ORAL EVERY 6 HOURS PRN
Qty: 30 TABLET | Refills: 3 | Status: SHIPPED | OUTPATIENT
Start: 2025-04-29 | End: 2025-06-28

## 2025-04-29 RX ADMIN — LEUCOVORIN CALCIUM 740 MG: 350 INJECTION, POWDER, LYOPHILIZED, FOR SOLUTION INTRAMUSCULAR; INTRAVENOUS at 10:04

## 2025-04-29 RX ADMIN — PALONOSETRON 0.25 MG: 0.05 INJECTION, SOLUTION INTRAVENOUS at 09:34

## 2025-04-29 RX ADMIN — ATROPINE SULFATE 0.4 MG: 0.4 INJECTION, SOLUTION INTRAVENOUS at 11:50

## 2025-04-29 RX ADMIN — IRINOTECAN HYDROCHLORIDE 280 MG: 20 INJECTION, SOLUTION INTRAVENOUS at 10:04

## 2025-04-29 RX ADMIN — FLUOROURACIL 3550 MG: 50 INJECTION, SOLUTION INTRAVENOUS at 11:43

## 2025-04-29 RX ADMIN — DEXAMETHASONE 12 MG: 6 TABLET ORAL at 09:34

## 2025-04-29 RX ADMIN — PROCHLORPERAZINE EDISYLATE 10 MG: 5 INJECTION INTRAMUSCULAR; INTRAVENOUS at 11:41

## 2025-04-29 ASSESSMENT — PAIN SCALES - GENERAL: PAINLEVEL_OUTOF10: 0-NO PAIN

## 2025-04-29 NOTE — PROGRESS NOTES
"Patient received chemo infusions without complications.  After irinotecan completed patient described feeling nausea and \"extra saliva\" in his mouth and reported he had used the bathroom 2x.  RN administered prn compazine and atropine to reduce side effects.  No other needs today in infusion. Patient is ordered to receive feraheme at pump disconnect to manage anemia. Patient verbalizes understanding of plan of care and pump disconnect date and time.  "

## 2025-04-29 NOTE — SIGNIFICANT EVENT
04/29/25 0830   Prechemo Checklist   Has the patient been in the hospital, ED, or urgent care since last date of service Yes  (seen by NP yesterday. condition is stable)   Chemo/Immuno Consent Completed and Signed Yes   Protocol/Indications Verified Yes   Confirmed to previous date/time of medication Yes   Compared to previous dose Yes   All medications are dated accurately Yes   Pregnancy Test Negative Not applicable   Parameters Met Yes   BSA/Weight-Height Verified Yes   Dose Calculations Verified (current, total, cumulative) Yes

## 2025-04-30 ENCOUNTER — TELEPHONE (OUTPATIENT)
Dept: HEMATOLOGY/ONCOLOGY | Facility: CLINIC | Age: 45
End: 2025-04-30

## 2025-04-30 ENCOUNTER — APPOINTMENT (OUTPATIENT)
Dept: HEMATOLOGY/ONCOLOGY | Facility: CLINIC | Age: 45
End: 2025-04-30
Payer: COMMERCIAL

## 2025-04-30 NOTE — TELEPHONE ENCOUNTER
[facility]za wants to discuss authorization for EGD scan. Wants to know if we would like to do a peer to peer.

## 2025-05-01 ENCOUNTER — INFUSION (OUTPATIENT)
Dept: HEMATOLOGY/ONCOLOGY | Facility: CLINIC | Age: 45
End: 2025-05-01
Payer: COMMERCIAL

## 2025-05-01 VITALS
WEIGHT: 153.88 LBS | SYSTOLIC BLOOD PRESSURE: 122 MMHG | HEART RATE: 80 BPM | RESPIRATION RATE: 18 BRPM | BODY MASS INDEX: 23.59 KG/M2 | TEMPERATURE: 97.9 F | DIASTOLIC BLOOD PRESSURE: 73 MMHG | OXYGEN SATURATION: 97 %

## 2025-05-01 DIAGNOSIS — C17.9 ADENOCARCINOMA OF SMALL BOWEL (MULTI): ICD-10-CM

## 2025-05-01 DIAGNOSIS — C7B.8 SECONDARY NEUROENDOCRINE TUMOR OF LIVER: ICD-10-CM

## 2025-05-01 DIAGNOSIS — D50.0 IRON DEFICIENCY ANEMIA DUE TO CHRONIC BLOOD LOSS: ICD-10-CM

## 2025-05-01 PROCEDURE — 96365 THER/PROPH/DIAG IV INF INIT: CPT | Mod: INF

## 2025-05-01 PROCEDURE — 2500000004 HC RX 250 GENERAL PHARMACY W/ HCPCS (ALT 636 FOR OP/ED): Mod: JZ,TB | Performed by: STUDENT IN AN ORGANIZED HEALTH CARE EDUCATION/TRAINING PROGRAM

## 2025-05-01 PROCEDURE — 2500000004 HC RX 250 GENERAL PHARMACY W/ HCPCS (ALT 636 FOR OP/ED): Mod: JZ | Performed by: INTERNAL MEDICINE

## 2025-05-01 RX ORDER — HEPARIN 100 UNIT/ML
500 SYRINGE INTRAVENOUS AS NEEDED
OUTPATIENT
Start: 2025-05-01

## 2025-05-01 RX ORDER — FAMOTIDINE 10 MG/ML
20 INJECTION, SOLUTION INTRAVENOUS ONCE AS NEEDED
OUTPATIENT
Start: 2025-05-08

## 2025-05-01 RX ORDER — DIPHENHYDRAMINE HYDROCHLORIDE 50 MG/ML
50 INJECTION, SOLUTION INTRAMUSCULAR; INTRAVENOUS AS NEEDED
OUTPATIENT
Start: 2025-05-08

## 2025-05-01 RX ORDER — EPINEPHRINE 0.3 MG/.3ML
0.3 INJECTION SUBCUTANEOUS EVERY 5 MIN PRN
OUTPATIENT
Start: 2025-05-08

## 2025-05-01 RX ORDER — ALBUTEROL SULFATE 0.83 MG/ML
3 SOLUTION RESPIRATORY (INHALATION) AS NEEDED
OUTPATIENT
Start: 2025-05-08

## 2025-05-01 RX ORDER — HEPARIN 100 UNIT/ML
500 SYRINGE INTRAVENOUS AS NEEDED
Status: DISCONTINUED | OUTPATIENT
Start: 2025-05-01 | End: 2025-05-01 | Stop reason: HOSPADM

## 2025-05-01 RX ORDER — HEPARIN SODIUM,PORCINE/PF 10 UNIT/ML
50 SYRINGE (ML) INTRAVENOUS AS NEEDED
OUTPATIENT
Start: 2025-05-01

## 2025-05-01 RX ADMIN — HEPARIN 500 UNITS: 100 SYRINGE at 11:33

## 2025-05-01 RX ADMIN — FERUMOXYTOL 510 MG: 510 INJECTION INTRAVENOUS at 10:38

## 2025-05-01 ASSESSMENT — PAIN SCALES - GENERAL: PAINLEVEL_OUTOF10: 0-NO PAIN

## 2025-05-02 ENCOUNTER — APPOINTMENT (OUTPATIENT)
Dept: PRIMARY CARE | Facility: CLINIC | Age: 45
End: 2025-05-02
Payer: COMMERCIAL

## 2025-05-02 ENCOUNTER — TELEPHONE (OUTPATIENT)
Dept: HEMATOLOGY/ONCOLOGY | Facility: HOSPITAL | Age: 45
End: 2025-05-02

## 2025-05-02 ENCOUNTER — APPOINTMENT (OUTPATIENT)
Dept: HEMATOLOGY/ONCOLOGY | Facility: CLINIC | Age: 45
End: 2025-05-02
Payer: COMMERCIAL

## 2025-05-02 DIAGNOSIS — I10 PRIMARY HYPERTENSION: ICD-10-CM

## 2025-05-02 DIAGNOSIS — C17.9 ADENOCARCINOMA OF SMALL BOWEL (MULTI): ICD-10-CM

## 2025-05-02 DIAGNOSIS — K50.918 CROHN'S DISEASE WITH OTHER COMPLICATION, UNSPECIFIED GASTROINTESTINAL TRACT LOCATION: ICD-10-CM

## 2025-05-02 DIAGNOSIS — F41.9 ANXIETY: Primary | ICD-10-CM

## 2025-05-02 PROCEDURE — 99214 OFFICE O/P EST MOD 30 MIN: CPT | Performed by: INTERNAL MEDICINE

## 2025-05-02 RX ORDER — LISINOPRIL 20 MG/1
20 TABLET ORAL DAILY
Qty: 90 TABLET | Refills: 1 | Status: SHIPPED | OUTPATIENT
Start: 2025-05-02

## 2025-05-02 ASSESSMENT — ENCOUNTER SYMPTOMS
VOMITING: 0
CHEST TIGHTNESS: 0
BLOOD IN STOOL: 0
ACTIVITY CHANGE: 1
DIARRHEA: 0
CONSTIPATION: 0
ABDOMINAL PAIN: 0
FATIGUE: 1
ABDOMINAL DISTENTION: 0
NERVOUS/ANXIOUS: 1
COUGH: 0
WEAKNESS: 1
APPETITE CHANGE: 1
NAUSEA: 0
RECTAL PAIN: 0
SHORTNESS OF BREATH: 1

## 2025-05-02 NOTE — TELEPHONE ENCOUNTER
The order for ECG is still pending and wants to know if we would like to do a peer to peer for it

## 2025-05-02 NOTE — PATIENT INSTRUCTIONS
Continue to hold Lisinopril and only restart if you see BP >130/80 consistently  Continue all other meds as directed-call when refills needed  Continue infusion of chemo and having scans per oncology  Be cautious at events to wear mask and avoid sick contacts if at all possible   Continue to adhere to healthy diet, be as active as able and devote plenty of time to sleep  Follow up here in 3 months-can be virtual if needed

## 2025-05-02 NOTE — ASSESSMENT & PLAN NOTE
Pt currently not on lisinopril due to low BP's secondary to anemia  He will continue to monitor BP and if notes >130/80 consistently will restart

## 2025-05-02 NOTE — PROGRESS NOTES
Subjective   Patient ID: Thai Cristobal is a 44 y.o. male who presents for No chief complaint on file..    HPI  Virtual or Telephone Consent    An interactive audio and video telecommunication system which permits real time communications between the patient (at the originating site) and provider (at the distant site) was utilized to provide this telehealth service.   Verbal consent was requested and obtained from Thai Cristobal on this date, 05/02/25 for a telehealth visit and the patient's location was confirmed at the time of the visit.     Pt continues to see oncology for known adenocarcinoma of small bowel.  He is getting infusions of ferumoxytol (that was yesterday) due to low blood counts.  He had change  as well in chemo due to low blood counts-he is now on fluorouracil/irinotecan/leucovorin/palonosetron.  He will have another iron infusion coming up.  He is having chemo every 2 weeks.  He doesn't feel terrible as much after this new regimen.      He was just in ER on 4/24 due to SOB from being anemic.  Hemoglobin was 7 with hematocrit 22.7.  He was given 1 unit of PRBC and discharged home.   He had colonoscopy on 4/23 that did not show active bleeding.  He is scheduled to have an EGD next week.       He had scan in early April that show stability of the lesions.  They are still considering possibly doing excision of liver lesions if able.  He will have another scan coming up.      He currently has stopped lisinopril due to low BP's of late-likely anemic related.     He is using Alprazolam nightly as he notes his mind starts to think about things.      Review of Systems   Constitutional:  Positive for activity change, appetite change and fatigue.   Respiratory:  Positive for shortness of breath. Negative for cough and chest tightness.    Cardiovascular:  Negative for chest pain.   Gastrointestinal:  Negative for abdominal distention, abdominal pain, blood in stool, constipation, diarrhea, nausea, rectal  pain and vomiting.   Neurological:  Positive for weakness.   Psychiatric/Behavioral:  The patient is nervous/anxious.        Objective   There were no vitals taken for this visit.   Physical Exam  Constitutional:       Appearance: Normal appearance.   Neurological:      Mental Status: He is alert and oriented to person, place, and time.   Psychiatric:         Mood and Affect: Mood normal.         Behavior: Behavior normal.         Thought Content: Thought content normal.         Judgment: Judgment normal.         Assessment/Plan   Problem List Items Addressed This Visit       Anxiety - Primary    On Prozac 60 mg/day with good results  Has alprazolam and using it at bedtime to help manage fears of having cancer/treatment, etc          Relevant Orders    Follow Up In Primary Care - Established    Primary hypertension    Pt currently not on lisinopril due to low BP's secondary to anemia  He will continue to monitor BP and if notes >130/80 consistently will restart         Relevant Orders    Follow Up In Primary Care - Established    Crohn's disease (Multi)    Followed by GI  Just had colonoscopy last week and going to have EGD next week          Relevant Orders    Follow Up In Primary Care - Established    Adenocarcinoma of small bowel (Multi)    Following with oncology  New chemo regimen started-goes every 2 weeks  Getting iron infusion as well secondary to anemia caused by chemo   Tolerating chemo fairly well   Has scans every so often         Relevant Orders    Follow Up In Primary Care - Established

## 2025-05-02 NOTE — ASSESSMENT & PLAN NOTE
On Prozac 60 mg/day with good results  Has alprazolam and using it at bedtime to help manage fears of having cancer/treatment, etc

## 2025-05-02 NOTE — ASSESSMENT & PLAN NOTE
Following with oncology  New chemo regimen started-goes every 2 weeks  Getting iron infusion as well secondary to anemia caused by chemo   Tolerating chemo fairly well   Has scans every so often

## 2025-05-05 ENCOUNTER — ANESTHESIA EVENT (OUTPATIENT)
Dept: GASTROENTEROLOGY | Facility: HOSPITAL | Age: 45
End: 2025-05-05
Payer: COMMERCIAL

## 2025-05-05 ENCOUNTER — ANESTHESIA (OUTPATIENT)
Dept: GASTROENTEROLOGY | Facility: HOSPITAL | Age: 45
End: 2025-05-05
Payer: COMMERCIAL

## 2025-05-05 ENCOUNTER — TELEPHONE (OUTPATIENT)
Dept: HEMATOLOGY/ONCOLOGY | Facility: HOSPITAL | Age: 45
End: 2025-05-05

## 2025-05-05 ENCOUNTER — HOSPITAL ENCOUNTER (OUTPATIENT)
Dept: GASTROENTEROLOGY | Facility: HOSPITAL | Age: 45
Discharge: HOME | End: 2025-05-05
Payer: COMMERCIAL

## 2025-05-05 VITALS
WEIGHT: 151 LBS | HEART RATE: 78 BPM | OXYGEN SATURATION: 99 % | TEMPERATURE: 96.8 F | SYSTOLIC BLOOD PRESSURE: 109 MMHG | DIASTOLIC BLOOD PRESSURE: 77 MMHG | HEIGHT: 68 IN | RESPIRATION RATE: 17 BRPM | BODY MASS INDEX: 22.88 KG/M2

## 2025-05-05 DIAGNOSIS — C17.9 ADENOCARCINOMA OF SMALL BOWEL (MULTI): ICD-10-CM

## 2025-05-05 DIAGNOSIS — C7B.8 SECONDARY NEUROENDOCRINE TUMOR OF LIVER: ICD-10-CM

## 2025-05-05 DIAGNOSIS — D50.0 IRON DEFICIENCY ANEMIA DUE TO CHRONIC BLOOD LOSS: ICD-10-CM

## 2025-05-05 PROCEDURE — 2500000004 HC RX 250 GENERAL PHARMACY W/ HCPCS (ALT 636 FOR OP/ED): Mod: JW

## 2025-05-05 PROCEDURE — A43239 PR EDG TRANSORAL BIOPSY SINGLE/MULTIPLE

## 2025-05-05 PROCEDURE — 3700000001 HC GENERAL ANESTHESIA TIME - INITIAL BASE CHARGE

## 2025-05-05 PROCEDURE — 7100000010 HC PHASE TWO TIME - EACH INCREMENTAL 1 MINUTE

## 2025-05-05 PROCEDURE — A43239 PR EDG TRANSORAL BIOPSY SINGLE/MULTIPLE: Performed by: STUDENT IN AN ORGANIZED HEALTH CARE EDUCATION/TRAINING PROGRAM

## 2025-05-05 PROCEDURE — 7100000009 HC PHASE TWO TIME - INITIAL BASE CHARGE

## 2025-05-05 PROCEDURE — 3700000002 HC GENERAL ANESTHESIA TIME - EACH INCREMENTAL 1 MINUTE

## 2025-05-05 PROCEDURE — 43239 EGD BIOPSY SINGLE/MULTIPLE: CPT | Performed by: STUDENT IN AN ORGANIZED HEALTH CARE EDUCATION/TRAINING PROGRAM

## 2025-05-05 RX ORDER — PROPOFOL 10 MG/ML
INJECTION, EMULSION INTRAVENOUS AS NEEDED
Status: DISCONTINUED | OUTPATIENT
Start: 2025-05-05 | End: 2025-05-05

## 2025-05-05 RX ORDER — MIDAZOLAM HYDROCHLORIDE 1 MG/ML
INJECTION, SOLUTION INTRAMUSCULAR; INTRAVENOUS AS NEEDED
Status: DISCONTINUED | OUTPATIENT
Start: 2025-05-05 | End: 2025-05-05

## 2025-05-05 RX ORDER — DIPHENHYDRAMINE HCL 25 MG
25 CAPSULE ORAL NIGHTLY PRN
COMMUNITY

## 2025-05-05 RX ORDER — DROPERIDOL 2.5 MG/ML
0.62 INJECTION, SOLUTION INTRAMUSCULAR; INTRAVENOUS ONCE AS NEEDED
OUTPATIENT
Start: 2025-05-05

## 2025-05-05 RX ORDER — ONDANSETRON HYDROCHLORIDE 2 MG/ML
4 INJECTION, SOLUTION INTRAVENOUS ONCE AS NEEDED
OUTPATIENT
Start: 2025-05-05

## 2025-05-05 RX ORDER — SODIUM CHLORIDE 0.9 % (FLUSH) 0.9 %
SYRINGE (ML) INJECTION AS NEEDED
Status: DISCONTINUED | OUTPATIENT
Start: 2025-05-05 | End: 2025-05-05

## 2025-05-05 RX ORDER — PROCHLORPERAZINE EDISYLATE 5 MG/ML
5 INJECTION INTRAMUSCULAR; INTRAVENOUS ONCE AS NEEDED
OUTPATIENT
Start: 2025-05-05

## 2025-05-05 RX ADMIN — PROPOFOL 50 MG: 10 INJECTION, EMULSION INTRAVENOUS at 10:15

## 2025-05-05 RX ADMIN — PROPOFOL 50 MG: 10 INJECTION, EMULSION INTRAVENOUS at 10:10

## 2025-05-05 RX ADMIN — Medication 10 ML: at 10:18

## 2025-05-05 RX ADMIN — PROPOFOL 50 MG: 10 INJECTION, EMULSION INTRAVENOUS at 10:08

## 2025-05-05 RX ADMIN — MIDAZOLAM 2 MG: 1 INJECTION INTRAMUSCULAR; INTRAVENOUS at 10:08

## 2025-05-05 RX ADMIN — PROPOFOL 50 MG: 10 INJECTION, EMULSION INTRAVENOUS at 10:12

## 2025-05-05 ASSESSMENT — COLUMBIA-SUICIDE SEVERITY RATING SCALE - C-SSRS
1. IN THE PAST MONTH, HAVE YOU WISHED YOU WERE DEAD OR WISHED YOU COULD GO TO SLEEP AND NOT WAKE UP?: NO
6. HAVE YOU EVER DONE ANYTHING, STARTED TO DO ANYTHING, OR PREPARED TO DO ANYTHING TO END YOUR LIFE?: NO
2. HAVE YOU ACTUALLY HAD ANY THOUGHTS OF KILLING YOURSELF?: NO

## 2025-05-05 ASSESSMENT — PAIN - FUNCTIONAL ASSESSMENT
PAIN_FUNCTIONAL_ASSESSMENT: 0-10

## 2025-05-05 ASSESSMENT — PAIN SCALES - GENERAL
PAINLEVEL_OUTOF10: 0 - NO PAIN

## 2025-05-05 NOTE — ANESTHESIA POSTPROCEDURE EVALUATION
Patient: Thai Cristobal    Procedure Summary       Date: 05/05/25 [place] [facility] [place]esthesia Start: 1004 Anesthesia Stop: 1028    Procedure: EGD Diagnosis:       Adenocarcinoma of small bowel (Multi)      Iron deficiency anemia due to chronic blood loss      Secondary neuroendocrine tumor of liver    Scheduled Providers: Vazquez Almanza MD Responsible Provider: Kye Guerrero MD    Anesthesia Type: MAC ASA Status: 3            Anesthesia Type: MAC    Vitals Value Taken Time   /77 05/05/25 11:00   Temp 36 °C (96.8 °F) 05/05/25 10:24   Pulse 78 05/05/25 11:00   Resp 17 05/05/25 11:00   SpO2 99 % 05/05/25 11:00       Anesthesia Post Evaluation    Patient location during evaluation: PACU  Patient participation: complete - patient participated  Level of consciousness: awake  Pain management: adequate  Airway patency: patent  Cardiovascular status: acceptable  Respiratory status: acceptable  Hydration status: acceptable  Postoperative Nausea and Vomiting: none        There were no known notable events for this encounter.

## 2025-05-05 NOTE — DISCHARGE INSTRUCTIONS
Patient Instructions after an Endoscopy      Post-procedure recommendations:  - The patient will be observed post-procedure, until all discharge criteria are met.   - Discharge the patient to home with family member/escort.  - Resume previous diet.  - Continue present medications.  - Observe patient's clinical course following today's procedure with therapeutic intervention.   - Watch for bleeding, perforation, and infection.   - Follow-up with referring physician.   - Return to primary care physician.  - The patient has a contact number available for  emergencies.  The signs and symptoms of potential delayed complications were discussed with the patient.  Return to normal activities tomorrow.  Written discharge instructions were provided to the patient.    The anesthetics, sedatives or narcotics which were given to you today will be acting in your body for the next 24 hours, so you might feel a little sleepy or groggy.  This feeling should slowly wear off. Carefully read and follow the instructions.     You received sedation today:  - Do not drive or operate any machinery or power tools of any kind.   - No alcoholic beverages today, not even beer or wine.  - Do not make any important decisions or sign any legal documents.  - No over the counter medications that contain alcohol or that may cause drowsiness.  - Do not make any important decisions or sign any legal documents.    While it is common to experience mild to moderate abdominal distention, gas, or belching after your procedure, if any of these symptoms occur following discharge from the GI Lab or within one week of having your procedure, call the [facility] to be advised whether a visit to your nearest Urgent Care or Emergency Department is indicated.  Take this paper with you if you go:  - If you develop an allergic reaction to the medications that were given during your procedure such as difficulty breathing, rash, hives, severe nausea,  vomiting or lightheadedness.  - If you experience chest pain, shortness of breath, severe abdominal pain, fevers and chills.  - If you develop signs and symptoms of bleeding such as blood in your spit, if your stools turn black, tarry, or bloody.  - If you have not urinated within 8 hours following your procedure.  - If your IV site becomes painful, red, inflamed, or looks infected.       If you experience any problems or have any questions following discharge from the GI Lab, please call: [phone]

## 2025-05-05 NOTE — ANESTHESIA PREPROCEDURE EVALUATION
Patient: Thai Cristobal    Procedure Information       Date/Time: 05/05/25 1040    Scheduled providers: Vazquez Almanza MD    Procedure: EGD    [facility]: [facility]            Relevant Problems   Cardiac   (+) Atypical chest pain   (+) Benign essential hypertension   (+) PSVT (paroxysmal supraventricular tachycardia) (CMS-HCC)   (+) Paroxysmal supraventricular tachycardia (CMS-HCC)   (+) Primary hypertension      Pulmonary   (+) Asthma      Neuro   (+) Anxiety   (+) Depression   (+) Panic attacks      GI   (+) Adenocarcinoma of small bowel (Multi)   (+) Crohn's disease (Multi)      Liver   (+) Adenocarcinoma of small bowel (Multi)   (+) Secondary neuroendocrine tumor of liver      Hematology   (+) Iron deficiency anemia due to chronic blood loss      HEENT   (+) Seasonal allergies       Clinical information reviewed: PMH, PSH, allergies,meds                   NPO Detail:  No data recorded     Physical Exam    Airway  Mallampati: III     Cardiovascular   Rhythm: regular  Rate: normal     Dental    Pulmonary - normal exam   Abdominal            Anesthesia Plan    History of general anesthesia?: yes  History of complications of general anesthesia?: no    ASA 3     MAC     intravenous induction   Anesthetic plan and risks discussed with patient.    Plan discussed with CAA.

## 2025-05-06 ENCOUNTER — TELEPHONE (OUTPATIENT)
Dept: ADMISSION | Facility: HOSPITAL | Age: 45
End: 2025-05-06
Payer: COMMERCIAL

## 2025-05-06 DIAGNOSIS — C17.9 ADENOCARCINOMA OF SMALL BOWEL (MULTI): ICD-10-CM

## 2025-05-06 DIAGNOSIS — F41.9 ANXIETY: ICD-10-CM

## 2025-05-06 LAB
LAB AP ASR DISCLAIMER: NORMAL
LABORATORY COMMENT REPORT: NORMAL
PATH REPORT.COMMENTS IMP SPEC: NORMAL
PATH REPORT.FINAL DX SPEC: NORMAL
PATH REPORT.GROSS SPEC: NORMAL
PATH REPORT.TOTAL CANCER: NORMAL
RESIDENT REVIEW: NORMAL

## 2025-05-06 RX ORDER — ALPRAZOLAM 0.5 MG/1
1 TABLET ORAL DAILY PRN
Status: SHIPPED
Start: 2025-05-06

## 2025-05-06 NOTE — TELEPHONE ENCOUNTER
The patient called in regarding results that he just received via [employer] regarding his colonoscopy from 4/23.     Comment from results:   Comment    A. The biopsy consists of soft tissue that is infiltrated by a high grade malignancy with focal positivity for INSM1 and SSTR2; it is negative for chromogranin. The Ki67 labeling index exceeds 95%. 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      Pt is very anxious regarding these results and would like to discuss prior to his visit on 5/13 so he can prepare for next steps. Please advise.

## 2025-05-07 DIAGNOSIS — F41.9 ANXIETY: ICD-10-CM

## 2025-05-07 DIAGNOSIS — F41.0 PANIC ATTACKS: ICD-10-CM

## 2025-05-07 RX ORDER — FLUOXETINE HYDROCHLORIDE 40 MG/1
40 CAPSULE ORAL DAILY
Qty: 90 CAPSULE | Refills: 1 | Status: SHIPPED | OUTPATIENT
Start: 2025-05-07

## 2025-05-08 NOTE — TELEPHONE ENCOUNTER
Called Thai back after speaking with Dr. Vargas. The pathology is small bowel adenocarcinoma, not NET. There is no local recurrence in the small bowel due to it being surgically removed. We are treating the mesenteric lymph nodes and liver lesions at this time. Treatment will continue and we will follow with CT scans for now. If there is any significant changes in the future CT scans, we may order a PET scan for further evaluation. Patient verbalized understanding and agreement regarding discussed information/plan.

## 2025-05-08 NOTE — TELEPHONE ENCOUNTER
Spoke with Thai regarding his scan concerns. His scan in September shows recurrence, which is why we got a new biopsy in April. Between September and now, he has had CT scans which all never show anything in his small bowel. He would like to know if this is the same cancer that is recurring or has this always been there but just not showing up on the CT scans? Will treatment stay the same?Will a new PET be needed or continue on the same plan with treatment and CT scans? I told the patient that I will talk to Dr. Vargas more about this and will get back with him.

## 2025-05-09 ENCOUNTER — TELEPHONE (OUTPATIENT)
Dept: HEMATOLOGY/ONCOLOGY | Facility: CLINIC | Age: 45
End: 2025-05-09
Payer: COMMERCIAL

## 2025-05-09 NOTE — TELEPHONE ENCOUNTER
Left message for sydni that he either needs to come in on Monday for his lab draw or earlier on Tuesday since  labs will be down from 10-12 and we needs results to be able to treat him. Instructed to call back @ [phone].

## 2025-05-12 ENCOUNTER — LAB (OUTPATIENT)
Dept: LAB | Facility: CLINIC | Age: 45
End: 2025-05-12
Payer: COMMERCIAL

## 2025-05-12 DIAGNOSIS — C17.9 ADENOCARCINOMA OF SMALL BOWEL (MULTI): ICD-10-CM

## 2025-05-12 DIAGNOSIS — F41.9 ANXIETY: ICD-10-CM

## 2025-05-12 LAB
ALBUMIN SERPL BCP-MCNC: 3.9 G/DL (ref 3.4–5)
ALP SERPL-CCNC: 83 U/L (ref 33–120)
ALT SERPL W P-5'-P-CCNC: 11 U/L (ref 10–52)
ANION GAP SERPL CALC-SCNC: 11 MMOL/L (ref 10–20)
AST SERPL W P-5'-P-CCNC: 16 U/L (ref 9–39)
BASOPHILS # BLD AUTO: 0.06 X10*3/UL (ref 0–0.1)
BASOPHILS NFR BLD AUTO: 2 %
BILIRUB SERPL-MCNC: 0.5 MG/DL (ref 0–1.2)
BUN SERPL-MCNC: 16 MG/DL (ref 6–23)
CALCIUM SERPL-MCNC: 9 MG/DL (ref 8.6–10.3)
CHLORIDE SERPL-SCNC: 104 MMOL/L (ref 98–107)
CO2 SERPL-SCNC: 29 MMOL/L (ref 21–32)
CREAT SERPL-MCNC: 0.97 MG/DL (ref 0.5–1.3)
EGFRCR SERPLBLD CKD-EPI 2021: >90 ML/MIN/1.73M*2
EOSINOPHIL # BLD AUTO: 0.07 X10*3/UL (ref 0–0.7)
EOSINOPHIL NFR BLD AUTO: 2.3 %
ERYTHROCYTE [DISTWIDTH] IN BLOOD BY AUTOMATED COUNT: 20.5 % (ref 11.5–14.5)
GLUCOSE SERPL-MCNC: 99 MG/DL (ref 74–99)
HCT VFR BLD AUTO: 29.3 % (ref 41–52)
HGB BLD-MCNC: 8.5 G/DL (ref 13.5–17.5)
HYPOCHROMIA BLD QL SMEAR: NORMAL
IMM GRANULOCYTES # BLD AUTO: 0 X10*3/UL (ref 0–0.7)
IMM GRANULOCYTES NFR BLD AUTO: 0 % (ref 0–0.9)
LYMPHOCYTES # BLD AUTO: 0.72 X10*3/UL (ref 1.2–4.8)
LYMPHOCYTES NFR BLD AUTO: 23.8 %
MCH RBC QN AUTO: 26.4 PG (ref 26–34)
MCHC RBC AUTO-ENTMCNC: 29 G/DL (ref 32–36)
MCV RBC AUTO: 91 FL (ref 80–100)
MONOCYTES # BLD AUTO: 0.37 X10*3/UL (ref 0.1–1)
MONOCYTES NFR BLD AUTO: 12.3 %
NEUTROPHILS # BLD AUTO: 1.8 X10*3/UL (ref 1.2–7.7)
NEUTROPHILS NFR BLD AUTO: 59.6 %
NRBC BLD-RTO: ABNORMAL /100{WBCS}
OVALOCYTES BLD QL SMEAR: NORMAL
PLATELET # BLD AUTO: 134 X10*3/UL (ref 150–450)
POLYCHROMASIA BLD QL SMEAR: NORMAL
POTASSIUM SERPL-SCNC: 4.1 MMOL/L (ref 3.5–5.3)
PROT SERPL-MCNC: 6.4 G/DL (ref 6.4–8.2)
RBC # BLD AUTO: 3.22 X10*6/UL (ref 4.5–5.9)
RBC MORPH BLD: NORMAL
SCHISTOCYTES BLD QL SMEAR: NORMAL
SODIUM SERPL-SCNC: 140 MMOL/L (ref 136–145)
WBC # BLD AUTO: 3 X10*3/UL (ref 4.4–11.3)

## 2025-05-12 PROCEDURE — 36415 COLL VENOUS BLD VENIPUNCTURE: CPT

## 2025-05-12 PROCEDURE — 85025 COMPLETE CBC W/AUTO DIFF WBC: CPT

## 2025-05-12 PROCEDURE — 84075 ASSAY ALKALINE PHOSPHATASE: CPT

## 2025-05-12 RX ORDER — ALPRAZOLAM 0.5 MG/1
1 TABLET ORAL 2 TIMES DAILY PRN
Qty: 120 TABLET | Refills: 0 | Status: SHIPPED | OUTPATIENT
Start: 2025-05-12

## 2025-05-13 ENCOUNTER — INFUSION (OUTPATIENT)
Dept: HEMATOLOGY/ONCOLOGY | Facility: CLINIC | Age: 45
End: 2025-05-13
Payer: COMMERCIAL

## 2025-05-13 ENCOUNTER — OFFICE VISIT (OUTPATIENT)
Dept: HEMATOLOGY/ONCOLOGY | Facility: CLINIC | Age: 45
End: 2025-05-13
Payer: COMMERCIAL

## 2025-05-13 VITALS
WEIGHT: 151.6 LBS | HEART RATE: 78 BPM | BODY MASS INDEX: 23.05 KG/M2 | SYSTOLIC BLOOD PRESSURE: 109 MMHG | TEMPERATURE: 98.1 F | DIASTOLIC BLOOD PRESSURE: 69 MMHG | OXYGEN SATURATION: 98 % | RESPIRATION RATE: 16 BRPM

## 2025-05-13 DIAGNOSIS — C17.9 ADENOCARCINOMA OF SMALL BOWEL (MULTI): ICD-10-CM

## 2025-05-13 PROCEDURE — 3078F DIAST BP <80 MM HG: CPT | Performed by: INTERNAL MEDICINE

## 2025-05-13 PROCEDURE — 96368 THER/DIAG CONCURRENT INF: CPT

## 2025-05-13 PROCEDURE — 99215 OFFICE O/P EST HI 40 MIN: CPT | Performed by: INTERNAL MEDICINE

## 2025-05-13 PROCEDURE — 2500000004 HC RX 250 GENERAL PHARMACY W/ HCPCS (ALT 636 FOR OP/ED): Mod: JZ | Performed by: INTERNAL MEDICINE

## 2025-05-13 PROCEDURE — 96413 CHEMO IV INFUSION 1 HR: CPT

## 2025-05-13 PROCEDURE — 96416 CHEMO PROLONG INFUSE W/PUMP: CPT

## 2025-05-13 PROCEDURE — 3074F SYST BP LT 130 MM HG: CPT | Performed by: INTERNAL MEDICINE

## 2025-05-13 PROCEDURE — 96375 TX/PRO/DX INJ NEW DRUG ADDON: CPT | Mod: INF

## 2025-05-13 PROCEDURE — 1036F TOBACCO NON-USER: CPT | Performed by: INTERNAL MEDICINE

## 2025-05-13 PROCEDURE — 99215 OFFICE O/P EST HI 40 MIN: CPT | Mod: 25 | Performed by: INTERNAL MEDICINE

## 2025-05-13 RX ORDER — EPINEPHRINE 0.3 MG/.3ML
0.3 INJECTION SUBCUTANEOUS EVERY 5 MIN PRN
Status: DISCONTINUED | OUTPATIENT
Start: 2025-05-13 | End: 2025-05-13 | Stop reason: HOSPADM

## 2025-05-13 RX ORDER — PROCHLORPERAZINE EDISYLATE 5 MG/ML
10 INJECTION INTRAMUSCULAR; INTRAVENOUS EVERY 6 HOURS PRN
Status: DISCONTINUED | OUTPATIENT
Start: 2025-05-13 | End: 2025-05-13 | Stop reason: HOSPADM

## 2025-05-13 RX ORDER — ATROPINE SULFATE 0.4 MG/ML
0.4 INJECTION, SOLUTION ENDOTRACHEAL; INTRAMEDULLARY; INTRAMUSCULAR; INTRAVENOUS; SUBCUTANEOUS
Status: DISCONTINUED | OUTPATIENT
Start: 2025-05-13 | End: 2025-05-13 | Stop reason: HOSPADM

## 2025-05-13 RX ORDER — DEXAMETHASONE 6 MG/1
12 TABLET ORAL ONCE
Status: COMPLETED | OUTPATIENT
Start: 2025-05-13 | End: 2025-05-13

## 2025-05-13 RX ORDER — PALONOSETRON 0.05 MG/ML
0.25 INJECTION, SOLUTION INTRAVENOUS ONCE
Status: COMPLETED | OUTPATIENT
Start: 2025-05-13 | End: 2025-05-13

## 2025-05-13 RX ORDER — DIPHENHYDRAMINE HYDROCHLORIDE 50 MG/ML
50 INJECTION, SOLUTION INTRAMUSCULAR; INTRAVENOUS AS NEEDED
Status: DISCONTINUED | OUTPATIENT
Start: 2025-05-13 | End: 2025-05-13 | Stop reason: HOSPADM

## 2025-05-13 RX ORDER — ALBUTEROL SULFATE 0.83 MG/ML
3 SOLUTION RESPIRATORY (INHALATION) AS NEEDED
Status: DISCONTINUED | OUTPATIENT
Start: 2025-05-13 | End: 2025-05-13 | Stop reason: HOSPADM

## 2025-05-13 RX ORDER — FAMOTIDINE 10 MG/ML
20 INJECTION, SOLUTION INTRAVENOUS ONCE AS NEEDED
Status: DISCONTINUED | OUTPATIENT
Start: 2025-05-13 | End: 2025-05-13 | Stop reason: HOSPADM

## 2025-05-13 RX ORDER — PROCHLORPERAZINE MALEATE 10 MG
10 TABLET ORAL EVERY 6 HOURS PRN
Status: DISCONTINUED | OUTPATIENT
Start: 2025-05-13 | End: 2025-05-13 | Stop reason: HOSPADM

## 2025-05-13 RX ADMIN — DEXAMETHASONE 12 MG: 6 TABLET ORAL at 11:56

## 2025-05-13 RX ADMIN — ATROPINE SULFATE 0.4 MG: 0.4 INJECTION, SOLUTION INTRAVENOUS at 12:29

## 2025-05-13 RX ADMIN — LEUCOVORIN CALCIUM 740 MG: 350 INJECTION, POWDER, LYOPHILIZED, FOR SOLUTION INTRAMUSCULAR; INTRAVENOUS at 12:36

## 2025-05-13 RX ADMIN — IRINOTECAN HYDROCHLORIDE 280 MG: 20 INJECTION, SOLUTION INTRAVENOUS at 12:33

## 2025-05-13 RX ADMIN — PALONOSETRON 0.25 MG: 0.05 INJECTION, SOLUTION INTRAVENOUS at 11:56

## 2025-05-13 RX ADMIN — FLUOROURACIL 3550 MG: 50 INJECTION, SOLUTION INTRAVENOUS at 14:15

## 2025-05-13 ASSESSMENT — PAIN SCALES - GENERAL: PAINLEVEL_OUTOF10: 0-NO PAIN

## 2025-05-13 NOTE — SIGNIFICANT EVENT
05/13/25 1138   Prechemo Checklist   Has the patient been in the hospital, ED, or urgent care since last date of service No   Chemo/Immuno Consent Completed and Signed Yes   Protocol/Indications Verified Yes   Confirmed to previous date/time of medication Yes   Compared to previous dose Yes   All medications are dated accurately Yes   Pregnancy Test Negative Not applicable   Parameters Met Yes   Provider Notified (!) No   BSA/Weight-Height Verified Yes   Dose Calculations Verified (current, total, cumulative) Yes

## 2025-05-13 NOTE — PROGRESS NOTES
.Patient ID: Thai Cristobal is a 44 y.o. male.  Referring Physician: Nba Vargas [place]  [address] [place] [address]  Primary Care Provider: Charlotte Yan DO      Chief complaint: SB Adenocarcinoma     HPI  This is a 44-year-old male with a known history of Crohn's disease patient is status post partial right colectomy small bowel resection on repair of a colonic fistula on 5/28/2024. Pathology he had a neuroendocrine tumor grade 3 well differentiated, with Ki67 30-40%. He did follow-up with oncology at [facility]. He had a PET-Ga68 which showed Postsurgical changes from right hemicolectomy and distal small bowel with no evidence of focal abnormal gallium 68 dotatate uptake.   Patient's only complaint is change in color of stools at this point, he is eating and drinking less and he has no abdominal pain. No diarrhea and no flushing.  Repeated liver biopsy showed: high grade malignancy, likely of colonic origin, showing focal adenocarcinoma-like features and focal neuroendocrine differentiation with Ki67 95%  He started on FOLFOX and avastin and finished 1 cycles and tolerated well. No abdominal pain, no nausea or vomiting. No new symptoms. His labs showed leucopenia and we delayed C2.  He had C2 and repeated labs showed normal WBCs  He finished 10 cycles and tolerated well with NH on restaging scans after C8 and SD after last cycle   Due to the third allergic reaction after C10 even with desensitization we switched to FOLFIRI      SYSTEMIC TREATMENT RECEIVED: None       Subjective   Please refer to Notes above for complete History of present illness.          Review of Systems:   All 14 systems have been reviewed and were negative except for the HPI.       MEDICAL HISTORY  Past Medical History:   Diagnosis Date    Acute upper respiratory infection, unspecified 02/21/2018    Acute URI    Asthma     Cancer (Multi)     Cancer, metastatic to liver (Multi)     Crohn's disease (Multi)     Personal history of  other specified conditions 04/23/2018    History of vertigo    SBO (small bowel obstruction) (Multi) 01/25/2024       FAMILY HISTORY  Family History   Problem Relation Name Age of Onset    Thyroid disease Mother      Other (bladder cancer) Mother      Hypertension Father      Benign prostatic hyperplasia Father      Anxiety disorder Sister      No Known Problems Brother      No Known Problems Daughter      Heart failure Paternal Grandfather         TOBACCO HISTORY  Tobacco Use: Low Risk  (5/13/2025)    Patient History     Smoking Tobacco Use: Never     Smokeless Tobacco Use: Never     Passive Exposure: Never       SOCIAL HISTORY  Social Connections: Not on file        Outpatient Medication Profile:  Current Outpatient Medications on File Prior to Visit   Medication Sig Dispense Refill    ALPRAZolam (Xanax) 0.5 mg tablet Take 2 tablets (1 mg) by mouth 2 times a day as needed for anxiety. 120 tablet 0    cholecalciferol (Vitamin D-3) 25 MCG (1000 UT) capsule Take 1 capsule (25 mcg) by mouth once daily.      diphenhydrAMINE (BENADryl) 25 mg capsule Take 1 capsule (25 mg) by mouth as needed at bedtime for itching.      elderberry fruit 350 mg capsule Take 1 capsule by mouth once daily.      flaxseed oiL 1,000 mg capsule Take 1 capsule (1,000 mg) by mouth once daily. (Patient not taking: Reported on 5/5/2025)      FLUoxetine (PROzac) 20 mg capsule Take 1 capsule (20 mg) by mouth once daily at bedtime. 90 capsule 1    FLUoxetine (PROzac) 40 mg capsule TAKE 1 CAPSULE BY MOUTH ONCE DAILY 90 capsule 1    lisinopril 20 mg tablet TAKE 1 TABLET BY MOUTH EVERY DAY 90 tablet 1    loperamide (Imodium) 2 mg capsule Take 2 capsules by mouth with the first episode of diarrhea, then take 1 capsule with each episode of loose stool thereafter. Maximum of 8 capsules per 24 hours. 30 capsule 3    omeprazole OTC (PriLOSEC OTC) 20 mg EC tablet Take 1 tablet (20 mg) by mouth once daily. Do not crush, chew, or split. 30 tablet 2     ondansetron (Zofran) 8 mg tablet Take 1 tablet (8 mg) by mouth every 8 hours if needed for nausea. 30 tablet 3    prochlorperazine (Compazine) 10 mg tablet Take 1 tablet (10 mg) by mouth every 6 hours if needed for nausea. 30 tablet 3    SUMAtriptan (Imitrex) 50 mg tablet TAKE 1 TABLET (50 MG) BY MOUTH 1 TIME IF NEEDED FOR MIGRAINE FOR UP TO 36 DOSES. 9 tablet 3    vitamin C-multivitamin-mineral (Emergen-C) 500 mg tablet,chewable Chew 1 tablet once daily.      [DISCONTINUED] ALPRAZolam (Xanax) 0.5 mg tablet Take 2 tablets (1 mg) by mouth once daily as needed for anxiety.      [DISCONTINUED] FLUoxetine (PROzac) 40 mg capsule TAKE 1 CAPSULE BY MOUTH ONCE DAILY 90 capsule 1     Current Facility-Administered Medications on File Prior to Visit   Medication Dose Route Frequency Provider Last Rate Last Admin    heparin flush 100 unit/mL syringe 500 Units  500 Units intra-catheter PRN Nba Vargas MD   500 Units at 10/15/24 1019         Performance Status:  Symptomatic; fully ambulatory     Vitals and Measurements:   /69 (BP Location: Left arm, Patient Position: Sitting)   Pulse 78   Temp 36.7 °C (98.1 °F) (Temporal)   Resp 16   Wt 68.8 kg (151 lb 9.6 oz)   SpO2 98%   BMI 23.05 kg/m²       Physical Exam:   General: Appears well and in NAD  Eyes: PERRL, EOMI, clear sclera  ENMT: mucous membranes moist, no apparent injury, no lesions seen  Head/Neck: Neck supple, no apparent injury, thyroid without mass or tenderness, No JVD, trachea midline,   Thorax: Patent airways, CTAB, normal breath sounds with good chest expansion, thorax symmetric  Cardiovascular: Regular, rate and rhythm, no murmurs, 2+ equal pulses of the extremities, normal S 1and S 2  Gastrointestinal: Nondistended, soft, non-tender, no rebound tenderness or guarding, no masses palpable, no organomegaly, +BS  Musculoskeletal: ROM intact, no joint swelling, normal strength  Extremities: normal extremities, no cyanosis edema, contusions or wounds, no  clubbing  Neurological: alert and oriented x3, intact senses, motor, response and reflexes, normal strength  Lymphatic: No significant lymphadenopathy  Psychological: Appropriate mood and behavior  Skin: Warm and dry, no lesions, no rashes          Lab Results:  I have reviewed these laboratory results:     Lab Results   Component Value Date    WBC 3.0 (L) 05/12/2025    HGB 8.5 (L) 05/12/2025    HCT 29.3 (L) 05/12/2025    MCV 91 05/12/2025     (L) 05/12/2025         Chemistry    Lab Results   Component Value Date/Time     05/12/2025 1118    K 4.1 05/12/2025 1118     05/12/2025 1118    CO2 29 05/12/2025 1118    BUN 16 05/12/2025 1118    CREATININE 0.97 05/12/2025 1118    Lab Results   Component Value Date/Time    CALCIUM 9.0 05/12/2025 1118    ALKPHOS 83 05/12/2025 1118    AST 16 05/12/2025 1118    ALT 11 05/12/2025 1118    BILITOT 0.5 05/12/2025 1118            Lab Results   Component Value Date    TSH 3.17 04/18/2023        Radiology Result:  I have reviewed the latest Imaging in PACS and the findings are noted in this note. I discussed the results of the latest imaging with the patient. All previous imaging were reviewed at the time it was completed. Full records are available in the EMR for review as well.         NM PET CT net netspot    Result Date: 7/5/2024  Impression: 1. Postsurgical changes from right hemicolectomy and distal small bowel with no evidence of focal abnormal gallium 68 dotatate uptake within the region of documented neuroendocrine tumor to suggest residual disease. 2. Nonspecific, gallium 68 dotatate uptake within the pancreatic uncinate which can be seen with physiologic uptake.     I personally reviewed the images/study and I agree with the findings as stated by Resident Celso Ashford MD.   MACRO: None   Signed by: Maco Villafana 7/5/2024 3:06 PM Dictation workstation:   [id number]        Pathology Results:  I have reviewed the full pathology report recorded in the EMR.  The pertinent portions indicating diagnosis are listed here in the note. for details please refer to the full report recorded in the EMR.    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 Pathology he had a neuroendocrine tumor grade 3 well differentiated, with Ki67 30-40%. He did follow-up with oncology at [facility]. He had a PET-Ga68 which showed Postsurgical changes from right hemicolectomy and distal small bowel with no evidence of focal abnormal gallium 68 dotatate uptake. Patient's only complaint is change in color of stools at this point he is eating and drinking and he has no abdominal pain. No diarrhea and no flushing. She had CT AP on 08/09/2024 at outside institution which showed new small liver nodules. 09/04/2024: MRI w Eovist showed Multiple T1 hypointense and T2 hyperintense lesions throughout the liver, and mesenteric LNS. PET-FDG showed FDG avid mesenteric kylie and liver metastases.   Repeated liver biopsy showed: high grade malignancy, likely of colonic origin, showing focal adenocarcinoma-like features and focal neuroendocrine differentiation with Ki67 95%  He started on FOLFOX and avastin and finished 1 cycles and tolerated well. No abdominal pain, no nausea or vomiting. No new symptoms. His labs showed leucopenia and keep delaying C2.  After C3 restaging scans on 12/06/2024: Showed [place] mainly in liver lesions and stable mesenteric LNS  Last labs were normal  and he is feeling fine today   Restaging scans after C8 showed OH  He finished 10 cycles  Had the third allergic reaction after C10 even with desensitization  Restaging scans showed stable liver lesions and no new metastatic lesions   We held FOLFOX plus bevacizumab and  will start FOLFIRI plus bevacizumab   He started FOLFIRI on 04/15/2025  Labs showed thrombocytopenia today which is improved . His Hb is trending down     Plan:  1- C4 FOLFIRI and continue to hold Bevacizumab due to trending down in Hb. Will add EGFRi according to his NGS   2-Restaging scans after 4 cycles for follow up   3-RTC in 2 weeks for follow up         DISCLAIMER:   In preparing for this visit and writing this note, I reviewed all the previous electronic medical records (labs, imaging and medical charts) of the patient available in the physician portal. Significant findings which helped in decision making are recorded  in this chart.     The plan was discussed with the patient. We gave him ample opportunities to ask questions. All questions were answered to his satisfaction and he verbalized understanding.       Nba Vargas M.D.   and Director of the Neuroendocrine Tumor Program  Gastrointestinal Medical Oncology  Department of Hematology and Oncology  [facility], [place] [address]  Phone (Office): [phone]  Fax:  [phone]   Email: [email or web address]  Learn more about [facility] Comprehensive Neuroendocrine Tumor Program: Click Here  Learn more about [place] [employer]: [email or web address]

## 2025-05-14 LAB
ATRIAL RATE: 85 BPM
ATRIAL RATE: 88 BPM
P AXIS: 44 DEGREES
P AXIS: 64 DEGREES
P OFFSET: 203 MS
P OFFSET: 216 MS
P ONSET: 152 MS
P ONSET: 162 MS
PR INTERVAL: 124 MS
PR INTERVAL: 130 MS
Q ONSET: 214 MS
Q ONSET: 227 MS
QRS COUNT: 14 BEATS
QRS COUNT: 14 BEATS
QRS DURATION: 86 MS
QRS DURATION: 88 MS
QT INTERVAL: 338 MS
QT INTERVAL: 342 MS
QTC CALCULATION(BAZETT): 406 MS
QTC CALCULATION(BAZETT): 408 MS
QTC FREDERICIA: 384 MS
QTC FREDERICIA: 384 MS
R AXIS: 56 DEGREES
R AXIS: 67 DEGREES
T AXIS: 65 DEGREES
T AXIS: 68 DEGREES
T OFFSET: 383 MS
T OFFSET: 398 MS
VENTRICULAR RATE: 85 BPM
VENTRICULAR RATE: 88 BPM

## 2025-05-15 ENCOUNTER — INFUSION (OUTPATIENT)
Dept: HEMATOLOGY/ONCOLOGY | Facility: CLINIC | Age: 45
End: 2025-05-15
Payer: COMMERCIAL

## 2025-05-15 VITALS
BODY MASS INDEX: 23.9 KG/M2 | RESPIRATION RATE: 18 BRPM | DIASTOLIC BLOOD PRESSURE: 78 MMHG | OXYGEN SATURATION: 98 % | HEART RATE: 85 BPM | TEMPERATURE: 98.4 F | WEIGHT: 157.19 LBS | SYSTOLIC BLOOD PRESSURE: 125 MMHG

## 2025-05-15 DIAGNOSIS — C7B.8 SECONDARY NEUROENDOCRINE TUMOR OF LIVER: ICD-10-CM

## 2025-05-15 DIAGNOSIS — C17.9 ADENOCARCINOMA OF SMALL BOWEL (MULTI): ICD-10-CM

## 2025-05-15 DIAGNOSIS — D50.0 IRON DEFICIENCY ANEMIA DUE TO CHRONIC BLOOD LOSS: ICD-10-CM

## 2025-05-15 PROCEDURE — 2500000004 HC RX 250 GENERAL PHARMACY W/ HCPCS (ALT 636 FOR OP/ED): Mod: JZ | Performed by: STUDENT IN AN ORGANIZED HEALTH CARE EDUCATION/TRAINING PROGRAM

## 2025-05-15 PROCEDURE — 96365 THER/PROPH/DIAG IV INF INIT: CPT | Mod: INF

## 2025-05-15 PROCEDURE — 2500000004 HC RX 250 GENERAL PHARMACY W/ HCPCS (ALT 636 FOR OP/ED): Mod: JZ | Performed by: INTERNAL MEDICINE

## 2025-05-15 RX ORDER — FAMOTIDINE 10 MG/ML
20 INJECTION, SOLUTION INTRAVENOUS ONCE AS NEEDED
Status: DISCONTINUED | OUTPATIENT
Start: 2025-05-15 | End: 2025-05-15 | Stop reason: HOSPADM

## 2025-05-15 RX ORDER — FAMOTIDINE 10 MG/ML
20 INJECTION, SOLUTION INTRAVENOUS ONCE AS NEEDED
OUTPATIENT
Start: 2025-05-22

## 2025-05-15 RX ORDER — ALBUTEROL SULFATE 0.83 MG/ML
3 SOLUTION RESPIRATORY (INHALATION) AS NEEDED
Status: DISCONTINUED | OUTPATIENT
Start: 2025-05-15 | End: 2025-05-15 | Stop reason: HOSPADM

## 2025-05-15 RX ORDER — EPINEPHRINE 0.3 MG/.3ML
0.3 INJECTION SUBCUTANEOUS EVERY 5 MIN PRN
Status: DISCONTINUED | OUTPATIENT
Start: 2025-05-15 | End: 2025-05-15 | Stop reason: HOSPADM

## 2025-05-15 RX ORDER — EPINEPHRINE 0.3 MG/.3ML
0.3 INJECTION SUBCUTANEOUS EVERY 5 MIN PRN
OUTPATIENT
Start: 2025-05-22

## 2025-05-15 RX ORDER — HEPARIN 100 UNIT/ML
500 SYRINGE INTRAVENOUS AS NEEDED
OUTPATIENT
Start: 2025-05-15

## 2025-05-15 RX ORDER — HEPARIN 100 UNIT/ML
500 SYRINGE INTRAVENOUS AS NEEDED
Status: DISCONTINUED | OUTPATIENT
Start: 2025-05-15 | End: 2025-05-15 | Stop reason: HOSPADM

## 2025-05-15 RX ORDER — DIPHENHYDRAMINE HYDROCHLORIDE 50 MG/ML
50 INJECTION, SOLUTION INTRAMUSCULAR; INTRAVENOUS AS NEEDED
OUTPATIENT
Start: 2025-05-22

## 2025-05-15 RX ORDER — ALBUTEROL SULFATE 0.83 MG/ML
3 SOLUTION RESPIRATORY (INHALATION) AS NEEDED
OUTPATIENT
Start: 2025-05-22

## 2025-05-15 RX ORDER — DIPHENHYDRAMINE HYDROCHLORIDE 50 MG/ML
50 INJECTION, SOLUTION INTRAMUSCULAR; INTRAVENOUS AS NEEDED
Status: DISCONTINUED | OUTPATIENT
Start: 2025-05-15 | End: 2025-05-15 | Stop reason: HOSPADM

## 2025-05-15 RX ORDER — HEPARIN SODIUM,PORCINE/PF 10 UNIT/ML
50 SYRINGE (ML) INTRAVENOUS AS NEEDED
OUTPATIENT
Start: 2025-05-15

## 2025-05-15 RX ADMIN — FERUMOXYTOL 510 MG: 510 INJECTION INTRAVENOUS at 13:02

## 2025-05-15 RX ADMIN — HEPARIN 500 UNITS: 100 SYRINGE at 13:28

## 2025-05-15 ASSESSMENT — PAIN SCALES - GENERAL: PAINLEVEL_OUTOF10: 0-NO PAIN

## 2025-05-20 LAB
LABORATORY COMMENT REPORT: NORMAL
PATH REPORT.FINAL DX SPEC: NORMAL
PATH REPORT.GROSS SPEC: NORMAL
PATH REPORT.RELEVANT HX SPEC: NORMAL
PATH REPORT.TOTAL CANCER: NORMAL

## 2025-05-27 ENCOUNTER — LAB (OUTPATIENT)
Dept: HEMATOLOGY/ONCOLOGY | Facility: CLINIC | Age: 45
End: 2025-05-27
Payer: COMMERCIAL

## 2025-05-27 ENCOUNTER — OFFICE VISIT (OUTPATIENT)
Dept: HEMATOLOGY/ONCOLOGY | Facility: CLINIC | Age: 45
End: 2025-05-27
Payer: COMMERCIAL

## 2025-05-27 VITALS
BODY MASS INDEX: 23.23 KG/M2 | DIASTOLIC BLOOD PRESSURE: 85 MMHG | HEART RATE: 76 BPM | RESPIRATION RATE: 16 BRPM | TEMPERATURE: 98.1 F | SYSTOLIC BLOOD PRESSURE: 120 MMHG | OXYGEN SATURATION: 99 % | WEIGHT: 152.8 LBS

## 2025-05-27 DIAGNOSIS — C17.9 ADENOCARCINOMA OF SMALL BOWEL (MULTI): Primary | ICD-10-CM

## 2025-05-27 DIAGNOSIS — B37.81 CANDIDA ESOPHAGITIS (MULTI): Primary | ICD-10-CM

## 2025-05-27 DIAGNOSIS — C17.9 ADENOCARCINOMA OF SMALL BOWEL (MULTI): ICD-10-CM

## 2025-05-27 LAB
ALBUMIN SERPL BCP-MCNC: 4.1 G/DL (ref 3.4–5)
ALP SERPL-CCNC: 86 U/L (ref 33–120)
ALT SERPL W P-5'-P-CCNC: 12 U/L (ref 10–52)
ANION GAP SERPL CALC-SCNC: 11 MMOL/L (ref 10–20)
AST SERPL W P-5'-P-CCNC: 19 U/L (ref 9–39)
BASOPHILS # BLD AUTO: 0.07 X10*3/UL (ref 0–0.1)
BASOPHILS NFR BLD AUTO: 2 %
BILIRUB SERPL-MCNC: 0.6 MG/DL (ref 0–1.2)
BUN SERPL-MCNC: 19 MG/DL (ref 6–23)
CALCIUM SERPL-MCNC: 9.1 MG/DL (ref 8.6–10.3)
CHLORIDE SERPL-SCNC: 106 MMOL/L (ref 98–107)
CO2 SERPL-SCNC: 26 MMOL/L (ref 21–32)
CREAT SERPL-MCNC: 0.98 MG/DL (ref 0.5–1.3)
EGFRCR SERPLBLD CKD-EPI 2021: >90 ML/MIN/1.73M*2
EOSINOPHIL # BLD AUTO: 0.07 X10*3/UL (ref 0–0.7)
EOSINOPHIL NFR BLD AUTO: 2 %
ERYTHROCYTE [DISTWIDTH] IN BLOOD BY AUTOMATED COUNT: 22.1 % (ref 11.5–14.5)
GLUCOSE SERPL-MCNC: 95 MG/DL (ref 74–99)
HCT VFR BLD AUTO: 32.7 % (ref 41–52)
HGB BLD-MCNC: 9.7 G/DL (ref 13.5–17.5)
IMM GRANULOCYTES # BLD AUTO: 0 X10*3/UL (ref 0–0.7)
IMM GRANULOCYTES NFR BLD AUTO: 0 % (ref 0–0.9)
LYMPHOCYTES # BLD AUTO: 0.73 X10*3/UL (ref 1.2–4.8)
LYMPHOCYTES NFR BLD AUTO: 20.7 %
MCH RBC QN AUTO: 26.6 PG (ref 26–34)
MCHC RBC AUTO-ENTMCNC: 29.7 G/DL (ref 32–36)
MCV RBC AUTO: 90 FL (ref 80–100)
MONOCYTES # BLD AUTO: 0.48 X10*3/UL (ref 0.1–1)
MONOCYTES NFR BLD AUTO: 13.6 %
NEUTROPHILS # BLD AUTO: 2.17 X10*3/UL (ref 1.2–7.7)
NEUTROPHILS NFR BLD AUTO: 61.7 %
NRBC BLD-RTO: ABNORMAL /100{WBCS}
PLATELET # BLD AUTO: 123 X10*3/UL (ref 150–450)
POTASSIUM SERPL-SCNC: 4.4 MMOL/L (ref 3.5–5.3)
PROT SERPL-MCNC: 6.6 G/DL (ref 6.4–8.2)
RBC # BLD AUTO: 3.65 X10*6/UL (ref 4.5–5.9)
SODIUM SERPL-SCNC: 139 MMOL/L (ref 136–145)
WBC # BLD AUTO: 3.5 X10*3/UL (ref 4.4–11.3)

## 2025-05-27 PROCEDURE — 99215 OFFICE O/P EST HI 40 MIN: CPT | Performed by: INTERNAL MEDICINE

## 2025-05-27 PROCEDURE — 36415 COLL VENOUS BLD VENIPUNCTURE: CPT

## 2025-05-27 PROCEDURE — 3074F SYST BP LT 130 MM HG: CPT | Performed by: INTERNAL MEDICINE

## 2025-05-27 PROCEDURE — 3079F DIAST BP 80-89 MM HG: CPT | Performed by: INTERNAL MEDICINE

## 2025-05-27 PROCEDURE — 80053 COMPREHEN METABOLIC PANEL: CPT

## 2025-05-27 PROCEDURE — 1036F TOBACCO NON-USER: CPT | Performed by: INTERNAL MEDICINE

## 2025-05-27 PROCEDURE — 85025 COMPLETE CBC W/AUTO DIFF WBC: CPT

## 2025-05-27 RX ORDER — FLUCONAZOLE 200 MG/1
400 TABLET ORAL DAILY
Qty: 28 TABLET | Refills: 0 | Status: SHIPPED | OUTPATIENT
Start: 2025-05-27 | End: 2025-06-10

## 2025-05-27 RX ORDER — EPINEPHRINE 0.3 MG/.3ML
0.3 INJECTION SUBCUTANEOUS EVERY 5 MIN PRN
OUTPATIENT
Start: 2025-06-10

## 2025-05-27 RX ORDER — FAMOTIDINE 10 MG/ML
20 INJECTION, SOLUTION INTRAVENOUS ONCE AS NEEDED
OUTPATIENT
Start: 2025-07-08

## 2025-05-27 RX ORDER — DIPHENHYDRAMINE HYDROCHLORIDE 50 MG/ML
50 INJECTION, SOLUTION INTRAMUSCULAR; INTRAVENOUS AS NEEDED
OUTPATIENT
Start: 2025-06-10

## 2025-05-27 RX ORDER — FAMOTIDINE 10 MG/ML
20 INJECTION, SOLUTION INTRAVENOUS ONCE AS NEEDED
OUTPATIENT
Start: 2025-06-24

## 2025-05-27 RX ORDER — FAMOTIDINE 10 MG/ML
20 INJECTION, SOLUTION INTRAVENOUS ONCE AS NEEDED
OUTPATIENT
Start: 2025-07-22

## 2025-05-27 RX ORDER — PROCHLORPERAZINE MALEATE 5 MG
10 TABLET ORAL EVERY 6 HOURS PRN
OUTPATIENT
Start: 2025-07-22

## 2025-05-27 RX ORDER — PROCHLORPERAZINE MALEATE 5 MG
10 TABLET ORAL EVERY 6 HOURS PRN
OUTPATIENT
Start: 2025-06-24

## 2025-05-27 RX ORDER — HEPARIN SODIUM,PORCINE/PF 10 UNIT/ML
50 SYRINGE (ML) INTRAVENOUS AS NEEDED
Status: CANCELLED | OUTPATIENT
Start: 2025-05-27

## 2025-05-27 RX ORDER — PROCHLORPERAZINE EDISYLATE 5 MG/ML
10 INJECTION INTRAMUSCULAR; INTRAVENOUS EVERY 6 HOURS PRN
OUTPATIENT
Start: 2025-07-22

## 2025-05-27 RX ORDER — ATROPINE SULFATE 0.1 MG/ML
0.4 INJECTION INTRAVENOUS
OUTPATIENT
Start: 2025-06-10

## 2025-05-27 RX ORDER — EPINEPHRINE 0.3 MG/.3ML
0.3 INJECTION SUBCUTANEOUS EVERY 5 MIN PRN
OUTPATIENT
Start: 2025-06-24

## 2025-05-27 RX ORDER — PALONOSETRON 0.05 MG/ML
0.25 INJECTION, SOLUTION INTRAVENOUS ONCE
OUTPATIENT
Start: 2025-07-22

## 2025-05-27 RX ORDER — DIPHENHYDRAMINE HYDROCHLORIDE 50 MG/ML
50 INJECTION, SOLUTION INTRAMUSCULAR; INTRAVENOUS AS NEEDED
OUTPATIENT
Start: 2025-07-22

## 2025-05-27 RX ORDER — DEXAMETHASONE 6 MG/1
12 TABLET ORAL ONCE
OUTPATIENT
Start: 2025-06-10

## 2025-05-27 RX ORDER — PROCHLORPERAZINE MALEATE 5 MG
10 TABLET ORAL EVERY 6 HOURS PRN
OUTPATIENT
Start: 2025-07-08

## 2025-05-27 RX ORDER — ALBUTEROL SULFATE 0.83 MG/ML
3 SOLUTION RESPIRATORY (INHALATION) AS NEEDED
OUTPATIENT
Start: 2025-06-10

## 2025-05-27 RX ORDER — PROCHLORPERAZINE EDISYLATE 5 MG/ML
10 INJECTION INTRAMUSCULAR; INTRAVENOUS EVERY 6 HOURS PRN
OUTPATIENT
Start: 2025-07-08

## 2025-05-27 RX ORDER — DIPHENHYDRAMINE HYDROCHLORIDE 50 MG/ML
50 INJECTION, SOLUTION INTRAMUSCULAR; INTRAVENOUS AS NEEDED
OUTPATIENT
Start: 2025-07-08

## 2025-05-27 RX ORDER — ALBUTEROL SULFATE 0.83 MG/ML
3 SOLUTION RESPIRATORY (INHALATION) AS NEEDED
OUTPATIENT
Start: 2025-07-08

## 2025-05-27 RX ORDER — FAMOTIDINE 10 MG/ML
20 INJECTION, SOLUTION INTRAVENOUS ONCE AS NEEDED
OUTPATIENT
Start: 2025-06-10

## 2025-05-27 RX ORDER — HEPARIN 100 UNIT/ML
500 SYRINGE INTRAVENOUS AS NEEDED
Status: DISCONTINUED | OUTPATIENT
Start: 2025-05-27 | End: 2025-05-27 | Stop reason: HOSPADM

## 2025-05-27 RX ORDER — PALONOSETRON 0.05 MG/ML
0.25 INJECTION, SOLUTION INTRAVENOUS ONCE
OUTPATIENT
Start: 2025-06-24

## 2025-05-27 RX ORDER — EPINEPHRINE 0.3 MG/.3ML
0.3 INJECTION SUBCUTANEOUS EVERY 5 MIN PRN
OUTPATIENT
Start: 2025-07-08

## 2025-05-27 RX ORDER — EPINEPHRINE 0.3 MG/.3ML
0.3 INJECTION SUBCUTANEOUS EVERY 5 MIN PRN
OUTPATIENT
Start: 2025-07-22

## 2025-05-27 RX ORDER — ATROPINE SULFATE 0.1 MG/ML
0.4 INJECTION INTRAVENOUS
OUTPATIENT
Start: 2025-07-22

## 2025-05-27 RX ORDER — PROCHLORPERAZINE EDISYLATE 5 MG/ML
10 INJECTION INTRAMUSCULAR; INTRAVENOUS EVERY 6 HOURS PRN
OUTPATIENT
Start: 2025-06-24

## 2025-05-27 RX ORDER — ATROPINE SULFATE 0.1 MG/ML
0.4 INJECTION INTRAVENOUS
OUTPATIENT
Start: 2025-06-24

## 2025-05-27 RX ORDER — HEPARIN 100 UNIT/ML
500 SYRINGE INTRAVENOUS AS NEEDED
Status: CANCELLED | OUTPATIENT
Start: 2025-05-27

## 2025-05-27 RX ORDER — ALBUTEROL SULFATE 0.83 MG/ML
3 SOLUTION RESPIRATORY (INHALATION) AS NEEDED
OUTPATIENT
Start: 2025-07-22

## 2025-05-27 RX ORDER — PROCHLORPERAZINE MALEATE 5 MG
10 TABLET ORAL EVERY 6 HOURS PRN
OUTPATIENT
Start: 2025-06-10

## 2025-05-27 RX ORDER — PALONOSETRON 0.05 MG/ML
0.25 INJECTION, SOLUTION INTRAVENOUS ONCE
OUTPATIENT
Start: 2025-06-10

## 2025-05-27 RX ORDER — PALONOSETRON 0.05 MG/ML
0.25 INJECTION, SOLUTION INTRAVENOUS ONCE
OUTPATIENT
Start: 2025-07-08

## 2025-05-27 RX ORDER — ALBUTEROL SULFATE 0.83 MG/ML
3 SOLUTION RESPIRATORY (INHALATION) AS NEEDED
OUTPATIENT
Start: 2025-06-24

## 2025-05-27 RX ORDER — ATROPINE SULFATE 0.1 MG/ML
0.4 INJECTION INTRAVENOUS
OUTPATIENT
Start: 2025-07-08

## 2025-05-27 RX ORDER — DEXAMETHASONE 6 MG/1
12 TABLET ORAL ONCE
OUTPATIENT
Start: 2025-06-24

## 2025-05-27 RX ORDER — DIPHENHYDRAMINE HYDROCHLORIDE 50 MG/ML
50 INJECTION, SOLUTION INTRAMUSCULAR; INTRAVENOUS AS NEEDED
OUTPATIENT
Start: 2025-06-24

## 2025-05-27 RX ORDER — PROCHLORPERAZINE EDISYLATE 5 MG/ML
10 INJECTION INTRAMUSCULAR; INTRAVENOUS EVERY 6 HOURS PRN
OUTPATIENT
Start: 2025-06-10

## 2025-05-27 ASSESSMENT — PAIN SCALES - GENERAL: PAINLEVEL_OUTOF10: 0-NO PAIN

## 2025-05-27 NOTE — PROGRESS NOTES
.Patient ID: Thai Cristobal is a 44 y.o. male.  Referring Physician: Nba Vargas [place]  [address] [place] [address]  Primary Care Provider: Charlotte Yan DO      Chief complaint: SB Adenocarcinoma     HPI  This is a 44-year-old male with a known history of Crohn's disease patient is status post partial right colectomy small bowel resection on repair of a colonic fistula on 5/28/2024. Pathology he had a neuroendocrine tumor grade 3 well differentiated, with Ki67 30-40%. He did follow-up with oncology at [facility]. He had a PET-Ga68 which showed Postsurgical changes from right hemicolectomy and distal small bowel with no evidence of focal abnormal gallium 68 dotatate uptake.   Patient's only complaint is change in color of stools at this point, he is eating and drinking less and he has no abdominal pain. No diarrhea and no flushing.  Repeated liver biopsy showed: high grade malignancy, likely of colonic origin, showing focal adenocarcinoma-like features and focal neuroendocrine differentiation with Ki67 95%  He started on FOLFOX and avastin and finished 1 cycles and tolerated well. No abdominal pain, no nausea or vomiting. No new symptoms. His labs showed leucopenia and we delayed C2.  He had C2 and repeated labs showed normal WBCs  He finished 10 cycles and tolerated well with CO on restaging scans after C8 and SD after last cycle   Due to the third allergic reaction after C10 even with desensitization we switched to FOLFIRI      SYSTEMIC TREATMENT RECEIVED: None       Subjective   Please refer to Notes above for complete History of present illness.          Review of Systems:   All 14 systems have been reviewed and were negative except for the HPI.       MEDICAL HISTORY  Past Medical History:   Diagnosis Date    Acute upper respiratory infection, unspecified 02/21/2018    Acute URI    Asthma     Cancer (Multi)     Cancer, metastatic to liver (Multi)     Crohn's disease (Multi)     Personal history of  other specified conditions 04/23/2018    History of vertigo    SBO (small bowel obstruction) (Multi) 01/25/2024       FAMILY HISTORY  Family History   Problem Relation Name Age of Onset    Thyroid disease Mother      Other (bladder cancer) Mother      Hypertension Father      Benign prostatic hyperplasia Father      Anxiety disorder Sister      No Known Problems Brother      No Known Problems Daughter      Heart failure Paternal Grandfather         TOBACCO HISTORY  Tobacco Use: Low Risk  (5/13/2025)    Patient History     Smoking Tobacco Use: Never     Smokeless Tobacco Use: Never     Passive Exposure: Never       SOCIAL HISTORY  Social Connections: Not on file        Outpatient Medication Profile:  Current Outpatient Medications on File Prior to Visit   Medication Sig Dispense Refill    ALPRAZolam (Xanax) 0.5 mg tablet Take 2 tablets (1 mg) by mouth 2 times a day as needed for anxiety. 120 tablet 0    cholecalciferol (Vitamin D-3) 25 MCG (1000 UT) capsule Take 1 capsule (25 mcg) by mouth once daily.      diphenhydrAMINE (BENADryl) 25 mg capsule Take 1 capsule (25 mg) by mouth as needed at bedtime for itching.      elderberry fruit 350 mg capsule Take 1 capsule by mouth once daily.      flaxseed oiL 1,000 mg capsule Take 1 capsule (1,000 mg) by mouth once daily. (Patient not taking: Reported on 5/5/2025)      FLUoxetine (PROzac) 20 mg capsule Take 1 capsule (20 mg) by mouth once daily at bedtime. 90 capsule 1    FLUoxetine (PROzac) 40 mg capsule TAKE 1 CAPSULE BY MOUTH ONCE DAILY 90 capsule 1    lisinopril 20 mg tablet TAKE 1 TABLET BY MOUTH EVERY DAY 90 tablet 1    loperamide (Imodium) 2 mg capsule Take 2 capsules by mouth with the first episode of diarrhea, then take 1 capsule with each episode of loose stool thereafter. Maximum of 8 capsules per 24 hours. 30 capsule 3    omeprazole OTC (PriLOSEC OTC) 20 mg EC tablet Take 1 tablet (20 mg) by mouth once daily. Do not crush, chew, or split. 30 tablet 2     ondansetron (Zofran) 8 mg tablet Take 1 tablet (8 mg) by mouth every 8 hours if needed for nausea. 30 tablet 3    prochlorperazine (Compazine) 10 mg tablet Take 1 tablet (10 mg) by mouth every 6 hours if needed for nausea. 30 tablet 3    SUMAtriptan (Imitrex) 50 mg tablet TAKE 1 TABLET (50 MG) BY MOUTH 1 TIME IF NEEDED FOR MIGRAINE FOR UP TO 36 DOSES. 9 tablet 3    vitamin C-multivitamin-mineral (Emergen-C) 500 mg tablet,chewable Chew 1 tablet once daily.       Current Facility-Administered Medications on File Prior to Visit   Medication Dose Route Frequency Provider Last Rate Last Admin    heparin flush 100 unit/mL syringe 500 Units  500 Units intra-catheter PRN Nba Vargas MD   500 Units at 10/15/24 1019         Performance Status:  Symptomatic; fully ambulatory     Vitals and Measurements:   There were no vitals taken for this visit.      Physical Exam:   General: Appears well and in NAD  Eyes: PERRL, EOMI, clear sclera  ENMT: mucous membranes moist, no apparent injury, no lesions seen  Head/Neck: Neck supple, no apparent injury, thyroid without mass or tenderness, No JVD, trachea midline,   Thorax: Patent airways, CTAB, normal breath sounds with good chest expansion, thorax symmetric  Cardiovascular: Regular, rate and rhythm, no murmurs, 2+ equal pulses of the extremities, normal S 1and S 2  Gastrointestinal: Nondistended, soft, non-tender, no rebound tenderness or guarding, no masses palpable, no organomegaly, +BS  Musculoskeletal: ROM intact, no joint swelling, normal strength  Extremities: normal extremities, no cyanosis edema, contusions or wounds, no clubbing  Neurological: alert and oriented x3, intact senses, motor, response and reflexes, normal strength  Lymphatic: No significant lymphadenopathy  Psychological: Appropriate mood and behavior  Skin: Warm and dry, no lesions, no rashes          Lab Results:  I have reviewed these laboratory results:     Lab Results   Component Value Date    WBC 3.5 (L)  05/27/2025    HGB 9.7 (L) 05/27/2025    HCT 32.7 (L) 05/27/2025    MCV 90 05/27/2025     (L) 05/27/2025         Chemistry    Lab Results   Component Value Date/Time     05/12/2025 1118    K 4.1 05/12/2025 1118     05/12/2025 1118    CO2 29 05/12/2025 1118    BUN 16 05/12/2025 1118    CREATININE 0.97 05/12/2025 1118    Lab Results   Component Value Date/Time    CALCIUM 9.0 05/12/2025 1118    ALKPHOS 83 05/12/2025 1118    AST 16 05/12/2025 1118    ALT 11 05/12/2025 1118    BILITOT 0.5 05/12/2025 1118            Lab Results   Component Value Date    TSH 3.17 04/18/2023        Radiology Result:  I have reviewed the latest Imaging in PACS and the findings are noted in this note. I discussed the results of the latest imaging with the patient. All previous imaging were reviewed at the time it was completed. Full records are available in the EMR for review as well.         NM PET CT net netspot    Result Date: 7/5/2024  Impression: 1. Postsurgical changes from right hemicolectomy and distal small bowel with no evidence of focal abnormal gallium 68 dotatate uptake within the region of documented neuroendocrine tumor to suggest residual disease. 2. Nonspecific, gallium 68 dotatate uptake within the pancreatic uncinate which can be seen with physiologic uptake.     I personally reviewed the images/study and I agree with the findings as stated by Resident Celso Ashford MD.   MACRO: None   Signed by: Maco Villafana 7/5/2024 3:06 PM Dictation workstation:   [id number]        Pathology Results:  I have reviewed the full pathology report recorded in the EMR. The pertinent portions indicating diagnosis are listed here in the note. for details please refer to the full report recorded in the EMR.    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 Pathology he had a neuroendocrine tumor grade 3 well differentiated, with Ki67 30-40%. He did follow-up with oncology at [facility]. He had a PET-Ga68 which showed Postsurgical changes from right hemicolectomy and distal small bowel with no evidence of focal abnormal gallium 68 dotatate uptake. Patient's only complaint is change in color of stools at this point he is eating and drinking and he has no abdominal pain. No diarrhea and no flushing. She had CT AP on 08/09/2024 at outside institution which showed new small liver nodules. 09/04/2024: MRI w Eovist showed Multiple T1 hypointense and T2 hyperintense lesions throughout the liver, and mesenteric LNS. PET-FDG showed FDG avid mesenteric kylie and liver metastases.   Repeated liver biopsy showed: high grade malignancy, likely of colonic origin, showing focal adenocarcinoma-like features and focal neuroendocrine differentiation with Ki67 95%  He started on FOLFOX and avastin and finished 1 cycles and tolerated well. No abdominal pain, no nausea or vomiting. No new symptoms. His labs showed leucopenia and keep delaying C2.  After C3 restaging scans on 12/06/2024: Showed [place] mainly in liver lesions and stable mesenteric LNS  Last labs were normal  and he is feeling fine today   Restaging scans after C8 showed [place]  He finished 10 cycles  Had the third allergic reaction after C10 even with desensitization  Restaging scans showed stable liver lesions and no new metastatic lesions   We held FOLFOX plus bevacizumab and will start FOLFIRI plus bevacizumab   He started FOLFIRI on 04/15/2025  Labs showed thrombocytopenia today which is improved .     Plan:  1- C4 FOLFIRI and continue to hold Bevacizumab due to trending down in Hb. Will add cetuximab after C4  2-Restaging scans after 4 cycles for follow up   3-RTC in 2 weeks for follow up         DISCLAIMER:   In preparing  for this visit and writing this note, I reviewed all the previous electronic medical records (labs, imaging and medical charts) of the patient available in the physician portal. Significant findings which helped in decision making are recorded  in this chart.     The plan was discussed with the patient. We gave him ample opportunities to ask questions. All questions were answered to his satisfaction and he verbalized understanding.       Nba Vargas M.D.   and Director of the Neuroendocrine Tumor Program  Gastrointestinal Medical Oncology  Department of Hematology and Oncology  [facility], [place] [address]  Phone (Office): [phone]  Fax:  [phone]   Email: [email or web address]  Learn more about [facility] Comprehensive Neuroendocrine Tumor Program: Click Here  Learn more about [place] [employer]: [email or web address]

## 2025-05-28 ENCOUNTER — INFUSION (OUTPATIENT)
Dept: HEMATOLOGY/ONCOLOGY | Facility: CLINIC | Age: 45
End: 2025-05-28
Payer: COMMERCIAL

## 2025-05-28 VITALS
WEIGHT: 152.67 LBS | HEART RATE: 76 BPM | BODY MASS INDEX: 23.21 KG/M2 | DIASTOLIC BLOOD PRESSURE: 81 MMHG | SYSTOLIC BLOOD PRESSURE: 139 MMHG | RESPIRATION RATE: 16 BRPM | OXYGEN SATURATION: 99 % | TEMPERATURE: 97.5 F

## 2025-05-28 DIAGNOSIS — C17.9 ADENOCARCINOMA OF SMALL BOWEL (MULTI): ICD-10-CM

## 2025-05-28 PROCEDURE — 2500000004 HC RX 250 GENERAL PHARMACY W/ HCPCS (ALT 636 FOR OP/ED): Mod: JZ | Performed by: INTERNAL MEDICINE

## 2025-05-28 PROCEDURE — 96413 CHEMO IV INFUSION 1 HR: CPT

## 2025-05-28 PROCEDURE — 96368 THER/DIAG CONCURRENT INF: CPT

## 2025-05-28 PROCEDURE — 96375 TX/PRO/DX INJ NEW DRUG ADDON: CPT | Mod: INF

## 2025-05-28 PROCEDURE — 96416 CHEMO PROLONG INFUSE W/PUMP: CPT

## 2025-05-28 RX ORDER — DEXAMETHASONE 6 MG/1
12 TABLET ORAL ONCE
Status: COMPLETED | OUTPATIENT
Start: 2025-05-28 | End: 2025-05-28

## 2025-05-28 RX ORDER — PROCHLORPERAZINE MALEATE 10 MG
10 TABLET ORAL EVERY 6 HOURS PRN
Status: DISCONTINUED | OUTPATIENT
Start: 2025-05-28 | End: 2025-05-28 | Stop reason: HOSPADM

## 2025-05-28 RX ORDER — ALBUTEROL SULFATE 0.83 MG/ML
3 SOLUTION RESPIRATORY (INHALATION) AS NEEDED
Status: DISCONTINUED | OUTPATIENT
Start: 2025-05-28 | End: 2025-05-28 | Stop reason: HOSPADM

## 2025-05-28 RX ORDER — PROCHLORPERAZINE EDISYLATE 5 MG/ML
10 INJECTION INTRAMUSCULAR; INTRAVENOUS EVERY 6 HOURS PRN
Status: DISCONTINUED | OUTPATIENT
Start: 2025-05-28 | End: 2025-05-28 | Stop reason: HOSPADM

## 2025-05-28 RX ORDER — DIPHENHYDRAMINE HYDROCHLORIDE 50 MG/ML
50 INJECTION, SOLUTION INTRAMUSCULAR; INTRAVENOUS AS NEEDED
Status: DISCONTINUED | OUTPATIENT
Start: 2025-05-28 | End: 2025-05-28 | Stop reason: HOSPADM

## 2025-05-28 RX ORDER — HEPARIN 100 UNIT/ML
500 SYRINGE INTRAVENOUS AS NEEDED
Status: CANCELLED | OUTPATIENT
Start: 2025-05-28

## 2025-05-28 RX ORDER — EPINEPHRINE 0.3 MG/.3ML
0.3 INJECTION SUBCUTANEOUS EVERY 5 MIN PRN
Status: DISCONTINUED | OUTPATIENT
Start: 2025-05-28 | End: 2025-05-28 | Stop reason: HOSPADM

## 2025-05-28 RX ORDER — PALONOSETRON 0.05 MG/ML
0.25 INJECTION, SOLUTION INTRAVENOUS ONCE
Status: COMPLETED | OUTPATIENT
Start: 2025-05-28 | End: 2025-05-28

## 2025-05-28 RX ORDER — HEPARIN SODIUM,PORCINE/PF 10 UNIT/ML
50 SYRINGE (ML) INTRAVENOUS AS NEEDED
Status: CANCELLED | OUTPATIENT
Start: 2025-05-28

## 2025-05-28 RX ORDER — FAMOTIDINE 10 MG/ML
20 INJECTION, SOLUTION INTRAVENOUS ONCE AS NEEDED
Status: DISCONTINUED | OUTPATIENT
Start: 2025-05-28 | End: 2025-05-28 | Stop reason: HOSPADM

## 2025-05-28 RX ORDER — ATROPINE SULFATE 0.4 MG/ML
0.4 INJECTION, SOLUTION ENDOTRACHEAL; INTRAMEDULLARY; INTRAMUSCULAR; INTRAVENOUS; SUBCUTANEOUS
Status: DISCONTINUED | OUTPATIENT
Start: 2025-05-28 | End: 2025-05-28 | Stop reason: HOSPADM

## 2025-05-28 RX ADMIN — PALONOSETRON 0.25 MG: 0.05 INJECTION, SOLUTION INTRAVENOUS at 08:52

## 2025-05-28 RX ADMIN — FLUOROURACIL 3550 MG: 50 INJECTION, SOLUTION INTRAVENOUS at 10:58

## 2025-05-28 RX ADMIN — DEXAMETHASONE 12 MG: 6 TABLET ORAL at 08:52

## 2025-05-28 RX ADMIN — ATROPINE SULFATE 0.4 MG: 0.4 INJECTION, SOLUTION INTRAVENOUS at 10:02

## 2025-05-28 RX ADMIN — IRINOTECAN HYDROCHLORIDE 280 MG: 20 INJECTION, SOLUTION INTRAVENOUS at 09:14

## 2025-05-28 RX ADMIN — LEUCOVORIN CALCIUM 740 MG: 350 INJECTION, POWDER, LYOPHILIZED, FOR SOLUTION INTRAMUSCULAR; INTRAVENOUS at 09:14

## 2025-05-28 ASSESSMENT — PAIN SCALES - GENERAL: PAINLEVEL_OUTOF10: 0-NO PAIN

## 2025-05-30 ENCOUNTER — INFUSION (OUTPATIENT)
Dept: HEMATOLOGY/ONCOLOGY | Facility: CLINIC | Age: 45
End: 2025-05-30
Payer: COMMERCIAL

## 2025-05-30 VITALS
OXYGEN SATURATION: 99 % | HEART RATE: 72 BPM | WEIGHT: 153.66 LBS | BODY MASS INDEX: 23.36 KG/M2 | TEMPERATURE: 97.5 F | RESPIRATION RATE: 18 BRPM | SYSTOLIC BLOOD PRESSURE: 130 MMHG | DIASTOLIC BLOOD PRESSURE: 82 MMHG

## 2025-05-30 DIAGNOSIS — C17.9 ADENOCARCINOMA OF SMALL BOWEL (MULTI): ICD-10-CM

## 2025-05-30 PROCEDURE — 96523 IRRIG DRUG DELIVERY DEVICE: CPT

## 2025-05-30 PROCEDURE — 2500000004 HC RX 250 GENERAL PHARMACY W/ HCPCS (ALT 636 FOR OP/ED): Mod: JZ | Performed by: INTERNAL MEDICINE

## 2025-05-30 RX ORDER — HEPARIN 100 UNIT/ML
500 SYRINGE INTRAVENOUS AS NEEDED
Status: DISCONTINUED | OUTPATIENT
Start: 2025-05-30 | End: 2025-05-30 | Stop reason: HOSPADM

## 2025-05-30 RX ORDER — HEPARIN SODIUM,PORCINE/PF 10 UNIT/ML
50 SYRINGE (ML) INTRAVENOUS AS NEEDED
OUTPATIENT
Start: 2025-05-30

## 2025-05-30 RX ORDER — HEPARIN SODIUM,PORCINE/PF 10 UNIT/ML
50 SYRINGE (ML) INTRAVENOUS AS NEEDED
Status: DISCONTINUED | OUTPATIENT
Start: 2025-05-30 | End: 2025-05-30 | Stop reason: HOSPADM

## 2025-05-30 RX ORDER — HEPARIN 100 UNIT/ML
500 SYRINGE INTRAVENOUS AS NEEDED
OUTPATIENT
Start: 2025-05-30

## 2025-05-30 RX ADMIN — HEPARIN 500 UNITS: 100 SYRINGE at 09:30

## 2025-05-30 ASSESSMENT — PAIN SCALES - GENERAL: PAINLEVEL_OUTOF10: 0-NO PAIN

## 2025-06-04 ENCOUNTER — HOSPITAL ENCOUNTER (OUTPATIENT)
Dept: RADIOLOGY | Facility: HOSPITAL | Age: 45
Discharge: HOME | End: 2025-06-04
Payer: COMMERCIAL

## 2025-06-04 DIAGNOSIS — C17.9 ADENOCARCINOMA OF SMALL BOWEL (MULTI): ICD-10-CM

## 2025-06-04 PROCEDURE — 2550000001 HC RX 255 CONTRASTS: Performed by: INTERNAL MEDICINE

## 2025-06-04 PROCEDURE — 74177 CT ABD & PELVIS W/CONTRAST: CPT

## 2025-06-04 RX ADMIN — IOHEXOL 75 ML: 350 INJECTION, SOLUTION INTRAVENOUS at 08:13

## 2025-06-10 ENCOUNTER — INFUSION (OUTPATIENT)
Dept: HEMATOLOGY/ONCOLOGY | Facility: CLINIC | Age: 45
End: 2025-06-10
Payer: COMMERCIAL

## 2025-06-10 VITALS
HEIGHT: 68 IN | OXYGEN SATURATION: 98 % | RESPIRATION RATE: 18 BRPM | HEART RATE: 73 BPM | WEIGHT: 151.9 LBS | SYSTOLIC BLOOD PRESSURE: 110 MMHG | TEMPERATURE: 97.5 F | BODY MASS INDEX: 23.02 KG/M2 | DIASTOLIC BLOOD PRESSURE: 75 MMHG

## 2025-06-10 DIAGNOSIS — C17.9 ADENOCARCINOMA OF SMALL BOWEL (MULTI): ICD-10-CM

## 2025-06-10 LAB
ALBUMIN SERPL BCP-MCNC: 4.1 G/DL (ref 3.4–5)
ALP SERPL-CCNC: 80 U/L (ref 33–120)
ALT SERPL W P-5'-P-CCNC: 11 U/L (ref 10–52)
ANION GAP SERPL CALC-SCNC: 11 MMOL/L (ref 10–20)
AST SERPL W P-5'-P-CCNC: 17 U/L (ref 9–39)
BASOPHILS # BLD AUTO: 0.06 X10*3/UL (ref 0–0.1)
BASOPHILS NFR BLD AUTO: 1.4 %
BILIRUB SERPL-MCNC: 0.5 MG/DL (ref 0–1.2)
BUN SERPL-MCNC: 15 MG/DL (ref 6–23)
CALCIUM SERPL-MCNC: 8.8 MG/DL (ref 8.6–10.3)
CHLORIDE SERPL-SCNC: 106 MMOL/L (ref 98–107)
CO2 SERPL-SCNC: 25 MMOL/L (ref 21–32)
CREAT SERPL-MCNC: 1.04 MG/DL (ref 0.5–1.3)
EGFRCR SERPLBLD CKD-EPI 2021: >90 ML/MIN/1.73M*2
EOSINOPHIL # BLD AUTO: 0.1 X10*3/UL (ref 0–0.7)
EOSINOPHIL NFR BLD AUTO: 2.3 %
ERYTHROCYTE [DISTWIDTH] IN BLOOD BY AUTOMATED COUNT: 21.3 % (ref 11.5–14.5)
GLUCOSE SERPL-MCNC: 92 MG/DL (ref 74–99)
HCT VFR BLD AUTO: 32.2 % (ref 41–52)
HGB BLD-MCNC: 10.1 G/DL (ref 13.5–17.5)
IMM GRANULOCYTES # BLD AUTO: 0.01 X10*3/UL (ref 0–0.7)
IMM GRANULOCYTES NFR BLD AUTO: 0.2 % (ref 0–0.9)
LYMPHOCYTES # BLD AUTO: 0.99 X10*3/UL (ref 1.2–4.8)
LYMPHOCYTES NFR BLD AUTO: 22.4 %
MCH RBC QN AUTO: 27.3 PG (ref 26–34)
MCHC RBC AUTO-ENTMCNC: 31.4 G/DL (ref 32–36)
MCV RBC AUTO: 87 FL (ref 80–100)
MONOCYTES # BLD AUTO: 0.49 X10*3/UL (ref 0.1–1)
MONOCYTES NFR BLD AUTO: 11.1 %
NEUTROPHILS # BLD AUTO: 2.77 X10*3/UL (ref 1.2–7.7)
NEUTROPHILS NFR BLD AUTO: 62.6 %
NRBC BLD-RTO: ABNORMAL /100{WBCS}
PLATELET # BLD AUTO: 152 X10*3/UL (ref 150–450)
POTASSIUM SERPL-SCNC: 4.1 MMOL/L (ref 3.5–5.3)
PROT SERPL-MCNC: 6.4 G/DL (ref 6.4–8.2)
RBC # BLD AUTO: 3.7 X10*6/UL (ref 4.5–5.9)
SODIUM SERPL-SCNC: 138 MMOL/L (ref 136–145)
WBC # BLD AUTO: 4.4 X10*3/UL (ref 4.4–11.3)

## 2025-06-10 PROCEDURE — 96416 CHEMO PROLONG INFUSE W/PUMP: CPT

## 2025-06-10 PROCEDURE — 96413 CHEMO IV INFUSION 1 HR: CPT

## 2025-06-10 PROCEDURE — 2500000004 HC RX 250 GENERAL PHARMACY W/ HCPCS (ALT 636 FOR OP/ED): Performed by: INTERNAL MEDICINE

## 2025-06-10 PROCEDURE — 85025 COMPLETE CBC W/AUTO DIFF WBC: CPT

## 2025-06-10 PROCEDURE — 80053 COMPREHEN METABOLIC PANEL: CPT

## 2025-06-10 PROCEDURE — 96368 THER/DIAG CONCURRENT INF: CPT

## 2025-06-10 PROCEDURE — 96375 TX/PRO/DX INJ NEW DRUG ADDON: CPT | Mod: INF

## 2025-06-10 RX ORDER — HEPARIN SODIUM,PORCINE/PF 10 UNIT/ML
50 SYRINGE (ML) INTRAVENOUS AS NEEDED
Status: CANCELLED | OUTPATIENT
Start: 2025-06-10

## 2025-06-10 RX ORDER — DIPHENHYDRAMINE HYDROCHLORIDE 50 MG/ML
50 INJECTION, SOLUTION INTRAMUSCULAR; INTRAVENOUS AS NEEDED
Status: DISCONTINUED | OUTPATIENT
Start: 2025-06-10 | End: 2025-06-10 | Stop reason: HOSPADM

## 2025-06-10 RX ORDER — FAMOTIDINE 10 MG/ML
20 INJECTION, SOLUTION INTRAVENOUS ONCE AS NEEDED
Status: DISCONTINUED | OUTPATIENT
Start: 2025-06-10 | End: 2025-06-10 | Stop reason: HOSPADM

## 2025-06-10 RX ORDER — PROCHLORPERAZINE EDISYLATE 5 MG/ML
10 INJECTION INTRAMUSCULAR; INTRAVENOUS EVERY 6 HOURS PRN
Status: DISCONTINUED | OUTPATIENT
Start: 2025-06-10 | End: 2025-06-10 | Stop reason: HOSPADM

## 2025-06-10 RX ORDER — ATROPINE SULFATE 0.4 MG/ML
0.4 INJECTION, SOLUTION ENDOTRACHEAL; INTRAMEDULLARY; INTRAMUSCULAR; INTRAVENOUS; SUBCUTANEOUS
Status: DISCONTINUED | OUTPATIENT
Start: 2025-06-10 | End: 2025-06-10 | Stop reason: HOSPADM

## 2025-06-10 RX ORDER — HEPARIN 100 UNIT/ML
500 SYRINGE INTRAVENOUS AS NEEDED
Status: CANCELLED | OUTPATIENT
Start: 2025-06-10

## 2025-06-10 RX ORDER — PALONOSETRON 0.05 MG/ML
0.25 INJECTION, SOLUTION INTRAVENOUS ONCE
Status: COMPLETED | OUTPATIENT
Start: 2025-06-10 | End: 2025-06-10

## 2025-06-10 RX ORDER — HEPARIN 100 UNIT/ML
500 SYRINGE INTRAVENOUS AS NEEDED
Status: DISCONTINUED | OUTPATIENT
Start: 2025-06-10 | End: 2025-06-10 | Stop reason: HOSPADM

## 2025-06-10 RX ORDER — EPINEPHRINE 0.3 MG/.3ML
0.3 INJECTION SUBCUTANEOUS EVERY 5 MIN PRN
Status: DISCONTINUED | OUTPATIENT
Start: 2025-06-10 | End: 2025-06-10 | Stop reason: HOSPADM

## 2025-06-10 RX ORDER — DEXAMETHASONE 6 MG/1
12 TABLET ORAL ONCE
Status: COMPLETED | OUTPATIENT
Start: 2025-06-10 | End: 2025-06-10

## 2025-06-10 RX ORDER — PROCHLORPERAZINE MALEATE 10 MG
10 TABLET ORAL EVERY 6 HOURS PRN
Status: DISCONTINUED | OUTPATIENT
Start: 2025-06-10 | End: 2025-06-10 | Stop reason: HOSPADM

## 2025-06-10 RX ORDER — ALBUTEROL SULFATE 0.83 MG/ML
3 SOLUTION RESPIRATORY (INHALATION) AS NEEDED
Status: DISCONTINUED | OUTPATIENT
Start: 2025-06-10 | End: 2025-06-10 | Stop reason: HOSPADM

## 2025-06-10 RX ADMIN — DEXAMETHASONE 12 MG: 6 TABLET ORAL at 10:10

## 2025-06-10 RX ADMIN — LEUCOVORIN CALCIUM 740 MG: 350 INJECTION, POWDER, LYOPHILIZED, FOR SOLUTION INTRAMUSCULAR; INTRAVENOUS at 10:37

## 2025-06-10 RX ADMIN — FLUOROURACIL 3550 MG: 50 INJECTION, SOLUTION INTRAVENOUS at 12:18

## 2025-06-10 RX ADMIN — ATROPINE SULFATE 0.4 MG: 0.4 INJECTION, SOLUTION INTRAVENOUS at 10:33

## 2025-06-10 RX ADMIN — PALONOSETRON 0.25 MG: 0.05 INJECTION, SOLUTION INTRAVENOUS at 10:10

## 2025-06-10 RX ADMIN — IRINOTECAN HYDROCHLORIDE 280 MG: 20 INJECTION, SOLUTION INTRAVENOUS at 10:39

## 2025-06-10 ASSESSMENT — PAIN SCALES - GENERAL: PAINLEVEL_OUTOF10: 0-NO PAIN

## 2025-06-10 NOTE — SIGNIFICANT EVENT
06/10/25 0955   Prechemo Checklist   Has the patient been in the hospital, ED, or urgent care since last date of service No   Chemo/Immuno Consent Completed and Signed Yes   Protocol/Indications Verified Yes   Confirmed to previous date/time of medication Yes   Compared to previous dose Yes   All medications are dated accurately Yes   Pregnancy Test Negative Not applicable   Parameters Met Yes   Is Patient Proceeding With Treatment? Yes   BSA/Weight-Height Verified Yes   Dose Calculations Verified (current, total, cumulative) Yes

## 2025-06-12 ENCOUNTER — APPOINTMENT (OUTPATIENT)
Dept: HEMATOLOGY/ONCOLOGY | Facility: CLINIC | Age: 45
End: 2025-06-12
Payer: COMMERCIAL

## 2025-06-12 ENCOUNTER — INFUSION (OUTPATIENT)
Dept: HEMATOLOGY/ONCOLOGY | Facility: CLINIC | Age: 45
End: 2025-06-12
Payer: COMMERCIAL

## 2025-06-12 VITALS
OXYGEN SATURATION: 97 % | SYSTOLIC BLOOD PRESSURE: 115 MMHG | DIASTOLIC BLOOD PRESSURE: 77 MMHG | HEART RATE: 85 BPM | WEIGHT: 152.12 LBS | RESPIRATION RATE: 18 BRPM | TEMPERATURE: 97.7 F | BODY MASS INDEX: 23.32 KG/M2

## 2025-06-12 DIAGNOSIS — C17.9 ADENOCARCINOMA OF SMALL BOWEL (MULTI): ICD-10-CM

## 2025-06-12 PROCEDURE — 96523 IRRIG DRUG DELIVERY DEVICE: CPT

## 2025-06-12 PROCEDURE — 2500000004 HC RX 250 GENERAL PHARMACY W/ HCPCS (ALT 636 FOR OP/ED): Performed by: INTERNAL MEDICINE

## 2025-06-12 RX ORDER — HEPARIN 100 UNIT/ML
500 SYRINGE INTRAVENOUS AS NEEDED
Status: DISCONTINUED | OUTPATIENT
Start: 2025-06-12 | End: 2025-06-12 | Stop reason: HOSPADM

## 2025-06-12 RX ORDER — HEPARIN 100 UNIT/ML
500 SYRINGE INTRAVENOUS AS NEEDED
OUTPATIENT
Start: 2025-06-12

## 2025-06-12 RX ORDER — HEPARIN SODIUM,PORCINE/PF 10 UNIT/ML
50 SYRINGE (ML) INTRAVENOUS AS NEEDED
OUTPATIENT
Start: 2025-06-12

## 2025-06-12 RX ORDER — HEPARIN SODIUM,PORCINE/PF 10 UNIT/ML
50 SYRINGE (ML) INTRAVENOUS AS NEEDED
Status: DISCONTINUED | OUTPATIENT
Start: 2025-06-12 | End: 2025-06-12 | Stop reason: HOSPADM

## 2025-06-12 RX ADMIN — HEPARIN 500 UNITS: 100 SYRINGE at 10:38

## 2025-06-12 ASSESSMENT — PAIN SCALES - GENERAL: PAINLEVEL_OUTOF10: 0-NO PAIN

## 2025-06-17 ENCOUNTER — OFFICE VISIT (OUTPATIENT)
Dept: HEMATOLOGY/ONCOLOGY | Facility: CLINIC | Age: 45
End: 2025-06-17
Payer: COMMERCIAL

## 2025-06-17 VITALS
BODY MASS INDEX: 23.6 KG/M2 | WEIGHT: 153.9 LBS | OXYGEN SATURATION: 98 % | SYSTOLIC BLOOD PRESSURE: 125 MMHG | HEART RATE: 79 BPM | TEMPERATURE: 96.8 F | RESPIRATION RATE: 16 BRPM | DIASTOLIC BLOOD PRESSURE: 84 MMHG

## 2025-06-17 DIAGNOSIS — C17.9 ADENOCARCINOMA OF SMALL BOWEL (MULTI): Primary | ICD-10-CM

## 2025-06-17 PROCEDURE — 3079F DIAST BP 80-89 MM HG: CPT | Performed by: INTERNAL MEDICINE

## 2025-06-17 PROCEDURE — 99215 OFFICE O/P EST HI 40 MIN: CPT | Performed by: INTERNAL MEDICINE

## 2025-06-17 PROCEDURE — 1036F TOBACCO NON-USER: CPT | Performed by: INTERNAL MEDICINE

## 2025-06-17 PROCEDURE — 3074F SYST BP LT 130 MM HG: CPT | Performed by: INTERNAL MEDICINE

## 2025-06-17 RX ORDER — ATROPINE SULFATE 0.1 MG/ML
0.4 INJECTION INTRAVENOUS
OUTPATIENT
Start: 2025-07-30

## 2025-06-17 RX ORDER — HYDROCORTISONE 1 %
CREAM (GRAM) TOPICAL
Qty: 453.6 G | Refills: 3 | Status: SHIPPED | OUTPATIENT
Start: 2025-06-17

## 2025-06-17 RX ORDER — PALONOSETRON 0.05 MG/ML
0.25 INJECTION, SOLUTION INTRAVENOUS ONCE
OUTPATIENT
Start: 2025-06-25

## 2025-06-17 RX ORDER — DEXAMETHASONE 6 MG/1
12 TABLET ORAL ONCE
OUTPATIENT
Start: 2025-06-25

## 2025-06-17 RX ORDER — PALONOSETRON 0.05 MG/ML
0.25 INJECTION, SOLUTION INTRAVENOUS ONCE
OUTPATIENT
Start: 2025-07-30

## 2025-06-17 RX ORDER — MAGNESIUM SULFATE HEPTAHYDRATE 40 MG/ML
4 INJECTION, SOLUTION INTRAVENOUS ONCE AS NEEDED
OUTPATIENT
Start: 2025-07-30

## 2025-06-17 RX ORDER — PROCHLORPERAZINE MALEATE 5 MG
10 TABLET ORAL EVERY 6 HOURS PRN
OUTPATIENT
Start: 2025-07-30

## 2025-06-17 RX ORDER — DIPHENHYDRAMINE HYDROCHLORIDE 50 MG/ML
50 INJECTION, SOLUTION INTRAMUSCULAR; INTRAVENOUS AS NEEDED
OUTPATIENT
Start: 2025-07-30

## 2025-06-17 RX ORDER — PROCHLORPERAZINE MALEATE 5 MG
10 TABLET ORAL EVERY 6 HOURS PRN
OUTPATIENT
Start: 2025-06-25

## 2025-06-17 RX ORDER — MAGNESIUM SULFATE HEPTAHYDRATE 40 MG/ML
2 INJECTION, SOLUTION INTRAVENOUS ONCE AS NEEDED
OUTPATIENT
Start: 2025-07-14

## 2025-06-17 RX ORDER — EPINEPHRINE 0.3 MG/.3ML
0.3 INJECTION SUBCUTANEOUS EVERY 5 MIN PRN
OUTPATIENT
Start: 2025-06-25

## 2025-06-17 RX ORDER — DIPHENHYDRAMINE HYDROCHLORIDE 50 MG/ML
50 INJECTION, SOLUTION INTRAMUSCULAR; INTRAVENOUS AS NEEDED
OUTPATIENT
Start: 2025-07-14

## 2025-06-17 RX ORDER — PROCHLORPERAZINE EDISYLATE 5 MG/ML
10 INJECTION INTRAMUSCULAR; INTRAVENOUS EVERY 6 HOURS PRN
OUTPATIENT
Start: 2025-07-14

## 2025-06-17 RX ORDER — EPINEPHRINE 0.3 MG/.3ML
0.3 INJECTION SUBCUTANEOUS EVERY 5 MIN PRN
OUTPATIENT
Start: 2025-07-14

## 2025-06-17 RX ORDER — MAGNESIUM SULFATE HEPTAHYDRATE 40 MG/ML
2 INJECTION, SOLUTION INTRAVENOUS ONCE AS NEEDED
OUTPATIENT
Start: 2025-07-30

## 2025-06-17 RX ORDER — ALBUTEROL SULFATE 0.83 MG/ML
3 SOLUTION RESPIRATORY (INHALATION) AS NEEDED
OUTPATIENT
Start: 2025-07-30

## 2025-06-17 RX ORDER — ATROPINE SULFATE 0.1 MG/ML
0.4 INJECTION INTRAVENOUS
OUTPATIENT
Start: 2025-06-25

## 2025-06-17 RX ORDER — MAGNESIUM SULFATE HEPTAHYDRATE 40 MG/ML
4 INJECTION, SOLUTION INTRAVENOUS ONCE AS NEEDED
OUTPATIENT
Start: 2025-07-14

## 2025-06-17 RX ORDER — MAGNESIUM SULFATE HEPTAHYDRATE 40 MG/ML
2 INJECTION, SOLUTION INTRAVENOUS ONCE AS NEEDED
OUTPATIENT
Start: 2025-06-25

## 2025-06-17 RX ORDER — PALONOSETRON 0.05 MG/ML
0.25 INJECTION, SOLUTION INTRAVENOUS ONCE
OUTPATIENT
Start: 2025-07-14

## 2025-06-17 RX ORDER — PROCHLORPERAZINE EDISYLATE 5 MG/ML
10 INJECTION INTRAMUSCULAR; INTRAVENOUS EVERY 6 HOURS PRN
OUTPATIENT
Start: 2025-07-30

## 2025-06-17 RX ORDER — FAMOTIDINE 10 MG/ML
20 INJECTION, SOLUTION INTRAVENOUS ONCE AS NEEDED
OUTPATIENT
Start: 2025-06-25

## 2025-06-17 RX ORDER — DEXAMETHASONE 6 MG/1
12 TABLET ORAL ONCE
OUTPATIENT
Start: 2025-07-14

## 2025-06-17 RX ORDER — MAGNESIUM SULFATE HEPTAHYDRATE 40 MG/ML
4 INJECTION, SOLUTION INTRAVENOUS ONCE AS NEEDED
OUTPATIENT
Start: 2025-06-25

## 2025-06-17 RX ORDER — CLINDAMYCIN PHOSPHATE 10 UG/ML
LOTION TOPICAL
Qty: 60 ML | Refills: 3 | Status: SHIPPED | OUTPATIENT
Start: 2025-06-17

## 2025-06-17 RX ORDER — DIPHENHYDRAMINE HYDROCHLORIDE 50 MG/ML
50 INJECTION, SOLUTION INTRAMUSCULAR; INTRAVENOUS AS NEEDED
OUTPATIENT
Start: 2025-06-25

## 2025-06-17 RX ORDER — PROCHLORPERAZINE EDISYLATE 5 MG/ML
10 INJECTION INTRAMUSCULAR; INTRAVENOUS EVERY 6 HOURS PRN
OUTPATIENT
Start: 2025-06-25

## 2025-06-17 RX ORDER — MINOCYCLINE HYDROCHLORIDE 100 MG/1
100 TABLET ORAL EVERY 24 HOURS
Qty: 28 TABLET | Refills: 3 | Status: SHIPPED | OUTPATIENT
Start: 2025-06-17

## 2025-06-17 RX ORDER — PROCHLORPERAZINE MALEATE 5 MG
10 TABLET ORAL EVERY 6 HOURS PRN
OUTPATIENT
Start: 2025-07-14

## 2025-06-17 RX ORDER — ALBUTEROL SULFATE 0.83 MG/ML
3 SOLUTION RESPIRATORY (INHALATION) AS NEEDED
OUTPATIENT
Start: 2025-07-14

## 2025-06-17 RX ORDER — ATROPINE SULFATE 0.1 MG/ML
0.4 INJECTION INTRAVENOUS
OUTPATIENT
Start: 2025-07-14

## 2025-06-17 RX ORDER — FAMOTIDINE 10 MG/ML
20 INJECTION, SOLUTION INTRAVENOUS ONCE AS NEEDED
OUTPATIENT
Start: 2025-07-14

## 2025-06-17 RX ORDER — DEXAMETHASONE 6 MG/1
12 TABLET ORAL ONCE
OUTPATIENT
Start: 2025-07-30

## 2025-06-17 RX ORDER — ALBUTEROL SULFATE 0.83 MG/ML
3 SOLUTION RESPIRATORY (INHALATION) AS NEEDED
OUTPATIENT
Start: 2025-06-25

## 2025-06-17 RX ORDER — EPINEPHRINE 0.3 MG/.3ML
0.3 INJECTION SUBCUTANEOUS EVERY 5 MIN PRN
OUTPATIENT
Start: 2025-07-30

## 2025-06-17 RX ORDER — FAMOTIDINE 10 MG/ML
20 INJECTION, SOLUTION INTRAVENOUS ONCE AS NEEDED
OUTPATIENT
Start: 2025-07-30

## 2025-06-17 ASSESSMENT — PAIN SCALES - GENERAL: PAINLEVEL_OUTOF10: 0-NO PAIN

## 2025-06-17 NOTE — PROGRESS NOTES
.Patient ID: Thai Cristobal is a 44 y.o. male.  Referring Physician: Nba Vargas MD  [id number] [place] [address]  Primary Care Provider: Charlotte Yan DO      Chief complaint: SB Adenocarcinoma     HPI  This is a 44-year-old male with a known history of Crohn's disease patient is status post partial right colectomy small bowel resection on repair of a colonic fistula on 5/28/2024. Pathology he had a neuroendocrine tumor grade 3 well differentiated, with Ki67 30-40%. He did follow-up with oncology at [facility]. He had a PET-Ga68 which showed Postsurgical changes from right hemicolectomy and distal small bowel with no evidence of focal abnormal gallium 68 dotatate uptake.   Patient's only complaint is change in color of stools at this point, he is eating and drinking less and he has no abdominal pain. No diarrhea and no flushing.  Repeated liver biopsy showed: high grade malignancy, likely of colonic origin, showing focal adenocarcinoma-like features and focal neuroendocrine differentiation with Ki67 95%  He started on FOLFOX and avastin and finished 1 cycles and tolerated well. No abdominal pain, no nausea or vomiting. No new symptoms. His labs showed leucopenia and we delayed C2.  He had C2 and repeated labs showed normal WBCs  He finished 10 cycles and tolerated well with SD on restaging scans after C8 and SD after last cycle   Due to the third allergic reaction after C10 even with desensitization we switched to FOLFIRI      SYSTEMIC TREATMENT RECEIVED: None       Subjective   Please refer to Notes above for complete History of present illness.          Review of Systems:   All 14 systems have been reviewed and were negative except for the HPI.       MEDICAL HISTORY  Past Medical History:   Diagnosis Date    Acute upper respiratory infection, unspecified 02/21/2018    Acute URI    Asthma     Cancer (Multi)     Cancer, metastatic to liver (Multi)     Crohn's disease (Multi)     Personal history of  other specified conditions 2018    History of vertigo    SBO (small bowel obstruction) (Multi) 2024       FAMILY HISTORY  Family History   Problem Relation Name Age of Onset    Thyroid disease Mother      Other (bladder cancer) Mother      Hypertension Father      Benign prostatic hyperplasia Father      Anxiety disorder Sister      No Known Problems Brother      No Known Problems Daughter      Heart failure Paternal Grandfather         TOBACCO HISTORY  Tobacco Use: Low Risk  (2025)    Patient History     Smoking Tobacco Use: Never     Smokeless Tobacco Use: Never     Passive Exposure: Never       SOCIAL HISTORY  Social Connections: Not on file        Outpatient Medication Profile:  Current Outpatient Medications on File Prior to Visit   Medication Sig Dispense Refill    ALPRAZolam (Xanax) 0.5 mg tablet Take 2 tablets (1 mg) by mouth 2 times a day as needed for anxiety. 120 tablet 0    cholecalciferol (Vitamin D-3) 25 MCG (1000 UT) capsule Take 1 capsule (25 mcg) by mouth once daily.      diphenhydrAMINE (BENADryl) 25 mg capsule Take 1 capsule (25 mg) by mouth as needed at bedtime for itching.      elderberry fruit 350 mg capsule Take 1 capsule by mouth once daily.      flaxseed oiL 1,000 mg capsule Take 1 capsule (1,000 mg) by mouth once daily. (Patient not taking: Reported on 2025)      [] fluconazole (Diflucan) 200 mg tablet Take 2 tablets (400 mg) by mouth once daily for 14 days. 28 tablet 0    FLUoxetine (PROzac) 20 mg capsule Take 1 capsule (20 mg) by mouth once daily at bedtime. 90 capsule 1    FLUoxetine (PROzac) 40 mg capsule TAKE 1 CAPSULE BY MOUTH ONCE DAILY 90 capsule 1    lisinopril 20 mg tablet TAKE 1 TABLET BY MOUTH EVERY DAY 90 tablet 1    loperamide (Imodium) 2 mg capsule Take 2 capsules by mouth with the first episode of diarrhea, then take 1 capsule with each episode of loose stool thereafter. Maximum of 8 capsules per 24 hours. 30 capsule 3    omeprazole OTC (PriLOSEC  OTC) 20 mg EC tablet Take 1 tablet (20 mg) by mouth once daily. Do not crush, chew, or split. 30 tablet 2    ondansetron (Zofran) 8 mg tablet Take 1 tablet (8 mg) by mouth every 8 hours if needed for nausea. 30 tablet 3    prochlorperazine (Compazine) 10 mg tablet Take 1 tablet (10 mg) by mouth every 6 hours if needed for nausea. 30 tablet 3    SUMAtriptan (Imitrex) 50 mg tablet TAKE 1 TABLET (50 MG) BY MOUTH 1 TIME IF NEEDED FOR MIGRAINE FOR UP TO 36 DOSES. 9 tablet 3    vitamin C-multivitamin-mineral (Emergen-C) 500 mg tablet,chewable Chew 1 tablet once daily.       Current Facility-Administered Medications on File Prior to Visit   Medication Dose Route Frequency Provider Last Rate Last Admin    heparin flush 100 unit/mL syringe 500 Units  500 Units intra-catheter PRN Nba Vargas MD   500 Units at 10/15/24 1019         Performance Status:  Symptomatic; fully ambulatory     Vitals and Measurements:   /84 (BP Location: Left arm, Patient Position: Sitting)   Pulse 79   Temp 36 °C (96.8 °F) (Temporal)   Resp 16   Wt 69.8 kg (153 lb 14.4 oz)   SpO2 98%   BMI 23.60 kg/m²       Physical Exam:   General: Appears well and in NAD  Eyes: PERRL, EOMI, clear sclera  ENMT: mucous membranes moist, no apparent injury, no lesions seen  Head/Neck: Neck supple, no apparent injury, thyroid without mass or tenderness, No JVD, trachea midline,   Thorax: Patent airways, CTAB, normal breath sounds with good chest expansion, thorax symmetric  Cardiovascular: Regular, rate and rhythm, no murmurs, 2+ equal pulses of the extremities, normal S 1and S 2  Gastrointestinal: Nondistended, soft, non-tender, no rebound tenderness or guarding, no masses palpable, no organomegaly, +BS  Musculoskeletal: ROM intact, no joint swelling, normal strength  Extremities: normal extremities, no cyanosis edema, contusions or wounds, no clubbing  Neurological: alert and oriented x3, intact senses, motor, response and reflexes, normal  strength  Lymphatic: No significant lymphadenopathy  Psychological: Appropriate mood and behavior  Skin: Warm and dry, no lesions, no rashes          Lab Results:  I have reviewed these laboratory results:     Lab Results   Component Value Date    WBC 4.4 06/10/2025    HGB 10.1 (L) 06/10/2025    HCT 32.2 (L) 06/10/2025    MCV 87 06/10/2025     06/10/2025         Chemistry    Lab Results   Component Value Date/Time     06/10/2025 0913    K 4.1 06/10/2025 0913     06/10/2025 0913    CO2 25 06/10/2025 0913    BUN 15 06/10/2025 0913    CREATININE 1.04 06/10/2025 0913    Lab Results   Component Value Date/Time    CALCIUM 8.8 06/10/2025 0913    ALKPHOS 80 06/10/2025 0913    AST 17 06/10/2025 0913    ALT 11 06/10/2025 0913    BILITOT 0.5 06/10/2025 0913            Lab Results   Component Value Date    TSH 3.17 04/18/2023        Radiology Result:  I have reviewed the latest Imaging in PACS and the findings are noted in this note. I discussed the results of the latest imaging with the patient. All previous imaging were reviewed at the time it was completed. Full records are available in the EMR for review as well.         NM PET CT net netspot    Result Date: 7/5/2024  Impression: 1. Postsurgical changes from right hemicolectomy and distal small bowel with no evidence of focal abnormal gallium 68 dotatate uptake within the region of documented neuroendocrine tumor to suggest residual disease. 2. Nonspecific, gallium 68 dotatate uptake within the pancreatic uncinate which can be seen with physiologic uptake.     I personally reviewed the images/study and I agree with the findings as stated by Resident Celso Ashford MD.   MACRO: None   Signed by: Maco Villafana 7/5/2024 3:06 PM Dictation workstation:   [id number]        Pathology Results:  I have reviewed the full pathology report recorded in the EMR. The pertinent portions indicating diagnosis are listed here in the note. for details please refer to the  full report recorded in the EMR.    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 Pathology he had a neuroendocrine tumor grade 3 well differentiated, with Ki67 30-40%. He did follow-up with oncology at [facility]. He had a PET-Ga68 which showed Postsurgical changes from right hemicolectomy and distal small bowel with no evidence of focal abnormal gallium 68 dotatate uptake. Patient's only complaint is change in color of stools at this point he is eating and drinking and he has no abdominal pain. No diarrhea and no flushing. She had CT AP on 08/09/2024 at outside institution which showed new small liver nodules. 09/04/2024: MRI w [facility] showed Multiple T1 hypointense and T2 hyperintense lesions throughout the liver, and mesenteric LNS. PET-FDG showed FDG avid mesenteric kylie and liver metastases.   Repeated liver biopsy showed: high grade malignancy, likely of colonic origin, showing focal adenocarcinoma-like features and focal neuroendocrine differentiation with Ki67 95%  He started on FOLFOX and avastin and finished 1 cycles and tolerated well. No abdominal pain, no nausea or vomiting. No new symptoms. His labs showed leucopenia and keep delaying C2.  After C3 restaging scans on 12/06/2024: Showed NE mainly in liver lesions and stable mesenteric LNS  Last labs were normal  and he is feeling fine today   Restaging scans after C8 showed NE  He finished 10 cycles  Had the third allergic reaction after C10 even with desensitization  Restaging scans showed stable liver lesions and no new metastatic lesions   We held FOLFOX plus bevacizumab and will start FOLFIRI plus bevacizumab   He started FOLFIRI on 04/15/2025  Finished 5 cycles  Restaging  scans showed SD    Plan:  1- C6 FOLFIRI and continue to hold Bevacizumab due to trending down in Hb. Will add cetuximab  2-Restaging scans after 8 cycles for follow up   3-RTC in 2 weeks for follow up         DISCLAIMER:   In preparing for this visit and writing this note, I reviewed all the previous electronic medical records (labs, imaging and medical charts) of the patient available in the physician portal. Significant findings which helped in decision making are recorded  in this chart.     The plan was discussed with the patient. We gave him ample opportunities to ask questions. All questions were answered to his satisfaction and he verbalized understanding.       Nba Vargas M.D.   and Director of the Neuroendocrine Tumor Program  Gastrointestinal Medical Oncology  Department of Hematology and Oncology  [facility], [place] [address]  Phone (Office): [phone]  Fax:  [phone]   Email: [email or web address]  Learn more about [facility] Comprehensive Neuroendocrine Tumor Program: Click Here  Learn more about [place] [employer]: [email or web address]

## 2025-06-23 DIAGNOSIS — T75.3XXD MOTION SICKNESS, SUBSEQUENT ENCOUNTER: Primary | ICD-10-CM

## 2025-06-23 RX ORDER — SCOPOLAMINE 1 MG/3D
1 PATCH, EXTENDED RELEASE TRANSDERMAL
Qty: 3 PATCH | Refills: 0 | Status: SHIPPED | OUTPATIENT
Start: 2025-06-23 | End: 2025-08-22

## 2025-06-24 ENCOUNTER — APPOINTMENT (OUTPATIENT)
Dept: HEMATOLOGY/ONCOLOGY | Facility: CLINIC | Age: 45
End: 2025-06-24
Payer: COMMERCIAL

## 2025-06-25 ENCOUNTER — INFUSION (OUTPATIENT)
Dept: HEMATOLOGY/ONCOLOGY | Facility: CLINIC | Age: 45
End: 2025-06-25
Payer: COMMERCIAL

## 2025-06-25 VITALS
WEIGHT: 153.22 LBS | OXYGEN SATURATION: 98 % | RESPIRATION RATE: 18 BRPM | SYSTOLIC BLOOD PRESSURE: 125 MMHG | HEART RATE: 78 BPM | BODY MASS INDEX: 23.49 KG/M2 | DIASTOLIC BLOOD PRESSURE: 86 MMHG

## 2025-06-25 DIAGNOSIS — C17.9 ADENOCARCINOMA OF SMALL BOWEL (MULTI): ICD-10-CM

## 2025-06-25 LAB
ALBUMIN SERPL BCP-MCNC: 3.9 G/DL (ref 3.4–5)
ALP SERPL-CCNC: 88 U/L (ref 33–120)
ALT SERPL W P-5'-P-CCNC: 13 U/L (ref 10–52)
ANION GAP SERPL CALC-SCNC: 12 MMOL/L (ref 10–20)
AST SERPL W P-5'-P-CCNC: 16 U/L (ref 9–39)
BASOPHILS # BLD AUTO: 0.06 X10*3/UL (ref 0–0.1)
BASOPHILS NFR BLD AUTO: 1.4 %
BILIRUB SERPL-MCNC: 0.5 MG/DL (ref 0–1.2)
BUN SERPL-MCNC: 17 MG/DL (ref 6–23)
CALCIUM SERPL-MCNC: 8.8 MG/DL (ref 8.6–10.3)
CHLORIDE SERPL-SCNC: 105 MMOL/L (ref 98–107)
CO2 SERPL-SCNC: 28 MMOL/L (ref 21–32)
CREAT SERPL-MCNC: 1.02 MG/DL (ref 0.5–1.3)
EGFRCR SERPLBLD CKD-EPI 2021: >90 ML/MIN/1.73M*2
EOSINOPHIL # BLD AUTO: 0.18 X10*3/UL (ref 0–0.7)
EOSINOPHIL NFR BLD AUTO: 4.3 %
ERYTHROCYTE [DISTWIDTH] IN BLOOD BY AUTOMATED COUNT: 19.8 % (ref 11.5–14.5)
GLUCOSE SERPL-MCNC: 108 MG/DL (ref 74–99)
HCT VFR BLD AUTO: 33.2 % (ref 41–52)
HGB BLD-MCNC: 9.9 G/DL (ref 13.5–17.5)
IMM GRANULOCYTES # BLD AUTO: 0 X10*3/UL (ref 0–0.7)
IMM GRANULOCYTES NFR BLD AUTO: 0 % (ref 0–0.9)
LYMPHOCYTES # BLD AUTO: 1.04 X10*3/UL (ref 1.2–4.8)
LYMPHOCYTES NFR BLD AUTO: 24.8 %
MAGNESIUM SERPL-MCNC: 1.91 MG/DL (ref 1.6–2.4)
MCH RBC QN AUTO: 26.2 PG (ref 26–34)
MCHC RBC AUTO-ENTMCNC: 29.8 G/DL (ref 32–36)
MCV RBC AUTO: 88 FL (ref 80–100)
MONOCYTES # BLD AUTO: 0.67 X10*3/UL (ref 0.1–1)
MONOCYTES NFR BLD AUTO: 16 %
NEUTROPHILS # BLD AUTO: 2.25 X10*3/UL (ref 1.2–7.7)
NEUTROPHILS NFR BLD AUTO: 53.5 %
NRBC BLD-RTO: ABNORMAL /100{WBCS}
PLATELET # BLD AUTO: 168 X10*3/UL (ref 150–450)
POTASSIUM SERPL-SCNC: 4 MMOL/L (ref 3.5–5.3)
PROT SERPL-MCNC: 6.1 G/DL (ref 6.4–8.2)
RBC # BLD AUTO: 3.78 X10*6/UL (ref 4.5–5.9)
SODIUM SERPL-SCNC: 141 MMOL/L (ref 136–145)
WBC # BLD AUTO: 4.2 X10*3/UL (ref 4.4–11.3)

## 2025-06-25 PROCEDURE — 96368 THER/DIAG CONCURRENT INF: CPT

## 2025-06-25 PROCEDURE — 80053 COMPREHEN METABOLIC PANEL: CPT

## 2025-06-25 PROCEDURE — 96415 CHEMO IV INFUSION ADDL HR: CPT

## 2025-06-25 PROCEDURE — 2500000004 HC RX 250 GENERAL PHARMACY W/ HCPCS (ALT 636 FOR OP/ED): Performed by: INTERNAL MEDICINE

## 2025-06-25 PROCEDURE — 83735 ASSAY OF MAGNESIUM: CPT

## 2025-06-25 PROCEDURE — 96413 CHEMO IV INFUSION 1 HR: CPT

## 2025-06-25 PROCEDURE — 96375 TX/PRO/DX INJ NEW DRUG ADDON: CPT | Mod: INF

## 2025-06-25 PROCEDURE — 85025 COMPLETE CBC W/AUTO DIFF WBC: CPT

## 2025-06-25 PROCEDURE — 96417 CHEMO IV INFUS EACH ADDL SEQ: CPT

## 2025-06-25 PROCEDURE — 96416 CHEMO PROLONG INFUSE W/PUMP: CPT

## 2025-06-25 RX ORDER — EPINEPHRINE 0.3 MG/.3ML
0.3 INJECTION SUBCUTANEOUS EVERY 5 MIN PRN
Status: DISCONTINUED | OUTPATIENT
Start: 2025-06-25 | End: 2025-06-25 | Stop reason: HOSPADM

## 2025-06-25 RX ORDER — HEPARIN SODIUM,PORCINE/PF 10 UNIT/ML
50 SYRINGE (ML) INTRAVENOUS AS NEEDED
Status: DISCONTINUED | OUTPATIENT
Start: 2025-06-25 | End: 2025-06-25 | Stop reason: HOSPADM

## 2025-06-25 RX ORDER — PROCHLORPERAZINE EDISYLATE 5 MG/ML
10 INJECTION INTRAMUSCULAR; INTRAVENOUS EVERY 6 HOURS PRN
Status: DISCONTINUED | OUTPATIENT
Start: 2025-06-25 | End: 2025-06-25 | Stop reason: HOSPADM

## 2025-06-25 RX ORDER — HEPARIN SODIUM,PORCINE/PF 10 UNIT/ML
50 SYRINGE (ML) INTRAVENOUS AS NEEDED
Status: CANCELLED | OUTPATIENT
Start: 2025-06-25

## 2025-06-25 RX ORDER — HEPARIN 100 UNIT/ML
500 SYRINGE INTRAVENOUS AS NEEDED
Status: CANCELLED | OUTPATIENT
Start: 2025-06-25

## 2025-06-25 RX ORDER — DIPHENHYDRAMINE HYDROCHLORIDE 50 MG/ML
50 INJECTION, SOLUTION INTRAMUSCULAR; INTRAVENOUS AS NEEDED
Status: DISCONTINUED | OUTPATIENT
Start: 2025-06-25 | End: 2025-06-25 | Stop reason: HOSPADM

## 2025-06-25 RX ORDER — ATROPINE SULFATE 0.4 MG/ML
0.4 INJECTION, SOLUTION ENDOTRACHEAL; INTRAMEDULLARY; INTRAMUSCULAR; INTRAVENOUS; SUBCUTANEOUS
Status: DISCONTINUED | OUTPATIENT
Start: 2025-06-25 | End: 2025-06-25 | Stop reason: HOSPADM

## 2025-06-25 RX ORDER — FAMOTIDINE 10 MG/ML
20 INJECTION, SOLUTION INTRAVENOUS ONCE AS NEEDED
Status: COMPLETED | OUTPATIENT
Start: 2025-06-25 | End: 2025-06-25

## 2025-06-25 RX ORDER — PALONOSETRON 0.05 MG/ML
0.25 INJECTION, SOLUTION INTRAVENOUS ONCE
Status: COMPLETED | OUTPATIENT
Start: 2025-06-25 | End: 2025-06-25

## 2025-06-25 RX ORDER — PROCHLORPERAZINE MALEATE 10 MG
10 TABLET ORAL EVERY 6 HOURS PRN
Status: DISCONTINUED | OUTPATIENT
Start: 2025-06-25 | End: 2025-06-25 | Stop reason: HOSPADM

## 2025-06-25 RX ORDER — ALBUTEROL SULFATE 0.83 MG/ML
3 SOLUTION RESPIRATORY (INHALATION) AS NEEDED
Status: DISCONTINUED | OUTPATIENT
Start: 2025-06-25 | End: 2025-06-25 | Stop reason: HOSPADM

## 2025-06-25 RX ORDER — HEPARIN 100 UNIT/ML
500 SYRINGE INTRAVENOUS AS NEEDED
Status: DISCONTINUED | OUTPATIENT
Start: 2025-06-25 | End: 2025-06-25 | Stop reason: HOSPADM

## 2025-06-25 RX ORDER — DEXAMETHASONE 6 MG/1
12 TABLET ORAL ONCE
Status: COMPLETED | OUTPATIENT
Start: 2025-06-25 | End: 2025-06-25

## 2025-06-25 RX ADMIN — ATROPINE SULFATE 0.4 MG: 0.4 INJECTION, SOLUTION INTRAVENOUS at 13:57

## 2025-06-25 RX ADMIN — FLUOROURACIL 3500 MG: 50 INJECTION, SOLUTION INTRAVENOUS at 15:37

## 2025-06-25 RX ADMIN — FAMOTIDINE 20 MG: 10 INJECTION INTRAVENOUS at 11:11

## 2025-06-25 RX ADMIN — SODIUM CHLORIDE 500 ML: 9 INJECTION, SOLUTION INTRAVENOUS at 11:16

## 2025-06-25 RX ADMIN — LEUCOVORIN CALCIUM 740 MG: 350 INJECTION, POWDER, LYOPHILIZED, FOR SOLUTION INTRAMUSCULAR; INTRAVENOUS at 14:00

## 2025-06-25 RX ADMIN — CETUXIMAB 900 MG: 2 SOLUTION INTRAVENOUS at 10:10

## 2025-06-25 RX ADMIN — DIPHENHYDRAMINE HYDROCHLORIDE 50 MG: 50 INJECTION INTRAMUSCULAR; INTRAVENOUS at 09:35

## 2025-06-25 RX ADMIN — DEXTROSE MONOHYDRATE 270 MG: 50 INJECTION, SOLUTION INTRAVENOUS at 13:58

## 2025-06-25 RX ADMIN — PALONOSETRON 0.25 MG: 0.05 INJECTION, SOLUTION INTRAVENOUS at 13:23

## 2025-06-25 RX ADMIN — METHYLPREDNISOLONE SODIUM SUCCINATE 40 MG: 40 INJECTION, POWDER, FOR SOLUTION INTRAMUSCULAR; INTRAVENOUS at 11:13

## 2025-06-25 RX ADMIN — DEXAMETHASONE 12 MG: 6 TABLET ORAL at 13:23

## 2025-06-25 ASSESSMENT — PAIN SCALES - GENERAL
PAINLEVEL_OUTOF10: 0-NO PAIN
PAINLEVEL_OUTOF10: 0-NO PAIN

## 2025-06-25 NOTE — PROGRESS NOTES
Adverse Event Note     Name:Thai Cristobal  : 1980  MRN: [id number]      Adverse Drug Reaction  Date and Time of Reaction: 2025 11:10 EDT    Medication Administration Details  Medication Administered: CETUXIMAB  Date and Time Medication Administration: 2025 10:10 EDT  Patient Location at Time of Admin: [facility]    ADR Symptoms:  Symptoms: Wheezing  Severity: Mild    Actions Taken:  Actions Taken: Provider notified, Medications given (refer to MAR), IV fluids given (refer to MAR), Rapid Response called (refer to code documentation) and Vital signs taken (refer to flowsheet)  Provider Notified:  Medications Given: Famotidine (Pepcid) and Methylprednisolone (Solu-Medrol)  Outcome: Treatment completed    Additional Details:  1 Hour into first cetuximab infusion, patient said he felt mildly SOB and wheezy. Infusion was stopped, code button initiated. Patient received IV pepcid, solumedrol, and IV fluids. MD Koch at bedside to assess patient. Patient used his home inhaler per MD Koch's direction. Patient's symptoms subsided after rescue medications. Medication was restarted at half rate for 15 minutes, then increased to full rate for duration of infusion. MD Vargas made aware and I'm agreement with plan.

## 2025-06-27 ENCOUNTER — INFUSION (OUTPATIENT)
Dept: HEMATOLOGY/ONCOLOGY | Facility: CLINIC | Age: 45
End: 2025-06-27
Payer: COMMERCIAL

## 2025-06-27 VITALS
RESPIRATION RATE: 18 BRPM | SYSTOLIC BLOOD PRESSURE: 126 MMHG | OXYGEN SATURATION: 98 % | BODY MASS INDEX: 23.56 KG/M2 | TEMPERATURE: 98.2 F | WEIGHT: 153.66 LBS | DIASTOLIC BLOOD PRESSURE: 83 MMHG | HEART RATE: 80 BPM

## 2025-06-27 DIAGNOSIS — C17.9 ADENOCARCINOMA OF SMALL BOWEL (MULTI): ICD-10-CM

## 2025-06-27 PROCEDURE — 2500000004 HC RX 250 GENERAL PHARMACY W/ HCPCS (ALT 636 FOR OP/ED): Performed by: INTERNAL MEDICINE

## 2025-06-27 PROCEDURE — 96523 IRRIG DRUG DELIVERY DEVICE: CPT

## 2025-06-27 RX ORDER — HEPARIN 100 UNIT/ML
500 SYRINGE INTRAVENOUS AS NEEDED
OUTPATIENT
Start: 2025-06-27

## 2025-06-27 RX ORDER — HEPARIN 100 UNIT/ML
500 SYRINGE INTRAVENOUS AS NEEDED
Status: DISCONTINUED | OUTPATIENT
Start: 2025-06-27 | End: 2025-06-27 | Stop reason: HOSPADM

## 2025-06-27 RX ORDER — HEPARIN SODIUM,PORCINE/PF 10 UNIT/ML
50 SYRINGE (ML) INTRAVENOUS AS NEEDED
Status: DISCONTINUED | OUTPATIENT
Start: 2025-06-27 | End: 2025-06-27 | Stop reason: HOSPADM

## 2025-06-27 RX ORDER — HEPARIN SODIUM,PORCINE/PF 10 UNIT/ML
50 SYRINGE (ML) INTRAVENOUS AS NEEDED
OUTPATIENT
Start: 2025-06-27

## 2025-06-27 RX ADMIN — HEPARIN 500 UNITS: 100 SYRINGE at 13:53

## 2025-06-27 ASSESSMENT — PAIN SCALES - GENERAL: PAINLEVEL_OUTOF10: 0-NO PAIN

## 2025-07-08 ENCOUNTER — APPOINTMENT (OUTPATIENT)
Dept: HEMATOLOGY/ONCOLOGY | Facility: CLINIC | Age: 45
End: 2025-07-08
Payer: COMMERCIAL

## 2025-07-09 ENCOUNTER — APPOINTMENT (OUTPATIENT)
Dept: HEMATOLOGY/ONCOLOGY | Facility: CLINIC | Age: 45
End: 2025-07-09
Payer: COMMERCIAL

## 2025-07-11 ENCOUNTER — APPOINTMENT (OUTPATIENT)
Dept: HEMATOLOGY/ONCOLOGY | Facility: CLINIC | Age: 45
End: 2025-07-11
Payer: COMMERCIAL

## 2025-07-15 DIAGNOSIS — C17.9 ADENOCARCINOMA OF SMALL BOWEL (MULTI): ICD-10-CM

## 2025-07-16 DIAGNOSIS — C17.9 ADENOCARCINOMA OF SMALL BOWEL (MULTI): ICD-10-CM

## 2025-07-16 DIAGNOSIS — F41.9 ANXIETY: ICD-10-CM

## 2025-07-16 RX ORDER — ALPRAZOLAM 0.5 MG/1
1 TABLET ORAL 2 TIMES DAILY PRN
Qty: 120 TABLET | Refills: 0 | Status: SHIPPED | OUTPATIENT
Start: 2025-07-16

## 2025-07-22 ENCOUNTER — INFUSION (OUTPATIENT)
Dept: HEMATOLOGY/ONCOLOGY | Facility: CLINIC | Age: 45
End: 2025-07-22
Payer: COMMERCIAL

## 2025-07-22 ENCOUNTER — NUTRITION (OUTPATIENT)
Dept: HEMATOLOGY/ONCOLOGY | Facility: CLINIC | Age: 45
End: 2025-07-22

## 2025-07-22 ENCOUNTER — APPOINTMENT (OUTPATIENT)
Dept: HEMATOLOGY/ONCOLOGY | Facility: CLINIC | Age: 45
End: 2025-07-22
Payer: COMMERCIAL

## 2025-07-22 VITALS
DIASTOLIC BLOOD PRESSURE: 69 MMHG | OXYGEN SATURATION: 96 % | WEIGHT: 156.31 LBS | TEMPERATURE: 98.1 F | RESPIRATION RATE: 18 BRPM | SYSTOLIC BLOOD PRESSURE: 107 MMHG | BODY MASS INDEX: 23.69 KG/M2 | HEIGHT: 68 IN | HEART RATE: 77 BPM

## 2025-07-22 VITALS — BODY MASS INDEX: 23.69 KG/M2 | WEIGHT: 156.31 LBS | HEIGHT: 68 IN

## 2025-07-22 DIAGNOSIS — D50.0 IRON DEFICIENCY ANEMIA DUE TO CHRONIC BLOOD LOSS: ICD-10-CM

## 2025-07-22 DIAGNOSIS — C17.9 ADENOCARCINOMA OF SMALL BOWEL (MULTI): ICD-10-CM

## 2025-07-22 DIAGNOSIS — D50.0 IRON DEFICIENCY ANEMIA DUE TO CHRONIC BLOOD LOSS: Primary | ICD-10-CM

## 2025-07-22 LAB
ALBUMIN SERPL BCP-MCNC: 3.8 G/DL (ref 3.4–5)
ALP SERPL-CCNC: 104 U/L (ref 33–120)
ALT SERPL W P-5'-P-CCNC: 13 U/L (ref 10–52)
ANION GAP SERPL CALC-SCNC: 10 MMOL/L (ref 10–20)
AST SERPL W P-5'-P-CCNC: 18 U/L (ref 9–39)
BASOPHILS # BLD AUTO: 0.08 X10*3/UL (ref 0–0.1)
BASOPHILS NFR BLD AUTO: 1.8 %
BILIRUB SERPL-MCNC: 0.4 MG/DL (ref 0–1.2)
BUN SERPL-MCNC: 16 MG/DL (ref 6–23)
CALCIUM SERPL-MCNC: 9 MG/DL (ref 8.6–10.3)
CHLORIDE SERPL-SCNC: 104 MMOL/L (ref 98–107)
CO2 SERPL-SCNC: 29 MMOL/L (ref 21–32)
CREAT SERPL-MCNC: 0.94 MG/DL (ref 0.5–1.3)
EGFRCR SERPLBLD CKD-EPI 2021: >90 ML/MIN/1.73M*2
EOSINOPHIL # BLD AUTO: 0.22 X10*3/UL (ref 0–0.7)
EOSINOPHIL NFR BLD AUTO: 5 %
ERYTHROCYTE [DISTWIDTH] IN BLOOD BY AUTOMATED COUNT: 18.2 % (ref 11.5–14.5)
GLUCOSE SERPL-MCNC: 106 MG/DL (ref 74–99)
HCT VFR BLD AUTO: 26.5 % (ref 41–52)
HGB BLD-MCNC: 7.6 G/DL (ref 13.5–17.5)
IMM GRANULOCYTES # BLD AUTO: 0.01 X10*3/UL (ref 0–0.7)
IMM GRANULOCYTES NFR BLD AUTO: 0.2 % (ref 0–0.9)
LYMPHOCYTES # BLD AUTO: 0.98 X10*3/UL (ref 1.2–4.8)
LYMPHOCYTES NFR BLD AUTO: 22.4 %
MAGNESIUM SERPL-MCNC: 1.86 MG/DL (ref 1.6–2.4)
MCH RBC QN AUTO: 22.9 PG (ref 26–34)
MCHC RBC AUTO-ENTMCNC: 28.7 G/DL (ref 32–36)
MCV RBC AUTO: 80 FL (ref 80–100)
MONOCYTES # BLD AUTO: 0.57 X10*3/UL (ref 0.1–1)
MONOCYTES NFR BLD AUTO: 13 %
NEUTROPHILS # BLD AUTO: 2.52 X10*3/UL (ref 1.2–7.7)
NEUTROPHILS NFR BLD AUTO: 57.6 %
NRBC BLD-RTO: ABNORMAL /100{WBCS}
PLATELET # BLD AUTO: 160 X10*3/UL (ref 150–450)
POTASSIUM SERPL-SCNC: 3.9 MMOL/L (ref 3.5–5.3)
PROT SERPL-MCNC: 6 G/DL (ref 6.4–8.2)
RBC # BLD AUTO: 3.32 X10*6/UL (ref 4.5–5.9)
SODIUM SERPL-SCNC: 139 MMOL/L (ref 136–145)
WBC # BLD AUTO: 4.4 X10*3/UL (ref 4.4–11.3)

## 2025-07-22 PROCEDURE — 85025 COMPLETE CBC W/AUTO DIFF WBC: CPT

## 2025-07-22 PROCEDURE — 83735 ASSAY OF MAGNESIUM: CPT

## 2025-07-22 PROCEDURE — 96415 CHEMO IV INFUSION ADDL HR: CPT

## 2025-07-22 PROCEDURE — 2500000004 HC RX 250 GENERAL PHARMACY W/ HCPCS (ALT 636 FOR OP/ED): Performed by: INTERNAL MEDICINE

## 2025-07-22 PROCEDURE — 96375 TX/PRO/DX INJ NEW DRUG ADDON: CPT | Mod: INF

## 2025-07-22 PROCEDURE — 80053 COMPREHEN METABOLIC PANEL: CPT

## 2025-07-22 PROCEDURE — 96416 CHEMO PROLONG INFUSE W/PUMP: CPT

## 2025-07-22 PROCEDURE — 83540 ASSAY OF IRON: CPT

## 2025-07-22 PROCEDURE — 96413 CHEMO IV INFUSION 1 HR: CPT

## 2025-07-22 PROCEDURE — 96368 THER/DIAG CONCURRENT INF: CPT

## 2025-07-22 PROCEDURE — 96417 CHEMO IV INFUS EACH ADDL SEQ: CPT

## 2025-07-22 RX ORDER — MAGNESIUM SULFATE HEPTAHYDRATE 40 MG/ML
4 INJECTION, SOLUTION INTRAVENOUS ONCE AS NEEDED
Status: DISCONTINUED | OUTPATIENT
Start: 2025-07-22 | End: 2025-07-22 | Stop reason: HOSPADM

## 2025-07-22 RX ORDER — HEPARIN 100 UNIT/ML
500 SYRINGE INTRAVENOUS AS NEEDED
Status: CANCELLED | OUTPATIENT
Start: 2025-07-22

## 2025-07-22 RX ORDER — HEPARIN SODIUM,PORCINE/PF 10 UNIT/ML
50 SYRINGE (ML) INTRAVENOUS AS NEEDED
Status: DISCONTINUED | OUTPATIENT
Start: 2025-07-22 | End: 2025-07-22 | Stop reason: HOSPADM

## 2025-07-22 RX ORDER — PALONOSETRON 0.05 MG/ML
0.25 INJECTION, SOLUTION INTRAVENOUS ONCE
Status: COMPLETED | OUTPATIENT
Start: 2025-07-22 | End: 2025-07-22

## 2025-07-22 RX ORDER — DIPHENHYDRAMINE HYDROCHLORIDE 50 MG/ML
50 INJECTION, SOLUTION INTRAMUSCULAR; INTRAVENOUS AS NEEDED
Status: DISCONTINUED | OUTPATIENT
Start: 2025-07-22 | End: 2025-07-22 | Stop reason: HOSPADM

## 2025-07-22 RX ORDER — MAGNESIUM SULFATE HEPTAHYDRATE 40 MG/ML
2 INJECTION, SOLUTION INTRAVENOUS ONCE AS NEEDED
Status: DISCONTINUED | OUTPATIENT
Start: 2025-07-22 | End: 2025-07-22 | Stop reason: HOSPADM

## 2025-07-22 RX ORDER — ALBUTEROL SULFATE 0.83 MG/ML
3 SOLUTION RESPIRATORY (INHALATION) AS NEEDED
Status: DISCONTINUED | OUTPATIENT
Start: 2025-07-22 | End: 2025-07-22 | Stop reason: HOSPADM

## 2025-07-22 RX ORDER — PROCHLORPERAZINE MALEATE 10 MG
10 TABLET ORAL EVERY 6 HOURS PRN
Status: DISCONTINUED | OUTPATIENT
Start: 2025-07-22 | End: 2025-07-22 | Stop reason: HOSPADM

## 2025-07-22 RX ORDER — PROCHLORPERAZINE EDISYLATE 5 MG/ML
10 INJECTION INTRAMUSCULAR; INTRAVENOUS EVERY 6 HOURS PRN
Status: DISCONTINUED | OUTPATIENT
Start: 2025-07-22 | End: 2025-07-22 | Stop reason: HOSPADM

## 2025-07-22 RX ORDER — FAMOTIDINE 10 MG/ML
20 INJECTION, SOLUTION INTRAVENOUS ONCE AS NEEDED
Status: DISCONTINUED | OUTPATIENT
Start: 2025-07-22 | End: 2025-07-22 | Stop reason: HOSPADM

## 2025-07-22 RX ORDER — HEPARIN 100 UNIT/ML
500 SYRINGE INTRAVENOUS AS NEEDED
Status: DISCONTINUED | OUTPATIENT
Start: 2025-07-22 | End: 2025-07-22 | Stop reason: HOSPADM

## 2025-07-22 RX ORDER — EPINEPHRINE 0.3 MG/.3ML
0.3 INJECTION SUBCUTANEOUS EVERY 5 MIN PRN
Status: DISCONTINUED | OUTPATIENT
Start: 2025-07-22 | End: 2025-07-22 | Stop reason: HOSPADM

## 2025-07-22 RX ORDER — DEXAMETHASONE 6 MG/1
12 TABLET ORAL ONCE
Status: COMPLETED | OUTPATIENT
Start: 2025-07-22 | End: 2025-07-22

## 2025-07-22 RX ORDER — HEPARIN SODIUM,PORCINE/PF 10 UNIT/ML
50 SYRINGE (ML) INTRAVENOUS AS NEEDED
Status: CANCELLED | OUTPATIENT
Start: 2025-07-22

## 2025-07-22 RX ORDER — ATROPINE SULFATE 0.4 MG/ML
0.4 INJECTION, SOLUTION ENDOTRACHEAL; INTRAMEDULLARY; INTRAMUSCULAR; INTRAVENOUS; SUBCUTANEOUS
Status: DISCONTINUED | OUTPATIENT
Start: 2025-07-22 | End: 2025-07-22 | Stop reason: HOSPADM

## 2025-07-22 RX ADMIN — DIPHENHYDRAMINE HYDROCHLORIDE 50 MG: 50 INJECTION INTRAMUSCULAR; INTRAVENOUS at 09:31

## 2025-07-22 RX ADMIN — IRINOTECAN HYDROCHLORIDE 270 MG: 20 INJECTION, SOLUTION INTRAVENOUS at 12:45

## 2025-07-22 RX ADMIN — DEXAMETHASONE 12 MG: 6 TABLET ORAL at 12:17

## 2025-07-22 RX ADMIN — CETUXIMAB 900 MG: 2 SOLUTION INTRAVENOUS at 10:09

## 2025-07-22 RX ADMIN — ATROPINE SULFATE 0.4 MG: 0.4 INJECTION, SOLUTION INTRAVENOUS at 12:41

## 2025-07-22 RX ADMIN — LEUCOVORIN CALCIUM 740 MG: 350 INJECTION, POWDER, LYOPHILIZED, FOR SOLUTION INTRAMUSCULAR; INTRAVENOUS at 12:45

## 2025-07-22 RX ADMIN — PALONOSETRON HYDROCHLORIDE 0.25 MG: 0.25 INJECTION INTRAVENOUS at 12:17

## 2025-07-22 RX ADMIN — FLUOROURACIL 3500 MG: 50 INJECTION, SOLUTION INTRAVENOUS at 14:22

## 2025-07-22 ASSESSMENT — PAIN SCALES - GENERAL: PAINLEVEL_OUTOF10: 0-NO PAIN

## 2025-07-22 NOTE — PROGRESS NOTES
"NUTRITION Follow-Up NOTE    Nutrition Assessment     Reason for Visit:  Thai Cristobal is a 44 y.o. male seen during his infusion at [facility] as patient and his wife had questions about supplements.  Patient receiving Cetuximab + FOLFIRI (Fluorouracil Continuous Infusion / Leucovorin / Irinotecan), 14 Day Cycles for diagnosis of small bowel adenocarcinoma.       Problem List[1] Medical History[2]    Nutrition Significant labs:  Lab Results   Component Value Date/Time    GLUCOSE 106 (H) 07/22/2025 0834     07/22/2025 0834    K 3.9 07/22/2025 0834     07/22/2025 0834    CO2 29 07/22/2025 0834    ANIONGAP 10 07/22/2025 0834    BUN 16 07/22/2025 0834    CREATININE 0.94 07/22/2025 0834    EGFR >90 07/22/2025 0834    CALCIUM 9.0 07/22/2025 0834    ALBUMIN 3.8 07/22/2025 0834    ALKPHOS 104 07/22/2025 0834    PROT 6.0 (L) 07/22/2025 0834    AST 18 07/22/2025 0834    BILITOT 0.4 07/22/2025 0834    ALT 13 07/22/2025 0834    MG 1.86 07/22/2025 0834     No results found for: \"VITD25\"      Anthropometrics:  Height: 171.8 cm (5' 7.64\")   Weight: 70.9 kg (156 lb 4.9 oz)   BMI (Calculated): 24.02    IBW/kg (Dietitian Calculated): 67.27 kg Percent of IBW: 105 %          Weight History:   Daily Weight  07/22/25 : 70.9 kg (156 lb 4.9 oz)  07/22/25 : 70.9 kg (156 lb 4.9 oz)  06/27/25 : 69.7 kg (153 lb 10.6 oz)  06/25/25 : 69.5 kg (153 lb 3.5 oz)  06/17/25 : 69.8 kg (153 lb 14.4 oz)  06/12/25 : 69 kg (152 lb 1.9 oz)  06/10/25 : 68.9 kg (151 lb 14.4 oz)  05/30/25 : 69.7 kg (153 lb 10.6 oz)  05/28/25 : 69.3 kg (152 lb 10.7 oz)  05/27/25 : 69.3 kg (152 lb 12.8 oz)    Weight Change %:  Weight History / % Weight Change: wt has been stable over the last 3 months.    Nutrition History:  Food and Nutrient History  Food and Nutrient History: Met with patient and his wife today per their request with supplement questions. Patient reported H/H has been low, and wanted to start to take supplement to help absorb iron.  His wife " stated it was called Anemiaprin.  He watches what he eats, does not eat a lot of sugar.  He does reported drinking 4 cups of coffee throughout the day as he is fatigued.  Energy Intake: Good > 75 %         Food Supplement Intake  Herbal Supplements: Flaxseed oil, elderberry oil  Vitamin Intake: Multivitamin, C, D, B12  Mineral/Element Intake: Magnesium    Food Preparation  Cooking: Spouse/Significant Other    Current Medications[3]     Nutrition Focused Physical Exam Findings:    Subcutaneous Fat Loss  Orbital Fat Pads: Well nourished (slightly bulging fat pads)  Buccal Fat Pads: Well nourished (full, rounded cheeks)  Triceps: Well nourished (ample fat tissue)    Muscle Wasting  Temporalis: Well nourished (well-defined muscle)              Estimated Needs:       Estimated Energy Needs  Total Energy Estimated Needs in 24 hours (kCal): 2200 kCal  Energy Estimated Needs per kg Body Weight in 24 hours (kCal/kg): 30 kCal/kg  Estimated Protein Needs  Total Protein Estimated Needs in 24 Hours (g): 85 g  Protein Estimated Needs per kg Body Weight in 24 Hours (g/kg): 1.2 g/kg  Estimated Fluid Needs  Total Fluid Estimated Needs in 24 Hours (mL): 2200 mL  Method for Estimating 24 Hour Fluid Needs: or 1ml/kcal             Nutrition Diagnosis        Nutrition Diagnosis  Patient has Nutrition Diagnosis: Yes  Diagnosis Status (1): Active  Nutrition Diagnosis 1: Increased nutrient needs  Related to (1): increased demand for calories, protein and fluids  As Evidenced by (1): chemotherapy for diagnosis of adenocarcinoma of small bowel.       Nutrition Interventions/Recommendations   Nutrition Prescription: Individualized Nutrition Prescription Provided for : Oral nutrition     Recommendations: Individualized Nutrition Prescription Provided for : Management of NIS, maintain weight    Nutrition Interventions:   Food and Nutrient Delivery: Food and Nutrition Delivery  Meals & Snacks: General Healthful Diet  Goals: Recommend following a  general healthful diet as able;  Encouraged intake of high calorie high protein foods throughout the day. Discussed adequate fluid intake to maintain hydration and mgmt of bowels.     Coordination of Care: Coordination of Nutrition Care by a Nutrition Professional  Collaboration and referral of nutrition care: Collaboration and Referral of Nutrition Care  Goals: will continue to follow up as needed throughout tx.     Reviewd AnemiaPrin supplement and I would not recommend as it does contain iron, patient reported when he took iron in the past he ended up with bowel obstruction, related or not related to iron supplements they were not sure, but his wife stated both times he tried he had side effects.  This supplement does contain Vitamin C, and B12 so if he were to take, would recommend stopping the other C, and B12 he takes.  Recommend they speak with patient oncologist prior to taking if they decide to take.    Nutrition Education:   Nutrition Education Content: Nutrition Education Content: Content related nutrition education  Goals: Adequate calories/ protein to maintain weight/ muscle; fiber adjusted for bowel mgmt   Reviewed iron rich foods, and foods to enhance absorption of iron rich foods and foods that can decrease iron absorption.  Recommended decrease coffee intake.       Nutrition Monitoring and Evaluation   Food and Nutrient Intake  Monitoring and Evaluation Plan: Energy intake, Fluid intake, Amount of food, Meal/snack pattern, Protein intake  Energy Intake: Estimated energy intake  Criteria: 75% of needs; dietary recall; measured/ reported weight  Fluid Intake: Estimated fluid intake  Criteria: maintain hydration; dietary recall  Amount of Food: Estimated amout of food  Criteria: smaller frequent meals  Meal/Snack Pattern: Estimated meal and snack pattern  Criteria: 4-6 meals daily, dietary recall  Estimated protein intake: Estimated protein intake  Criteria: 75% of needs; dietery  recall    Anthropometric measurements  Monitoring and Evaluation Plan: Weight  Body Weight: Measured body weight  Criteria: Maintain weight    Biochemical Data, Medical Tests and Procedures  Monitoring and Evaluation Plan: Electrolyte/renal panel  Criteria: labs WNL              Follow Up: Planned follow up visit: as needed throughout tx.                [1]   Patient Active Problem List  Diagnosis    IDALIA positive    Anxiety    Attention deficit    Primary hypertension    Crohn's disease (Multi)    LAD (lymphadenopathy), cervical    Migraine with aura and without status migrainosus, not intractable    Obsessive behaviors    Panic attacks    PSVT (paroxysmal supraventricular tachycardia)    Hypopigmentation    Seasonal allergies    Chest pressure    Allergic rhinitis    Asthma    Atypical chest pain    Depression    Mallet finger    Migraine without aura and responsive to treatment    Orthostatic hypotension    Palpitations    Urinary hesitancy    Positive antinuclear antibody    Attention disturbance    Disorder of pigmentation    Cervical lymphadenopathy    Obsessive behavior    Benign essential hypertension    Paroxysmal supraventricular tachycardia    Neuroendocrine tumor    Secondary neuroendocrine tumor of liver    Adenocarcinoma of small bowel (Multi)    Iron deficiency anemia due to chronic blood loss   [2]   Past Medical History:  Diagnosis Date    Acute upper respiratory infection, unspecified 02/21/2018    Acute URI    Asthma     Cancer (Multi)     Cancer, metastatic to liver (Multi)     Crohn's disease (Multi)     Personal history of other specified conditions 04/23/2018    History of vertigo    SBO (small bowel obstruction) (Multi) 01/25/2024   [3]   Current Outpatient Medications:     ALPRAZolam (Xanax) 0.5 mg tablet, Take 2 tablets (1 mg) by mouth 2 times a day as needed for anxiety., Disp: 120 tablet, Rfl: 0    cholecalciferol (Vitamin D-3) 25 MCG (1000 UT) capsule, Take 1 capsule (25 mcg) by mouth  once daily., Disp: , Rfl:     clindamycin (Cleocin T) 1 % lotion, Apply topically to affected area twice daily., Disp: 60 mL, Rfl: 3    diphenhydrAMINE (BENADryl) 25 mg capsule, Take 1 capsule (25 mg) by mouth as needed at bedtime for itching., Disp: , Rfl:     elderberry fruit 350 mg capsule, Take 1 capsule by mouth once daily., Disp: , Rfl:     ferrous sulfate 325 (65 Fe) mg EC tablet, Take 1 tablet by mouth 3 times daily (morning, midday, late afternoon). Do not crush, chew, or split., Disp: 90 tablet, Rfl: 6    flaxseed oiL 1,000 mg capsule, Take 1 capsule (1,000 mg) by mouth once daily. (Patient not taking: Reported on 5/5/2025), Disp: , Rfl:     FLUoxetine (PROzac) 20 mg capsule, Take 1 capsule (20 mg) by mouth once daily at bedtime., Disp: 90 capsule, Rfl: 1    FLUoxetine (PROzac) 40 mg capsule, TAKE 1 CAPSULE BY MOUTH ONCE DAILY, Disp: 90 capsule, Rfl: 1    hydrocortisone 1 % cream, Apply topically to face, hands, feet, neck, back, and chest once daily at bedtime for rash prevention., Disp: 453.6 g, Rfl: 3    lisinopril 20 mg tablet, TAKE 1 TABLET BY MOUTH EVERY DAY, Disp: 90 tablet, Rfl: 1    loperamide (Imodium) 2 mg capsule, Take 2 capsules by mouth with the first episode of diarrhea, then take 1 capsule with each episode of loose stool thereafter. Maximum of 8 capsules per 24 hours., Disp: 30 capsule, Rfl: 3    minocycline (Dynacin) 100 mg tablet, Take 1 tablet (100 mg) by mouth once every 24 hours. For rash prevention, Disp: 28 tablet, Rfl: 3    omeprazole OTC (PriLOSEC OTC) 20 mg EC tablet, Take 1 tablet (20 mg) by mouth once daily. Do not crush, chew, or split., Disp: 30 tablet, Rfl: 2    ondansetron (Zofran) 8 mg tablet, Take 1 tablet (8 mg) by mouth every 8 hours if needed for nausea., Disp: 30 tablet, Rfl: 3    scopolamine (Transderm-Scop) 1 mg over 3 days patch 3 day, Place 1 patch over 72 hours on the skin every 3rd day if needed (nausea)., Disp: 3 patch, Rfl: 0    SUMAtriptan (Imitrex) 50 mg  tablet, TAKE 1 TABLET (50 MG) BY MOUTH 1 TIME IF NEEDED FOR MIGRAINE FOR UP TO 36 DOSES., Disp: 9 tablet, Rfl: 3    vitamin C-multivitamin-mineral (Emergen-C) 500 mg tablet,chewable, Chew 1 tablet once daily., Disp: , Rfl:   No current facility-administered medications for this visit.    Facility-Administered Medications Ordered in Other Visits:     heparin flush 100 unit/mL syringe 500 Units, 500 Units, intra-catheter, PRN, Nba Vargas MD, 500 Units at 10/15/24 1015

## 2025-07-22 NOTE — SIGNIFICANT EVENT
07/22/25 0907   Prechemo Checklist   Has the patient been in the hospital, ED, or urgent care since last date of service No   Chemo/Immuno Consent Completed and Signed Yes   Protocol/Indications Verified Yes   Confirmed to previous date/time of medication Yes  (Delay d/t vacation)   Compared to previous dose Yes   All medications are dated accurately Yes   Pregnancy Test Negative Not applicable   Parameters Met Yes   BSA/Weight-Height Verified Yes   Dose Calculations Verified (current, total, cumulative) Yes

## 2025-07-23 ENCOUNTER — APPOINTMENT (OUTPATIENT)
Dept: HEMATOLOGY/ONCOLOGY | Facility: CLINIC | Age: 45
End: 2025-07-23
Payer: COMMERCIAL

## 2025-07-23 DIAGNOSIS — C17.9 ADENOCARCINOMA OF SMALL BOWEL (MULTI): Primary | ICD-10-CM

## 2025-07-23 LAB
IRON SATN MFR SERPL: 4 % (ref 25–45)
IRON SERPL-MCNC: 14 UG/DL (ref 35–150)
TIBC SERPL-MCNC: 391 UG/DL (ref 240–445)
UIBC SERPL-MCNC: 377 UG/DL (ref 110–370)

## 2025-07-23 RX ORDER — FERROUS SULFATE 325(65) MG
325 TABLET, DELAYED RELEASE (ENTERIC COATED) ORAL
Qty: 90 TABLET | Refills: 6 | Status: SHIPPED | OUTPATIENT
Start: 2025-07-23 | End: 2025-08-22

## 2025-07-24 ENCOUNTER — APPOINTMENT (OUTPATIENT)
Dept: HEMATOLOGY/ONCOLOGY | Facility: CLINIC | Age: 45
End: 2025-07-24
Payer: COMMERCIAL

## 2025-07-24 ENCOUNTER — INFUSION (OUTPATIENT)
Dept: HEMATOLOGY/ONCOLOGY | Facility: CLINIC | Age: 45
End: 2025-07-24
Payer: COMMERCIAL

## 2025-07-24 VITALS
WEIGHT: 156.31 LBS | HEART RATE: 83 BPM | RESPIRATION RATE: 18 BRPM | BODY MASS INDEX: 24.02 KG/M2 | OXYGEN SATURATION: 97 % | DIASTOLIC BLOOD PRESSURE: 71 MMHG | TEMPERATURE: 98.4 F | SYSTOLIC BLOOD PRESSURE: 110 MMHG

## 2025-07-24 DIAGNOSIS — D50.0 IRON DEFICIENCY ANEMIA DUE TO CHRONIC BLOOD LOSS: ICD-10-CM

## 2025-07-24 DIAGNOSIS — C7B.8 SECONDARY NEUROENDOCRINE TUMOR OF LIVER: ICD-10-CM

## 2025-07-24 DIAGNOSIS — C17.9 ADENOCARCINOMA OF SMALL BOWEL (MULTI): ICD-10-CM

## 2025-07-24 LAB
ERYTHROCYTE [DISTWIDTH] IN BLOOD BY AUTOMATED COUNT: 18.7 % (ref 11.5–14.5)
HCT VFR BLD AUTO: 25.9 % (ref 41–52)
HGB BLD-MCNC: 7.6 G/DL (ref 13.5–17.5)
MCH RBC QN AUTO: 23.2 PG (ref 26–34)
MCHC RBC AUTO-ENTMCNC: 29.3 G/DL (ref 32–36)
MCV RBC AUTO: 79 FL (ref 80–100)
NRBC BLD-RTO: ABNORMAL /100{WBCS}
PLATELET # BLD AUTO: 145 X10*3/UL (ref 150–450)
RBC # BLD AUTO: 3.28 X10*6/UL (ref 4.5–5.9)
WBC # BLD AUTO: 5.6 X10*3/UL (ref 4.4–11.3)

## 2025-07-24 PROCEDURE — 36591 DRAW BLOOD OFF VENOUS DEVICE: CPT

## 2025-07-24 PROCEDURE — 2500000004 HC RX 250 GENERAL PHARMACY W/ HCPCS (ALT 636 FOR OP/ED): Performed by: INTERNAL MEDICINE

## 2025-07-24 PROCEDURE — 85027 COMPLETE CBC AUTOMATED: CPT

## 2025-07-24 RX ORDER — HEPARIN SODIUM,PORCINE/PF 10 UNIT/ML
50 SYRINGE (ML) INTRAVENOUS AS NEEDED
Status: DISCONTINUED | OUTPATIENT
Start: 2025-07-24 | End: 2025-07-24 | Stop reason: HOSPADM

## 2025-07-24 RX ORDER — HEPARIN 100 UNIT/ML
500 SYRINGE INTRAVENOUS AS NEEDED
Status: DISCONTINUED | OUTPATIENT
Start: 2025-07-24 | End: 2025-07-24 | Stop reason: HOSPADM

## 2025-07-24 RX ORDER — HEPARIN 100 UNIT/ML
500 SYRINGE INTRAVENOUS AS NEEDED
OUTPATIENT
Start: 2025-07-24

## 2025-07-24 RX ORDER — HEPARIN SODIUM,PORCINE/PF 10 UNIT/ML
50 SYRINGE (ML) INTRAVENOUS AS NEEDED
OUTPATIENT
Start: 2025-07-24

## 2025-07-24 RX ADMIN — HEPARIN 500 UNITS: 100 SYRINGE at 13:21

## 2025-07-24 ASSESSMENT — PAIN SCALES - GENERAL: PAINLEVEL_OUTOF10: 2

## 2025-07-24 NOTE — PROGRESS NOTES
Patient had CBC ordered for today, 7/24/25. HgB was 7.6, which was the same from 7/22/25. Hemoglobin results reported to Dr. Vargas. Awaiting response. Of note, patient reported that he is picking up the prescription for iron that Dr. Vargas ordered today.

## 2025-07-25 ENCOUNTER — NURSE TRIAGE (OUTPATIENT)
Dept: ADMISSION | Facility: HOSPITAL | Age: 45
End: 2025-07-25
Payer: COMMERCIAL

## 2025-07-25 ENCOUNTER — APPOINTMENT (OUTPATIENT)
Dept: HEMATOLOGY/ONCOLOGY | Facility: CLINIC | Age: 45
End: 2025-07-25
Payer: COMMERCIAL

## 2025-07-25 NOTE — TELEPHONE ENCOUNTER
Patient contacted and made aware stools are most likely from iron. MD recommended to stop iron at signs of abdominal pain or discomfort and constipation. Patient does not need IV iron therapy at this time. Patient urged to used Imodium as needed. Verbalized understanding and agreed to plan of care.

## 2025-07-25 NOTE — TELEPHONE ENCOUNTER
Patient contacted the office with concerns on his bowel movement from today . He states it was slate grey in color and had a strange odor.    Other Symptoms-  Completely Water - no solid stool present  No Abdominal Pain   Skin and Whites of eyes no change in color or jaundice   Hemoglobin 7.6   No blood in stool  No fevers  Chemotherapy was yesterday  Oral Iron therapy started yesterday   Brain fog present       Lastly, patient started Iron pills yesterday - informed him that these symptoms are most likely due to this but will check with physician.  The last time he tried oral iron and liquid iron he had a very hard time digesting the planned course of treatment and eventually had to have IV Iron therapy. Patient is wondering if these symptoms are from the iron tablets and if the iron therapy can be changed to IV?    Please let me know your thoughts - thank you!

## 2025-07-30 ENCOUNTER — TELEPHONE (OUTPATIENT)
Dept: HEMATOLOGY/ONCOLOGY | Facility: HOSPITAL | Age: 45
End: 2025-07-30
Payer: COMMERCIAL

## 2025-07-30 NOTE — TELEPHONE ENCOUNTER
Per Lilly Null, PharmD:    Please call the patient back and let him know that he will be OK, but emphasize importance of looking at prescription bottle before taking medication or storing in separate area from wife's medications if they look similar/confusing.       The bupropion will be out of his system within 1-2 days along with the nausea/satiety symptoms and it does not significantly interact with any of his medications.  He can still take his dose of Prozac tonight if he would like.  They are both antidepressants but different mechanisms of action (Prozac reuptakes serotonin and has limited reuptake of norepinephrine or dopamine that bupropion works on) so I do not have concern for toxicity based on the one dose taken.      Called Thai back to relay the message as above. Understanding verbalized with teach back. He will call back if he has any further concerns or issues.

## 2025-07-30 NOTE — TELEPHONE ENCOUNTER
Thai calls today because he accidentally took one of his wife's medications erroneously. He was intending to take his iron pill but accidentally took his wife's IC Bupropion 100mg, which he states looks identical to his iron pill.    He states that he took this about 30 minutes ago. He felt nauseous but it has since passed.    He also takes Prozac at night and hopes that this will not interact with this or any of the other medications that he takes. So far today he has only had supplements and the Bupropion taken erroneously.    He also has a call out to his PCP.    Message sent to team.

## 2025-07-31 ENCOUNTER — PATIENT MESSAGE (OUTPATIENT)
Dept: HEMATOLOGY/ONCOLOGY | Facility: CLINIC | Age: 45
End: 2025-07-31
Payer: COMMERCIAL

## 2025-07-31 ENCOUNTER — NURSE TRIAGE (OUTPATIENT)
Dept: ADMISSION | Facility: HOSPITAL | Age: 45
End: 2025-07-31
Payer: COMMERCIAL

## 2025-07-31 NOTE — TELEPHONE ENCOUNTER
Pt's wife called this evening to report new bruising in the corner of his right eye.  She sent a pic in [facility].  No other s/s of bleeding or infection.  Received Cetuximab + FOLFIRI on 7/22. Last Plt count was 145 on 7/24.    Secure chat sent to on call Fellow.

## 2025-08-01 NOTE — TELEPHONE ENCOUNTER
Spouse notified that ok to continue to monitor eye and to call back if there is any change in vision or eye pain.

## 2025-08-05 ENCOUNTER — OFFICE VISIT (OUTPATIENT)
Dept: HEMATOLOGY/ONCOLOGY | Facility: CLINIC | Age: 45
End: 2025-08-05
Payer: COMMERCIAL

## 2025-08-05 ENCOUNTER — LAB (OUTPATIENT)
Dept: HEMATOLOGY/ONCOLOGY | Facility: CLINIC | Age: 45
End: 2025-08-05
Payer: COMMERCIAL

## 2025-08-05 VITALS
TEMPERATURE: 98.6 F | SYSTOLIC BLOOD PRESSURE: 116 MMHG | WEIGHT: 150.1 LBS | DIASTOLIC BLOOD PRESSURE: 79 MMHG | RESPIRATION RATE: 16 BRPM | OXYGEN SATURATION: 98 % | HEART RATE: 86 BPM | BODY MASS INDEX: 23.07 KG/M2

## 2025-08-05 DIAGNOSIS — C17.9 ADENOCARCINOMA OF SMALL BOWEL (MULTI): Primary | ICD-10-CM

## 2025-08-05 DIAGNOSIS — C17.9 ADENOCARCINOMA OF SMALL BOWEL (MULTI): ICD-10-CM

## 2025-08-05 LAB
ALBUMIN SERPL BCP-MCNC: 3.9 G/DL (ref 3.4–5)
ALP SERPL-CCNC: 106 U/L (ref 33–120)
ALT SERPL W P-5'-P-CCNC: 13 U/L (ref 10–52)
ANION GAP SERPL CALC-SCNC: 12 MMOL/L (ref 10–20)
AST SERPL W P-5'-P-CCNC: 17 U/L (ref 9–39)
BASOPHILS # BLD AUTO: 0.07 X10*3/UL (ref 0–0.1)
BASOPHILS NFR BLD AUTO: 1.8 %
BILIRUB SERPL-MCNC: 0.5 MG/DL (ref 0–1.2)
BUN SERPL-MCNC: 17 MG/DL (ref 6–23)
CALCIUM SERPL-MCNC: 8.8 MG/DL (ref 8.6–10.3)
CHLORIDE SERPL-SCNC: 102 MMOL/L (ref 98–107)
CO2 SERPL-SCNC: 29 MMOL/L (ref 21–32)
CREAT SERPL-MCNC: 0.87 MG/DL (ref 0.5–1.3)
EGFRCR SERPLBLD CKD-EPI 2021: >90 ML/MIN/1.73M*2
EOSINOPHIL # BLD AUTO: 0.2 X10*3/UL (ref 0–0.7)
EOSINOPHIL NFR BLD AUTO: 5.2 %
ERYTHROCYTE [DISTWIDTH] IN BLOOD BY AUTOMATED COUNT: 22.2 % (ref 11.5–14.5)
GLUCOSE SERPL-MCNC: 98 MG/DL (ref 74–99)
HCT VFR BLD AUTO: 26.5 % (ref 41–52)
HGB BLD-MCNC: 7.5 G/DL (ref 13.5–17.5)
IMM GRANULOCYTES # BLD AUTO: 0.01 X10*3/UL (ref 0–0.7)
IMM GRANULOCYTES NFR BLD AUTO: 0.3 % (ref 0–0.9)
LYMPHOCYTES # BLD AUTO: 0.88 X10*3/UL (ref 1.2–4.8)
LYMPHOCYTES NFR BLD AUTO: 22.9 %
MAGNESIUM SERPL-MCNC: 2.01 MG/DL (ref 1.6–2.4)
MCH RBC QN AUTO: 22.8 PG (ref 26–34)
MCHC RBC AUTO-ENTMCNC: 28.3 G/DL (ref 32–36)
MCV RBC AUTO: 81 FL (ref 80–100)
MONOCYTES # BLD AUTO: 0.42 X10*3/UL (ref 0.1–1)
MONOCYTES NFR BLD AUTO: 10.9 %
NEUTROPHILS # BLD AUTO: 2.26 X10*3/UL (ref 1.2–7.7)
NEUTROPHILS NFR BLD AUTO: 58.9 %
NRBC BLD-RTO: ABNORMAL /100{WBCS}
PLATELET # BLD AUTO: 197 X10*3/UL (ref 150–450)
POTASSIUM SERPL-SCNC: 3.7 MMOL/L (ref 3.5–5.3)
PROT SERPL-MCNC: 6.3 G/DL (ref 6.4–8.2)
RBC # BLD AUTO: 3.29 X10*6/UL (ref 4.5–5.9)
SODIUM SERPL-SCNC: 139 MMOL/L (ref 136–145)
WBC # BLD AUTO: 3.8 X10*3/UL (ref 4.4–11.3)

## 2025-08-05 PROCEDURE — 1036F TOBACCO NON-USER: CPT | Performed by: INTERNAL MEDICINE

## 2025-08-05 PROCEDURE — 83735 ASSAY OF MAGNESIUM: CPT

## 2025-08-05 PROCEDURE — 36415 COLL VENOUS BLD VENIPUNCTURE: CPT

## 2025-08-05 PROCEDURE — 85025 COMPLETE CBC W/AUTO DIFF WBC: CPT

## 2025-08-05 PROCEDURE — 84075 ASSAY ALKALINE PHOSPHATASE: CPT

## 2025-08-05 PROCEDURE — 99215 OFFICE O/P EST HI 40 MIN: CPT | Performed by: INTERNAL MEDICINE

## 2025-08-05 PROCEDURE — 3078F DIAST BP <80 MM HG: CPT | Performed by: INTERNAL MEDICINE

## 2025-08-05 PROCEDURE — 3074F SYST BP LT 130 MM HG: CPT | Performed by: INTERNAL MEDICINE

## 2025-08-05 RX ORDER — ALBUTEROL SULFATE 0.83 MG/ML
3 SOLUTION RESPIRATORY (INHALATION) AS NEEDED
OUTPATIENT
Start: 2025-09-18

## 2025-08-05 RX ORDER — DIPHENHYDRAMINE HYDROCHLORIDE 50 MG/ML
50 INJECTION, SOLUTION INTRAMUSCULAR; INTRAVENOUS AS NEEDED
OUTPATIENT
Start: 2025-09-04

## 2025-08-05 RX ORDER — MAGNESIUM SULFATE HEPTAHYDRATE 40 MG/ML
2 INJECTION, SOLUTION INTRAVENOUS ONCE AS NEEDED
OUTPATIENT
Start: 2025-09-18

## 2025-08-05 RX ORDER — DEXAMETHASONE 6 MG/1
12 TABLET ORAL ONCE
OUTPATIENT
Start: 2025-09-04

## 2025-08-05 RX ORDER — EPINEPHRINE 0.3 MG/.3ML
0.3 INJECTION SUBCUTANEOUS EVERY 5 MIN PRN
OUTPATIENT
Start: 2025-09-04

## 2025-08-05 RX ORDER — PROCHLORPERAZINE EDISYLATE 5 MG/ML
10 INJECTION INTRAMUSCULAR; INTRAVENOUS EVERY 6 HOURS PRN
OUTPATIENT
Start: 2025-09-04

## 2025-08-05 RX ORDER — DEXAMETHASONE 6 MG/1
12 TABLET ORAL ONCE
OUTPATIENT
Start: 2025-09-18

## 2025-08-05 RX ORDER — MAGNESIUM SULFATE HEPTAHYDRATE 40 MG/ML
4 INJECTION, SOLUTION INTRAVENOUS ONCE AS NEEDED
OUTPATIENT
Start: 2025-09-04

## 2025-08-05 RX ORDER — ATROPINE SULFATE 0.1 MG/ML
0.4 INJECTION INTRAVENOUS
OUTPATIENT
Start: 2025-09-18

## 2025-08-05 RX ORDER — ALBUTEROL SULFATE 0.83 MG/ML
3 SOLUTION RESPIRATORY (INHALATION) AS NEEDED
OUTPATIENT
Start: 2025-09-04

## 2025-08-05 RX ORDER — MAGNESIUM SULFATE HEPTAHYDRATE 40 MG/ML
2 INJECTION, SOLUTION INTRAVENOUS ONCE AS NEEDED
OUTPATIENT
Start: 2025-09-04

## 2025-08-05 RX ORDER — HEPARIN SODIUM,PORCINE/PF 10 UNIT/ML
50 SYRINGE (ML) INTRAVENOUS AS NEEDED
Status: CANCELLED | OUTPATIENT
Start: 2025-08-05

## 2025-08-05 RX ORDER — EPINEPHRINE 0.3 MG/.3ML
0.3 INJECTION SUBCUTANEOUS EVERY 5 MIN PRN
OUTPATIENT
Start: 2025-09-18

## 2025-08-05 RX ORDER — FAMOTIDINE 10 MG/ML
20 INJECTION, SOLUTION INTRAVENOUS ONCE AS NEEDED
OUTPATIENT
Start: 2025-09-18

## 2025-08-05 RX ORDER — MAGNESIUM SULFATE HEPTAHYDRATE 40 MG/ML
4 INJECTION, SOLUTION INTRAVENOUS ONCE AS NEEDED
OUTPATIENT
Start: 2025-09-18

## 2025-08-05 RX ORDER — ATROPINE SULFATE 0.1 MG/ML
0.4 INJECTION INTRAVENOUS
OUTPATIENT
Start: 2025-09-04

## 2025-08-05 RX ORDER — PROCHLORPERAZINE MALEATE 5 MG
10 TABLET ORAL EVERY 6 HOURS PRN
OUTPATIENT
Start: 2025-09-04

## 2025-08-05 RX ORDER — PALONOSETRON 0.05 MG/ML
0.25 INJECTION, SOLUTION INTRAVENOUS ONCE
OUTPATIENT
Start: 2025-09-18

## 2025-08-05 RX ORDER — HEPARIN 100 UNIT/ML
500 SYRINGE INTRAVENOUS AS NEEDED
Status: CANCELLED | OUTPATIENT
Start: 2025-08-05

## 2025-08-05 RX ORDER — PALONOSETRON 0.05 MG/ML
0.25 INJECTION, SOLUTION INTRAVENOUS ONCE
OUTPATIENT
Start: 2025-09-04

## 2025-08-05 RX ORDER — HEPARIN 100 UNIT/ML
500 SYRINGE INTRAVENOUS AS NEEDED
Status: DISCONTINUED | OUTPATIENT
Start: 2025-08-05 | End: 2025-08-05 | Stop reason: HOSPADM

## 2025-08-05 RX ORDER — FAMOTIDINE 10 MG/ML
20 INJECTION, SOLUTION INTRAVENOUS ONCE AS NEEDED
OUTPATIENT
Start: 2025-09-04

## 2025-08-05 RX ORDER — PROCHLORPERAZINE EDISYLATE 5 MG/ML
10 INJECTION INTRAMUSCULAR; INTRAVENOUS EVERY 6 HOURS PRN
OUTPATIENT
Start: 2025-09-18

## 2025-08-05 RX ORDER — DIPHENHYDRAMINE HYDROCHLORIDE 50 MG/ML
50 INJECTION, SOLUTION INTRAMUSCULAR; INTRAVENOUS AS NEEDED
OUTPATIENT
Start: 2025-09-18

## 2025-08-05 RX ORDER — PROCHLORPERAZINE MALEATE 5 MG
10 TABLET ORAL EVERY 6 HOURS PRN
OUTPATIENT
Start: 2025-09-18

## 2025-08-05 ASSESSMENT — PAIN SCALES - GENERAL: PAINLEVEL_OUTOF10: 0-NO PAIN

## 2025-08-05 NOTE — PROGRESS NOTES
.Patient ID: Thai Cristobal is a 44 y.o. male.  Referring Physician: No referring provider defined for this encounter.  Primary Care Provider: Charlotte Yan DO      Chief complaint: SB Adenocarcinoma     HPI  This is a 44-year-old male with a known history of Crohn's disease patient is status post partial right colectomy small bowel resection on repair of a colonic fistula on 5/28/2024. Pathology he had a neuroendocrine tumor grade 3 well differentiated, with Ki67 30-40%. He did follow-up with oncology at [facility]. He had a PET-Ga68 which showed Postsurgical changes from right hemicolectomy and distal small bowel with no evidence of focal abnormal gallium 68 dotatate uptake.   Patient's only complaint is change in color of stools at this point, he is eating and drinking less and he has no abdominal pain. No diarrhea and no flushing.  Repeated liver biopsy showed: high grade malignancy, likely of colonic origin, showing focal adenocarcinoma-like features and focal neuroendocrine differentiation with Ki67 95%  He started on FOLFOX and avastin and finished 1 cycles and tolerated well. No abdominal pain, no nausea or vomiting. No new symptoms. His labs showed leucopenia and we delayed C2.  He had C2 and repeated labs showed normal WBCs  He finished 10 cycles and tolerated well with HI on restaging scans after C8 and SD after last cycle   Due to the third allergic reaction after C10 even with desensitization we switched to FOLFIRI      SYSTEMIC TREATMENT RECEIVED: None       Subjective   Please refer to Notes above for complete History of present illness.          Review of Systems:   All 14 systems have been reviewed and were negative except for the HPI.       MEDICAL HISTORY  Past Medical History:   Diagnosis Date    Acute upper respiratory infection, unspecified 02/21/2018    Acute URI    Asthma     Cancer (Multi)     Cancer, metastatic to liver (Multi)     Crohn's disease (Multi)     Personal history of other  specified conditions 04/23/2018    History of vertigo    SBO (small bowel obstruction) (Multi) 01/25/2024       FAMILY HISTORY  Family History   Problem Relation Name Age of Onset    Thyroid disease Mother      Other (bladder cancer) Mother      Hypertension Father      Benign prostatic hyperplasia Father      Anxiety disorder Sister      No Known Problems Brother      No Known Problems Daughter      Heart failure Paternal Grandfather         TOBACCO HISTORY  Tobacco Use: Low Risk  (6/17/2025)    Patient History     Smoking Tobacco Use: Never     Smokeless Tobacco Use: Never     Passive Exposure: Never       SOCIAL HISTORY  Social Connections: Not on file        Outpatient Medication Profile:  Current Outpatient Medications on File Prior to Visit   Medication Sig Dispense Refill    ALPRAZolam (Xanax) 0.5 mg tablet Take 2 tablets (1 mg) by mouth 2 times a day as needed for anxiety. 120 tablet 0    cholecalciferol (Vitamin D-3) 25 MCG (1000 UT) capsule Take 1 capsule (25 mcg) by mouth once daily.      clindamycin (Cleocin T) 1 % lotion Apply topically to affected area twice daily. 60 mL 3    diphenhydrAMINE (BENADryl) 25 mg capsule Take 1 capsule (25 mg) by mouth as needed at bedtime for itching.      elderberry fruit 350 mg capsule Take 1 capsule by mouth once daily.      ferrous sulfate 325 (65 Fe) mg EC tablet Take 1 tablet by mouth 3 times daily (morning, midday, late afternoon). Do not crush, chew, or split. 90 tablet 6    flaxseed oiL 1,000 mg capsule Take 1 capsule (1,000 mg) by mouth once daily. (Patient not taking: Reported on 5/5/2025)      FLUoxetine (PROzac) 20 mg capsule Take 1 capsule (20 mg) by mouth once daily at bedtime. 90 capsule 1    FLUoxetine (PROzac) 40 mg capsule TAKE 1 CAPSULE BY MOUTH ONCE DAILY 90 capsule 1    hydrocortisone 1 % cream Apply topically to face, hands, feet, neck, back, and chest once daily at bedtime for rash prevention. 453.6 g 3    lisinopril 20 mg tablet TAKE 1 TABLET BY  MOUTH EVERY DAY 90 tablet 1    loperamide (Imodium) 2 mg capsule Take 2 capsules by mouth with the first episode of diarrhea, then take 1 capsule with each episode of loose stool thereafter. Maximum of 8 capsules per 24 hours. 30 capsule 3    minocycline (Dynacin) 100 mg tablet Take 1 tablet (100 mg) by mouth once every 24 hours. For rash prevention 28 tablet 3    omeprazole OTC (PriLOSEC OTC) 20 mg EC tablet Take 1 tablet (20 mg) by mouth once daily. Do not crush, chew, or split. 30 tablet 2    ondansetron (Zofran) 8 mg tablet Take 1 tablet (8 mg) by mouth every 8 hours if needed for nausea. 30 tablet 3    scopolamine (Transderm-Scop) 1 mg over 3 days patch 3 day Place 1 patch over 72 hours on the skin every 3rd day if needed (nausea). 3 patch 0    SUMAtriptan (Imitrex) 50 mg tablet TAKE 1 TABLET (50 MG) BY MOUTH 1 TIME IF NEEDED FOR MIGRAINE FOR UP TO 36 DOSES. 9 tablet 3    vitamin C-multivitamin-mineral (Emergen-C) 500 mg tablet,chewable Chew 1 tablet once daily.       Current Facility-Administered Medications on File Prior to Visit   Medication Dose Route Frequency Provider Last Rate Last Admin    heparin flush 100 unit/mL syringe 500 Units  500 Units intra-catheter PRN Nba Vargas MD   500 Units at 10/15/24 1019         Performance Status:  Symptomatic; fully ambulatory     Vitals and Measurements:   There were no vitals taken for this visit.      Physical Exam:   General: Appears well and in NAD  Eyes: PERRL, EOMI, clear sclera  ENMT: mucous membranes moist, no apparent injury, no lesions seen  Head/Neck: Neck supple, no apparent injury, thyroid without mass or tenderness, No JVD, trachea midline,   Thorax: Patent airways, CTAB, normal breath sounds with good chest expansion, thorax symmetric  Cardiovascular: Regular, rate and rhythm, no murmurs, 2+ equal pulses of the extremities, normal S 1and S 2  Gastrointestinal: Nondistended, soft, non-tender, no rebound tenderness or guarding, no masses palpable, no  organomegaly, +BS  Musculoskeletal: ROM intact, no joint swelling, normal strength  Extremities: normal extremities, no cyanosis edema, contusions or wounds, no clubbing  Neurological: alert and oriented x3, intact senses, motor, response and reflexes, normal strength  Lymphatic: No significant lymphadenopathy  Psychological: Appropriate mood and behavior  Skin: Warm and dry, no lesions, no rashes          Lab Results:  I have reviewed these laboratory results:     Lab Results   Component Value Date    WBC 3.8 (L) 08/05/2025    HGB 7.5 (L) 08/05/2025    HCT 26.5 (L) 08/05/2025    MCV 81 08/05/2025     08/05/2025         Chemistry    Lab Results   Component Value Date/Time     07/22/2025 0834    K 3.9 07/22/2025 0834     07/22/2025 0834    CO2 29 07/22/2025 0834    BUN 16 07/22/2025 0834    CREATININE 0.94 07/22/2025 0834    Lab Results   Component Value Date/Time    CALCIUM 9.0 07/22/2025 0834    ALKPHOS 104 07/22/2025 0834    AST 18 07/22/2025 0834    ALT 13 07/22/2025 0834    BILITOT 0.4 07/22/2025 0834            Lab Results   Component Value Date    TSH 3.17 04/18/2023        Radiology Result:  I have reviewed the latest Imaging in PACS and the findings are noted in this note. I discussed the results of the latest imaging with the patient. All previous imaging were reviewed at the time it was completed. Full records are available in the EMR for review as well.         NM PET CT net netspot    Result Date: 7/5/2024  Impression: 1. Postsurgical changes from right hemicolectomy and distal small bowel with no evidence of focal abnormal gallium 68 dotatate uptake within the region of documented neuroendocrine tumor to suggest residual disease. 2. Nonspecific, gallium 68 dotatate uptake within the pancreatic uncinate which can be seen with physiologic uptake.     I personally reviewed the images/study and I agree with the findings as stated by Resident Celso Ashford MD.   MACRO: None   Signed by:  Maco Villafana 7/5/2024 3:06 PM Dictation workstation:   [id number]        Pathology Results:  I have reviewed the full pathology report recorded in the EMR. The pertinent portions indicating diagnosis are listed here in the note. for details please refer to the full report recorded in the EMR.    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 Pathology he had a neuroendocrine tumor grade 3 well differentiated, with Ki67 30-40%. He did follow-up with oncology at [facility]. He had a PET-Ga68 which showed Postsurgical changes from right hemicolectomy and distal small bowel with no evidence of focal abnormal gallium 68 dotatate uptake. Patient's only complaint is change in color of stools at this point he is eating and drinking and he has no abdominal pain. No diarrhea and no flushing. She had CT AP on 08/09/2024 at outside institution which showed new small liver nodules. 09/04/2024: MRI w [facility] showed Multiple T1 hypointense and T2 hyperintense lesions throughout the liver, and mesenteric LNS. PET-FDG showed FDG avid mesenteric kylie and liver metastases.   Repeated liver biopsy showed: high grade malignancy, likely of colonic origin, showing focal adenocarcinoma-like features and focal neuroendocrine differentiation with Ki67 95%  He started on FOLFOX and avastin and finished 1 cycles and tolerated well. No abdominal pain, no nausea or vomiting. No new symptoms. His labs showed leucopenia and keep delaying C2.  After C3 restaging scans on 12/06/2024: Showed OK mainly in liver lesions and stable mesenteric LNS  Last labs were normal  and he is feeling fine today   Restaging scans after C8 showed OK  He finished 10 cycles  Had the third allergic  reaction after C10 even with desensitization  Restaging scans showed stable liver lesions and no new metastatic lesions   We held FOLFOX plus bevacizumab and will start FOLFIRI plus bevacizumab   He started FOLFIRI on 04/15/2025  Finished 6 cycles  Restaging scans showed SD  The only complaint is the facial rash and is stable with minocycline     Plan:  1- C7 FOLFIRI and continue to hold Bevacizumab due to trending down in Hb. Added cetuximab and minocycline for the rash   2-Restaging scans after 8 cycles for follow up   3-Resume iron tablets  4-RTC in 2 weeks for follow up         DISCLAIMER:   In preparing for this visit and writing this note, I reviewed all the previous electronic medical records (labs, imaging and medical charts) of the patient available in the physician portal. Significant findings which helped in decision making are recorded  in this chart.     The plan was discussed with the patient. We gave him ample opportunities to ask questions. All questions were answered to his satisfaction and he verbalized understanding.       Nba Vargas M.D.   and Director of the Neuroendocrine Tumor Program  Gastrointestinal Medical Oncology  Department of Hematology and Oncology  [facility], [place] [address]  Phone (Office): [phone]  Fax:  [phone]   Email: [email or web address]  Learn more about [facility] Comprehensive Neuroendocrine Tumor Program: Click Here  Learn more about [place] [employer]: [email or web address]

## 2025-08-07 ENCOUNTER — INFUSION (OUTPATIENT)
Dept: HEMATOLOGY/ONCOLOGY | Facility: CLINIC | Age: 45
End: 2025-08-07
Payer: COMMERCIAL

## 2025-08-07 VITALS
HEIGHT: 68 IN | TEMPERATURE: 98.4 F | RESPIRATION RATE: 16 BRPM | BODY MASS INDEX: 23.59 KG/M2 | WEIGHT: 155.65 LBS | DIASTOLIC BLOOD PRESSURE: 77 MMHG | SYSTOLIC BLOOD PRESSURE: 125 MMHG | HEART RATE: 80 BPM | OXYGEN SATURATION: 97 %

## 2025-08-07 DIAGNOSIS — C17.9 ADENOCARCINOMA OF SMALL BOWEL (MULTI): ICD-10-CM

## 2025-08-07 PROCEDURE — 96375 TX/PRO/DX INJ NEW DRUG ADDON: CPT | Mod: INF

## 2025-08-07 PROCEDURE — 2500000004 HC RX 250 GENERAL PHARMACY W/ HCPCS (ALT 636 FOR OP/ED): Performed by: INTERNAL MEDICINE

## 2025-08-07 PROCEDURE — 96415 CHEMO IV INFUSION ADDL HR: CPT

## 2025-08-07 PROCEDURE — 96417 CHEMO IV INFUS EACH ADDL SEQ: CPT

## 2025-08-07 PROCEDURE — 96367 TX/PROPH/DG ADDL SEQ IV INF: CPT

## 2025-08-07 PROCEDURE — 96366 THER/PROPH/DIAG IV INF ADDON: CPT | Mod: INF

## 2025-08-07 PROCEDURE — 96368 THER/DIAG CONCURRENT INF: CPT

## 2025-08-07 PROCEDURE — 96416 CHEMO PROLONG INFUSE W/PUMP: CPT

## 2025-08-07 PROCEDURE — 96413 CHEMO IV INFUSION 1 HR: CPT

## 2025-08-07 RX ORDER — MAGNESIUM SULFATE HEPTAHYDRATE 40 MG/ML
2 INJECTION, SOLUTION INTRAVENOUS ONCE AS NEEDED
Status: DISCONTINUED | OUTPATIENT
Start: 2025-08-07 | End: 2025-08-07 | Stop reason: HOSPADM

## 2025-08-07 RX ORDER — EPINEPHRINE 0.3 MG/.3ML
0.3 INJECTION SUBCUTANEOUS EVERY 5 MIN PRN
Status: DISCONTINUED | OUTPATIENT
Start: 2025-08-07 | End: 2025-08-07 | Stop reason: HOSPADM

## 2025-08-07 RX ORDER — PALONOSETRON 0.05 MG/ML
0.25 INJECTION, SOLUTION INTRAVENOUS ONCE
Status: COMPLETED | OUTPATIENT
Start: 2025-08-07 | End: 2025-08-07

## 2025-08-07 RX ORDER — ATROPINE SULFATE 0.4 MG/ML
0.4 INJECTION, SOLUTION ENDOTRACHEAL; INTRAMEDULLARY; INTRAMUSCULAR; INTRAVENOUS; SUBCUTANEOUS
Status: DISCONTINUED | OUTPATIENT
Start: 2025-08-07 | End: 2025-08-07 | Stop reason: HOSPADM

## 2025-08-07 RX ORDER — FAMOTIDINE 10 MG/ML
20 INJECTION, SOLUTION INTRAVENOUS ONCE AS NEEDED
Status: DISCONTINUED | OUTPATIENT
Start: 2025-08-07 | End: 2025-08-07 | Stop reason: HOSPADM

## 2025-08-07 RX ORDER — DIPHENHYDRAMINE HYDROCHLORIDE 50 MG/ML
50 INJECTION, SOLUTION INTRAMUSCULAR; INTRAVENOUS AS NEEDED
Status: DISCONTINUED | OUTPATIENT
Start: 2025-08-07 | End: 2025-08-07 | Stop reason: HOSPADM

## 2025-08-07 RX ORDER — DEXAMETHASONE 6 MG/1
12 TABLET ORAL ONCE
Status: COMPLETED | OUTPATIENT
Start: 2025-08-07 | End: 2025-08-07

## 2025-08-07 RX ORDER — ALBUTEROL SULFATE 0.83 MG/ML
3 SOLUTION RESPIRATORY (INHALATION) AS NEEDED
Status: DISCONTINUED | OUTPATIENT
Start: 2025-08-07 | End: 2025-08-07 | Stop reason: HOSPADM

## 2025-08-07 RX ORDER — PROCHLORPERAZINE EDISYLATE 5 MG/ML
10 INJECTION INTRAMUSCULAR; INTRAVENOUS EVERY 6 HOURS PRN
Status: DISCONTINUED | OUTPATIENT
Start: 2025-08-07 | End: 2025-08-07 | Stop reason: HOSPADM

## 2025-08-07 RX ORDER — PROCHLORPERAZINE MALEATE 10 MG
10 TABLET ORAL EVERY 6 HOURS PRN
Status: DISCONTINUED | OUTPATIENT
Start: 2025-08-07 | End: 2025-08-07 | Stop reason: HOSPADM

## 2025-08-07 RX ORDER — MAGNESIUM SULFATE HEPTAHYDRATE 40 MG/ML
4 INJECTION, SOLUTION INTRAVENOUS ONCE AS NEEDED
Status: DISCONTINUED | OUTPATIENT
Start: 2025-08-07 | End: 2025-08-07 | Stop reason: HOSPADM

## 2025-08-07 RX ADMIN — DIPHENHYDRAMINE HYDROCHLORIDE 50 MG: 50 INJECTION INTRAMUSCULAR; INTRAVENOUS at 08:58

## 2025-08-07 RX ADMIN — CETUXIMAB 900 MG: 2 SOLUTION INTRAVENOUS at 09:24

## 2025-08-07 RX ADMIN — DEXAMETHASONE 12 MG: 6 TABLET ORAL at 11:47

## 2025-08-07 RX ADMIN — ATROPINE SULFATE 0.4 MG: 0.4 INJECTION, SOLUTION INTRAVENOUS at 11:48

## 2025-08-07 RX ADMIN — IRINOTECAN HYDROCHLORIDE 270 MG: 20 INJECTION, SOLUTION INTRAVENOUS at 11:51

## 2025-08-07 RX ADMIN — DEXTROSE MONOHYDRATE 740 MG: 50 INJECTION, SOLUTION INTRAVENOUS at 11:51

## 2025-08-07 RX ADMIN — FLUOROURACIL 3500 MG: 50 INJECTION, SOLUTION INTRAVENOUS at 13:39

## 2025-08-07 RX ADMIN — PALONOSETRON 0.25 MG: 0.05 INJECTION, SOLUTION INTRAVENOUS at 11:47

## 2025-08-07 ASSESSMENT — PAIN SCALES - GENERAL: PAINLEVEL_OUTOF10: 0-NO PAIN

## 2025-08-07 NOTE — SIGNIFICANT EVENT

## 2025-08-09 ENCOUNTER — INFUSION (OUTPATIENT)
Dept: HEMATOLOGY/ONCOLOGY | Facility: HOSPITAL | Age: 45
End: 2025-08-09
Payer: COMMERCIAL

## 2025-08-09 VITALS
RESPIRATION RATE: 18 BRPM | DIASTOLIC BLOOD PRESSURE: 76 MMHG | SYSTOLIC BLOOD PRESSURE: 119 MMHG | BODY MASS INDEX: 23.69 KG/M2 | HEIGHT: 68 IN | OXYGEN SATURATION: 100 % | HEART RATE: 81 BPM | WEIGHT: 156.31 LBS | TEMPERATURE: 98.1 F

## 2025-08-09 DIAGNOSIS — C17.9 ADENOCARCINOMA OF SMALL BOWEL (MULTI): ICD-10-CM

## 2025-08-09 PROCEDURE — 2500000004 HC RX 250 GENERAL PHARMACY W/ HCPCS (ALT 636 FOR OP/ED): Performed by: INTERNAL MEDICINE

## 2025-08-09 PROCEDURE — 96523 IRRIG DRUG DELIVERY DEVICE: CPT

## 2025-08-09 RX ORDER — HEPARIN 100 UNIT/ML
500 SYRINGE INTRAVENOUS AS NEEDED
Status: DISCONTINUED | OUTPATIENT
Start: 2025-08-09 | End: 2025-08-09 | Stop reason: HOSPADM

## 2025-08-09 RX ORDER — HEPARIN SODIUM,PORCINE/PF 10 UNIT/ML
50 SYRINGE (ML) INTRAVENOUS AS NEEDED
OUTPATIENT
Start: 2025-08-09

## 2025-08-09 RX ORDER — HEPARIN 100 UNIT/ML
500 SYRINGE INTRAVENOUS AS NEEDED
OUTPATIENT
Start: 2025-08-09

## 2025-08-09 RX ADMIN — HEPARIN 500 UNITS: 100 SYRINGE at 11:53

## 2025-08-10 ENCOUNTER — NURSE TRIAGE (OUTPATIENT)
Dept: ADMISSION | Facility: HOSPITAL | Age: 45
End: 2025-08-10
Payer: COMMERCIAL

## 2025-08-11 ENCOUNTER — TELEPHONE (OUTPATIENT)
Dept: ADMISSION | Facility: HOSPITAL | Age: 45
End: 2025-08-11
Payer: COMMERCIAL

## 2025-08-12 DIAGNOSIS — C7B.8 SECONDARY NEUROENDOCRINE TUMOR OF LIVER: ICD-10-CM

## 2025-08-12 DIAGNOSIS — C17.9 ADENOCARCINOMA OF SMALL BOWEL (MULTI): ICD-10-CM

## 2025-08-12 RX ORDER — MINOCYCLINE HYDROCHLORIDE 100 MG/1
100 TABLET ORAL EVERY 24 HOURS
Qty: 28 TABLET | Refills: 3 | Status: SHIPPED | OUTPATIENT
Start: 2025-08-12 | End: 2025-08-15

## 2025-08-19 ENCOUNTER — INFUSION (OUTPATIENT)
Dept: HEMATOLOGY/ONCOLOGY | Facility: CLINIC | Age: 45
End: 2025-08-19
Payer: COMMERCIAL

## 2025-08-19 VITALS
RESPIRATION RATE: 16 BRPM | WEIGHT: 160.05 LBS | OXYGEN SATURATION: 97 % | BODY MASS INDEX: 24.6 KG/M2 | DIASTOLIC BLOOD PRESSURE: 78 MMHG | TEMPERATURE: 98.2 F | HEART RATE: 86 BPM | SYSTOLIC BLOOD PRESSURE: 128 MMHG

## 2025-08-19 DIAGNOSIS — C17.9 ADENOCARCINOMA OF SMALL BOWEL (MULTI): ICD-10-CM

## 2025-08-19 DIAGNOSIS — C7B.8 SECONDARY NEUROENDOCRINE TUMOR OF LIVER: ICD-10-CM

## 2025-08-19 LAB
ALBUMIN SERPL BCP-MCNC: 3.7 G/DL (ref 3.4–5)
ALP SERPL-CCNC: 116 U/L (ref 33–120)
ALT SERPL W P-5'-P-CCNC: 15 U/L (ref 10–52)
ANION GAP SERPL CALC-SCNC: 11 MMOL/L (ref 10–20)
AST SERPL W P-5'-P-CCNC: 17 U/L (ref 9–39)
BASOPHILS # BLD AUTO: 0.07 X10*3/UL (ref 0–0.1)
BASOPHILS NFR BLD AUTO: 1.5 %
BILIRUB SERPL-MCNC: 0.3 MG/DL (ref 0–1.2)
BUN SERPL-MCNC: 17 MG/DL (ref 6–23)
CALCIUM SERPL-MCNC: 8.7 MG/DL (ref 8.6–10.3)
CHLORIDE SERPL-SCNC: 104 MMOL/L (ref 98–107)
CO2 SERPL-SCNC: 28 MMOL/L (ref 21–32)
CREAT SERPL-MCNC: 0.81 MG/DL (ref 0.5–1.3)
EGFRCR SERPLBLD CKD-EPI 2021: >90 ML/MIN/1.73M*2
EOSINOPHIL # BLD AUTO: 0.07 X10*3/UL (ref 0–0.7)
EOSINOPHIL NFR BLD AUTO: 1.5 %
ERYTHROCYTE [DISTWIDTH] IN BLOOD BY AUTOMATED COUNT: 23.4 % (ref 11.5–14.5)
GLUCOSE SERPL-MCNC: 96 MG/DL (ref 74–99)
HCT VFR BLD AUTO: 28.3 % (ref 41–52)
HGB BLD-MCNC: 7.9 G/DL (ref 13.5–17.5)
IMM GRANULOCYTES # BLD AUTO: 0.01 X10*3/UL (ref 0–0.7)
IMM GRANULOCYTES NFR BLD AUTO: 0.2 % (ref 0–0.9)
LYMPHOCYTES # BLD AUTO: 0.98 X10*3/UL (ref 1.2–4.8)
LYMPHOCYTES NFR BLD AUTO: 21.2 %
MAGNESIUM SERPL-MCNC: 1.89 MG/DL (ref 1.6–2.4)
MCH RBC QN AUTO: 23.3 PG (ref 26–34)
MCHC RBC AUTO-ENTMCNC: 27.9 G/DL (ref 32–36)
MCV RBC AUTO: 84 FL (ref 80–100)
MONOCYTES # BLD AUTO: 0.42 X10*3/UL (ref 0.1–1)
MONOCYTES NFR BLD AUTO: 9.1 %
NEUTROPHILS # BLD AUTO: 3.07 X10*3/UL (ref 1.2–7.7)
NEUTROPHILS NFR BLD AUTO: 66.5 %
NRBC BLD-RTO: ABNORMAL /100{WBCS}
PLATELET # BLD AUTO: 200 X10*3/UL (ref 150–450)
POTASSIUM SERPL-SCNC: 4.4 MMOL/L (ref 3.5–5.3)
PROT SERPL-MCNC: 6 G/DL (ref 6.4–8.2)
RBC # BLD AUTO: 3.39 X10*6/UL (ref 4.5–5.9)
SODIUM SERPL-SCNC: 139 MMOL/L (ref 136–145)
WBC # BLD AUTO: 4.6 X10*3/UL (ref 4.4–11.3)

## 2025-08-19 PROCEDURE — 2500000004 HC RX 250 GENERAL PHARMACY W/ HCPCS (ALT 636 FOR OP/ED): Performed by: INTERNAL MEDICINE

## 2025-08-19 PROCEDURE — 96415 CHEMO IV INFUSION ADDL HR: CPT

## 2025-08-19 PROCEDURE — 83735 ASSAY OF MAGNESIUM: CPT

## 2025-08-19 PROCEDURE — 96368 THER/DIAG CONCURRENT INF: CPT

## 2025-08-19 PROCEDURE — 80053 COMPREHEN METABOLIC PANEL: CPT

## 2025-08-19 PROCEDURE — 96413 CHEMO IV INFUSION 1 HR: CPT

## 2025-08-19 PROCEDURE — 96416 CHEMO PROLONG INFUSE W/PUMP: CPT

## 2025-08-19 PROCEDURE — 96417 CHEMO IV INFUS EACH ADDL SEQ: CPT

## 2025-08-19 PROCEDURE — 85025 COMPLETE CBC W/AUTO DIFF WBC: CPT

## 2025-08-19 PROCEDURE — 96375 TX/PRO/DX INJ NEW DRUG ADDON: CPT | Mod: INF

## 2025-08-19 RX ORDER — ATROPINE SULFATE 0.4 MG/ML
0.4 INJECTION, SOLUTION ENDOTRACHEAL; INTRAMEDULLARY; INTRAMUSCULAR; INTRAVENOUS; SUBCUTANEOUS
Status: DISCONTINUED | OUTPATIENT
Start: 2025-08-19 | End: 2025-08-19 | Stop reason: HOSPADM

## 2025-08-19 RX ORDER — PROCHLORPERAZINE MALEATE 10 MG
10 TABLET ORAL EVERY 6 HOURS PRN
Status: DISCONTINUED | OUTPATIENT
Start: 2025-08-19 | End: 2025-08-19 | Stop reason: HOSPADM

## 2025-08-19 RX ORDER — HEPARIN 100 UNIT/ML
500 SYRINGE INTRAVENOUS AS NEEDED
Status: CANCELLED | OUTPATIENT
Start: 2025-08-19

## 2025-08-19 RX ORDER — DIPHENHYDRAMINE HYDROCHLORIDE 50 MG/ML
50 INJECTION, SOLUTION INTRAMUSCULAR; INTRAVENOUS AS NEEDED
Status: DISCONTINUED | OUTPATIENT
Start: 2025-08-19 | End: 2025-08-19 | Stop reason: HOSPADM

## 2025-08-19 RX ORDER — PROCHLORPERAZINE EDISYLATE 5 MG/ML
10 INJECTION INTRAMUSCULAR; INTRAVENOUS EVERY 6 HOURS PRN
Status: DISCONTINUED | OUTPATIENT
Start: 2025-08-19 | End: 2025-08-19 | Stop reason: HOSPADM

## 2025-08-19 RX ORDER — DEXAMETHASONE 6 MG/1
12 TABLET ORAL ONCE
Status: COMPLETED | OUTPATIENT
Start: 2025-08-19 | End: 2025-08-19

## 2025-08-19 RX ORDER — HEPARIN SODIUM,PORCINE/PF 10 UNIT/ML
50 SYRINGE (ML) INTRAVENOUS AS NEEDED
Status: CANCELLED | OUTPATIENT
Start: 2025-08-19

## 2025-08-19 RX ORDER — FAMOTIDINE 10 MG/ML
20 INJECTION, SOLUTION INTRAVENOUS ONCE AS NEEDED
Status: DISCONTINUED | OUTPATIENT
Start: 2025-08-19 | End: 2025-08-19 | Stop reason: HOSPADM

## 2025-08-19 RX ORDER — PALONOSETRON 0.05 MG/ML
0.25 INJECTION, SOLUTION INTRAVENOUS ONCE
Status: COMPLETED | OUTPATIENT
Start: 2025-08-19 | End: 2025-08-19

## 2025-08-19 RX ORDER — ALBUTEROL SULFATE 0.83 MG/ML
3 SOLUTION RESPIRATORY (INHALATION) AS NEEDED
Status: DISCONTINUED | OUTPATIENT
Start: 2025-08-19 | End: 2025-08-19 | Stop reason: HOSPADM

## 2025-08-19 RX ORDER — EPINEPHRINE 0.3 MG/.3ML
0.3 INJECTION SUBCUTANEOUS EVERY 5 MIN PRN
Status: DISCONTINUED | OUTPATIENT
Start: 2025-08-19 | End: 2025-08-19 | Stop reason: HOSPADM

## 2025-08-19 RX ADMIN — DEXAMETHASONE 12 MG: 6 TABLET ORAL at 14:27

## 2025-08-19 RX ADMIN — ATROPINE SULFATE 0.4 MG: 0.4 INJECTION, SOLUTION INTRAVENOUS at 15:13

## 2025-08-19 RX ADMIN — DIPHENHYDRAMINE HYDROCHLORIDE 50 MG: 50 INJECTION INTRAMUSCULAR; INTRAVENOUS at 12:18

## 2025-08-19 RX ADMIN — CETUXIMAB 900 MG: 2 SOLUTION INTRAVENOUS at 12:58

## 2025-08-19 RX ADMIN — IRINOTECAN HYDROCHLORIDE 270 MG: 20 INJECTION, SOLUTION INTRAVENOUS at 15:13

## 2025-08-19 RX ADMIN — DEXTROSE MONOHYDRATE 740 MG: 50 INJECTION, SOLUTION INTRAVENOUS at 15:12

## 2025-08-19 RX ADMIN — PALONOSETRON 0.25 MG: 0.05 INJECTION, SOLUTION INTRAVENOUS at 12:05

## 2025-08-19 RX ADMIN — FLUOROURACIL 3500 MG: 50 INJECTION, SOLUTION INTRAVENOUS at 16:55

## 2025-08-19 ASSESSMENT — PAIN SCALES - GENERAL: PAINLEVEL_OUTOF10: 0-NO PAIN

## 2025-08-21 ENCOUNTER — INFUSION (OUTPATIENT)
Dept: HEMATOLOGY/ONCOLOGY | Facility: CLINIC | Age: 45
End: 2025-08-21
Payer: COMMERCIAL

## 2025-08-21 VITALS
WEIGHT: 169.09 LBS | SYSTOLIC BLOOD PRESSURE: 125 MMHG | BODY MASS INDEX: 25.99 KG/M2 | HEART RATE: 86 BPM | DIASTOLIC BLOOD PRESSURE: 79 MMHG | OXYGEN SATURATION: 98 % | TEMPERATURE: 98.8 F | RESPIRATION RATE: 18 BRPM

## 2025-08-21 DIAGNOSIS — C7B.8 SECONDARY NEUROENDOCRINE TUMOR OF LIVER: ICD-10-CM

## 2025-08-21 DIAGNOSIS — C17.9 ADENOCARCINOMA OF SMALL BOWEL (MULTI): ICD-10-CM

## 2025-08-21 RX ORDER — HEPARIN 100 UNIT/ML
500 SYRINGE INTRAVENOUS AS NEEDED
OUTPATIENT
Start: 2025-08-21

## 2025-08-21 RX ORDER — HEPARIN SODIUM,PORCINE/PF 10 UNIT/ML
50 SYRINGE (ML) INTRAVENOUS AS NEEDED
OUTPATIENT
Start: 2025-08-21

## 2025-08-21 ASSESSMENT — PAIN SCALES - GENERAL: PAINLEVEL_OUTOF10: 0-NO PAIN

## 2025-08-29 ENCOUNTER — HOSPITAL ENCOUNTER (OUTPATIENT)
Dept: RADIOLOGY | Facility: HOSPITAL | Age: 45
Discharge: HOME | End: 2025-08-29
Payer: COMMERCIAL

## 2025-08-29 DIAGNOSIS — C17.9 ADENOCARCINOMA OF SMALL BOWEL (MULTI): ICD-10-CM

## 2025-08-29 PROCEDURE — 74177 CT ABD & PELVIS W/CONTRAST: CPT

## 2025-08-29 PROCEDURE — 2550000001 HC RX 255 CONTRASTS: Performed by: INTERNAL MEDICINE

## 2025-08-29 RX ADMIN — IOHEXOL 75 ML: 350 INJECTION, SOLUTION INTRAVENOUS at 08:13

## 2025-09-02 ENCOUNTER — INFUSION (OUTPATIENT)
Dept: HEMATOLOGY/ONCOLOGY | Facility: CLINIC | Age: 45
End: 2025-09-02
Payer: COMMERCIAL

## 2025-09-02 ENCOUNTER — LAB (OUTPATIENT)
Dept: HEMATOLOGY/ONCOLOGY | Facility: CLINIC | Age: 45
End: 2025-09-02
Payer: COMMERCIAL

## 2025-09-02 ENCOUNTER — OFFICE VISIT (OUTPATIENT)
Dept: HEMATOLOGY/ONCOLOGY | Facility: CLINIC | Age: 45
End: 2025-09-02
Payer: COMMERCIAL

## 2025-09-02 VITALS
SYSTOLIC BLOOD PRESSURE: 121 MMHG | HEART RATE: 74 BPM | WEIGHT: 156.7 LBS | OXYGEN SATURATION: 98 % | BODY MASS INDEX: 24.08 KG/M2 | RESPIRATION RATE: 16 BRPM | DIASTOLIC BLOOD PRESSURE: 68 MMHG | TEMPERATURE: 97.7 F

## 2025-09-02 DIAGNOSIS — C17.9 ADENOCARCINOMA OF SMALL BOWEL (MULTI): ICD-10-CM

## 2025-09-02 LAB
ALBUMIN SERPL BCP-MCNC: 3.8 G/DL (ref 3.4–5)
ALP SERPL-CCNC: 128 U/L (ref 33–120)
ALT SERPL W P-5'-P-CCNC: 20 U/L (ref 10–52)
ANION GAP SERPL CALC-SCNC: 13 MMOL/L (ref 10–20)
AST SERPL W P-5'-P-CCNC: 21 U/L (ref 9–39)
BASOPHILS # BLD AUTO: 0.08 X10*3/UL (ref 0–0.1)
BASOPHILS NFR BLD AUTO: 1.6 %
BILIRUB SERPL-MCNC: 0.4 MG/DL (ref 0–1.2)
BUN SERPL-MCNC: 13 MG/DL (ref 6–23)
CALCIUM SERPL-MCNC: 8.8 MG/DL (ref 8.6–10.3)
CEA SERPL-MCNC: 6.9 UG/L
CHLORIDE SERPL-SCNC: 106 MMOL/L (ref 98–107)
CO2 SERPL-SCNC: 26 MMOL/L (ref 21–32)
CREAT SERPL-MCNC: 0.87 MG/DL (ref 0.5–1.3)
EGFRCR SERPLBLD CKD-EPI 2021: >90 ML/MIN/1.73M*2
EOSINOPHIL # BLD AUTO: 0.11 X10*3/UL (ref 0–0.7)
EOSINOPHIL NFR BLD AUTO: 2.1 %
ERYTHROCYTE [DISTWIDTH] IN BLOOD BY AUTOMATED COUNT: 24.9 % (ref 11.5–14.5)
GLUCOSE SERPL-MCNC: 111 MG/DL (ref 74–99)
HCT VFR BLD AUTO: 32.6 % (ref 41–52)
HGB BLD-MCNC: 9.2 G/DL (ref 13.5–17.5)
IMM GRANULOCYTES # BLD AUTO: 0 X10*3/UL (ref 0–0.7)
IMM GRANULOCYTES NFR BLD AUTO: 0 % (ref 0–0.9)
LYMPHOCYTES # BLD AUTO: 0.92 X10*3/UL (ref 1.2–4.8)
LYMPHOCYTES NFR BLD AUTO: 17.9 %
MAGNESIUM SERPL-MCNC: 1.92 MG/DL (ref 1.6–2.4)
MCH RBC QN AUTO: 24.1 PG (ref 26–34)
MCHC RBC AUTO-ENTMCNC: 28.2 G/DL (ref 32–36)
MCV RBC AUTO: 86 FL (ref 80–100)
MONOCYTES # BLD AUTO: 0.6 X10*3/UL (ref 0.1–1)
MONOCYTES NFR BLD AUTO: 11.7 %
NEUTROPHILS # BLD AUTO: 3.43 X10*3/UL (ref 1.2–7.7)
NEUTROPHILS NFR BLD AUTO: 66.7 %
NRBC BLD-RTO: ABNORMAL /100{WBCS}
PLATELET # BLD AUTO: 202 X10*3/UL (ref 150–450)
POTASSIUM SERPL-SCNC: 3.6 MMOL/L (ref 3.5–5.3)
PROT SERPL-MCNC: 6.3 G/DL (ref 6.4–8.2)
RBC # BLD AUTO: 3.81 X10*6/UL (ref 4.5–5.9)
SODIUM SERPL-SCNC: 141 MMOL/L (ref 136–145)
WBC # BLD AUTO: 5.1 X10*3/UL (ref 4.4–11.3)

## 2025-09-02 PROCEDURE — 2500000004 HC RX 250 GENERAL PHARMACY W/ HCPCS (ALT 636 FOR OP/ED): Performed by: INTERNAL MEDICINE

## 2025-09-02 PROCEDURE — 80053 COMPREHEN METABOLIC PANEL: CPT

## 2025-09-02 PROCEDURE — 85025 COMPLETE CBC W/AUTO DIFF WBC: CPT

## 2025-09-02 PROCEDURE — 1036F TOBACCO NON-USER: CPT | Performed by: INTERNAL MEDICINE

## 2025-09-02 PROCEDURE — 3074F SYST BP LT 130 MM HG: CPT | Performed by: INTERNAL MEDICINE

## 2025-09-02 PROCEDURE — 99215 OFFICE O/P EST HI 40 MIN: CPT | Performed by: INTERNAL MEDICINE

## 2025-09-02 PROCEDURE — 3078F DIAST BP <80 MM HG: CPT | Performed by: INTERNAL MEDICINE

## 2025-09-02 PROCEDURE — 83735 ASSAY OF MAGNESIUM: CPT

## 2025-09-02 PROCEDURE — 36415 COLL VENOUS BLD VENIPUNCTURE: CPT

## 2025-09-02 PROCEDURE — 82378 CARCINOEMBRYONIC ANTIGEN: CPT

## 2025-09-02 PROCEDURE — 36591 DRAW BLOOD OFF VENOUS DEVICE: CPT

## 2025-09-02 RX ORDER — HEPARIN 100 UNIT/ML
500 SYRINGE INTRAVENOUS AS NEEDED
OUTPATIENT
Start: 2025-09-02

## 2025-09-02 RX ORDER — HEPARIN SODIUM,PORCINE/PF 10 UNIT/ML
50 SYRINGE (ML) INTRAVENOUS AS NEEDED
OUTPATIENT
Start: 2025-09-02

## 2025-09-02 RX ORDER — HEPARIN 100 UNIT/ML
500 SYRINGE INTRAVENOUS AS NEEDED
Status: DISCONTINUED | OUTPATIENT
Start: 2025-09-02 | End: 2025-09-02 | Stop reason: HOSPADM

## 2025-09-02 RX ADMIN — HEPARIN 500 UNITS: 100 SYRINGE at 09:59

## 2025-09-02 ASSESSMENT — PAIN SCALES - GENERAL: PAINLEVEL_OUTOF10: 0-NO PAIN

## 2025-09-04 ENCOUNTER — PATIENT MESSAGE (OUTPATIENT)
Dept: HEMATOLOGY/ONCOLOGY | Facility: CLINIC | Age: 45
End: 2025-09-04
Payer: COMMERCIAL

## 2025-09-04 ENCOUNTER — APPOINTMENT (OUTPATIENT)
Dept: HEMATOLOGY/ONCOLOGY | Facility: CLINIC | Age: 45
End: 2025-09-04
Payer: COMMERCIAL

## 2025-09-04 ENCOUNTER — SOCIAL WORK (OUTPATIENT)
Dept: HEMATOLOGY/ONCOLOGY | Facility: CLINIC | Age: 45
End: 2025-09-04
Payer: COMMERCIAL

## 2025-09-18 ENCOUNTER — APPOINTMENT (OUTPATIENT)
Dept: HEMATOLOGY/ONCOLOGY | Facility: CLINIC | Age: 45
End: 2025-09-18
Payer: COMMERCIAL